# Patient Record
Sex: FEMALE | Race: WHITE | Employment: UNEMPLOYED | URBAN - METROPOLITAN AREA
[De-identification: names, ages, dates, MRNs, and addresses within clinical notes are randomized per-mention and may not be internally consistent; named-entity substitution may affect disease eponyms.]

---

## 2017-03-09 ENCOUNTER — HOSPITAL ENCOUNTER (EMERGENCY)
Age: 22
Discharge: HOME | End: 2017-03-09
Attending: EMERGENCY MEDICINE
Payer: MEDICAID

## 2017-03-09 VITALS
DIASTOLIC BLOOD PRESSURE: 80 MMHG | SYSTOLIC BLOOD PRESSURE: 121 MMHG | RESPIRATION RATE: 18 BRPM | HEART RATE: 76 BPM | HEIGHT: 70 IN | WEIGHT: 293 LBS | BODY MASS INDEX: 41.95 KG/M2 | TEMPERATURE: 98.3 F | OXYGEN SATURATION: 96 %

## 2017-03-09 DIAGNOSIS — R10.2 PELVIC PAIN: Primary | ICD-10-CM

## 2017-03-09 LAB
ALBUMIN SERPL BCP-MCNC: 3.4 G/DL (ref 3.5–5)
ALBUMIN/GLOB SERPL: 1 {RATIO} (ref 1.1–2.2)
ALP SERPL-CCNC: 62 U/L (ref 45–117)
ALT SERPL-CCNC: 63 U/L (ref 12–78)
ANION GAP BLD CALC-SCNC: 8 MMOL/L (ref 5–15)
APPEARANCE UR: CLEAR
AST SERPL W P-5'-P-CCNC: 26 U/L (ref 15–37)
BACTERIA URNS QL MICRO: NEGATIVE /HPF
BASOPHILS # BLD AUTO: 0 K/UL (ref 0–0.1)
BASOPHILS # BLD: 0 % (ref 0–1)
BILIRUB SERPL-MCNC: 0.6 MG/DL (ref 0.2–1)
BILIRUB UR QL: NEGATIVE
BUN SERPL-MCNC: 12 MG/DL (ref 6–20)
BUN/CREAT SERPL: 13 (ref 12–20)
CALCIUM SERPL-MCNC: 8.7 MG/DL (ref 8.5–10.1)
CHLORIDE SERPL-SCNC: 105 MMOL/L (ref 97–108)
CLUE CELLS VAG QL WET PREP: NORMAL
CO2 SERPL-SCNC: 28 MMOL/L (ref 21–32)
COLOR UR: ABNORMAL
CREAT SERPL-MCNC: 0.95 MG/DL (ref 0.55–1.02)
EOSINOPHIL # BLD: 0.2 K/UL (ref 0–0.4)
EOSINOPHIL NFR BLD: 2 % (ref 0–7)
EPITH CASTS URNS QL MICRO: ABNORMAL /LPF
ERYTHROCYTE [DISTWIDTH] IN BLOOD BY AUTOMATED COUNT: 13.1 % (ref 11.5–14.5)
GLOBULIN SER CALC-MCNC: 3.3 G/DL (ref 2–4)
GLUCOSE SERPL-MCNC: 107 MG/DL (ref 65–100)
GLUCOSE UR STRIP.AUTO-MCNC: NEGATIVE MG/DL
HCG UR QL: NEGATIVE
HCT VFR BLD AUTO: 40.6 % (ref 35–47)
HGB BLD-MCNC: 13.3 G/DL (ref 11.5–16)
HGB UR QL STRIP: NEGATIVE
KETONES UR QL STRIP.AUTO: NEGATIVE MG/DL
KOH PREP SPEC: NORMAL
LEUKOCYTE ESTERASE UR QL STRIP.AUTO: ABNORMAL
LYMPHOCYTES # BLD AUTO: 23 % (ref 12–49)
LYMPHOCYTES # BLD: 2 K/UL (ref 0.8–3.5)
MCH RBC QN AUTO: 28.5 PG (ref 26–34)
MCHC RBC AUTO-ENTMCNC: 32.8 G/DL (ref 30–36.5)
MCV RBC AUTO: 86.9 FL (ref 80–99)
MONOCYTES # BLD: 0.5 K/UL (ref 0–1)
MONOCYTES NFR BLD AUTO: 6 % (ref 5–13)
NEUTS SEG # BLD: 6 K/UL (ref 1.8–8)
NEUTS SEG NFR BLD AUTO: 69 % (ref 32–75)
NITRITE UR QL STRIP.AUTO: NEGATIVE
PH UR STRIP: 7 [PH] (ref 5–8)
PLATELET # BLD AUTO: 249 K/UL (ref 150–400)
POTASSIUM SERPL-SCNC: 4 MMOL/L (ref 3.5–5.1)
PROT SERPL-MCNC: 6.7 G/DL (ref 6.4–8.2)
PROT UR STRIP-MCNC: NEGATIVE MG/DL
RBC # BLD AUTO: 4.67 M/UL (ref 3.8–5.2)
RBC #/AREA URNS HPF: ABNORMAL /HPF (ref 0–5)
SERVICE CMNT-IMP: NORMAL
SODIUM SERPL-SCNC: 141 MMOL/L (ref 136–145)
SP GR UR REFRACTOMETRY: 1.02 (ref 1–1.03)
T VAGINALIS VAG QL WET PREP: NORMAL
UA: UC IF INDICATED,UAUC: ABNORMAL
UROBILINOGEN UR QL STRIP.AUTO: 0.2 EU/DL (ref 0.2–1)
WBC # BLD AUTO: 8.7 K/UL (ref 3.6–11)
WBC URNS QL MICRO: ABNORMAL /HPF (ref 0–4)

## 2017-03-09 PROCEDURE — 85025 COMPLETE CBC W/AUTO DIFF WBC: CPT | Performed by: PHYSICIAN ASSISTANT

## 2017-03-09 PROCEDURE — 74011000250 HC RX REV CODE- 250: Performed by: PHYSICIAN ASSISTANT

## 2017-03-09 PROCEDURE — 81025 URINE PREGNANCY TEST: CPT

## 2017-03-09 PROCEDURE — 87210 SMEAR WET MOUNT SALINE/INK: CPT | Performed by: PHYSICIAN ASSISTANT

## 2017-03-09 PROCEDURE — 36415 COLL VENOUS BLD VENIPUNCTURE: CPT | Performed by: PHYSICIAN ASSISTANT

## 2017-03-09 PROCEDURE — 81001 URINALYSIS AUTO W/SCOPE: CPT | Performed by: EMERGENCY MEDICINE

## 2017-03-09 PROCEDURE — 80053 COMPREHEN METABOLIC PANEL: CPT | Performed by: PHYSICIAN ASSISTANT

## 2017-03-09 PROCEDURE — 87491 CHLMYD TRACH DNA AMP PROBE: CPT | Performed by: PHYSICIAN ASSISTANT

## 2017-03-09 PROCEDURE — 74011250636 HC RX REV CODE- 250/636: Performed by: PHYSICIAN ASSISTANT

## 2017-03-09 PROCEDURE — 74011250637 HC RX REV CODE- 250/637: Performed by: PHYSICIAN ASSISTANT

## 2017-03-09 PROCEDURE — 96374 THER/PROPH/DIAG INJ IV PUSH: CPT

## 2017-03-09 PROCEDURE — 96372 THER/PROPH/DIAG INJ SC/IM: CPT

## 2017-03-09 PROCEDURE — 99284 EMERGENCY DEPT VISIT MOD MDM: CPT

## 2017-03-09 RX ORDER — KETOROLAC TROMETHAMINE 30 MG/ML
30 INJECTION, SOLUTION INTRAMUSCULAR; INTRAVENOUS
Status: COMPLETED | OUTPATIENT
Start: 2017-03-09 | End: 2017-03-09

## 2017-03-09 RX ORDER — AZITHROMYCIN 250 MG/1
1000 TABLET, FILM COATED ORAL
Status: COMPLETED | OUTPATIENT
Start: 2017-03-09 | End: 2017-03-09

## 2017-03-09 RX ORDER — ONDANSETRON 8 MG/1
8 TABLET, ORALLY DISINTEGRATING ORAL
Qty: 6 TAB | Refills: 0 | Status: SHIPPED | OUTPATIENT
Start: 2017-03-09 | End: 2017-04-21

## 2017-03-09 RX ORDER — NAPROXEN 500 MG/1
500 TABLET ORAL
Qty: 20 TAB | Refills: 0 | Status: SHIPPED | OUTPATIENT
Start: 2017-03-09 | End: 2017-03-19

## 2017-03-09 RX ORDER — ONDANSETRON 2 MG/ML
4 INJECTION INTRAMUSCULAR; INTRAVENOUS
Status: COMPLETED | OUTPATIENT
Start: 2017-03-09 | End: 2017-03-09

## 2017-03-09 RX ADMIN — AZITHROMYCIN 1000 MG: 250 TABLET, FILM COATED ORAL at 13:25

## 2017-03-09 RX ADMIN — ONDANSETRON HYDROCHLORIDE 4 MG: 2 INJECTION, SOLUTION INTRAMUSCULAR; INTRAVENOUS at 12:51

## 2017-03-09 RX ADMIN — LIDOCAINE HYDROCHLORIDE 250 MG: 10 INJECTION, SOLUTION EPIDURAL; INFILTRATION; INTRACAUDAL; PERINEURAL at 13:25

## 2017-03-09 RX ADMIN — KETOROLAC TROMETHAMINE 30 MG: 30 INJECTION, SOLUTION INTRAMUSCULAR at 13:50

## 2017-03-09 NOTE — ED NOTES
Patient (s)  given copy of dc instructions and 0 paper script(s) and 2 electronic scripts. Patient (s)  verbalized understanding of instructions and script (s). Patient given a current medication reconciliation form and verbalized understanding of their medications. Patient (s) verbalized understanding of the importance of discussing medications with his or her physician or clinic they will be following up with. Patient alert and oriented and in no acute distress. Patient offered wheelchair from treatment area to hospital entrance, patient denied wheelchair.

## 2017-03-09 NOTE — ED NOTES
Pt reported nausea,vomiting,abdominal pain,diarrhea, x 1 day. C/o dizziness about to pass out but didn't passed out,light headache. Emergency Department Nursing Plan of Care       The Nursing Plan of Care is developed from the Nursing assessment and Emergency Department Attending provider initial evaluation. The plan of care may be reviewed in the ED Provider note.     The Plan of Care was developed with the following considerations:   Patient / Family readiness to learn indicated by:verbalized understanding  Persons(s) to be included in education: patient  Barriers to Learning/Limitations:No    Signed     Marietta Root RN    3/9/2017   1:46 PM

## 2017-03-09 NOTE — ED PROVIDER NOTES
Patient is a 24 y.o. female presenting with abdominal pain. The history is provided by the patient. Abdominal Pain    This is a recurrent problem. Episode onset: pt reports intermittent pelvic pain x 1 wk, recently 2 days ago. The problem occurs constantly. The problem has not changed since onset. Pain location: pelvic pain. The quality of the pain is aching. The pain is at a severity of 7/10. The pain is moderate. Associated symptoms include nausea and vomiting. Pertinent negatives include no fever, no diarrhea, no dysuria and no frequency. Nothing worsens the pain. The pain is relieved by nothing.         Past Medical History:   Diagnosis Date    Acid reflux     Acute hepatitis 4/7/2014    Acute pharyngitis 1/22/2010    Acute sinusitis 1/12/2009    Anemia NEC     with pregnancy    Backache, unspecified 9/79/4009    Complication of anesthesia     difficulty waking after surgery (pt reported)    Essential hypertension     Before pregnancy    Flu 11/11/2009    GERD (gastroesophageal reflux disease) 7/31/2012    HX OTHER MEDICAL     GERD with pregnancy    HX OTHER MEDICAL     Migraines worse with pregnancy    Hypertension     IGT (impaired glucose tolerance) 6/27/2012    Migraine 3/22/2011    Mononucleosis 1/12/2010    Mononucleosis 1/12/2010    Morbid obesity ([address]        Past Surgical History:   Procedure Laterality Date    HX CHOLECYSTECTOMY      HX OTHER SURGICAL      gallbladder    HX TONSILLECTOMY      HX WISDOM TEETH EXTRACTION  4/4/2013    Dr. Pat Bachelor         Family History:   Problem Relation Age of Onset    Hypertension Mother     Alcohol abuse Father     Diabetes Maternal Grandmother        Social History     Social History    Marital status: SINGLE     Spouse name: N/A    Number of children: 1    Years of education: 12     Occupational History     Not Employed     grad from [employer] History Main Topics    Smoking status: Current Every Day Smoker     Packs/day: 0.25    Smokeless tobacco: Never Used    Alcohol use No    Drug use: No    Sexual activity: Yes     Partners: Female     Birth control/ protection: Inserts     Other Topics Concern    Not on file     Social History Narrative         ALLERGIES: Latex; Compazine [prochlorperazine edisylate]; Aspirin; and Wellbutrin [bupropion hcl]    Review of Systems   Constitutional: Negative for fever. Gastrointestinal: Positive for abdominal pain, nausea and vomiting. Negative for diarrhea. Genitourinary: Positive for menstrual problem, pelvic pain and vaginal discharge. Negative for dysuria and frequency. Skin: Negative. Neurological: Positive for dizziness and light-headedness. Vitals:    03/09/17 1146   BP: (!) 142/96   Pulse: 78   Resp: 18   Temp: 98.3 °F (36.8 °C)   SpO2: 96%   Weight: 134.7 kg (297 lb)   Height: 5' 10\" (1.778 m)            Physical Exam   Constitutional: She is oriented to person, place, and time. She appears well-developed and well-nourished. No distress. HENT:   Head: Normocephalic and atraumatic. Eyes: Conjunctivae are normal.   Cardiovascular: Normal rate, regular rhythm and normal heart sounds. Pulmonary/Chest: Effort normal and breath sounds normal. No respiratory distress. She has no wheezes. She has no rales. Abdominal: Soft. Bowel sounds are normal. She exhibits distension (obese). There is no tenderness. There is no rebound and no guarding. Musculoskeletal: Normal range of motion. Neurological: She is alert and oriented to person, place, and time. Skin: Skin is warm and dry. Psychiatric: She has a normal mood and affect. Her behavior is normal. Judgment and thought content normal.   Nursing note and vitals reviewed. MDM  Number of Diagnoses or Management Options  Diagnosis management comments: DDX: UTI, PID, dehydration, electrolyte imbalance, gastritis, STI, pregnancy    Discharge Note:  1:50 PM  Discussed lab results with pt and friend.   The patient is ready for discharge. The patient's signs, symptoms, diagnosis, and discharge instruction have been discussed and the patient has conveyed their understanding. The patient is to follow up as recommended or return to the ER should their symptoms worsen. Plan has been discussed and the patient is in agreement.        Amount and/or Complexity of Data Reviewed  Clinical lab tests: ordered and reviewed      ED Course       Procedures

## 2017-03-09 NOTE — LETTER
[facility] EMERGENCY DEPT 
[address] [facility] [address] [id number] [phone] Work/School Note Date: 3/9/2017 To Whom It May concern: 
 
Maria M Em was seen and treated today in the emergency room by the following provider(s): 
Attending Provider: Luiz Porter MD 
Physician Assistant: Aura Ibrahim PA-C. Maria M Em may return to work on 3/11/17. Sincerely, Aura Ibrahim PA-C

## 2017-03-09 NOTE — DISCHARGE INSTRUCTIONS
Pelvic Pain: Care Instructions  Your Care Instructions    Pelvic pain, or pain in the lower belly, can have many causes. Often pelvic pain is not serious and gets better in a few days. If your pain continues or gets worse, you may need tests and treatment. Tell your doctor about any new symptoms. These may be signs of a serious problem. Follow-up care is a key part of your treatment and safety. Be sure to make and go to all appointments, and call your doctor if you are having problems. It's also a good idea to know your test results and keep a list of the medicines you take. How can you care for yourself at home? · Rest until you feel better. Lie down, and raise your legs by placing a pillow under your knees. · Drink plenty of fluids. You may find that small, frequent sips are easier on your stomach than if you drink a lot at once. Avoid drinks with carbonation or caffeine, such as soda pop, tea, or coffee. · Try eating several small meals instead of 2 or 3 large ones. Eat mild foods, such as rice, dry toast or crackers, bananas, and applesauce. Avoid fatty and spicy foods, other fruits, and alcohol until 48 hours after your symptoms have gone away. · Take an over-the-counter pain medicine, such as acetaminophen (Tylenol), ibuprofen (Advil, Motrin), or naproxen (Aleve). Read and follow all instructions on the label. · Do not take two or more pain medicines at the same time unless the doctor told you to. Many pain medicines have acetaminophen, which is Tylenol. Too much acetaminophen (Tylenol) can be harmful. · You can put a heating pad, a warm cloth, or moist heat on your belly to relieve pain. When should you call for help? Call 911 anytime you think you may need emergency care. For example, call if:  · You passed out (lost consciousness). Call your doctor now or seek immediate medical care if:  · Your pain is getting worse. · Your pain becomes focused in one area of your belly.   · You have severe vaginal bleeding. Severe means that you are soaking through your usual pads or tampons every hour for 2 or more hours and passing clots of blood. · You have new symptoms such as fever, urinary problems or unexpected vaginal bleeding. · You are dizzy or lightheaded, or you feel like you may faint. Watch closely for changes in your health, and be sure to contact your doctor if:  · You do not get better as expected. Where can you learn more? Go to http://adelfo-paul.info/. Enter [phone] in the search box to learn more about \"Pelvic Pain: Care Instructions. \"  Current as of: February 25, 2016  Content Version: 11.1  © 2416-5158 [employer], [employer]. Care instructions adapted under license by [employer] (which disclaims liability or warranty for this information). If you have questions about a medical condition or this instruction, always ask your healthcare professional. [address] any warranty or liability for your use of this information.

## 2017-03-10 LAB
C TRACH DNA SPEC QL NAA+PROBE: NEGATIVE
N GONORRHOEA DNA SPEC QL NAA+PROBE: NEGATIVE
SAMPLE TYPE: NORMAL
SERVICE CMNT-IMP: NORMAL
SPECIMEN SOURCE: NORMAL

## 2017-04-21 ENCOUNTER — APPOINTMENT (OUTPATIENT)
Dept: ULTRASOUND IMAGING | Age: 22
End: 2017-04-21
Attending: EMERGENCY MEDICINE
Payer: MEDICAID

## 2017-04-21 ENCOUNTER — HOSPITAL ENCOUNTER (EMERGENCY)
Age: 22
Discharge: HOME | End: 2017-04-21
Attending: EMERGENCY MEDICINE
Payer: MEDICAID

## 2017-04-21 VITALS
BODY MASS INDEX: 41.95 KG/M2 | HEART RATE: 96 BPM | OXYGEN SATURATION: 98 % | DIASTOLIC BLOOD PRESSURE: 99 MMHG | RESPIRATION RATE: 20 BRPM | HEIGHT: 70 IN | WEIGHT: 293 LBS | TEMPERATURE: 98.7 F | SYSTOLIC BLOOD PRESSURE: 141 MMHG

## 2017-04-21 DIAGNOSIS — R11.2 NON-INTRACTABLE VOMITING WITH NAUSEA, UNSPECIFIED VOMITING TYPE: ICD-10-CM

## 2017-04-21 DIAGNOSIS — Z34.91 FIRST TRIMESTER PREGNANCY: ICD-10-CM

## 2017-04-21 DIAGNOSIS — N30.00 ACUTE CYSTITIS WITHOUT HEMATURIA: Primary | ICD-10-CM

## 2017-04-21 LAB
ANION GAP BLD CALC-SCNC: 9 MMOL/L (ref 5–15)
APPEARANCE UR: CLEAR
BACTERIA URNS QL MICRO: ABNORMAL /HPF
BASOPHILS # BLD AUTO: 0 K/UL (ref 0–0.1)
BASOPHILS # BLD: 0 % (ref 0–1)
BILIRUB UR QL: NEGATIVE
BUN SERPL-MCNC: 11 MG/DL (ref 6–20)
BUN/CREAT SERPL: 14 (ref 12–20)
CALCIUM SERPL-MCNC: 8.5 MG/DL (ref 8.5–10.1)
CHLORIDE SERPL-SCNC: 104 MMOL/L (ref 97–108)
CO2 SERPL-SCNC: 27 MMOL/L (ref 21–32)
COLOR UR: ABNORMAL
CREAT SERPL-MCNC: 0.78 MG/DL (ref 0.55–1.02)
EOSINOPHIL # BLD: 0.2 K/UL (ref 0–0.4)
EOSINOPHIL NFR BLD: 2 % (ref 0–7)
EPITH CASTS URNS QL MICRO: ABNORMAL /LPF
ERYTHROCYTE [DISTWIDTH] IN BLOOD BY AUTOMATED COUNT: 13.1 % (ref 11.5–14.5)
GLUCOSE SERPL-MCNC: 79 MG/DL (ref 65–100)
GLUCOSE UR STRIP.AUTO-MCNC: NEGATIVE MG/DL
HCG SERPL-ACNC: ABNORMAL MIU/ML (ref 0–6)
HCG UR QL: POSITIVE
HCT VFR BLD AUTO: 39.1 % (ref 35–47)
HGB BLD-MCNC: 12.9 G/DL (ref 11.5–16)
HGB UR QL STRIP: ABNORMAL
KETONES UR QL STRIP.AUTO: NEGATIVE MG/DL
LEUKOCYTE ESTERASE UR QL STRIP.AUTO: ABNORMAL
LYMPHOCYTES # BLD AUTO: 23 % (ref 12–49)
LYMPHOCYTES # BLD: 2.4 K/UL (ref 0.8–3.5)
MCH RBC QN AUTO: 28.5 PG (ref 26–34)
MCHC RBC AUTO-ENTMCNC: 33 G/DL (ref 30–36.5)
MCV RBC AUTO: 86.3 FL (ref 80–99)
MONOCYTES # BLD: 0.8 K/UL (ref 0–1)
MONOCYTES NFR BLD AUTO: 8 % (ref 5–13)
NEUTS SEG # BLD: 6.8 K/UL (ref 1.8–8)
NEUTS SEG NFR BLD AUTO: 67 % (ref 32–75)
NITRITE UR QL STRIP.AUTO: NEGATIVE
PH UR STRIP: 6.5 [PH] (ref 5–8)
PLATELET # BLD AUTO: 226 K/UL (ref 150–400)
POTASSIUM SERPL-SCNC: 4.2 MMOL/L (ref 3.5–5.1)
PROT UR STRIP-MCNC: NEGATIVE MG/DL
RBC # BLD AUTO: 4.53 M/UL (ref 3.8–5.2)
RBC #/AREA URNS HPF: ABNORMAL /HPF (ref 0–5)
SODIUM SERPL-SCNC: 140 MMOL/L (ref 136–145)
SP GR UR REFRACTOMETRY: 1.02 (ref 1–1.03)
UA: UC IF INDICATED,UAUC: ABNORMAL
UROBILINOGEN UR QL STRIP.AUTO: 1 EU/DL (ref 0.2–1)
WBC # BLD AUTO: 10.3 K/UL (ref 3.6–11)
WBC URNS QL MICRO: ABNORMAL /HPF (ref 0–4)

## 2017-04-21 PROCEDURE — 76801 OB US < 14 WKS SINGLE FETUS: CPT

## 2017-04-21 PROCEDURE — 81025 URINE PREGNANCY TEST: CPT

## 2017-04-21 PROCEDURE — 87147 CULTURE TYPE IMMUNOLOGIC: CPT | Performed by: EMERGENCY MEDICINE

## 2017-04-21 PROCEDURE — 87086 URINE CULTURE/COLONY COUNT: CPT | Performed by: EMERGENCY MEDICINE

## 2017-04-21 PROCEDURE — 74011250636 HC RX REV CODE- 250/636: Performed by: EMERGENCY MEDICINE

## 2017-04-21 PROCEDURE — 85025 COMPLETE CBC W/AUTO DIFF WBC: CPT | Performed by: EMERGENCY MEDICINE

## 2017-04-21 PROCEDURE — 96361 HYDRATE IV INFUSION ADD-ON: CPT

## 2017-04-21 PROCEDURE — 80048 BASIC METABOLIC PNL TOTAL CA: CPT | Performed by: EMERGENCY MEDICINE

## 2017-04-21 PROCEDURE — 99283 EMERGENCY DEPT VISIT LOW MDM: CPT

## 2017-04-21 PROCEDURE — 81001 URINALYSIS AUTO W/SCOPE: CPT | Performed by: EMERGENCY MEDICINE

## 2017-04-21 PROCEDURE — 96374 THER/PROPH/DIAG INJ IV PUSH: CPT

## 2017-04-21 PROCEDURE — 36415 COLL VENOUS BLD VENIPUNCTURE: CPT | Performed by: EMERGENCY MEDICINE

## 2017-04-21 PROCEDURE — 84702 CHORIONIC GONADOTROPIN TEST: CPT | Performed by: EMERGENCY MEDICINE

## 2017-04-21 RX ORDER — SODIUM CHLORIDE 0.9 % (FLUSH) 0.9 %
5-10 SYRINGE (ML) INJECTION EVERY 8 HOURS
Status: DISCONTINUED | OUTPATIENT
Start: 2017-04-21 | End: 2017-04-21 | Stop reason: HOSPADM

## 2017-04-21 RX ORDER — SODIUM CHLORIDE 0.9 % (FLUSH) 0.9 %
5-10 SYRINGE (ML) INJECTION AS NEEDED
Status: DISCONTINUED | OUTPATIENT
Start: 2017-04-21 | End: 2017-04-21 | Stop reason: HOSPADM

## 2017-04-21 RX ORDER — NITROFURANTOIN 25; 75 MG/1; MG/1
100 CAPSULE ORAL 2 TIMES DAILY
Qty: 10 CAP | Refills: 0 | Status: SHIPPED | OUTPATIENT
Start: 2017-04-21 | End: 2017-04-26

## 2017-04-21 RX ORDER — METOCLOPRAMIDE HYDROCHLORIDE 5 MG/ML
10 INJECTION INTRAMUSCULAR; INTRAVENOUS
Status: COMPLETED | OUTPATIENT
Start: 2017-04-21 | End: 2017-04-21

## 2017-04-21 RX ORDER — METOCLOPRAMIDE 10 MG/1
10 TABLET ORAL
Qty: 12 TAB | Refills: 0 | Status: SHIPPED | OUTPATIENT
Start: 2017-04-21 | End: 2017-05-01

## 2017-04-21 RX ADMIN — SODIUM CHLORIDE 1000 ML: 900 INJECTION, SOLUTION INTRAVENOUS at 17:27

## 2017-04-21 RX ADMIN — METOCLOPRAMIDE 10 MG: 5 INJECTION, SOLUTION INTRAMUSCULAR; INTRAVENOUS at 17:27

## 2017-04-21 NOTE — ED NOTES
Patient (s) was given copy of dc instructions and no paper script(s) and 1 electronic scripts. Patient (s)  verbalized understanding of instructions and script (s). Patient given a current medication reconciliation form and verbalized understanding of their medications. Patient (s) verbalized understanding of the importance of discussing medications with  his or her physician or clinic they will be following up with. Patient alert and oriented and in no acute distress. Patient offered wheelchair from treatment area to hospital entrance, patient declined wheelchair.

## 2017-04-21 NOTE — LETTER
[facility] EMERGENCY DEPT 
[address] [facility] [address] [address] 
[phone] Work/School Note Date: 4/21/2017 To Whom It May concern: 
 
Maria M Jaramillo was seen and treated today in the emergency room by the following provider(s): 
Attending Provider: Alok Montero MD. Maria M Jaramillo may return to work on 04/24/2017.  
 
Sincerely, 
 
 
 
 
Dr. Alok Montero MD

## 2017-04-21 NOTE — ED NOTES
.See Nursing Assessment      Emergency Department Nursing Plan of Care       The Nursing Plan of Care is developed from the Nursing assessment and Emergency Department Attending provider initial evaluation. The plan of care may be reviewed in the ED Provider note.     The Plan of Care was developed with the following considerations:   Patient / Family readiness to learn indicated by:verbalized understanding  Persons(s) to be included in education: patient  Barriers to Learning/Limitations:No    Signed     Marc Gentile RN    4/21/2017   5:07 PM

## 2017-04-21 NOTE — ED PROVIDER NOTES
HPI Comments: Maria M Gore is a 9 weeks pregnant 24 y.o. female with PMHx significant for acute pharyngitis, mononucleosis, GERD, migraines and essential HTN presenting ambulatory to [facility] ED with c/o constant nausea, vomiting and diarrhea x 2 days. The pt notes additional sx of 6/10 cramping abdominal pain. She states that she has not been able to keep food or liquids down secondary to her sx. She denies vaginal bleeding, vaginal discharge or taking any prenatal vitamins. PCP: Lizzy Guzmán MD  PSHx: cholecystectomy, tonsillectomy, wisdom teeth extraction   Social History:  (+) Tobacco (0.25 ppd),   (-) EtOH,      (-) Drugs     There are no other complaints, changes, or physical findings at this time. The history is provided by the patient.         Past Medical History:   Diagnosis Date    Acid reflux     Acute hepatitis 4/7/2014    Acute pharyngitis 1/22/2010    Acute sinusitis 1/12/2009    Anemia NEC     with pregnancy    Backache, unspecified 1/38/0307    Complication of anesthesia     difficulty waking after surgery (pt reported)    Essential hypertension     Before pregnancy    Flu 11/11/2009    GERD (gastroesophageal reflux disease) 7/31/2012    HX OTHER MEDICAL     GERD with pregnancy    HX OTHER MEDICAL     Migraines worse with pregnancy    Hypertension     IGT (impaired glucose tolerance) 6/27/2012    Migraine 3/22/2011    Mononucleosis 1/12/2010    Mononucleosis 1/12/2010    Morbid obesity ([address]        Past Surgical History:   Procedure Laterality Date    HX CHOLECYSTECTOMY      HX OTHER SURGICAL      gallbladder    HX TONSILLECTOMY      HX WISDOM TEETH EXTRACTION  4/4/2013    Dr. Karin Murphy         Family History:   Problem Relation Age of Onset    Hypertension Mother     Alcohol abuse Father     Diabetes Maternal Grandmother        Social History     Social History    Marital status: SINGLE     Spouse name: N/A    Number of children: 1    Years of education: 15 Occupational History     Not Employed     grad from [employer] History Main Topics    Smoking status: Current Every Day Smoker     Packs/day: 0.25    Smokeless tobacco: Never Used    Alcohol use No    Drug use: No    Sexual activity: Yes     Partners: Female     Birth control/ protection: Inserts     Other Topics Concern    Not on file     Social History Narrative         ALLERGIES: Latex; Compazine [prochlorperazine edisylate]; Aspirin; and Wellbutrin [bupropion hcl]    Review of Systems   Constitutional: Negative. Negative for appetite change, chills and fever. HENT: Negative. Negative for congestion. Eyes: Negative for visual disturbance. Respiratory: Negative. Negative for cough, shortness of breath and wheezing. Cardiovascular: Negative for chest pain, palpitations and leg swelling. Gastrointestinal: Positive for abdominal pain, diarrhea, nausea and vomiting. Genitourinary: Negative. Negative for dysuria, frequency and urgency. Musculoskeletal: Negative for back pain, joint swelling, myalgias and neck stiffness. Skin: Negative for rash. Neurological: Negative. Negative for dizziness, syncope, weakness and headaches. Hematological: Negative for adenopathy. Psychiatric/Behavioral: Negative for behavioral problems and dysphoric mood. All other systems reviewed and are negative. Vitals:    04/21/17 1651   BP: (!) 141/99   Pulse: 96   Resp: 20   Temp: 98.7 °F (37.1 °C)   SpO2: 98%   Weight: 142 kg (313 lb)   Height: 5' 10\" (1.778 m)            Physical Exam   Constitutional: She is oriented to person, place, and time. She appears well-developed and well-nourished. No distress. Morbidly obese    HENT:   Head: Normocephalic and atraumatic. Mouth/Throat: Oropharynx is clear and moist.   Eyes: Conjunctivae and EOM are normal. Pupils are equal, round, and reactive to light. No scleral icterus. Neck: Normal range of motion. Neck supple.    Cardiovascular: Normal rate and regular rhythm. Exam reveals no gallop. No murmur heard. Pulmonary/Chest: Effort normal. No stridor. No respiratory distress. She has no wheezes. She has no rales. Abdominal: Soft. Bowel sounds are normal. She exhibits no distension and no mass. There is no tenderness. There is no rebound and no guarding. Musculoskeletal: Normal range of motion. She exhibits no edema. Lymphadenopathy:     She has no cervical adenopathy. Neurological: She is alert and oriented to person, place, and time. No cranial nerve deficit. Coordination normal.   Skin: Skin is warm and dry. No rash noted. No erythema. Psychiatric: She has a normal mood and affect. Nursing note and vitals reviewed. MDM  Number of Diagnoses or Management Options  Acute cystitis without hematuria:   First trimester pregnancy:   Non-intractable vomiting with nausea, unspecified vomiting type:   Diagnosis management comments:   DDx: hyperemesis, pregnancy, UTI, dehydration        Amount and/or Complexity of Data Reviewed  Clinical lab tests: reviewed and ordered  Review and summarize past medical records: yes    Patient Progress  Patient progress: stable    ED Course       Procedures    6:34 PM  The patient states that their symptoms have resolved and they feel much better. There are no other new complaints at this time. Her questions have been answered. 6:36 PM  Maria M Britt's final results have been reviewed with her. She has been counseled regarding her diagnosis. She verbally conveys understanding and agreement of the signs, symptoms, diagnosis, treatment and prognosis .      LABORATORY TESTS:  Recent Results (from the past 12 hour(s))   CBC WITH AUTOMATED DIFF    Collection Time: 04/21/17  5:15 PM   Result Value Ref Range    WBC 10.3 3.6 - 11.0 K/uL    RBC 4.53 3.80 - 5.20 M/uL    HGB 12.9 11.5 - 16.0 g/dL    HCT 39.1 35.0 - 47.0 %    MCV 86.3 80.0 - 99.0 FL    MCH 28.5 26.0 - 34.0 PG    MCHC 33.0 30.0 - 36.5 g/dL    RDW 13.1 11.5 - 14.5 %    PLATELET 665 592 - 243 K/uL    NEUTROPHILS 67 32 - 75 %    LYMPHOCYTES 23 12 - 49 %    MONOCYTES 8 5 - 13 %    EOSINOPHILS 2 0 - 7 %    BASOPHILS 0 0 - 1 %    ABS. NEUTROPHILS 6.8 1.8 - 8.0 K/UL    ABS. LYMPHOCYTES 2.4 0.8 - 3.5 K/UL    ABS. MONOCYTES 0.8 0.0 - 1.0 K/UL    ABS. EOSINOPHILS 0.2 0.0 - 0.4 K/UL    ABS. BASOPHILS 0.0 0.0 - 0.1 K/UL   TOTAL HCG, QT.     Collection Time: 04/21/17  5:15 PM   Result Value Ref [place]    [place] [address] (H) 0 - 6 MIU/ML   METABOLIC PANEL, BASIC    Collection Time: 04/21/17  5:15 PM   Result Value Ref Range    Sodium 140 136 - 145 mmol/L    Potassium 4.2 3.5 - 5.1 mmol/L    Chloride 104 97 - 108 mmol/L    CO2 27 21 - 32 mmol/L    Anion gap 9 5 - 15 mmol/L    Glucose 79 65 - 100 mg/dL    BUN 11 6 - 20 MG/DL    Creatinine 0.78 0.55 - 1.02 MG/DL    BUN/Creatinine ratio 14 12 - 20      GFR est AA >60 >60 ml/min/1.73m2    GFR est non-AA >60 >60 ml/min/1.73m2    Calcium 8.5 8.5 - 10.1 MG/DL   URINALYSIS W/ REFLEX CULTURE    Collection Time: 04/21/17  5:15 PM   Result Value Ref Range    Color YELLOW/STRAW      Appearance CLEAR CLEAR      Specific gravity 1.025 1.003 - 1.030      pH (UA) 6.5 5.0 - 8.0      Protein NEGATIVE  NEG mg/dL    Glucose NEGATIVE  NEG mg/dL    Ketone NEGATIVE  NEG mg/dL    Bilirubin NEGATIVE  NEG      Blood TRACE (A) NEG      Urobilinogen 1.0 0.2 - 1.0 EU/dL    Nitrites NEGATIVE  NEG      Leukocyte Esterase LARGE (A) NEG      WBC 5-10 0 - 4 /hpf    RBC 0-5 0 - 5 /hpf    Epithelial cells FEW FEW /lpf    Bacteria 1+ (A) NEG /hpf    UA:UC IF INDICATED URINE CULTURE ORDERED (A) CNI     HCG URINE, QL. - POC    Collection Time: 04/21/17  5:16 PM   Result Value Ref Range    Pregnancy test,urine (POC) POSITIVE (A) NEG           IMAGING RESULTS:  US PREG UTS < 14 WKS SNGL   Final Result      EXAM: US PREG UTS < 14 WKS SNGL     INDICATION: first trimester pregnancy assessment     COMPARISON: None.     TECHNIQUE:  Realtime sonography of the pelvis was performed transabdominally with multiple  static images obtained.      FINDINGS:  The examination demonstrates a single intrauterine pregnancy with a crown-rump  length of 0.84 cm and estimated gestational age of 7 weeks, 5 days. Fetal  cardiac activity is identified with a fetal heart rate of 146 beats per minute. The placenta is not seen at this early stage. There is adequate amniotic fluid. The ovaries are normal in appearance of bilateral blood flow demonstrated. The  right ovary measures 2.4 x 3.0 x 3.0 cm and the left ovary measures 1.3 x 3.3 x  2.5 cm. .     IMPRESSION  IMPRESSION: Single live 6 weeks, 5 days intrauterine pregnancy. MEDICATIONS GIVEN:  Medications   sodium chloride (NS) flush 5-10 mL (not administered)   sodium chloride (NS) flush 5-10 mL (not administered)   metoclopramide HCl (REGLAN) injection 10 mg (10 mg IntraVENous Given 4/21/17 9424)   sodium chloride 0.9 % bolus infusion 1,000 mL (0 mL IntraVENous IV Completed 4/21/17 1804)       IMPRESSION:  1. Acute cystitis without hematuria    2. First trimester pregnancy    3. Non-intractable vomiting with nausea, unspecified vomiting type        PLAN:  1. Current Discharge Medication List      START taking these medications    Details   nitrofurantoin, macrocrystal-monohydrate, (MACROBID) 100 mg capsule Take 1 Cap by mouth two (2) times a day for 5 days. Qty: 10 Cap, Refills: 0           2. Follow-up Information     Follow up With Details Comments Contact Info    Freddie Leach MD In 3 days  [address]  [phone]          Return to ED if worse     DISCHARGE NOTE  6:36 PM  The patient has been re-evaluated and is ready for discharge. Reviewed available results with patient. Counseled pt on diagnosis and care plan. Pt has expressed understanding, and all questions have been answered. Pt agrees with plan and agrees to F/U as recommended, or return to the ED if their sxs worsen.  Discharge instructions have been provided and explained to the pt, along with reasons to return to the ED. This note is prepared by Frank Ricci, acting as Scribe for Dr. Cara Campa MD.    Dr. Cara Campa MD: The scribe's documentation has been prepared under my direction and personally reviewed by me in its entirety. I confirm that the note above accurately reflects all work, treatment, procedures, and medical decision making performed by me.

## 2017-04-22 LAB
BACTERIA SPEC CULT: ABNORMAL
BACTERIA SPEC CULT: ABNORMAL
CC UR VC: ABNORMAL
SERVICE CMNT-IMP: ABNORMAL

## 2017-04-26 LAB
ANTIBODY SCREEN, EXTERNAL: NORMAL
CHLAMYDIA, EXTERNAL: NORMAL
HBSAG, EXTERNAL: NORMAL
HIV, EXTERNAL: NON REACTIVE
N. GONORRHEA, EXTERNAL: NORMAL
RPR, EXTERNAL: NON REACTIVE
RUBELLA, EXTERNAL: NORMAL

## 2017-05-12 ENCOUNTER — HOSPITAL ENCOUNTER (EMERGENCY)
Age: 22
Discharge: HOME | End: 2017-05-12
Attending: EMERGENCY MEDICINE
Payer: MEDICAID

## 2017-05-12 VITALS
HEART RATE: 95 BPM | SYSTOLIC BLOOD PRESSURE: 169 MMHG | BODY MASS INDEX: 41.95 KG/M2 | OXYGEN SATURATION: 98 % | RESPIRATION RATE: 16 BRPM | TEMPERATURE: 98.4 F | WEIGHT: 293 LBS | DIASTOLIC BLOOD PRESSURE: 103 MMHG | HEIGHT: 70 IN

## 2017-05-12 DIAGNOSIS — O26.891 ABDOMINAL PAIN IN PREGNANCY, FIRST TRIMESTER: ICD-10-CM

## 2017-05-12 DIAGNOSIS — L03.311 CELLULITIS, ABDOMINAL WALL: ICD-10-CM
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
## 2017-05-12 DIAGNOSIS — N30.00 ACUTE CYSTITIS WITHOUT HEMATURIA: ICD-10-CM

## 2017-05-12 DIAGNOSIS — R10.9 ABDOMINAL PAIN IN PREGNANCY, FIRST TRIMESTER: ICD-10-CM

## 2017-05-12 LAB
APPEARANCE UR: ABNORMAL
BACTERIA URNS QL MICRO: ABNORMAL /HPF
BILIRUB UR QL: NEGATIVE
CLUE CELLS VAG QL WET PREP: NORMAL
COLOR UR: ABNORMAL
EPITH CASTS URNS QL MICRO: ABNORMAL /LPF
GLUCOSE UR STRIP.AUTO-MCNC: NEGATIVE MG/DL
HGB UR QL STRIP: NEGATIVE
KETONES UR QL STRIP.AUTO: NEGATIVE MG/DL
KOH PREP SPEC: NORMAL
LEUKOCYTE ESTERASE UR QL STRIP.AUTO: ABNORMAL
NITRITE UR QL STRIP.AUTO: NEGATIVE
PH UR STRIP: 6 [PH] (ref 5–8)
PROT UR STRIP-MCNC: NEGATIVE MG/DL
RBC #/AREA URNS HPF: ABNORMAL /HPF (ref 0–5)
SERVICE CMNT-IMP: NORMAL
SP GR UR REFRACTOMETRY: 1.02 (ref 1–1.03)
T VAGINALIS VAG QL WET PREP: NORMAL
UA: UC IF INDICATED,UAUC: ABNORMAL
UROBILINOGEN UR QL STRIP.AUTO: 1 EU/DL (ref 0.2–1)
WBC URNS QL MICRO: ABNORMAL /HPF (ref 0–4)

## 2017-05-12 PROCEDURE — 87210 SMEAR WET MOUNT SALINE/INK: CPT | Performed by: PHYSICIAN ASSISTANT

## 2017-05-12 PROCEDURE — 87086 URINE CULTURE/COLONY COUNT: CPT | Performed by: PHYSICIAN ASSISTANT

## 2017-05-12 PROCEDURE — 99284 EMERGENCY DEPT VISIT MOD MDM: CPT

## 2017-05-12 PROCEDURE — 74011000250 HC RX REV CODE- 250: Performed by: PHYSICIAN ASSISTANT

## 2017-05-12 PROCEDURE — 96372 THER/PROPH/DIAG INJ SC/IM: CPT

## 2017-05-12 PROCEDURE — 87491 CHLMYD TRACH DNA AMP PROBE: CPT | Performed by: PHYSICIAN ASSISTANT

## 2017-05-12 PROCEDURE — 74011250636 HC RX REV CODE- 250/636: Performed by: PHYSICIAN ASSISTANT

## 2017-05-12 PROCEDURE — 87147 CULTURE TYPE IMMUNOLOGIC: CPT | Performed by: PHYSICIAN ASSISTANT

## 2017-05-12 PROCEDURE — 74011250637 HC RX REV CODE- 250/637: Performed by: PHYSICIAN ASSISTANT

## 2017-05-12 PROCEDURE — 81001 URINALYSIS AUTO W/SCOPE: CPT | Performed by: PHYSICIAN ASSISTANT

## 2017-05-12 RX ORDER — ONDANSETRON 4 MG/1
4 TABLET, ORALLY DISINTEGRATING ORAL
Status: COMPLETED | OUTPATIENT
Start: 2017-05-12 | End: 2017-05-12

## 2017-05-12 RX ORDER — ACETAMINOPHEN 500 MG
1000 TABLET ORAL
Status: DISCONTINUED | OUTPATIENT
Start: 2017-05-12 | End: 2017-05-12 | Stop reason: HOSPADM

## 2017-05-12 RX ORDER — AZITHROMYCIN 250 MG/1
1000 TABLET, FILM COATED ORAL
Status: COMPLETED | OUTPATIENT
Start: 2017-05-12 | End: 2017-05-12

## 2017-05-12 RX ORDER — CEPHALEXIN 500 MG/1
500 CAPSULE ORAL 3 TIMES DAILY
Qty: 21 CAP | Refills: 0 | Status: SHIPPED | OUTPATIENT
Start: 2017-05-12 | End: 2017-05-19

## 2017-05-12 RX ADMIN — AZITHROMYCIN 1000 MG: 250 TABLET, FILM COATED ORAL at 19:13

## 2017-05-12 RX ADMIN — ONDANSETRON 4 MG: 4 TABLET, ORALLY DISINTEGRATING ORAL at 19:14

## 2017-05-12 RX ADMIN — LIDOCAINE HYDROCHLORIDE 250 MG: 10 INJECTION, SOLUTION EPIDURAL; INFILTRATION; INTRACAUDAL; PERINEURAL at 19:13

## 2017-05-12 NOTE — ED NOTES
Assumed care of patient from triage. Patient alert and oriented x4. Patient reports she tripped walking up the stairs today and hit lower abd on step. Reports she is 10 wks pregnant. LMP March 3rd. Denies any bleeding. Patient reports she sees Dr. Shawn Barros at [facility]. Patient also reports a circular area of redness to right lower abd x2-3 days and states she thinks she was bit by something. Denies seeing insect. Reports area has increases in redness. Patient denies any other complaints at this time. Emergency Department Nursing Plan of Care       The Nursing Plan of Care is developed from the Nursing assessment and Emergency Department Attending provider initial evaluation. The plan of care may be reviewed in the ED Provider note.     The Plan of Care was developed with the following considerations:   Patient / Family readiness to learn indicated by:verbalized understanding  Persons(s) to be included in education: patient  Barriers to Learning/Limitations:No    Signed     Cristina El RN    5/12/2017   6:34 PM

## 2017-05-12 NOTE — ED NOTES
Bedside and Verbal shift change report given to me (oncoming nurse) by Kamari Gaytan RN  (offgoing nurse). Report included the following information SBAR, Kardex, ED Summary, Intake/Output, MAR and Recent Results.

## 2017-05-12 NOTE — ED TRIAGE NOTES
Pt reports she is 10 weeks pregnant, missed her OBGYN appt yesterday, tripped on slippery steps 30 minutes PTA, denies hitting head or LOC, reports some abdominal cramping since the fall, pt has a red, swollen bite kandice to right abdomen, reports pain worsening since last night.

## 2017-05-12 NOTE — ED NOTES
Patient now mentions that she has been having vaginal discharge for awhile with odor x3 days. Reports concerns for STDs.

## 2017-05-12 NOTE — ED PROVIDER NOTES
Patient is a 24 y.o. female presenting with fall and rash. The history is provided by the patient. Fall   The accident occurred less than 1 hour ago. Fall occurred: while walking up the steps to her home pt states she tripped and fell.  she states she caught herself with the upper part of hand but hit her lower abd. Pt reports she has been having abd cramping prior to fall, states she is high risk pregnancy. She fell from a height of ground level. The pain is at a severity of 10/10. The pain is severe. She was ambulatory at the scene. Associated symptoms include abdominal pain and nausea. Pertinent negatives include no fever, no vomiting, no loss of consciousness and no laceration. Risk factors: pt states she is 10wks pregnant. Associated symptoms comments: Pt c/o vaginal discharge \"all the time\" but states in the past 3 days it now has an odor to it. Pt states her \"baby magali\" has two other girls pregnant and she knows he is sleeping with one of them \"for a fact\"  Pt does have concern for STI. The symptoms are aggravated by pressure on injury. She has tried nothing for the symptoms. Rash    This is a new problem. The current episode started 2 days ago (pt reports small bump to the abdomen ). The problem has been gradually worsening (this morning pt states she woke up and her stomach was \"hot\"). The problem is associated with an unknown factor. There has been no fever. The rash is present on the abdomen. The pain is at a severity of 10/10. The pain has been constant since onset. Associated symptoms include pain and weeping (pt reports greenish drainage from the area). She has tried nothing for the symptoms.         Past Medical History:   Diagnosis Date    Acid reflux     Acute hepatitis 4/7/2014    Acute pharyngitis 1/22/2010    Acute sinusitis 1/12/2009    Anemia NEC     with pregnancy    Backache, unspecified 0/88/3256    Complication of anesthesia     difficulty waking after surgery (pt reported)  Essential hypertension     Before pregnancy    Flu 11/11/2009    GERD (gastroesophageal reflux disease) 7/31/2012    HX OTHER MEDICAL     GERD with pregnancy    HX OTHER MEDICAL     Migraines worse with pregnancy    Hypertension     IGT (impaired glucose tolerance) 6/27/2012    Migraine 3/22/2011    Mononucleosis 1/12/2010    Mononucleosis 1/12/2010    Morbid obesity ([facility] [address]        Past Surgical History:   Procedure Laterality Date    HX CHOLECYSTECTOMY      HX OTHER SURGICAL      gallbladder    HX TONSILLECTOMY      HX WISDOM TEETH EXTRACTION  4/4/2013    Dr. Shyam Gracia         Family History:   Problem Relation Age of Onset    Hypertension Mother     Alcohol abuse Father     Diabetes Maternal Grandmother        Social History     Social History    Marital status: SINGLE     Spouse name: N/A    Number of children: 1    Years of education: 15     Occupational History     Not Employed     grad from [employer] History Main Topics    Smoking status: Current Every Day Smoker     Packs/day: 0.50    Smokeless tobacco: Never Used    Alcohol use No    Drug use: No    Sexual activity: Yes     Partners: Female     Birth control/ protection: Inserts     Other Topics Concern    Not on file     Social History Narrative         ALLERGIES: Latex; Compazine [prochlorperazine edisylate]; Aspirin; and Wellbutrin [bupropion hcl]    Review of Systems   Constitutional: Negative for fever. Gastrointestinal: Positive for abdominal pain and nausea. Negative for vomiting. Genitourinary: Positive for vaginal discharge. Negative for dysuria, frequency and vaginal bleeding. Skin: Positive for rash. Neurological: Negative for loss of consciousness and speech difficulty. All other systems reviewed and are negative.       Vitals:    05/12/17 1810   BP: (!) 169/103   Pulse: 95   Resp: 16   Temp: 98.4 °F (36.9 °C)   SpO2: 98%   Weight: 140.6 kg (310 lb)   Height: 5' 10\" (1.778 m)            Physical Exam Constitutional: She is oriented to person, place, and time. She appears well-developed and well-nourished. No distress. HENT:   Head: Normocephalic and atraumatic. Eyes: Conjunctivae are normal.   Cardiovascular: Normal rate, regular rhythm and normal heart sounds. Pulmonary/Chest: Effort normal and breath sounds normal. No respiratory distress. She has no wheezes. She has no rales. Abdominal: Soft. Bowel sounds are normal. She exhibits distension (morbidly obses). There is tenderness in the suprapubic area. There is no rigidity, no rebound and no guarding. Genitourinary: Cervix exhibits discharge (small amount of yellowish discharge present). Right adnexum displays no tenderness. Left adnexum displays no tenderness. Vaginal discharge found. Neurological: She is alert and oriented to person, place, and time. Skin: Skin is warm and dry. No laceration noted. There is erythema (to the R abdomen, no induration appreciated). Psychiatric: She has a normal mood and affect. Her behavior is normal. Judgment and thought content normal.   Nursing note and vitals reviewed.        MDM  Number of Diagnoses or Management Options  Diagnosis management comments: DDX: UTI, STI, vaginitis, cellulitis, abscess       Amount and/or Complexity of Data Reviewed  Clinical lab tests: ordered and reviewed  Review and summarize past medical records: yes (U/S done about 2-3 wks ago showed normal IUP)      ED Course       Procedures

## 2017-05-12 NOTE — DISCHARGE INSTRUCTIONS
Belly Pain in Pregnancy: Care Instructions  Your Care Instructions  When you're pregnant, any belly pain can be a worry. You may not want to call your doctor about every pain you have. But you don't want to miss something that is dangerous for you or your baby. Even if it feels familiar, belly pain can mean something new when you're pregnant. It's important to know when to call your doctor. It will also help to know how to care for yourself at home when your pain is not caused by anything harmful. · When belly pain is more severe or constant, see a doctor right away. · If you're sure your belly pain is a sign of labor, call your doctor. · When belly pain is brief, it's usually a normal part of pregnancy. It might be related to changes in the growing uterus. Or it could be the stretching of ligaments called round ligaments. These ligaments help support the uterus. Round ligament pain can be on either side of your belly. It can also be felt in your hips or groin. Follow-up care is a key part of your treatment and safety. Be sure to make and go to all appointments, and call your doctor if you are having problems. It's also a good idea to know your test results and keep a list of the medicines you take. How can you tell if belly pain is a sign of labor? When belly pain is caused by labor, it can feel like mild or menstrual-like cramps in your lower belly. These cramps are probably contractions. They can happen in your second or third trimester. You may also have:  · A steady, dull ache in your lower back, pelvis, or thighs. · A feeling of pressure in your pelvis or lower belly. · Changes in your vaginal discharge or a sudden release of fluid from the vagina. If you think you are in labor, call your doctor. How can you care for yourself at home? When belly pain is mild and is not a symptom of labor:  · Rest until you feel better. · Take a warm bath.   · Think about what you drink and eat:  ¨ Drink plenty of fluids. Choose water and other caffeine-free clear liquids until you feel better. ¨ Try eating small, frequent meals. If your stomach is upset, try bland, low-fat foods like plain rice, broiled chicken, toast, and yogurt. · Think about how you move if you are having brief pains from stretching of the round ligaments. ¨ Try gentle stretching. ¨ Move a little more slowly when turning in bed or getting up from a chair, so those ligaments don't stretch quickly. ¨ Lean forward a bit if you think you are going to cough or sneeze. When should you call for help? Call 911 anytime you think you may need emergency care. For example, call if:  · You have sudden, severe pain in your belly. · You have severe vaginal bleeding. Call your doctor now or seek immediate medical care if:  · You have new or worse belly pain or cramping. · You have any vaginal bleeding. · You have a fever. · You have symptoms of preeclampsia, such as:  ¨ Sudden swelling of your face, hands, or feet. ¨ New vision problems (such as dimness or blurring). ¨ A severe headache. · You think that you may be in labor. This means that you've had at least 8 contractions within 1 hour or at least 4 contractions within 20 minutes, even after you change your position and drink fluids. · You have symptoms of a urinary tract infection. These may include:  ¨ Pain or burning when you urinate. ¨ A frequent need to urinate without being able to pass much urine. ¨ Pain in the flank, which is just below the rib cage and above the waist on either side of the back. ¨ Blood in your urine. Watch closely for changes in your health, and be sure to contact your doctor if you are worried about your or your baby's health. Where can you learn more? Go to http://adelfo-paul.info/. Enter 256 127 971 in the search box to learn more about \"Belly Pain in Pregnancy: Care Instructions. \"  Current as of: June 8, 2016  Content Version: 11.2  © 8308-4693 [employer]. Care instructions adapted under license by [employer] (which disclaims liability or warranty for this information). If you have questions about a medical condition or this instruction, always ask your healthcare professional. [place] [address] any warranty or liability for your use of this information. Urinary Tract Infection in Pregnancy: Care Instructions  Your Care Instructions    A urinary tract infection, or UTI, is an infection of the bladder and other urinary structures. Most UTIs occur in the bladder. They often cause pain or burning when you urinate. UTI is the most common bacterial infection in pregnancy. If untreated, a UTI could lead to problems such as a kidney infection or  labor. Most UTIs can be cured with antibiotics. Your doctor will prescribe an antibiotic that is safe during pregnancy. Be sure to finish your medicine so that the infection does not spread to your kidneys. Follow-up care is a key part of your treatment and safety. Be sure to make and go to all appointments, and call your doctor if you are having problems. It's also a good idea to know your test results and keep a list of the medicines you take. How can you care for yourself at home? · Take your antibiotics as directed. Do not stop taking them just because you feel better. You need to take the full course of antibiotics. · Drink extra water and other fluids for the next day or two. This will help wash out the bacteria causing the infection. If you have kidney, heart, or liver disease and have to limit fluids, talk with your doctor before you increase the amount of fluids you drink. · Do not drink alcohol. · Urinate often. Try to empty your bladder each time. Preventing UTIs  · Drink plenty of fluids, enough so that your urine is light yellow or clear like water. This helps you urinate often, which clears bacteria from your system.  If you have kidney, heart, or liver disease and have to limit fluids, talk with your doctor before you increase the amount of fluids you drink. · Urinate when you first have the urge. · Urinate right after you have sex. This is the best way for women to avoid UTIs. · When going to the bathroom, wipe from front to back to keep bacteria from entering the vagina or urethra. When should you call for help? Call your doctor now or seek immediate medical care if:  · Symptoms such as fever, chills, nausea, or vomiting get worse or appear for the first time. · You have new pain in your back just below your rib cage. This is called flank pain. · There is new blood or pus in your urine. · You have any problems with your antibiotic medicine. Watch closely for changes in your health, and be sure to contact your doctor if:  · You are not getting better after 1 day (24 hours). · You have new symptoms, such as blood in your urine. Where can you learn more? Go to http://adelfo-paul.info/. Enter M982 in the search box to learn more about \"Urinary Tract Infection in Pregnancy: Care Instructions. \"  Current as of: November 28, 2016  Content Version: 11.2  © 5213-4415 [employer]. Care instructions adapted under license by [employer] (which disclaims liability or warranty for this information). If you have questions about a medical condition or this instruction, always ask your healthcare professional. [place] [address] any warranty or liability for your use of this information.

## 2017-05-13 NOTE — ED NOTES
Patient  given copy of dc instructions and 1 e- script(s). Patient  verbalized understanding of instructions and script (s). Patient given a current medication reconciliation form and verbalized understanding of their medications. Patient  verbalized understanding of the importance of discussing medications with  his or her physician or clinic they will be following up with. Patient alert and oriented and in no acute distress. Patient discharged home ambulatory.

## 2017-05-14 LAB
BACTERIA SPEC CULT: ABNORMAL
CC UR VC: ABNORMAL
SERVICE CMNT-IMP: ABNORMAL

## 2017-07-17 ENCOUNTER — APPOINTMENT (OUTPATIENT)
Dept: ULTRASOUND IMAGING | Age: 22
End: 2017-07-17
Attending: EMERGENCY MEDICINE
Payer: MEDICAID

## 2017-07-17 ENCOUNTER — HOSPITAL ENCOUNTER (EMERGENCY)
Age: 22
Discharge: HOME | End: 2017-07-18
Attending: EMERGENCY MEDICINE
Payer: MEDICAID

## 2017-07-17 DIAGNOSIS — K21.9 GASTROESOPHAGEAL REFLUX DISEASE, ESOPHAGITIS PRESENCE NOT SPECIFIED: Primary | ICD-10-CM

## 2017-07-17 DIAGNOSIS — R74.8 ELEVATED LIVER ENZYMES: ICD-10-CM

## 2017-07-17 DIAGNOSIS — E86.0 DEHYDRATION: ICD-10-CM

## 2017-07-17 LAB
ALBUMIN SERPL BCP-MCNC: 2.7 G/DL (ref 3.5–5)
ALBUMIN/GLOB SERPL: 0.6 {RATIO} (ref 1.1–2.2)
ALP SERPL-CCNC: 121 U/L (ref 45–117)
ALT SERPL-CCNC: 174 U/L (ref 12–78)
ANION GAP BLD CALC-SCNC: 8 MMOL/L (ref 5–15)
AST SERPL W P-5'-P-CCNC: 91 U/L (ref 15–37)
BASOPHILS # BLD AUTO: 0 K/UL (ref 0–0.1)
BASOPHILS # BLD: 0 % (ref 0–1)
BILIRUB SERPL-MCNC: 0.4 MG/DL (ref 0.2–1)
BUN SERPL-MCNC: 7 MG/DL (ref 6–20)
BUN/CREAT SERPL: 11 (ref 12–20)
CALCIUM SERPL-MCNC: 8.8 MG/DL (ref 8.5–10.1)
CHLORIDE SERPL-SCNC: 105 MMOL/L (ref 97–108)
CO2 SERPL-SCNC: 24 MMOL/L (ref 21–32)
CREAT SERPL-MCNC: 0.64 MG/DL (ref 0.55–1.02)
EOSINOPHIL # BLD: 0.1 K/UL (ref 0–0.4)
EOSINOPHIL NFR BLD: 1 % (ref 0–7)
ERYTHROCYTE [DISTWIDTH] IN BLOOD BY AUTOMATED COUNT: 12.6 % (ref 11.5–14.5)
GLOBULIN SER CALC-MCNC: 4.3 G/DL (ref 2–4)
GLUCOSE SERPL-MCNC: 82 MG/DL (ref 65–100)
HCT VFR BLD AUTO: 38.7 % (ref 35–47)
HGB BLD-MCNC: 13.3 G/DL (ref 11.5–16)
LYMPHOCYTES # BLD AUTO: 17 % (ref 12–49)
LYMPHOCYTES # BLD: 1.7 K/UL (ref 0.8–3.5)
MCH RBC QN AUTO: 29.9 PG (ref 26–34)
MCHC RBC AUTO-ENTMCNC: 34.4 G/DL (ref 30–36.5)
MCV RBC AUTO: 87 FL (ref 80–99)
MONOCYTES # BLD: 0.8 K/UL (ref 0–1)
MONOCYTES NFR BLD AUTO: 9 % (ref 5–13)
NEUTS SEG # BLD: 7.2 K/UL (ref 1.8–8)
NEUTS SEG NFR BLD AUTO: 73 % (ref 32–75)
PLATELET # BLD AUTO: 215 K/UL (ref 150–400)
POTASSIUM SERPL-SCNC: 3.8 MMOL/L (ref 3.5–5.1)
PROT SERPL-MCNC: 7 G/DL (ref 6.4–8.2)
RBC # BLD AUTO: 4.45 M/UL (ref 3.8–5.2)
SODIUM SERPL-SCNC: 137 MMOL/L (ref 136–145)
TROPONIN I SERPL-MCNC: <0.04 NG/ML
WBC # BLD AUTO: 9.8 K/UL (ref 3.6–11)

## 2017-07-17 PROCEDURE — 74011250636 HC RX REV CODE- 250/636: Performed by: EMERGENCY MEDICINE

## 2017-07-17 PROCEDURE — 36415 COLL VENOUS BLD VENIPUNCTURE: CPT | Performed by: EMERGENCY MEDICINE

## 2017-07-17 PROCEDURE — 74011250637 HC RX REV CODE- 250/637: Performed by: EMERGENCY MEDICINE

## 2017-07-17 PROCEDURE — 93005 ELECTROCARDIOGRAM TRACING: CPT

## 2017-07-17 PROCEDURE — 74011000250 HC RX REV CODE- 250: Performed by: EMERGENCY MEDICINE

## 2017-07-17 PROCEDURE — 80053 COMPREHEN METABOLIC PANEL: CPT | Performed by: EMERGENCY MEDICINE

## 2017-07-17 PROCEDURE — 76700 US EXAM ABDOM COMPLETE: CPT

## 2017-07-17 PROCEDURE — 99284 EMERGENCY DEPT VISIT MOD MDM: CPT

## 2017-07-17 PROCEDURE — 84484 ASSAY OF TROPONIN QUANT: CPT | Performed by: EMERGENCY MEDICINE

## 2017-07-17 PROCEDURE — 96375 TX/PRO/DX INJ NEW DRUG ADDON: CPT

## 2017-07-17 PROCEDURE — 83690 ASSAY OF LIPASE: CPT | Performed by: EMERGENCY MEDICINE

## 2017-07-17 PROCEDURE — 96361 HYDRATE IV INFUSION ADD-ON: CPT

## 2017-07-17 PROCEDURE — 85025 COMPLETE CBC W/AUTO DIFF WBC: CPT | Performed by: EMERGENCY MEDICINE

## 2017-07-17 PROCEDURE — 96374 THER/PROPH/DIAG INJ IV PUSH: CPT

## 2017-07-17 RX ORDER — FAMOTIDINE 10 MG/ML
20 INJECTION INTRAVENOUS
Status: COMPLETED | OUTPATIENT
Start: 2017-07-17 | End: 2017-07-17

## 2017-07-17 RX ADMIN — PROMETHAZINE HYDROCHLORIDE 25 MG: 25 INJECTION INTRAMUSCULAR; INTRAVENOUS at 23:20

## 2017-07-17 RX ADMIN — LIDOCAINE HYDROCHLORIDE 40 ML: 20 SOLUTION ORAL; TOPICAL at 23:20

## 2017-07-17 RX ADMIN — SODIUM CHLORIDE 1000 ML: 900 INJECTION, SOLUTION INTRAVENOUS at 23:19

## 2017-07-17 RX ADMIN — FAMOTIDINE 20 MG: 10 INJECTION, SOLUTION INTRAVENOUS at 23:20

## 2017-07-18 VITALS
OXYGEN SATURATION: 97 % | SYSTOLIC BLOOD PRESSURE: 130 MMHG | TEMPERATURE: 98.5 F | DIASTOLIC BLOOD PRESSURE: 73 MMHG | WEIGHT: 293 LBS | RESPIRATION RATE: 16 BRPM | HEIGHT: 70 IN | HEART RATE: 98 BPM | BODY MASS INDEX: 41.95 KG/M2

## 2017-07-18 LAB
APPEARANCE UR: ABNORMAL
ATRIAL RATE: 95 BPM
BACTERIA URNS QL MICRO: NEGATIVE /HPF
BILIRUB UR QL CFM: POSITIVE
CALCULATED P AXIS, ECG09: 47 DEGREES
CALCULATED R AXIS, ECG10: 18 DEGREES
CALCULATED T AXIS, ECG11: 29 DEGREES
COLOR UR: ABNORMAL
DIAGNOSIS, 93000: NORMAL
EPITH CASTS URNS QL MICRO: ABNORMAL /LPF
GLUCOSE UR STRIP.AUTO-MCNC: NEGATIVE MG/DL
HGB UR QL STRIP: NEGATIVE
KETONES UR QL STRIP.AUTO: 15 MG/DL
LEUKOCYTE ESTERASE UR QL STRIP.AUTO: ABNORMAL
LIPASE SERPL-CCNC: 97 U/L (ref 73–393)
NITRITE UR QL STRIP.AUTO: NEGATIVE
P-R INTERVAL, ECG05: 122 MS
PH UR STRIP: 6.5 [PH] (ref 5–8)
PROT UR STRIP-MCNC: NEGATIVE MG/DL
Q-T INTERVAL, ECG07: 348 MS
QRS DURATION, ECG06: 78 MS
QTC CALCULATION (BEZET), ECG08: 437 MS
RBC #/AREA URNS HPF: ABNORMAL /HPF (ref 0–5)
SP GR UR REFRACTOMETRY: 1.02 (ref 1–1.03)
UROBILINOGEN UR QL STRIP.AUTO: 1 EU/DL (ref 0.2–1)
VENTRICULAR RATE, ECG03: 95 BPM
WBC URNS QL MICRO: ABNORMAL /HPF (ref 0–4)

## 2017-07-18 PROCEDURE — 81001 URINALYSIS AUTO W/SCOPE: CPT | Performed by: EMERGENCY MEDICINE

## 2017-07-18 PROCEDURE — 74011250636 HC RX REV CODE- 250/636: Performed by: EMERGENCY MEDICINE

## 2017-07-18 RX ORDER — ONDANSETRON 8 MG/1
8 TABLET, ORALLY DISINTEGRATING ORAL
Qty: 15 TAB | Refills: 0 | Status: SHIPPED | OUTPATIENT
Start: 2017-07-18 | End: 2018-02-14

## 2017-07-18 RX ORDER — OXYCODONE HYDROCHLORIDE 5 MG/1
5 TABLET ORAL
Qty: 10 TAB | Refills: 0 | Status: SHIPPED | OUTPATIENT
Start: 2017-07-18 | End: 2017-09-12

## 2017-07-18 RX ORDER — METOCLOPRAMIDE HYDROCHLORIDE 5 MG/ML
10 INJECTION INTRAMUSCULAR; INTRAVENOUS
Status: COMPLETED | OUTPATIENT
Start: 2017-07-18 | End: 2017-07-18

## 2017-07-18 RX ORDER — HYDROMORPHONE HYDROCHLORIDE 1 MG/ML
0.5 INJECTION, SOLUTION INTRAMUSCULAR; INTRAVENOUS; SUBCUTANEOUS
Status: COMPLETED | OUTPATIENT
Start: 2017-07-18 | End: 2017-07-18

## 2017-07-18 RX ADMIN — HYDROMORPHONE HYDROCHLORIDE 0.5 MG: 1 INJECTION, SOLUTION INTRAMUSCULAR; INTRAVENOUS; SUBCUTANEOUS at 02:54

## 2017-07-18 RX ADMIN — METOCLOPRAMIDE 10 MG: 5 INJECTION, SOLUTION INTRAMUSCULAR; INTRAVENOUS at 02:54

## 2017-07-18 RX ADMIN — SODIUM CHLORIDE 1000 ML: 900 INJECTION, SOLUTION INTRAVENOUS at 02:54

## 2017-07-18 NOTE — ED PROVIDER NOTES
HPI Comments: 25 y.o. A4 21w3d pregnant female with past medical history significant for HTN, s/p cholecystectomy who presents with chief complaint of chest pain. Patient complains of generalized chest pain that initially began as mid-upper back pain 2 days ago. Patient states she awoke with the back pain thought it had migrated to her chest at the end of the day. Patient states the pain is a constant pressure \"like someone stomping\" that is occasionally tight. Patient states the pain has since begun radiating to her abdomen. Patient also complains of nausea, diarrhea, and feeling unable to eat. Patient states she's also had a productive cough with \"brown/green sputum. \" Patient states she went to [facility] ER yesterday and had a negative chest CT to rule out a PE. Patient states tonight she was unable to lay flat due to the pain. Patient denies fever, vaginal bleeding/discharge, or dysuria. There are no other acute medical concerns at this time. Social hx: current smoker, no alcohol  PCP: None  OB: Valerie Bloch Ryce, MD    Note written by Lala Bowden, as dictated by Max Wagner MD 10:59 PM      The history is provided by the patient. No  was used.         Past Medical History:   Diagnosis Date    Acid reflux     Acute hepatitis 2014    Acute pharyngitis 2010    Acute sinusitis 2009    Anemia NEC     with pregnancy    Backache, unspecified     Complication of anesthesia     difficulty waking after surgery (pt reported)    Essential hypertension     Before pregnancy    Flu 2009    GERD (gastroesophageal reflux disease) 2012    HX OTHER MEDICAL     GERD with pregnancy    HX OTHER MEDICAL     Migraines worse with pregnancy    Hypertension     IGT (impaired glucose tolerance) 2012    Migraine 3/22/2011    Mononucleosis 2010    Mononucleosis 2010    Morbid obesity ([facility] [address]        Past Surgical History:   Procedure Laterality Date    HX CHOLECYSTECTOMY      HX OTHER SURGICAL      gallbladder    HX TONSILLECTOMY      HX WISDOM TEETH EXTRACTION  4/4/2013    Dr. Lisa Hardwick         Family History:   Problem Relation Age of Onset    Hypertension Mother     Alcohol abuse Father     Diabetes Maternal Grandmother        Social History     Social History    Marital status: SINGLE     Spouse name: N/A    Number of children: 1    Years of education: 15     Occupational History     Not Employed     grad from [address] [place] [address] [employer] History Main Topics    Smoking status: Current Every Day Smoker     Packs/day: 0.50    Smokeless tobacco: Never Used    Alcohol use No    Drug use: No    Sexual activity: Yes     Partners: Female     Birth control/ protection: Inserts     Other Topics Concern    Not on file     Social History Narrative         ALLERGIES: Latex; Compazine [prochlorperazine edisylate]; Acetaminophen; Aspirin; Tramadol; and Wellbutrin [bupropion hcl]    Review of Systems   Constitutional: Positive for appetite change. Negative for chills, fatigue and fever. HENT: Negative for congestion, rhinorrhea and sore throat. Respiratory: Positive for cough and chest tightness. Negative for shortness of breath. Cardiovascular: Positive for chest pain. Negative for leg swelling. Gastrointestinal: Positive for abdominal pain, diarrhea and nausea. Negative for constipation and vomiting. Genitourinary: Negative for difficulty urinating, dysuria, vaginal bleeding and vaginal discharge. Musculoskeletal: Negative for back pain and neck pain. Skin: Negative for rash and wound. Neurological: Negative for headaches. All other systems reviewed and are negative. Vitals:    07/17/17 2017   BP: (!) 146/96   Pulse: 98   Resp: 20   Temp: 98.5 °F (36.9 °C)   SpO2: 98%   Weight: 138.3 kg (305 lb)   Height: 5' 10\" (1.778 m)            Physical Exam   Nursing note and vitals reviewed.        CONSTITUTIONAL: Well-appearing; well-nourished; in no apparent distress  HEAD: Normocephalic; atraumatic  EYES: PERRL; EOM intact; conjunctiva and sclera are clear bilaterally. ENT: No rhinorrhea; normal pharynx with no tonsillar hypertrophy; mucous membranes pink/moist, no erythema, no exudate. NECK: Supple; non-tender; no cervical lymphadenopathy  CARD: Normal S1, S2; no murmurs, rubs, or gallops. Regular rate and rhythm. RESP: Normal respiratory effort; breath sounds clear and equal bilaterally; no wheezes, rhonchi, or rales. ABD: Normal bowel sounds; non-distended; non-tender; no palpable organomegaly, no masses, no bruits. Back Exam: Normal inspection; no vertebral point tenderness, no CVA tenderness. Normal range of motion. EXT: Normal ROM in all four extremities; non-tender to palpation; no swelling or deformity; distal pulses are normal, no edema. SKIN: Warm; dry; no rash. NEURO:Alert and oriented x 3, coherent, LEONIE-XII grossly intact, sensory and motor are non-focal.      MDM  Number of Diagnoses or Management Options  Dehydration:   Elevated liver enzymes:   Gastroesophageal reflux disease, esophagitis presence not specified:   Diagnosis management comments: Assessment: 20-year-old female, who presents with epigastric pain. She was evaluated at [facility] emergency department with symptoms symptoms when he falls ago and had a negative CT of the chest for PE. Differential diagnosis included ACS, GERD, pancreatitis, obstipation, myofascial strain,      Plan: EKG/ lab/ antiemetic/ Pepcid and GI cocktail/ p.o. Challenge/ ultrasound Abdomen/ Monitor and Reevaluate.          Amount and/or Complexity of Data Reviewed  Clinical lab tests: ordered and reviewed  Tests in the radiology section of CPT®: ordered and reviewed  Tests in the medicine section of CPT®: reviewed and ordered  Discussion of test results with the performing providers: yes  Decide to obtain previous medical records or to obtain history from someone other than the patient: yes  Obtain history from someone other than the patient: yes  Review and summarize past medical records: yes  Discuss the patient with other providers: yes  Independent visualization of images, tracings, or specimens: yes    Risk of Complications, Morbidity, and/or Mortality  Presenting problems: moderate  Diagnostic procedures: moderate  Management options: moderate    Patient Progress  Patient progress: stable    ED Course       Procedures     ED EKG interpretation:  Rhythm: normal sinus rhythm; and regular . Rate (approx.): 95; Axis: normal; P wave: normal; QRS interval: normal ; ST/T wave: non-specific changes; in  Lead: Diffusely; sinus arrhythmia; Other findings: abnormal ekg. This EKG was interpreted by Eduardo Sneed MD,ED Provider. Progress Note:   Pt has been reexamined by Eduardo Sneed MD. Pt is feeling much better. Symptoms have improved. All available results have been reviewed with pt and any available family. The patient was given p.o. Challenge and she tolerated well. She has abnormal LFTs without any evidence of biliary dilatation. Pt understands sx, dx, and tx in ED. Care plan has been outlined and questions have been answered. Pt is ready to go home. Will send home on abdominal pain, and abnormal LFTs instruction. Prescription of oxycodone and Zofran Outpatient referral with PCP as needed. Written by Eduardo Sneed MD,10:22 AM    .   .

## 2017-07-18 NOTE — ED TRIAGE NOTES
Pt arrives from home c/o generalized chest pain that radiates to her upper back and abd. Pt states that she was seen yesterday and had a CT with contrast to rule out PE.  CT scan was negative. Pt reports pain has increased since that time and she is worried because she is 19 weeks pregnant.

## 2017-07-18 NOTE — DISCHARGE INSTRUCTIONS
Abdominal Pain: Care Instructions  Your Care Instructions    Abdominal pain has many possible causes. Some aren't serious and get better on their own in a few days. Others need more testing and treatment. If your pain continues or gets worse, you need to be rechecked and may need more tests to find out what is wrong. You may need surgery to correct the problem. Don't ignore new symptoms, such as fever, nausea and vomiting, urination problems, pain that gets worse, and dizziness. These may be signs of a more serious problem. Your doctor may have recommended a follow-up visit in the next 8 to 12 hours. If you are not getting better, you may need more tests or treatment. The doctor has checked you carefully, but problems can develop later. If you notice any problems or new symptoms, get medical treatment right away. Follow-up care is a key part of your treatment and safety. Be sure to make and go to all appointments, and call your doctor if you are having problems. It's also a good idea to know your test results and keep a list of the medicines you take. How can you care for yourself at home? · Rest until you feel better. · To prevent dehydration, drink plenty of fluids, enough so that your urine is light yellow or clear like water. Choose water and other caffeine-free clear liquids until you feel better. If you have kidney, heart, or liver disease and have to limit fluids, talk with your doctor before you increase the amount of fluids you drink. · If your stomach is upset, eat mild foods, such as rice, dry toast or crackers, bananas, and applesauce. Try eating several small meals instead of two or three large ones. · Wait until 48 hours after all symptoms have gone away before you have spicy foods, alcohol, and drinks that contain caffeine. · Do not eat foods that are high in fat. · Avoid anti-inflammatory medicines such as aspirin, ibuprofen (Advil, Motrin), and naproxen (Aleve).  These can cause stomach upset. Talk to your doctor if you take daily aspirin for another health problem. When should you call for help? Call 911 anytime you think you may need emergency care. For example, call if:  · You passed out (lost consciousness). · You pass maroon or very bloody stools. · You vomit blood or what looks like coffee grounds. · You have new, severe belly pain. Call your doctor now or seek immediate medical care if:  · Your pain gets worse, especially if it becomes focused in one area of your belly. · You have a new or higher fever. · Your stools are black and look like tar, or they have streaks of blood. · You have unexpected vaginal bleeding. · You have symptoms of a urinary tract infection. These may include:  ¨ Pain when you urinate. ¨ Urinating more often than usual.  ¨ Blood in your urine. · You are dizzy or lightheaded, or you feel like you may faint. Watch closely for changes in your health, and be sure to contact your doctor if:  · You are not getting better after 1 day (24 hours). Where can you learn more? Go to http://adelfoEasyRunpaul.info/. Enter E721 in the search box to learn more about \"Abdominal Pain: Care Instructions. \"  Current as of: March 20, 2017  Content Version: 11.3  © 0540-5027 [employer]. Care instructions adapted under license by [employer] (which disclaims liability or warranty for this information). If you have questions about a medical condition or this instruction, always ask your healthcare professional. [place] [address] any warranty or liability for your use of this information. We hope that we have addressed all of your medical concerns. The examination and treatment you received in the Emergency Department were for an emergent problem and were not intended as complete care. It is important that you follow up with your healthcare provider(s) for ongoing care.  If your symptoms worsen or do not improve as expected, and you are unable to reach your usual health care provider(s), you should return to the Emergency Department. Today's healthcare is undergoing tremendous change, and patient satisfaction surveys are one of the many tools to assess the quality of medical care. You may receive a survey from the [employer] regarding your experience in the Emergency Department. I hope that your experience has been completely positive, particularly the medical care that I provided. As such, please participate in the survey; anything less than excellent does not meet my expectations or intentions. [facility] and [facility] participate in nationally recognized quality of care measures. If your blood pressure is greater than 120/80, as reported below, we urge that you seek medical care to address the potential of high blood pressure, commonly known as hypertension. Hypertension can be hereditary or can be caused by certain medical conditions, pain, stress, or \"white coat syndrome. \"       Please make an appointment with your health care provider(s) for follow up of your Emergency Department visit. VITALS:   Patient Vitals for the past 8 hrs:   Temp Pulse Resp BP SpO2   07/17/17 2017 98.5 °F (36.9 °C) 98 20 (!) 146/96 98 %          Thank you for allowing us to provide you with medical care today. We realize that you have many choices for your emergency care needs. Please choose us in the future for any continued health care needs. MD RODOLFO Chan 70: [phone]            Recent Results (from the past 24 hour(s))   EKG, 12 LEAD, INITIAL    Collection Time: 07/17/17  8:45 PM   Result Value Ref Range    Ventricular Rate 95 BPM    Atrial Rate 95 BPM    P-R Interval 122 ms    QRS Duration 78 ms    Q-T Interval 348 ms    QTC Calculation (Bezet) 437 ms    Calculated P Axis 47 degrees    Calculated R Axis 18 degrees    Calculated T Axis 29 degrees    Diagnosis       Normal sinus rhythm with sinus arrhythmia  No previous ECGs available     CBC WITH AUTOMATED DIFF    Collection Time: 07/17/17  8:53 PM   Result Value Ref Range    WBC 9.8 3.6 - 11.0 K/uL    RBC 4.45 3.80 - 5.20 M/uL    HGB 13.3 11.5 - 16.0 g/dL    HCT 38.7 35.0 - 47.0 %    MCV 87.0 80.0 - 99.0 FL    MCH 29.9 26.0 - 34.0 PG    MCHC 34.4 30.0 - 36.5 g/dL    RDW 12.6 11.5 - 14.5 %    PLATELET 424 830 - 043 K/uL    NEUTROPHILS 73 32 - 75 %    LYMPHOCYTES 17 12 - 49 %    MONOCYTES 9 5 - 13 %    EOSINOPHILS 1 0 - 7 %    BASOPHILS 0 0 - 1 %    ABS. NEUTROPHILS 7.2 1.8 - 8.0 K/UL    ABS. LYMPHOCYTES 1.7 0.8 - 3.5 K/UL    ABS. MONOCYTES 0.8 0.0 - 1.0 K/UL    ABS. EOSINOPHILS 0.1 0.0 - 0.4 K/UL    ABS. BASOPHILS 0.0 0.0 - 0.1 K/UL   METABOLIC PANEL, COMPREHENSIVE    Collection Time: 07/17/17  8:53 PM   Result Value Ref Range    Sodium 137 136 - 145 mmol/L    Potassium 3.8 3.5 - 5.1 mmol/L    Chloride 105 97 - 108 mmol/L    CO2 24 21 - 32 mmol/L    Anion gap 8 5 - 15 mmol/L    Glucose 82 65 - 100 mg/dL    BUN 7 6 - 20 MG/DL    Creatinine 0.64 0.55 - 1.02 MG/DL    BUN/Creatinine ratio 11 (L) 12 - 20      GFR est AA >60 >60 ml/min/1.73m2    GFR est non-AA >60 >60 ml/min/1.73m2    Calcium 8.8 8.5 - 10.1 MG/DL    Bilirubin, total 0.4 0.2 - 1.0 MG/DL    ALT (SGPT) 174 (H) 12 - 78 U/L    AST (SGOT) 91 (H) 15 - 37 U/L    Alk.  phosphatase 121 (H) 45 - 117 U/L    Protein, total 7.0 6.4 - 8.2 g/dL    Albumin 2.7 (L) 3.5 - 5.0 g/dL    Globulin 4.3 (H) 2.0 - 4.0 g/dL    A-G Ratio 0.6 (L) 1.1 - 2.2     TROPONIN I    Collection Time: 07/17/17  8:53 PM   Result Value Ref Range    Troponin-I, Qt. <0.04 <0.05 ng/mL   LIPASE    Collection Time: 07/17/17  8:53 PM   Result Value Ref Range    Lipase 97 73 - 393 U/L   URINALYSIS W/ RFLX MICROSCOPIC    Collection Time: 07/18/17  1:18 AM   Result Value Ref Range    Color DARK YELLOW      Appearance CLOUDY (A) CLEAR      Specific gravity 1.023 1.003 - 1.030      pH (UA) 6.5 5.0 - 8.0      Protein NEGATIVE  NEG mg/dL    Glucose NEGATIVE  NEG mg/dL    Ketone 15 (A) NEG mg/dL    Blood NEGATIVE  NEG      Urobilinogen 1.0 0.2 - 1.0 EU/dL    Nitrites NEGATIVE  NEG      Leukocyte Esterase LARGE (A) NEG      WBC 5-10 0 - 4 /hpf    RBC 0-5 0 - 5 /hpf    Epithelial cells MODERATE (A) FEW /lpf    Bacteria NEGATIVE  NEG /hpf   BILIRUBIN, CONFIRM    Collection Time: 07/18/17  1:18 AM   Result Value Ref Range    Bilirubin UA, confirm POSITIVE (A) NEG         Us Abd Comp    Result Date: 7/18/2017  EXAM:  US ABD COMP INDICATION: Abdominal pain. COMPARISON: 12/12/2012. TECHNIQUE: Real-time sonography of the abdomen was performed with multiple static images of the liver, gallbladder, pancreas, spleen, kidneys and retroperitoneum obtained. FINDINGS: LIVER: The liver is normal in echotexture with no mass or other focal abnormality. LIVER VASCULATURE: The portal vein flow is patent with flow towards the liver. The portal vein measures 1.2 cm in diameter. The velocity measures 33.9 cm/s. GALLBLADDER: Surgically absent. COMMON BILE DUCT: There is no biliary duct dilatation and the common duct measures 3.9 mm in diameter. PANCREAS: The visualized pancreas is normal. SPLEEN: The spleen is normal in echotexture. It is mildly enlarged measuring 15.1 x 14.6 x 5.4 cm for a volume of 617 mm. RIGHT KIDNEY: The right kidney demonstrates normal echogenicity with no mass, stone or hydronephrosis. The right kidney measures 12.6 cm in length. LEFT KIDNEY: The left kidney demonstrates normal echogenicity with no mass, stone or hydronephrosis. The left kidney measures 12.8 cm in length. RETROPERITONEUM: The proximal aorta is not well visualized. The mid and distal portions are within normal limits. The IVC is normal. No retroperitoneal mass is identified. There is a mild right pleural effusion. IMPRESSION: 1. Moderate pleural effusion.  2. Mild splenomegaly. 3. Status post cholecystectomy. Gastroesophageal Reflux Disease (GERD): Care Instructions  Your Care Instructions    Gastroesophageal reflux disease (GERD) is the backward flow of stomach acid into the esophagus. The esophagus is the tube that leads from your throat to your stomach. A one-way valve prevents the stomach acid from moving up into this tube. When you have GERD, this valve does not close tightly enough. If you have mild GERD symptoms including heartburn, you may be able to control the problem with antacids or over-the-counter medicine. Changing your diet, losing weight, and making other lifestyle changes can also help reduce symptoms. Follow-up care is a key part of your treatment and safety. Be sure to make and go to all appointments, and call your doctor if you are having problems. Its also a good idea to know your test results and keep a list of the medicines you take. How can you care for yourself at home? · Take your medicines exactly as prescribed. Call your doctor if you think you are having a problem with your medicine. · Your doctor may recommend over-the-counter medicine. For mild or occasional indigestion, antacids, such as Tums, Gaviscon, Mylanta, or Maalox, may help. Your doctor also may recommend over-the-counter acid reducers, such as Pepcid AC, Tagamet HB, Zantac 75, or Prilosec. Read and follow all instructions on the label. If you use these medicines often, talk with your doctor. · Change your eating habits. ¨ Its best to eat several small meals instead of two or three large meals. ¨ After you eat, wait 2 to 3 hours before you lie down. ¨ Chocolate, mint, and alcohol can make GERD worse. ¨ Spicy foods, foods that have a lot of acid (like tomatoes and oranges), and coffee can make GERD symptoms worse in some people. If your symptoms are worse after you eat a certain food, you may want to stop eating that food to see if your symptoms get better.   · Do not smoke or chew tobacco. Smoking can make GERD worse. If you need help quitting, talk to your doctor about stop-smoking programs and medicines. These can increase your chances of quitting for good. · If you have GERD symptoms at night, raise the head of your bed 6 to 8 inches by putting the frame on blocks or placing a foam wedge under the head of your mattress. (Adding extra pillows does not work.)  · Do not wear tight clothing around your middle. · Lose weight if you need to. Losing just 5 to 10 pounds can help. When should you call for help? Call your doctor now or seek immediate medical care if:  · You have new or different belly pain. · Your stools are black and tarlike or have streaks of blood. Watch closely for changes in your health, and be sure to contact your doctor if:  · Your symptoms have not improved after 2 days. · Food seems to catch in your throat or chest.  Where can you learn more? Go to http://adelfo-paul.info/. Enter B533 in the search box to learn more about \"Gastroesophageal Reflux Disease (GERD): Care Instructions. \"  Current as of: August 9, 2016  Content Version: 11.3  © 7544-6888 [employer]. Care instructions adapted under license by [employer] (which disclaims liability or warranty for this information). If you have questions about a medical condition or this instruction, always ask your healthcare professional. [place] [address] any warranty or liability for your use of this information.

## 2017-07-18 NOTE — ED NOTES
Pt has tolerated water PO. Dr. Palomares Cast to bedside to update pt and review discharge information. Pt to be discharged once IV fluids complete.

## 2017-07-18 NOTE — ED NOTES
MD reviewed discharge instructions and options with patient and patient verbalized understanding. RN reviewed discharge instructions using teachback method. Pt ambulated to exit without difficulty and in no signs of acute distress escorted by family. No complaints or needs expressed at this time. Patient was counseled on medications prescribed at discharge.

## 2017-09-12 ENCOUNTER — OFFICE VISIT (OUTPATIENT)
Dept: NEUROLOGY | Age: 22
End: 2017-09-12

## 2017-09-12 VITALS
WEIGHT: 293 LBS | BODY MASS INDEX: 41.95 KG/M2 | SYSTOLIC BLOOD PRESSURE: 124 MMHG | DIASTOLIC BLOOD PRESSURE: 76 MMHG | HEART RATE: 78 BPM | OXYGEN SATURATION: 98 % | HEIGHT: 70 IN | RESPIRATION RATE: 20 BRPM

## 2017-09-12 DIAGNOSIS — Z78.9 EXCESSIVE CAFFEINE INTAKE: ICD-10-CM

## 2017-09-12 DIAGNOSIS — G44.40 REBOUND HEADACHE: Primary | ICD-10-CM

## 2017-09-12 DIAGNOSIS — Z72.0 TOBACCO ABUSE: ICD-10-CM

## 2017-09-12 RX ORDER — NITROFURANTOIN (MACROCRYSTALS) 100 MG/1
100 CAPSULE ORAL 2 TIMES DAILY
COMMUNITY
End: 2017-11-05

## 2017-09-12 RX ORDER — BUTALBITAL, ACETAMINOPHEN AND CAFFEINE 50; 325; 40 MG/1; MG/1; MG/1
1 TABLET ORAL
COMMUNITY
End: 2017-11-05

## 2017-09-12 RX ORDER — DOXYLAMINE SUCCINATE AND PYRIDOXINE HYDROCHLORIDE, DELAYED RELEASE TABLETS 10 MG/10 MG 10; 10 MG/1; MG/1
TABLET, DELAYED RELEASE ORAL
COMMUNITY
End: 2017-11-05

## 2017-09-12 RX ORDER — LABETALOL 100 MG/1
100 TABLET, FILM COATED ORAL 2 TIMES DAILY
COMMUNITY
End: 2018-02-14 | Stop reason: SDUPTHER

## 2017-09-12 RX ORDER — LANOLIN ALCOHOL/MO/W.PET/CERES
65 CREAM (GRAM) TOPICAL
COMMUNITY
End: 2017-11-05

## 2017-09-12 RX ORDER — PANTOPRAZOLE SODIUM 20 MG/1
20 TABLET, DELAYED RELEASE ORAL DAILY
COMMUNITY
End: 2018-09-12

## 2017-09-12 RX ORDER — ASPIRIN 81 MG/1
81 TABLET ORAL DAILY
COMMUNITY
End: 2018-02-14

## 2017-09-12 RX ORDER — METOCLOPRAMIDE 10 MG/1
10 TABLET ORAL
COMMUNITY
End: 2018-02-14 | Stop reason: ALTCHOICE

## 2017-09-12 NOTE — PROGRESS NOTES
Neurology Consult Note      HISTORY PROVIDED BY: patient  This note will not be viewable in [address] Chief Complaint:   Chief Complaint   Patient presents with    Headache      Subjective:    Crista Burk is a 25 y.o. right handed female who presents in consultation for headaches. Pt is currently 27 weeks pregnant. Pt reports onset of migraines in childhood, around 10 or 12yo, [place] [facility] [place] to [place] having MHAs \"all the time\", always has a HA, worse after sleeping. She had a PSG 2-3 years ago without MOLLY seen, has gained wt since then. Pain is in the bifrontal region, throbbing and stabbing pain, may also have in back of head, pain is 8/10, currently 8/10, no N/V, may have P/P. No vision changes. Was taking tylenol everyday 3-4 a day, now taking Fioricet 2 a day. She is also on reglan for MHA from Ob/Gyn. She is on Labetalol for HTN.  +MGM with MHA. Drinking 3-4 water bottles a day. Drinks excessive amounts of caffeine a day, coffee drinks, tea, sodas. Does not sleep well at night, +snoring off and on, witnessed apneas occasionally. +Smoking, 1/2ppd. Her partner mentions that she was a pt of mine in the past seen for non-epileptic spells, currently pregnant as well.      Past Medical History:   Diagnosis Date    Acute hepatitis 4/7/2014    Acute pharyngitis 1/22/2010    Acute sinusitis 1/12/2009    Anemia NEC     with pregnancy    Anxiety     Backache, unspecified 2/86/4485    Complication of anesthesia     difficulty waking after surgery (pt reported)    Depression     Essential hypertension     Before pregnancy    Fatty liver     Flu 11/11/2009    GERD (gastroesophageal reflux disease) 7/31/2012    HX OTHER MEDICAL     GERD with pregnancy    HX OTHER MEDICAL     Migraines worse with pregnancy    Hypertension     IGT (impaired glucose tolerance) 6/27/2012    Migraine 3/22/2011    Mononucleosis 1/12/2010    Morbid obesity ([address]       Past Surgical History:   Procedure Laterality Date    HX CHOLECYSTECTOMY      HX OTHER SURGICAL      gallbladder    HX TONSILLECTOMY      HX WISDOM TEETH EXTRACTION  4/4/2013    Dr. Newton Watson      Social History     Social History    Marital status: SINGLE     Spouse name: N/A    Number of children: 1    Years of education: 12     Occupational History    Does not work, \"pulled out of work due to high risk pregnancy\" Not Employed     grad from [address] [place] [address] History Main Topics    Smoking status: Current Every Day Smoker     Packs/day: 0.50    Smokeless tobacco: Never Used    Alcohol use No    Drug use: No    Sexual activity: Yes     Partners: Female     Birth control/ protection: Inserts     Other Topics Concern    Not on file     Social History Narrative    Lives in [place]      Family History   Problem Relation Age of Onset    Hypertension Mother     Alcohol abuse Father     Diabetes Maternal Grandmother     Headache Maternal Grandmother          Objective:   Review of Systems   Constitutional: Positive for malaise/fatigue. Poor appetite   HENT: Negative. Eyes: Negative. Respiratory: Negative. Cardiovascular: Positive for chest pain and palpitations. Gastrointestinal: Negative. Genitourinary: Negative. Musculoskeletal: Positive for myalgias. Muscle weakness   Skin: Negative. Neurological: Positive for headaches. Endo/Heme/Allergies: Negative. Psychiatric/Behavioral: Positive for depression.        Allergies   Allergen Reactions    Latex Hives    Compazine [Prochlorperazine Edisylate] Anxiety    Acetaminophen Other (comments)     Pt has liver disease    Aspirin Other (comments)     Patient states \"I have liver disease\"    Tramadol Anxiety    Wellbutrin [Bupropion Hcl] Palpitations        Meds:  Outpatient Medications Prior to Visit   Medication Sig Dispense Refill    ondansetron (ZOFRAN ODT) 8 mg disintegrating tablet Take 1 Tab by mouth every eight (8) hours as needed for Nausea. 15 Tab 0    oxyCODONE IR (ROXICODONE) 5 mg immediate release tablet Take 1 Tab by mouth every four (4) hours as needed for Pain. Max Daily Amount: 30 mg. 10 Tab 0    BUTALB/ACETAMINOPHEN/CAFFEINE (FIORICET PO) Take  by mouth. No facility-administered medications prior to visit. Imaging:  MRI Results (most recent):  No results found for this or any previous visit. CT Results (most recent):    Results from Hospital Encounter encounter on 04/06/14   CT MAXILLOFACIAL W CONT   Narrative **Final Report**      ICD Codes / Adm. Diagnosis: [id number]   / Post OP Complication    Examination:  CT Magaly Perry CON  - [id number] - Apr 7 2014  1:02AM  Accession No:  [id number]  Reason:  eval abscess bottom left s/p wisdom extraction. REPORT:  INDICATION: [place] tooth extraction, postop complication    COMPARISON:  None    TECHNIQUE:  Axial CT was performed through the maxillofacial bones. Coronal   reconstruction was obtained. 97 cc Optiray 350 was administered. FINDINGS:    There are postsurgical changes of wisdom tooth extraction. There is   associated subcutaneous emphysema and soft tissue swelling. There is no   focal fluid collection to suggest abscess. There is sinus mucosal thickening   involving the right maxillary sinus; remainder of the paranasal sinuses are   clear. The globes and lenses are intact. IMPRESSION:    No focal fluid collection to suggest abscess. Expected postsurgical changes   of wisdom tooth extraction. Right maxillary sinusitis.           Signing/[place] Doctor: Lindsay Frankel ([id number])    Approved: Lindsay Frankel ([id number])  Apr 7 2014  1:11AM                                       Reviewed records in [place] [facility] [place] tab today    Lab Review   Results for orders placed or performed during the hospital encounter of 07/17/17   CBC WITH AUTOMATED DIFF   Result Value Ref Range    WBC 9.8 3.6 - 11.0 K/uL    RBC 4.45 3.80 - 5.20 M/uL    HGB 13.3 11.5 - 16.0 g/dL    HCT 38.7 35.0 - 47.0 %    MCV 87.0 80.0 - 99.0 FL    MCH 29.9 26.0 - 34.0 PG    MCHC 34.4 30.0 - 36.5 g/dL    RDW 12.6 11.5 - 14.5 %    PLATELET 451 348 - 290 K/uL    NEUTROPHILS 73 32 - 75 %    LYMPHOCYTES 17 12 - 49 %    MONOCYTES 9 5 - 13 %    EOSINOPHILS 1 0 - 7 %    BASOPHILS 0 0 - 1 %    ABS. NEUTROPHILS 7.2 1.8 - 8.0 K/UL    ABS. LYMPHOCYTES 1.7 0.8 - 3.5 K/UL    ABS. MONOCYTES 0.8 0.0 - 1.0 K/UL    ABS. EOSINOPHILS 0.1 0.0 - 0.4 K/UL    ABS. BASOPHILS 0.0 0.0 - 0.1 K/UL   METABOLIC PANEL, COMPREHENSIVE   Result Value Ref Range    Sodium 137 136 - 145 mmol/L    Potassium 3.8 3.5 - 5.1 mmol/L    Chloride 105 97 - 108 mmol/L    CO2 24 21 - 32 mmol/L    Anion gap 8 5 - 15 mmol/L    Glucose 82 65 - 100 mg/dL    BUN 7 6 - 20 MG/DL    Creatinine 0.64 0.55 - 1.02 MG/DL    BUN/Creatinine ratio 11 (L) 12 - 20      GFR est AA >60 >60 ml/min/1.73m2    GFR est non-AA >60 >60 ml/min/1.73m2    Calcium 8.8 8.5 - 10.1 MG/DL    Bilirubin, total 0.4 0.2 - 1.0 MG/DL    ALT (SGPT) 174 (H) 12 - 78 U/L    AST (SGOT) 91 (H) 15 - 37 U/L    Alk.  phosphatase 121 (H) 45 - 117 U/L    Protein, total 7.0 6.4 - 8.2 g/dL    Albumin 2.7 (L) 3.5 - 5.0 g/dL    Globulin 4.3 (H) 2.0 - 4.0 g/dL    A-G Ratio 0.6 (L) 1.1 - 2.2     TROPONIN I   Result Value Ref Range    Troponin-I, Qt. <0.04 <0.05 ng/mL   URINALYSIS W/ RFLX MICROSCOPIC   Result Value Ref Range    Color DARK YELLOW      Appearance CLOUDY (A) CLEAR      Specific gravity 1.023 1.003 - 1.030      pH (UA) 6.5 5.0 - 8.0      Protein NEGATIVE  NEG mg/dL    Glucose NEGATIVE  NEG mg/dL    Ketone 15 (A) NEG mg/dL    Blood NEGATIVE  NEG      Urobilinogen 1.0 0.2 - 1.0 EU/dL    Nitrites NEGATIVE  NEG      Leukocyte Esterase LARGE (A) NEG      WBC 5-10 0 - 4 /hpf    RBC 0-5 0 - 5 /hpf    Epithelial cells MODERATE (A) FEW /lpf    Bacteria NEGATIVE  NEG /hpf   LIPASE   Result Value Ref Range    Lipase 97 73 - 393 U/L   BILIRUBIN, CONFIRM   Result Value Ref Range    Bilirubin UA, confirm POSITIVE (A) NEG     EKG, 12 LEAD, INITIAL   Result Value Ref Range    Ventricular Rate 95 BPM    Atrial Rate 95 BPM    P-R Interval 122 ms    QRS Duration 78 ms    Q-T Interval 348 ms    QTC Calculation (Bezet) 437 ms    Calculated P Axis 47 degrees    Calculated R Axis 18 degrees    Calculated T Axis 29 degrees    Diagnosis       Normal sinus rhythm with sinus arrhythmia  No previous ECGs available  Confirmed by Rodo Mercedes MD, Gregoria Grande ([id number]) on 7/18/2017 8:32:52 AM          Exam:  Visit Vitals    /76    Pulse 78    Resp 20    Ht 5' 10\" (1.778 m)    Wt 137.4 kg (303 lb)    LMP 03/03/2017    SpO2 98%    BMI 43.48 kg/m2     General:  Alert, cooperative, no distress, joking with friend. Head:  Normocephalic, without obvious abnormality, atraumatic. Respiratory:  Heart:   Non labored breathing  Regular rate and rhythm, no murmurs   Neck:   2+ carotids, no bruits   Extremities: Warm, no cyanosis or edema. Pulses: 2+ radial pulses. Neurologic:  MS: Alert and oriented x 4, speech intact. Language intact. Attention and fund of knowledge appropriate. Recent and remote memory intact.   Cranial Nerves:  II: visual fields Full to confrontation   II: pupils Equal, round, reactive to light   II: optic disc No papilledema   III,VII: ptosis none   III,IV,VI: extraocular muscles  EOMI, no nystagmus or diplopia   V: facial light touch sensation  normal   VII: facial muscle function   symmetric   VIII: hearing intact   IX: soft palate elevation  normal   XI: trapezius strength  5/5   XI: sternocleidomastoid strength 5/5   XII: tongue  Midline     Motor: normal bulk and tone, no tremor              Strength: 5/5 throughout, no PD  Sensory: intact to LT, PP  Coordination: FTN and HTS intact, GUS intact  Gait: normal gait, able to tandem walk  Reflexes: 2+ symmetric, toes downgoing           Assessment/Plan   Pt is a 25 y.o. right handed female who is currently 27 weeks pregnant, c/o daily HAs for a long time, taking daily tylenol prior to pregnancy and now daily Fioricet, with h/o migraines beginning in childhood. Exam reveals BMI of >43, pleasant pt in no distress despite current 8/10 headache, o/w non-focal and unremarkable. She may have migraine headaches, but current headaches are consistent with rebound headaches from daily pain medications. In addition patient is drinking excessive amounts of caffeine. Discussed pathophysiology of rebound headaches. Recommend stopping all pain medications understanding that headaches will get worse for up to 2 weeks before they gradually improve. Given the duration that she has had a daily headache this may be months to even a year for her headaches return to baseline migraine frequency. Patient is instructed to wean her caffeine intake down to 8-16 ounces a day. Caffeine likely contributing to HAs and poor sleep which is also contributing to HAs. Increase water intake to 64oz a day. She is instructed to stop smoking immediately for many reasons, but this can also cause HAs. She is on labetalol for her hypertension, this is a reasonable migraine headache prevention medication. Recommend f/u a few months after delivery to reassess progress with the above recommendations and HA frequency. ICD-10-CM ICD-9-CM    1. Rebound headache G44.40 339.3    2. Excessive caffeine intake Z91.89 V15.89    3. Tobacco abuse Z72.0 305.1        Signed:   Alfonso Moss MD  9/12/2017

## 2017-09-12 NOTE — PROGRESS NOTES
Patient here for evaluation of headaches. Patient is currently 27 weeks pregnant. Patient has headache every day. Headaches have been an on going issue.

## 2017-09-12 NOTE — PATIENT INSTRUCTIONS
[address] [facility]   Statement to Patients  April 1, 2014      In an effort to ensure the large volume of patient prescription refills is processed in the most efficient and expeditious manner, we are asking our patients to assist us by calling your Pharmacy for all prescription refills, this will include also your  Mail Order Pharmacy. The pharmacy will contact our office electronically to continue the refill process. Please do not wait until the last minute to call your pharmacy. We need at least 48 hours (2days) to fill prescriptions. We also encourage you to call your pharmacy before going to  your prescription to make sure it is ready. With regard to controlled substance prescription refill requests (narcotic refills) that need to be picked up at our office, we ask your cooperation by providing us with at least 72 hours (3days) notice that you will need a refill. We will not refill narcotic prescription refill requests after 4:00pm on any weekday, Monday through Thursday, or after 2:00pm on Fridays, or on the weekends. We encourage everyone to explore another way of getting your prescription refill request processed using Winkcam, our patient web portal through our electronic medical record system. Winkcam is an efficient and effective way to communicate your medication request directly to the office and  downloadable as an dejan on your smart phone . Winkcam also features a review functionality that allows you to view your medication list as well as leave messages for your physician. Are you ready to get connected? If so please review the attatched instructions or speak to any of our staff to get you set up right away! Thank you so much for your cooperation. Should you have any questions please contact our Practice Administrator.     The Physicians and Staff,  [facility]           Please bring all medication bottles, including vitamins, supplements and any over-the-counter medications, to your next office visit.    -Stop taking all pain medications (May take aspirin if needed for cardiac issues.)  -Wean caffeine to 8-16oz in morning only  -Increase water intake to 64oz a day  -Stop smoking immediately

## 2017-09-12 NOTE — MR AVS SNAPSHOT
Visit Information Date & Time Provider Department Dept. Phone Encounter #  
 9/12/2017  9:40 AM Simba Burnham MD [facility] at [address] [id number] Follow-up Instructions Return in about 6 months (around 3/12/2018). Upcoming Health Maintenance Date Due Pneumococcal 19-64 Medium Risk (1 of 1 - PPSV23) 6/17/2014 DTaP/Tdap/Td series (1 - Tdap) 6/17/2016 PAP AKA CERVICAL CYTOLOGY 6/17/2016 INFLUENZA AGE 9 TO ADULT 8/1/2017 Allergies as of 9/12/2017  Review Complete On: 9/12/2017 By: Jane Jara LPN Severity Noted Reaction Type Reactions Latex    Hives Compazine [Prochlorperazine Edisylate] Medium 03/09/2017    Anxiety Acetaminophen  05/12/2017    Other (comments) Pt has liver disease Aspirin  08/13/2015    Other (comments) Patient states \"I have liver disease\" Tramadol  07/17/2017    Anxiety Wellbutrin [Bupropion Hcl]  10/31/2012    Palpitations Current Immunizations  Reviewed on 10/13/2016 Name Date Human Papillomavirus 6/19/2012, 1/17/2012, 12/1/2011 Influenza Vaccine 9/22/2016 Not reviewed this visit You Were Diagnosed With   
  
 Codes Comments Rebound headache    -  Primary ICD-10-CM: G44.40 ICD-9-CM: 339.3 Excessive caffeine intake     ICD-10-CM: Z91.89 ICD-9-CM: V15.89 Tobacco abuse     ICD-10-CM: Z72.0 ICD-9-CM: 305.1 Vitals BP Pulse Resp Height(growth percentile) Weight(growth percentile) LMP  
 124/76 78 20 5' 10\" (1.778 m) 303 lb (137.4 kg) 03/03/2017 SpO2 BMI OB Status Smoking Status 98% 43.48 kg/m2 Pregnant Current Every Day Smoker Vitals History BMI and BSA Data Body Mass Index Body Surface Area  
 43.48 kg/m 2 2.61 m 2 Preferred Pharmacy Pharmacy Name Phone [address] [facility] [address] [facility] [phone] Your Updated Medication List  
  
   
This list is accurate as of: 9/12/17 10:29 AM.  Always use your most recent med list.  
  
  
  
  
 aspirin delayed-release 81 mg tablet Take 81 mg by mouth daily. butalbital-acetaminophen-caffeine -40 mg per tablet Commonly known as:  Vicente Kimyer Take 1 Tab by mouth every six (6) hours as needed for Pain. DICLEGIS 10-10 mg Tbec Generic drug:  doxylamine-pyridoxine Take  by mouth three (3) times daily as needed. Iron 325 mg (65 mg iron) tablet Generic drug:  ferrous sulfate Take 65 mg by mouth Daily (before breakfast). labetalol 100 mg tablet Commonly known as:  [place] Take 100 mg by mouth two (2) times a day. metoclopramide HCl 10 mg tablet Commonly known as:  REGLAN Take 10 mg by mouth every six (6) hours as needed (headache). nitrofurantoin 100 mg capsule Commonly known as:  MACRODANTIN Take 100 mg by mouth two (2) times a day. ondansetron 8 mg disintegrating tablet Commonly known as:  ZOFRAN ODT Take 1 Tab by mouth every eight (8) hours as needed for Nausea. pantoprazole 20 mg tablet Commonly known as:  PROTONIX Take 20 mg by mouth daily. PRENATAL VITAMIN PO Take  by mouth. Follow-up Instructions Return in about 6 months (around 3/12/2018). Patient Instructions PRESCRIPTION REFILL POLICY [facility] Statement to Patients April 1, 2014 In an effort to ensure the large volume of patient prescription refills is processed in the most efficient and expeditious manner, we are asking our patients to assist us by calling your Pharmacy for all prescription refills, this will include also your  Mail Order Pharmacy. The pharmacy will contact our office electronically to continue the refill process. Please do not wait until the last minute to call your pharmacy. We need at least 48 hours (2days) to fill prescriptions.  We also encourage you to call your pharmacy before going to  your prescription to make sure it is ready. With regard to controlled substance prescription refill requests (narcotic refills) that need to be picked up at our office, we ask your cooperation by providing us with at least 72 hours (3days) notice that you will need a refill. We will not refill narcotic prescription refill requests after 4:00pm on any weekday, Monday through Thursday, or after 2:00pm on Fridays, or on the weekends. We encourage everyone to explore another way of getting your prescription refill request processed using YesWeAd, our patient web portal through our electronic medical record system. YesWeAd is an efficient and effective way to communicate your medication request directly to the office and  downloadable as an dejan on your smart phone . YesWeAd also features a review functionality that allows you to view your medication list as well as leave messages for your physician. Are you ready to get connected? If so please review the attatched instructions or speak to any of our staff to get you set up right away! Thank you so much for your cooperation. Should you have any questions please contact our Practice Administrator. The Physicians and Staff,  [facility] Please bring all medication bottles, including vitamins, supplements and any over-the-counter medications, to your next office visit. 
 
-Stop taking all pain medications (May take aspirin if needed for cardiac issues.) -Wean caffeine to 8-16oz in morning only 
-Increase water intake to 64oz a day 
-Stop smoking immediately Introducing [facility] SERVICES! Dear April: 
Thank you for requesting a YesWeAd account. Our records indicate that you already have an active YesWeAd account. You can access your account anytime at [email or web address] Did you know that you can access your hospital and ER discharge instructions at any time in [address]? You can also review all of your test results from your hospital stay or ER visit. Additional Information If you have questions, please visit the Frequently Asked Questions section of the Smart Balloon website at [email or web address] Remember, Smart Balloon is NOT to be used for urgent needs. For medical emergencies, dial 911. Now available from your iPhone and Android! Please provide this summary of care documentation to your next provider. Your primary care clinician is listed as NONE. If you have any questions after today's visit, please call [phone].

## 2017-11-03 ENCOUNTER — HOME HEALTH ADMISSION (OUTPATIENT)
Dept: HOME HEALTH SERVICES | Facility: HOME HEALTH | Age: 22
End: 2017-11-03
Payer: MEDICAID

## 2017-11-05 ENCOUNTER — HOME CARE VISIT (OUTPATIENT)
Dept: SCHEDULING | Facility: HOME HEALTH | Age: 22
End: 2017-11-05
Payer: MEDICAID

## 2017-11-05 PROCEDURE — G0299 HHS/HOSPICE OF RN EA 15 MIN: HCPCS

## 2017-11-06 VITALS
TEMPERATURE: 97.9 F | HEIGHT: 70 IN | WEIGHT: 293 LBS | BODY MASS INDEX: 41.95 KG/M2 | SYSTOLIC BLOOD PRESSURE: 130 MMHG | OXYGEN SATURATION: 98 % | HEART RATE: 72 BPM | RESPIRATION RATE: 24 BRPM | DIASTOLIC BLOOD PRESSURE: 90 MMHG

## 2017-11-06 LAB — GRBS, EXTERNAL: NORMAL

## 2017-11-08 ENCOUNTER — HOME CARE VISIT (OUTPATIENT)
Dept: SCHEDULING | Facility: HOME HEALTH | Age: 22
End: 2017-11-08
Payer: MEDICAID

## 2017-11-08 VITALS
HEART RATE: 80 BPM | RESPIRATION RATE: 32 BRPM | TEMPERATURE: 97.4 F | SYSTOLIC BLOOD PRESSURE: 110 MMHG | DIASTOLIC BLOOD PRESSURE: 70 MMHG | OXYGEN SATURATION: 98 %

## 2017-11-08 PROCEDURE — G0299 HHS/HOSPICE OF RN EA 15 MIN: HCPCS

## 2017-11-13 ENCOUNTER — HOME CARE VISIT (OUTPATIENT)
Dept: HOME HEALTH SERVICES | Facility: HOME HEALTH | Age: 22
End: 2017-11-13
Payer: MEDICAID

## 2017-11-21 ENCOUNTER — HOSPITAL ENCOUNTER (INPATIENT)
Age: 22
LOS: 3 days | Discharge: HOME | DRG: 540 | End: 2017-11-24
Attending: OBSTETRICS & GYNECOLOGY | Admitting: OBSTETRICS & GYNECOLOGY
Payer: MEDICAID

## 2017-11-21 PROBLEM — Z34.90 PREGNANCY: Status: ACTIVE | Noted: 2017-11-21

## 2017-11-21 LAB
BASOPHILS # BLD: 0 K/UL (ref 0–0.1)
BASOPHILS NFR BLD: 0 % (ref 0–1)
EOSINOPHIL # BLD: 0.2 K/UL (ref 0–0.4)
EOSINOPHIL NFR BLD: 1 % (ref 0–7)
ERYTHROCYTE [DISTWIDTH] IN BLOOD BY AUTOMATED COUNT: 12.5 % (ref 11.5–14.5)
HCT VFR BLD AUTO: 33.6 % (ref 35–47)
HGB BLD-MCNC: 11.2 G/DL (ref 11.5–16)
LYMPHOCYTES # BLD: 2.9 K/UL (ref 0.8–3.5)
LYMPHOCYTES NFR BLD: 21 % (ref 12–49)
MCH RBC QN AUTO: 28.5 PG (ref 26–34)
MCHC RBC AUTO-ENTMCNC: 33.3 G/DL (ref 30–36.5)
MCV RBC AUTO: 85.5 FL (ref 80–99)
MONOCYTES # BLD: 1 K/UL (ref 0–1)
MONOCYTES NFR BLD: 7 % (ref 5–13)
NEUTS SEG # BLD: 10.1 K/UL (ref 1.8–8)
NEUTS SEG NFR BLD: 71 % (ref 32–75)
PLATELET # BLD AUTO: 297 K/UL (ref 150–400)
RBC # BLD AUTO: 3.93 M/UL (ref 3.8–5.2)
WBC # BLD AUTO: 14.2 K/UL (ref 3.6–11)

## 2017-11-21 PROCEDURE — 85025 COMPLETE CBC W/AUTO DIFF WBC: CPT | Performed by: OBSTETRICS & GYNECOLOGY

## 2017-11-21 PROCEDURE — 74011000258 HC RX REV CODE- 258: Performed by: OBSTETRICS & GYNECOLOGY

## 2017-11-21 PROCEDURE — 65410000002 HC RM PRIVATE OB

## 2017-11-21 PROCEDURE — 77030005538 HC CATH URETH FOL44 BARD -B

## 2017-11-21 PROCEDURE — 75410000002 HC LABOR FEE PER 1 HR

## 2017-11-21 PROCEDURE — 74011250636 HC RX REV CODE- 250/636: Performed by: OBSTETRICS & GYNECOLOGY

## 2017-11-21 PROCEDURE — 77030007880 HC KT SPN EPDRL BBMI -B

## 2017-11-21 PROCEDURE — 74011250637 HC RX REV CODE- 250/637: Performed by: OBSTETRICS & GYNECOLOGY

## 2017-11-21 PROCEDURE — 59200 INSERT CERVICAL DILATOR: CPT

## 2017-11-21 PROCEDURE — 36415 COLL VENOUS BLD VENIPUNCTURE: CPT | Performed by: OBSTETRICS & GYNECOLOGY

## 2017-11-21 RX ORDER — SODIUM CHLORIDE 0.9 % (FLUSH) 0.9 %
5-10 SYRINGE (ML) INJECTION AS NEEDED
Status: DISCONTINUED | OUTPATIENT
Start: 2017-11-21 | End: 2017-11-24

## 2017-11-21 RX ORDER — ZOLPIDEM TARTRATE 5 MG/1
5 TABLET ORAL
Status: DISCONTINUED | OUTPATIENT
Start: 2017-11-21 | End: 2017-11-21 | Stop reason: SDUPTHER

## 2017-11-21 RX ORDER — URSODIOL 300 MG/1
300 CAPSULE ORAL 3 TIMES DAILY
COMMUNITY
End: 2018-02-14 | Stop reason: ALTCHOICE

## 2017-11-21 RX ORDER — OXYTOCIN IN 5 % DEXTROSE 30/500 ML
1-25 PLASTIC BAG, INJECTION (ML) INTRAVENOUS
Status: DISCONTINUED | OUTPATIENT
Start: 2017-11-21 | End: 2017-11-22

## 2017-11-21 RX ORDER — SODIUM CHLORIDE 0.9 % (FLUSH) 0.9 %
5-10 SYRINGE (ML) INJECTION EVERY 8 HOURS
Status: DISCONTINUED | OUTPATIENT
Start: 2017-11-21 | End: 2017-11-24

## 2017-11-21 RX ORDER — ZOLPIDEM TARTRATE 5 MG/1
5 TABLET ORAL
Status: DISCONTINUED | OUTPATIENT
Start: 2017-11-21 | End: 2017-11-22

## 2017-11-21 RX ORDER — NALOXONE HYDROCHLORIDE 0.4 MG/ML
0.4 INJECTION, SOLUTION INTRAMUSCULAR; INTRAVENOUS; SUBCUTANEOUS AS NEEDED
Status: DISCONTINUED | OUTPATIENT
Start: 2017-11-21 | End: 2017-11-22 | Stop reason: HOSPADM

## 2017-11-21 RX ADMIN — DINOPROSTONE 10 MG: 10 INSERT VAGINAL at 17:58

## 2017-11-21 RX ADMIN — PROMETHAZINE HYDROCHLORIDE 25 MG: 25 INJECTION, SOLUTION INTRAMUSCULAR; INTRAVENOUS at 21:47

## 2017-11-21 RX ADMIN — Medication 10 ML: at 22:10

## 2017-11-21 RX ADMIN — ZOLPIDEM TARTRATE 5 MG: 5 TABLET ORAL at 21:21

## 2017-11-21 NOTE — IP AVS SNAPSHOT
[address] [facility] 
[phone] Patient: Vanna Domínguez MRN: [id number] ULP:[birth year] About your hospitalization You were admitted on:  November 21, 2017 You last received care in the:  [facility] You were discharged on:  November 24, 2017 Why you were hospitalized Your primary diagnosis was:  Not on File Your diagnoses also included:  Pregnancy Things You Need To Do (next 8 weeks) Follow up with PROVIDER UNKNOWN Where:  Patient not available to ask Discharge Orders None A check kandice indicates which time of day the medication should be taken. My Medications TAKE these medications as instructed Instructions Each Dose to Equal  
 Morning Noon Evening Bedtime HYDROcodone-acetaminophen 5-325 mg per tablet Commonly known as:  Barnet Cuff Your last dose was: Your next dose is: Take 1 Tab by mouth every four (4) hours as needed. Max Daily Amount: 6 Tabs. 1 Tab  
    
   
   
   
  
 ibuprofen 800 mg tablet Commonly known as:  MOTRIN Your last dose was: Your next dose is: Take 1 Tab by mouth every eight (8) hours as needed for Pain. 800 mg  
    
   
   
   
  
 * labetalol 100 mg tablet Commonly known as:  Marlane Angelucci Your last dose was: Your next dose is: Take 100 mg by mouth two (2) times a day. 100 mg  
    
   
   
   
  
 * labetalol 100 mg tablet Commonly known as:  Marlane Angelucci Your last dose was: Your next dose is: Take 1 Tab by mouth two (2) times a day. 100 mg * Notice: This list has 2 medication(s) that are the same as other medications prescribed for you. Read the directions carefully, and ask your doctor or other care provider to review them with you. ASK your physician about these medications Instructions Each Dose to Equal  
 Morning Noon Evening Bedtime ACETAMINOPHEN-CODEINE PO Your last dose was: Your next dose is: Take 1 Tab by mouth every four (4) hours as needed (Pain). 1 Tab  
    
   
   
   
  
 aspirin delayed-release 81 mg tablet Your last dose was: Your next dose is: Take 81 mg by mouth daily. 81 mg  
    
   
   
   
  
 BACTRIM -800 mg per tablet Generic drug:  trimethoprim-sulfamethoxazole Your last dose was: Your next dose is: Take 1 Tab by mouth daily. 1 Tab  
    
   
   
   
  
 cephALEXin 500 mg capsule Commonly known as:  Tram Culver Your last dose was: Your next dose is: Take 500 mg by mouth three (3) times daily. 500 mg  
    
   
   
   
  
 metoclopramide HCl 10 mg tablet Commonly known as:  REGLAN Your last dose was: Your next dose is: Take 10 mg by mouth every six (6) hours as needed (headache). 10 mg  
    
   
   
   
  
 ondansetron 8 mg disintegrating tablet Commonly known as:  ZOFRAN ODT Your last dose was: Your next dose is: Take 1 Tab by mouth every eight (8) hours as needed for Nausea. 8 mg  
    
   
   
   
  
 pantoprazole 20 mg tablet Commonly known as:  PROTONIX Your last dose was: Your next dose is: Take 20 mg by mouth daily. 20 mg PRENATAL VITAMIN PO Your last dose was: Your next dose is: Take 1 Tab by mouth daily. 1 Tab  
    
   
   
   
  
 ursodiol 300 mg capsule Commonly known as:  ACTIGALL Your last dose was: Your next dose is: Take 300 mg by mouth three (3) times daily. Indications: pt states she takes TID but prescription is written for BID  
 300 mg Where to Get Your Medications Information on where to get these meds will be given to you by the nurse or doctor. ! Ask your nurse or doctor about these medications HYDROcodone-acetaminophen 5-325 mg per tablet  
 ibuprofen 800 mg tablet  
 labetalol 100 mg tablet Discharge Instructions  Delivery (Postpartum Care): After Your Visit Your Care Instructions Your baby was delivered through a cut, called an incision, in your belly. This surgery is called a  delivery, or a . After childbirth (postpartum period), your body goes through many changes. In the next few weeks, your body will slowly heal. It can take 4 weeks or more for the incision from your surgery to heal. It is easy to get too tired and overwhelmed during the first weeks after your baby is born. You may feel emotional during this time. Changes in your hormones can shift your mood without warning. It is easy to get too tired and overwhelmed during the first weeks after childbirth. Take it easy on yourself. You may find it hard to meet the extra demands on your energy and time. Get rest whenever you can, accept help from others, and eat well and drink plenty of fluids. After a , you may have gas or need to burp a lot. You may have some light vaginal bleeding or spotting for up to 6 weeks after surgery. It is normal to have pain in the incision area off and on during the next 6 months. No driving for 1 week after delivery. No heavy lifting. No more than 8 lbs or the size of baby for 2-3 weeks. Limit use of stairs. 1-2 times per day for the first week after delivery. Follow-up care is a key part of your treatment and safety. Be sure to make and go to all appointments, and call your doctor if you are having problems. Its also a good idea to know your test results and keep a list of the medicines you take. Around 4 to 6 weeks after your baby's birth, you will have a follow-up visit with your doctor.  This visit is your time to talk to your doctor about anything you are concerned or curious about. Keep a list of questions to bring to your postpartum visit. Your questions might be about: 
Changes in your breasts, such as lumps or soreness. When to expect your menstrual period to start again. What form of birth control is best for you. Weight you have put on during the pregnancy. Exercise options. What foods and drinks are best for you, especially if you are breast-feeding. Problems you might be having with breast-feeding. When you can have sex. Some women may want to talk about lubricants for the vagina. Any feelings of sadness or restlessness that you are having. How can you care for yourself at home? Be sure that you understand any instructions your doctor has given you after surgery. This will guide your activities and tell you what to watch for in the next few weeks. Vaginal bleeding and cramps After delivery, you will have a bloody discharge from the vagina. This will turn pink within a week and then white or yellow after about 10 days. It may last for 2 to 4 weeks or longer, until the uterus has healed. You may have cramps for the first few days after childbirth. These are normal and occur as the uterus shrinks to normal size. Take an over-the-counter pain medicine, such as acetaminophen (Tylenol), ibuprofen (Advil, Motrin), or naproxen (Aleve), for cramps. Read and follow all instructions on the label. Do not take aspirin, because it can cause more bleeding. Take other medicines exactly as prescribed. Call your doctor if you have any problems with your medicine. Incision You may have had staples removed while you were in the hospital. Keep the area clean, and wash it with water and mild soap. If you had stitches, they should dissolve on their own and should not need to be removed. Follow your doctor's instructions for cleaning the stitched area. If you have steri-strips (tape) the will come off on their own in 7-10 days. Breast-feeding and breast fullness Breast-feed your baby in positions that do not put pressure on your incision, such as side-lying or football-hold positions. Ask your nurse, doctor, midwife, or lactation specialist to show you these positions if you do not know what they are. Your breasts may overfill (engorge) in the first few days after delivery. To help milk flow and to relieve pain, warm your breasts in the shower or by using warm, moist towels before nursing. Express a small amount of milk to soften your breast near the nipple and then feed your baby. Put an ice or cold pack on your breast for a few minutes after nursing to reduce swelling and pain. Put a thin cloth between the ice pack and your skin. If you are not nursing, do not put warmth on your breasts or touch your breasts. Wear a tight bra or sports bra, and use an ice or cold pack on your breasts to reduce swelling and pain. Breast fullness usually lasts 3 to 4 days. Activity Eat a balanced diet. Do not try to lose weight by cutting calories. Keep taking your prenatal vitamins, or take a multivitamin. Get as much rest as you can. Try to take naps when your baby sleeps during the day. Get help with household chores from family or friends, if you can. Do not try to do it all yourself. Get some gentle exercise, such as walking, every day. Do not lift more than 10 pounds for the next 4 to 6 weeks. Do not have sex or use tampons until you have stopped bleeding and at least 4 to 6 weeks have passed since you gave birth. Talk to your doctor about birth control. You can get pregnant even before your period returns. Also, you can get pregnant while you are breast-feeding. Mental health It is normal to have some sadness, anxiety, sleeplessness, and mood swings after you go home. If you feel upset or hopeless for more than a few days, talk to your doctor.  If you have any thoughts of hurting yourself or anyone elseincluding your babycall 911 or go to the emergency room. Many women get the \"baby blues\" during the first few days after childbirth. The \"baby blues\" usually peak around the fourth day and then ease up in less than 2 weeks. If you have the \"baby blues\" for more than a few days, or if you have thoughts of hurting yourself or your baby, call your doctor right away. Constipation and hemorrhoids Drink plenty of fluids (8 to 10 glasses a day), especially water. · Eat plenty of fiber each day to avoid constipation. Include foods such as whole-grain breads and cereals, raw vegetables, raw and dried fruits, and beans. · If you have hemorrhoids or swelling or pain around the opening of your vagina, try using cold and heat. You can put ice or a cold pack on the area for 10 to 20 minutes at a time. Put a thin cloth between the ice and your skin. Also try sitting in a few inches of warm water (sitz bath) 3 times a day and after bowel movements. Drink plenty of fluids, enough so that your urine is light yellow or clear like water. If you have kidney, heart, or liver disease and have to limit fluids, talk with your doctor before you increase the amount of fluids you drink. When should you call for help? Call 911 anytime you think you may need emergency care. For example, call if: 
You are thinking of hurting yourself, your baby, or anyone else. You have sudden, severe pain in your belly. You pass out (lose consciousness). Call your doctor now or seek immediate medical care if: 
You have severe vaginal bleeding. You are passing blood clots and soaking through a pad each hour for 2 or more hours. Your vaginal bleeding seems to be getting heavier or is still bright red 4 days after delivery, or you pass blood clots larger than the size of a golf ball. You have increased redness, heat, or drainage in the incision area. You are dizzy or lightheaded, or you feel like you may faint. You are vomiting or cannot keep fluids down. You have a fever. You have new or more belly pain. You pass tissue (not just blood). The incision seems to be pulling open or starts bleeding. Your vaginal discharge smells bad. Your belly feels more tender or full and hard. You have pain or redness in one or both breasts. You feel sad, anxious, or hopeless for more than a few days. Where can you learn more? Go to [email or web address].be Enter W874 in the search box to learn more about \" Delivery: After Your Visit\". or 
  
Go to [email or web address].be Enter T923  in the search box to learn more about \"Postpartum Care: After Your Visit\". This care instruction is for use with your licensed healthcare professional. If you have questions about a medical condition or this instruction, always ask your healthcare professional. Care instructions adapted by [employer] (which disclaims liability or warranty for this information) from Jian Gay, [address] [place] [address]. [employer] disclaims any warranty or liability for your use of this information. Knovel Activation Thank you for requesting access to Knovel. Please follow the instructions below to securely access and download your online medical record. Knovel allows you to send messages to your doctor, view your test results, renew your prescriptions, schedule appointments, and more. How Do I Sign Up? 1. In your internet browser, go to [email or web address]. 2. Click on the First Time User? Click Here link in the Sign In box. You will see the New Member Sign Up page. 3. Enter your Knovel Access Code exactly as it appears below. You will not need to use this code after youve completed the sign-up process. If you do not sign up before the expiration date, you must request a new code. Knovel Access Code: Activation code not generated Current Knovel Status: Active (This is the date your Knovel access code will ) 4.  Enter the last four digits of your Social Security Number (xxxx) and Date of Birth (mm/dd/yyyy) as indicated and click Submit. You will be taken to the next sign-up page. 5. Create a Funium ID. This will be your Funium login ID and cannot be changed, so think of one that is secure and easy to remember. 6. Create a Funium password. You can change your password at any time. 7. Enter your Password Reset Question and Answer. This can be used at a later time if you forget your password. 8. Enter your e-mail address. You will receive e-mail notification when new information is available in [address] 9. Click Sign Up. You can now view and download portions of your medical record. 10. Click the Download Summary menu link to download a portable copy of your medical information. Additional Information If you have questions, please visit the Frequently Asked Questions section of the Funium website at [email or web address] Remember, Funium is NOT to be used for urgent needs. For medical emergencies, dial 911. Introducing [facility]! Dear April: 
Thank you for requesting a Funium account. Our records indicate that you already have an active Funium account. You can access your account anytime at [email or web address] Did you know that you can access your hospital and ER discharge instructions at any time in Funium? You can also review all of your test results from your hospital stay or ER visit. Additional Information If you have questions, please visit the Frequently Asked Questions section of the Funium website at [email or web address] Remember, Funium is NOT to be used for urgent needs. For medical emergencies, dial 911. Now available from your iPhone and Android! Providers Seen During Your Hospitalization Provider Specialty Primary office phone Atif Diaz MD Obstetrics & Gynecology [phone]  Audrey Kwon MD Obstetrics & Gynecology [phone] Immunizations Administered for This Admission Name Date Tdap 11/23/2017 Your Primary Care Physician (PCP) Primary Care Physician Office Phone Office Fax UNKNOWN, PROVIDER ** None ** ** None ** You are allergic to the following Allergen Reactions Latex Hives Compazine (Prochlorperazine Edisylate) Anxiety Acetaminophen Other (comments) Pt has liver disease Aspirin Other (comments) Patient states \"I have liver disease\" Tramadol Anxiety Wellbutrin (Bupropion Hcl) Palpitations Recent Documentation Height Weight Breastfeeding? BMI OB Status Smoking Status 1.778 m 142.9 kg Yes 45.2 kg/m2 Recent pregnancy Current Every Day Smoker Emergency Contacts Name Discharge Info Relation Home Work Mobile Jennifer Reyes DISCHARGE CAREGIVER [3] Other Relative [6] [phone] Estefani Donis  Friend [5] [phone] Patient Belongings The following personal items are in your possession at time of discharge: 
  Dental Appliances: None         Home Medications: Kept at bedside   Jewelry: Body Piercing, Necklace, Ring  Clothing: Footwear, Pants, Shirt    Other Valuables: Cell Phone Please provide this summary of care documentation to your next provider. Signatures-by signing, you are acknowledging that this After Visit Summary has been reviewed with you and you have received a copy. Patient Signature:  ____________________________________________________________ Date:  ____________________________________________________________  
  
Josph Perfect Provider Signature:  ____________________________________________________________ Date:  ____________________________________________________________

## 2017-11-21 NOTE — H&P
History & Physical    Name: Risa Fried MRN: [id number]  SSN: [id number]    YOB: 1995  Age: 25 y.o. Sex: female      Subjective:     Estimated Date of Delivery: None noted. OB History    Para Term  AB Living   2 1 1   1   SAB TAB Ectopic Molar Multiple Live Births        1      # Outcome Date GA Lbr Angel/2nd Weight Sex Delivery Anes PTL Lv   2 Current            1 Term 11 40w5d 10:42 / 01:02 3.07 kg F VAGINAL DELI OTHER (Katarina Bloom          Ms. Nikos Duran is admitted with pregnancy at Unknown for induction of labor due to CHOLESTASIS. Prenatal course was complicated by cholestasis. Please see prenatal records for details.     Past Medical History:   Diagnosis Date    Acute hepatitis 2014    Acute pharyngitis 2010    Acute sinusitis 2009    Anemia NEC     with pregnancy    Anxiety     Backache, unspecified 9258    Complication of anesthesia     difficulty waking after surgery (pt reported)    Depression     Essential hypertension     Before pregnancy    Fatty liver     Flu 2009    GERD (gastroesophageal reflux disease) 2012    HX OTHER MEDICAL     GERD with pregnancy    HX OTHER MEDICAL     Migraines worse with pregnancy    Hypertension     IGT (impaired glucose tolerance) 2012    Migraine 3/22/2011    Mononucleosis 2010    Morbid obesity ([address]      Past Surgical History:   Procedure Laterality Date    HX CHOLECYSTECTOMY      HX OTHER SURGICAL      gallbladder    HX TONSILLECTOMY      HX WISDOM TEETH EXTRACTION  2013    Dr. Gopi Mccracken History    Does not work, \"pulled out of work due to high risk pregnancy\" Not Employed     grad from [address] [place] [address] History Main Topics    Smoking status: Current Every Day Smoker     Packs/day: 0.50    Smokeless tobacco: Never Used    Alcohol use No    Drug use: No    Sexual activity: Yes     Partners: Female     Birth control/ protection: Inserts Family History   Problem Relation Age of Onset    Hypertension Mother     Alcohol abuse Father     Diabetes Maternal Grandmother     Headache Maternal Grandmother        Allergies   Allergen Reactions    Latex Hives    Compazine [Prochlorperazine Edisylate] Anxiety    Acetaminophen Other (comments)     Pt has liver disease    Aspirin Other (comments)     Patient states \"I have liver disease\"    Tramadol Anxiety    Wellbutrin [Bupropion Hcl] Palpitations     Prior to Admission medications    Medication Sig Start Date End Date Taking? Authorizing Provider   ACETAMINOPHEN WITH CODEINE (ACETAMINOPHEN-CODEINE PO) Take 1 Tab by mouth every four (4) hours as needed (Pain). 11/5/17   Daniele Jack MD   trimethoprim-sulfamethoxazole (BACTRIM DS) 160-800 mg per tablet Take 1 Tab by mouth daily. 11/5/17   Daniele Jack MD   cephALEXin (KEFLEX) 500 mg capsule Take 500 mg by mouth three (3) times daily. 11/5/17   Daniele Jack MD   labetalol (NORMODYNE) 100 mg tablet Take 100 mg by mouth two (2) times a day. Historical Provider   aspirin delayed-release 81 mg tablet Take 81 mg by mouth daily. Historical Provider   pantoprazole (PROTONIX) 20 mg tablet Take 20 mg by mouth daily. Historical Provider   metoclopramide HCl (REGLAN) 10 mg tablet Take 10 mg by mouth every six (6) hours as needed (headache). Historical Provider   PRENATAL VIT CALC,IRON,FOLIC (PRENATAL VITAMIN PO) Take 1 Tab by mouth daily. 11/5/17   Historical Provider   ondansetron (ZOFRAN ODT) 8 mg disintegrating tablet Take 1 Tab by mouth every eight (8) hours as needed for Nausea. Patient not taking: Reported on 11/6/2017 7/18/17   Donato Holstein, MD        Review of Systems: A comprehensive review of systems was negative except for that written in the History of Present Illness. Objective:     Vitals: There were no vitals filed for this visit.      Physical Exam:  Cervical Exam: Closed/Thick/High  Membranes:  Intact  Fetal Heart Rate: Reactive          Prenatal Labs:   Lab Results   Component Value Date/Time    Rubella, External immune at 32 09/16/2010    HBsAg, External neg 09/16/2010    HIV, External NR 09/16/2010    RPR, External NR 02/28/2011    Gonorrhea, External neg 09/16/2010    Chlamydia, External neg 09/16/2010    ABO,Rh AB + 09/16/2010    GrBStrep, External neg 04/28/2011       Impression/Plan:     Active Problems:    * No active hospital problems. *       Plan: Admit for induction of labor.      Signed By:  Jonnathan Mills MD     November 21, 2017

## 2017-11-21 NOTE — PROGRESS NOTES
[age] y/o  brianna 17 to l&d for scheduled cervidil d/t cholisistitis. Pt very anxiouse, and reasured. Ppt changed, consents signed.   poc reviewed, questions answered    [id number] Pt back up to br    1645 Pt back up to br    1730 Up to br    1755 Dr Chin Search at bedside to view strip, assess and place cervidil    1900 Pt sitting up eating, trracing mhr.

## 2017-11-21 NOTE — IP AVS SNAPSHOT
Summary of Care Report The Summary of Care report has been created to help improve care coordination. Users with access to [facility] or [address] (Web-based application) may access additional patient information including the Discharge Summary. If you are not currently a [address] user and need more information, please call the number listed below in the [place] [address] section and ask to be connected with Medical Records. Facility Information Name Address Phone [address] [address] [phone] Patient Information Patient Name Sex  Gabriela Organ ([id number]) Female 1995 Discharge Information Admitting Provider Service Area Unit Fede Zamarripa MD / [phone] [employer] [facility] Mother Infant / [phone] Discharge Provider Discharge Date/Time Discharge Disposition Destination (none) 2017 Morning (Pending) AHR (none) Patient Language Language ENGLISH [13] Hospital Problems as of 2017  Reviewed: 2017 11:05 AM by Monserrat Jameson MD  
  
  
  
 Class Noted - Resolved Last Modified POA Active Problems Pregnancy  2017 - Present 2017 by Fede Zamarripa MD Unknown Entered by Fede Zamarripa MD  
  
Non-Hospital Problems as of 2017  Reviewed: 2017 11:05 AM by Monserrat Jameson MD  
  
  
  
 Class Noted - Resolved Last Modified Active Problems Backache, unspecified  2009 - Present 2010 by Bo Hsu Entered by Bo Hsu Acute sinusitis  2009 - Present 2010 by Bo Hsu Entered by Bo Hsu Migraine  3/22/2011 - Present 3/22/2011 by David Adam Entered by David Adam Overview Signed 3/22/2011  7:43 PM by David Adam    Seen by Dr. Iram Leahy 2.14.2011 Obesity  6/19/2012 - Present 6/19/2012 by Olivia Becker MD  
  Entered by Olivia Becker MD  
  IGT (impaired glucose tolerance)  6/27/2012 - Present 6/27/2012 by Olivia Becker MD  
  Entered by Olivia Becker MD  
  GERD (gastroesophageal reflux disease)  7/31/2012 - Present 7/31/2012 by Everardo Brink MD  
  Entered by Everardo Brink MD  
  Elevated liver enzymes  11/23/2012 - Present 11/23/2012 by Gabino Jolly MD  
  Entered by Gabino Jolly MD  
  S/P cholecystectomy  11/23/2012 - Present 11/23/2012 by Gabino Jolly MD  
  Entered by Gabino Jolly MD  
  
You are allergic to the following Allergen Reactions Latex Hives Compazine (Prochlorperazine Edisylate) Anxiety Acetaminophen Other (comments) Pt has liver disease Aspirin Other (comments) Patient states \"I have liver disease\" Tramadol Anxiety Wellbutrin (Bupropion Hcl) Palpitations Current Discharge Medication List  
  
START taking these medications Dose & Instructions Dispensing Information Comments HYDROcodone-acetaminophen 5-325 mg per tablet Commonly known as:  Viji Carlitos Dose:  1 Tab Take 1 Tab by mouth every four (4) hours as needed. Max Daily Amount: 6 Tabs. Quantity:  30 Tab Refills:  0  
   
 ibuprofen 800 mg tablet Commonly known as:  MOTRIN Dose:  800 mg Take 1 Tab by mouth every eight (8) hours as needed for Pain. Quantity:  30 Tab Refills:  0 CONTINUE these medications which have CHANGED Dose & Instructions Dispensing Information Comments * labetalol 100 mg tablet Commonly known as:  Amanda Gala What changed:  Another medication with the same name was added. Make sure you understand how and when to take each. Dose:  100 mg Take 100 mg by mouth two (2) times a day. Refills:  0  
   
 * labetalol 100 mg tablet Commonly known as:  Amanda Gala What changed: You were already taking a medication with the same name, and this prescription was added. Make sure you understand how and when to take each. Dose:  100 mg Take 1 Tab by mouth two (2) times a day. Quantity:  60 Tab Refills:  0  
   
 * Notice: This list has 2 medication(s) that are the same as other medications prescribed for you. Read the directions carefully, and ask your doctor or other care provider to review them with you. ASK your doctor about these medications Dose & Instructions Dispensing Information Comments ACETAMINOPHEN-CODEINE PO Dose:  1 Tab Take 1 Tab by mouth every four (4) hours as needed (Pain). Refills:  0  
   
 aspirin delayed-release 81 mg tablet Dose:  81 mg Take 81 mg by mouth daily. Refills:  0 BACTRIM -800 mg per tablet Generic drug:  trimethoprim-sulfamethoxazole Dose:  1 Tab Take 1 Tab by mouth daily. Refills:  0  
   
 cephALEXin 500 mg capsule Commonly known as:  Nickola Newer Dose:  500 mg Take 500 mg by mouth three (3) times daily. Refills:  0  
   
 metoclopramide HCl 10 mg tablet Commonly known as:  REGLAN Dose:  10 mg Take 10 mg by mouth every six (6) hours as needed (headache). Refills:  0  
   
 ondansetron 8 mg disintegrating tablet Commonly known as:  ZOFRAN ODT Dose:  8 mg Take 1 Tab by mouth every eight (8) hours as needed for Nausea. Quantity:  15 Tab Refills:  0  
   
 pantoprazole 20 mg tablet Commonly known as:  PROTONIX Dose:  20 mg Take 20 mg by mouth daily. Refills:  0 PRENATAL VITAMIN PO Dose:  1 Tab Take 1 Tab by mouth daily. Refills:  0  
   
 ursodiol 300 mg capsule Commonly known as:  ACTIGALL Dose:  300 mg Take 300 mg by mouth three (3) times daily. Indications: pt states she takes TID but prescription is written for BID Refills:  0 Current Immunizations Name Date  Human Papillomavirus 6/19/2012, 1/17/2012, 2011 Influenza Vaccine 2016 Tdap 2017 Surgery Information ID Date/Time Status Primary Surgeon All Procedures Location [id number] 2017 Unposted   [facility] - DO NOT SCHEDULE    
 [id number] 2017 [address] Cecile Jack MD  [facility] L[facility] OR Follow-up Information Follow up With Details Comments Contact Info Provider Unknown   Patient not available to ask Discharge Instructions  Delivery (Postpartum Care): After Your Visit Your Care Instructions Your baby was delivered through a cut, called an incision, in your belly. This surgery is called a  delivery, or a . After childbirth (postpartum period), your body goes through many changes. In the next few weeks, your body will slowly heal. It can take 4 weeks or more for the incision from your surgery to heal. It is easy to get too tired and overwhelmed during the first weeks after your baby is born. You may feel emotional during this time. Changes in your hormones can shift your mood without warning. It is easy to get too tired and overwhelmed during the first weeks after childbirth. Take it easy on yourself. You may find it hard to meet the extra demands on your energy and time. Get rest whenever you can, accept help from others, and eat well and drink plenty of fluids. After a , you may have gas or need to burp a lot. You may have some light vaginal bleeding or spotting for up to 6 weeks after surgery. It is normal to have pain in the incision area off and on during the next 6 months. No driving for 1 week after delivery. No heavy lifting. No more than 8 lbs or the size of baby for 2-3 weeks. Limit use of stairs. 1-2 times per day for the first week after delivery. Follow-up care is a key part of your treatment and safety. Be sure to make and go to all appointments, and call your doctor if you are having problems.  Its also a good idea to know your test results and keep a list of the medicines you take. Around 4 to 6 weeks after your baby's birth, you will have a follow-up visit with your doctor. This visit is your time to talk to your doctor about anything you are concerned or curious about. Keep a list of questions to bring to your postpartum visit. Your questions might be about: 
Changes in your breasts, such as lumps or soreness. When to expect your menstrual period to start again. What form of birth control is best for you. Weight you have put on during the pregnancy. Exercise options. What foods and drinks are best for you, especially if you are breast-feeding. Problems you might be having with breast-feeding. When you can have sex. Some women may want to talk about lubricants for the vagina. Any feelings of sadness or restlessness that you are having. How can you care for yourself at home? Be sure that you understand any instructions your doctor has given you after surgery. This will guide your activities and tell you what to watch for in the next few weeks. Vaginal bleeding and cramps After delivery, you will have a bloody discharge from the vagina. This will turn pink within a week and then white or yellow after about 10 days. It may last for 2 to 4 weeks or longer, until the uterus has healed. You may have cramps for the first few days after childbirth. These are normal and occur as the uterus shrinks to normal size. Take an over-the-counter pain medicine, such as acetaminophen (Tylenol), ibuprofen (Advil, Motrin), or naproxen (Aleve), for cramps. Read and follow all instructions on the label. Do not take aspirin, because it can cause more bleeding. Take other medicines exactly as prescribed. Call your doctor if you have any problems with your medicine. Incision You may have had staples removed while you were in the hospital. Keep the area clean, and wash it with water and mild soap.  
If you had stitches, they should dissolve on their own and should not need to be removed. Follow your doctor's instructions for cleaning the stitched area. If you have steri-strips (tape) the will come off on their own in 7-10 days. Breast-feeding and breast fullness Breast-feed your baby in positions that do not put pressure on your incision, such as side-lying or football-hold positions. Ask your nurse, doctor, midwife, or lactation specialist to show you these positions if you do not know what they are. Your breasts may overfill (engorge) in the first few days after delivery. To help milk flow and to relieve pain, warm your breasts in the shower or by using warm, moist towels before nursing. Express a small amount of milk to soften your breast near the nipple and then feed your baby. Put an ice or cold pack on your breast for a few minutes after nursing to reduce swelling and pain. Put a thin cloth between the ice pack and your skin. If you are not nursing, do not put warmth on your breasts or touch your breasts. Wear a tight bra or sports bra, and use an ice or cold pack on your breasts to reduce swelling and pain. Breast fullness usually lasts 3 to 4 days. Activity Eat a balanced diet. Do not try to lose weight by cutting calories. Keep taking your prenatal vitamins, or take a multivitamin. Get as much rest as you can. Try to take naps when your baby sleeps during the day. Get help with household chores from family or friends, if you can. Do not try to do it all yourself. Get some gentle exercise, such as walking, every day. Do not lift more than 10 pounds for the next 4 to 6 weeks. Do not have sex or use tampons until you have stopped bleeding and at least 4 to 6 weeks have passed since you gave birth. Talk to your doctor about birth control. You can get pregnant even before your period returns. Also, you can get pregnant while you are breast-feeding. Mental health It is normal to have some sadness, anxiety, sleeplessness, and mood swings after you go home. If you feel upset or hopeless for more than a few days, talk to your doctor. If you have any thoughts of hurting yourself or anyone elseincluding your babycall 911 or go to the emergency room. Many women get the \"baby blues\" during the first few days after childbirth. The \"baby blues\" usually peak around the fourth day and then ease up in less than 2 weeks. If you have the \"baby blues\" for more than a few days, or if you have thoughts of hurting yourself or your baby, call your doctor right away. Constipation and hemorrhoids Drink plenty of fluids (8 to 10 glasses a day), especially water. · Eat plenty of fiber each day to avoid constipation. Include foods such as whole-grain breads and cereals, raw vegetables, raw and dried fruits, and beans. · If you have hemorrhoids or swelling or pain around the opening of your vagina, try using cold and heat. You can put ice or a cold pack on the area for 10 to 20 minutes at a time. Put a thin cloth between the ice and your skin. Also try sitting in a few inches of warm water (sitz bath) 3 times a day and after bowel movements. Drink plenty of fluids, enough so that your urine is light yellow or clear like water. If you have kidney, heart, or liver disease and have to limit fluids, talk with your doctor before you increase the amount of fluids you drink. When should you call for help? Call 911 anytime you think you may need emergency care. For example, call if: 
You are thinking of hurting yourself, your baby, or anyone else. You have sudden, severe pain in your belly. You pass out (lose consciousness). Call your doctor now or seek immediate medical care if: 
You have severe vaginal bleeding. You are passing blood clots and soaking through a pad each hour for 2 or more hours.  
Your vaginal bleeding seems to be getting heavier or is still bright red 4 days after delivery, or you pass blood clots larger than the size of a golf ball. You have increased redness, heat, or drainage in the incision area. You are dizzy or lightheaded, or you feel like you may faint. You are vomiting or cannot keep fluids down. You have a fever. You have new or more belly pain. You pass tissue (not just blood). The incision seems to be pulling open or starts bleeding. Your vaginal discharge smells bad. Your belly feels more tender or full and hard. You have pain or redness in one or both breasts. You feel sad, anxious, or hopeless for more than a few days. Where can you learn more? Go to Devunity.be Enter U632 in the search box to learn more about \" Delivery: After Your Visit\". or 
  
Go to Devunity.be Enter F076  in the search box to learn more about \"Postpartum Care: After Your Visit\". This care instruction is for use with your licensed healthcare professional. If you have questions about a medical condition or this instruction, always ask your healthcare professional. Care instructions adapted by [facility] (which disclaims liability or warranty for this information) from [facility], [address] [place] [address]. [facility] disclaims any warranty or liability for your use of this information. [employer] Activation Thank you for requesting access to [employer]. Please follow the instructions below to securely access and download your online medical record. [employer] allows you to send messages to your doctor, view your test results, renew your prescriptions, schedule appointments, and more. How Do I Sign Up? 1. In your internet browser, go to [email or web address]. 2. Click on the First Time User? Click Here link in the Sign In box. You will see the New Member Sign Up page. 3. Enter your [employer] Access Code exactly as it appears below. You will not need to use this code after youve completed the sign-up process.  If you do not sign up before the expiration date, you must request a new code. [employer] Access Code: Activation code not generated Current [facility] Status: Active (This is the date your [employer] access code will ) 4. Enter the last four digits of your Social Security Number (xxxx) and Date of Birth (mm/dd/yyyy) as indicated and click Submit. You will be taken to the next sign-up page. 5. Create a [employer]iat ID. This will be your [facility] login ID and cannot be changed, so think of one that is secure and easy to remember. 6. Create a [employer] password. You can change your password at any time. 7. Enter your Password Reset Question and Answer. This can be used at a later time if you forget your password. 8. Enter your e-mail address. You will receive e-mail notification when new information is available in [address]. 9. Click Sign Up. You can now view and download portions of your medical record. 10. Click the Download Summary menu link to download a portable copy of your medical information. Additional Information If you have questions, please visit the Frequently Asked Questions section of the [facility] website at [email or web address] Remember, [facility] is NOT to be used for urgent needs. For medical emergencies, dial 911. Chart Review Routing History Recipient Method Report Sent By Darvin Anguiano MD  
Phone: [phone] In Basket IP Auto Routed Notes Pippa Hill MD [[id number]] 2014  4:40 AM 2014 Kiko Anguiano MD  
Phone: [phone] In Basket IP Auto Routed Notes Emily Negron MD [[id number]] 2014  9:08 AM 2014 Provider Unknown, MD  
Patient not available to ask [address] Mail IP Auto Routed Notes Leonardo Bravo MD [[id number]] 2017  5:30 PM 2017

## 2017-11-22 ENCOUNTER — ANESTHESIA (OUTPATIENT)
Dept: LABOR AND DELIVERY | Age: 22
DRG: 540 | End: 2017-11-22
Payer: MEDICAID

## 2017-11-22 ENCOUNTER — ANESTHESIA EVENT (OUTPATIENT)
Dept: LABOR AND DELIVERY | Age: 22
DRG: 540 | End: 2017-11-22
Payer: MEDICAID

## 2017-11-22 PROCEDURE — 77030010848 HC CATH INTUTR PRSS KOLB -B

## 2017-11-22 PROCEDURE — 65410000002 HC RM PRIVATE OB

## 2017-11-22 PROCEDURE — 74011250636 HC RX REV CODE- 250/636

## 2017-11-22 PROCEDURE — 00HU33Z INSERTION OF INFUSION DEVICE INTO SPINAL CANAL, PERCUTANEOUS APPROACH: ICD-10-PCS | Performed by: ANESTHESIOLOGY

## 2017-11-22 PROCEDURE — 77010026065 HC OXYGEN MINIMUM MEDICAL AIR

## 2017-11-22 PROCEDURE — 77030018749 HC HK AMNIO DISP DERY -A

## 2017-11-22 PROCEDURE — 76010000391 HC C SECN FIRST 1 HR: Performed by: OBSTETRICS & GYNECOLOGY

## 2017-11-22 PROCEDURE — 76060000078 HC EPIDURAL ANESTHESIA: Performed by: OBSTETRICS & GYNECOLOGY

## 2017-11-22 PROCEDURE — 77030009413 HC ELECTRD SCALP COVD -A

## 2017-11-22 PROCEDURE — 75410000002 HC LABOR FEE PER 1 HR

## 2017-11-22 PROCEDURE — 74011000250 HC RX REV CODE- 250

## 2017-11-22 PROCEDURE — 77030014125 HC TY EPDRL BBMI -B: Performed by: ANESTHESIOLOGY

## 2017-11-22 PROCEDURE — 74011000258 HC RX REV CODE- 258: Performed by: OBSTETRICS & GYNECOLOGY

## 2017-11-22 PROCEDURE — 76060000032 HC ANESTHESIA 0.5 TO 1 HR: Performed by: OBSTETRICS & GYNECOLOGY

## 2017-11-22 PROCEDURE — 74011250636 HC RX REV CODE- 250/636: Performed by: OBSTETRICS & GYNECOLOGY

## 2017-11-22 PROCEDURE — 74011000250 HC RX REV CODE- 250: Performed by: OBSTETRICS & GYNECOLOGY

## 2017-11-22 PROCEDURE — 77010026064 HC OXYGEN INFANT MED AIR MIN

## 2017-11-22 PROCEDURE — 74011250637 HC RX REV CODE- 250/637: Performed by: OBSTETRICS & GYNECOLOGY

## 2017-11-22 PROCEDURE — 76060000078 HC EPIDURAL ANESTHESIA

## 2017-11-22 PROCEDURE — 88307 TISSUE EXAM BY PATHOLOGIST: CPT | Performed by: OBSTETRICS & GYNECOLOGY

## 2017-11-22 PROCEDURE — 75410000003 HC RECOV DEL/VAG/CSECN EA 0.5 HR

## 2017-11-22 RX ORDER — MORPHINE SULFATE 4 MG/ML
INJECTION INTRAVENOUS
Status: COMPLETED
Start: 2017-11-22 | End: 2017-11-22

## 2017-11-22 RX ORDER — MORPHINE SULFATE 4 MG/ML
2 INJECTION INTRAVENOUS
Status: DISCONTINUED | OUTPATIENT
Start: 2017-11-22 | End: 2017-11-24

## 2017-11-22 RX ORDER — SODIUM CHLORIDE, SODIUM LACTATE, POTASSIUM CHLORIDE, CALCIUM CHLORIDE 600; 310; 30; 20 MG/100ML; MG/100ML; MG/100ML; MG/100ML
125 INJECTION, SOLUTION INTRAVENOUS CONTINUOUS
Status: DISCONTINUED | OUTPATIENT
Start: 2017-11-22 | End: 2017-11-24

## 2017-11-22 RX ORDER — SODIUM CHLORIDE 900 MG/100ML
INJECTION INTRAVENOUS
Status: DISPENSED
Start: 2017-11-22 | End: 2017-11-23

## 2017-11-22 RX ORDER — HYDROCODONE BITARTRATE AND ACETAMINOPHEN 5; 325 MG/1; MG/1
1 TABLET ORAL
Status: DISCONTINUED | OUTPATIENT
Start: 2017-11-22 | End: 2017-11-24 | Stop reason: HOSPADM

## 2017-11-22 RX ORDER — SODIUM CHLORIDE 0.9 % (FLUSH) 0.9 %
5-10 SYRINGE (ML) INJECTION EVERY 8 HOURS
Status: DISCONTINUED | OUTPATIENT
Start: 2017-11-22 | End: 2017-11-22 | Stop reason: HOSPADM

## 2017-11-22 RX ORDER — SODIUM CHLORIDE 0.9 % (FLUSH) 0.9 %
5-10 SYRINGE (ML) INJECTION EVERY 8 HOURS
Status: DISCONTINUED | OUTPATIENT
Start: 2017-11-22 | End: 2017-11-22

## 2017-11-22 RX ORDER — NALOXONE HYDROCHLORIDE 0.4 MG/ML
0.4 INJECTION, SOLUTION INTRAMUSCULAR; INTRAVENOUS; SUBCUTANEOUS AS NEEDED
Status: DISCONTINUED | OUTPATIENT
Start: 2017-11-22 | End: 2017-11-24

## 2017-11-22 RX ORDER — ONDANSETRON 2 MG/ML
INJECTION INTRAMUSCULAR; INTRAVENOUS AS NEEDED
Status: DISCONTINUED | OUTPATIENT
Start: 2017-11-22 | End: 2017-11-22 | Stop reason: HOSPADM

## 2017-11-22 RX ORDER — LABETALOL 100 MG/1
100 TABLET, FILM COATED ORAL EVERY 12 HOURS
Status: DISCONTINUED | OUTPATIENT
Start: 2017-11-22 | End: 2017-11-24 | Stop reason: HOSPADM

## 2017-11-22 RX ORDER — DIPHENHYDRAMINE HCL 25 MG
25 CAPSULE ORAL
Status: DISCONTINUED | OUTPATIENT
Start: 2017-11-22 | End: 2017-11-24 | Stop reason: HOSPADM

## 2017-11-22 RX ORDER — SODIUM CHLORIDE 900 MG/100ML
INJECTION INTRAVENOUS
Status: DISCONTINUED
Start: 2017-11-22 | End: 2017-11-22

## 2017-11-22 RX ORDER — CEFAZOLIN SODIUM IN 0.9 % NACL 2 G/100 ML
PLASTIC BAG, INJECTION (ML) INTRAVENOUS
Status: COMPLETED
Start: 2017-11-22 | End: 2017-11-22

## 2017-11-22 RX ORDER — MISOPROSTOL 100 UG/1
TABLET ORAL
Status: DISCONTINUED
Start: 2017-11-22 | End: 2017-11-22

## 2017-11-22 RX ORDER — CHLOROPROCAINE HYDROCHLORIDE 30 MG/ML
INJECTION, SOLUTION EPIDURAL; INFILTRATION; INTRACAUDAL; PERINEURAL AS NEEDED
Status: DISCONTINUED | OUTPATIENT
Start: 2017-11-22 | End: 2017-11-22 | Stop reason: HOSPADM

## 2017-11-22 RX ORDER — SIMETHICONE 80 MG
80 TABLET,CHEWABLE ORAL AS NEEDED
Status: DISCONTINUED | OUTPATIENT
Start: 2017-11-22 | End: 2017-11-24 | Stop reason: HOSPADM

## 2017-11-22 RX ORDER — FENTANYL/BUPIVACAINE/NS/PF 2-1250MCG
1-16 PREFILLED PUMP RESERVOIR EPIDURAL CONTINUOUS
Status: DISCONTINUED | OUTPATIENT
Start: 2017-11-22 | End: 2017-11-22 | Stop reason: HOSPADM

## 2017-11-22 RX ORDER — ACETAMINOPHEN 325 MG/1
650 TABLET ORAL
Status: DISCONTINUED | OUTPATIENT
Start: 2017-11-22 | End: 2017-11-24 | Stop reason: HOSPADM

## 2017-11-22 RX ORDER — SODIUM CHLORIDE 0.9 % (FLUSH) 0.9 %
5-10 SYRINGE (ML) INJECTION AS NEEDED
Status: DISCONTINUED | OUTPATIENT
Start: 2017-11-22 | End: 2017-11-24

## 2017-11-22 RX ORDER — OXYTOCIN/RINGER'S LACTATE 20/1000 ML
PLASTIC BAG, INJECTION (ML) INTRAVENOUS
Status: DISPENSED
Start: 2017-11-22 | End: 2017-11-23

## 2017-11-22 RX ORDER — CEFAZOLIN SODIUM 1 G/3ML
INJECTION, POWDER, FOR SOLUTION INTRAMUSCULAR; INTRAVENOUS
Status: DISCONTINUED
Start: 2017-11-22 | End: 2017-11-22

## 2017-11-22 RX ORDER — SODIUM CHLORIDE, SODIUM LACTATE, POTASSIUM CHLORIDE, CALCIUM CHLORIDE 600; 310; 30; 20 MG/100ML; MG/100ML; MG/100ML; MG/100ML
INJECTION, SOLUTION INTRAVENOUS
Status: DISCONTINUED | OUTPATIENT
Start: 2017-11-22 | End: 2017-11-22 | Stop reason: HOSPADM

## 2017-11-22 RX ORDER — OXYTOCIN/RINGER'S LACTATE 20/1000 ML
125-500 PLASTIC BAG, INJECTION (ML) INTRAVENOUS ONCE
Status: ACTIVE | OUTPATIENT
Start: 2017-11-22 | End: 2017-11-23

## 2017-11-22 RX ORDER — DIPHENHYDRAMINE HYDROCHLORIDE 50 MG/ML
INJECTION, SOLUTION INTRAMUSCULAR; INTRAVENOUS
Status: COMPLETED
Start: 2017-11-22 | End: 2017-11-22

## 2017-11-22 RX ORDER — TERBUTALINE SULFATE 1 MG/ML
INJECTION SUBCUTANEOUS
Status: DISCONTINUED
Start: 2017-11-22 | End: 2017-11-22

## 2017-11-22 RX ORDER — TRANEXAMIC ACID 100 MG/ML
INJECTION, SOLUTION INTRAVENOUS
Status: DISCONTINUED
Start: 2017-11-22 | End: 2017-11-22 | Stop reason: CLARIF

## 2017-11-22 RX ORDER — SODIUM CHLORIDE 0.9 % (FLUSH) 0.9 %
5-10 SYRINGE (ML) INJECTION AS NEEDED
Status: DISCONTINUED | OUTPATIENT
Start: 2017-11-22 | End: 2017-11-22 | Stop reason: HOSPADM

## 2017-11-22 RX ORDER — NALOXONE HYDROCHLORIDE 0.4 MG/ML
0.4 INJECTION, SOLUTION INTRAMUSCULAR; INTRAVENOUS; SUBCUTANEOUS AS NEEDED
Status: DISCONTINUED | OUTPATIENT
Start: 2017-11-22 | End: 2017-11-22 | Stop reason: HOSPADM

## 2017-11-22 RX ORDER — SODIUM CHLORIDE, SODIUM LACTATE, POTASSIUM CHLORIDE, CALCIUM CHLORIDE 600; 310; 30; 20 MG/100ML; MG/100ML; MG/100ML; MG/100ML
125 INJECTION, SOLUTION INTRAVENOUS CONTINUOUS
Status: DISCONTINUED | OUTPATIENT
Start: 2017-11-22 | End: 2017-11-22

## 2017-11-22 RX ORDER — SODIUM CHLORIDE 0.9 % (FLUSH) 0.9 %
5-10 SYRINGE (ML) INJECTION EVERY 8 HOURS
Status: DISCONTINUED | OUTPATIENT
Start: 2017-11-22 | End: 2017-11-24

## 2017-11-22 RX ORDER — MORPHINE SULFATE 10 MG/ML
INJECTION, SOLUTION INTRAMUSCULAR; INTRAVENOUS AS NEEDED
Status: DISCONTINUED | OUTPATIENT
Start: 2017-11-22 | End: 2017-11-22 | Stop reason: HOSPADM

## 2017-11-22 RX ORDER — CEFAZOLIN SODIUM IN 0.9 % NACL 2 G/100 ML
PLASTIC BAG, INJECTION (ML) INTRAVENOUS AS NEEDED
Status: DISCONTINUED | OUTPATIENT
Start: 2017-11-22 | End: 2017-11-22 | Stop reason: HOSPADM

## 2017-11-22 RX ORDER — IBUPROFEN 400 MG/1
800 TABLET ORAL EVERY 8 HOURS
Status: DISCONTINUED | OUTPATIENT
Start: 2017-11-22 | End: 2017-11-24 | Stop reason: HOSPADM

## 2017-11-22 RX ORDER — TERBUTALINE SULFATE 1 MG/ML
0.25 INJECTION SUBCUTANEOUS
Status: COMPLETED | OUTPATIENT
Start: 2017-11-22 | End: 2017-11-22

## 2017-11-22 RX ORDER — MISOPROSTOL 100 UG/1
TABLET ORAL AS NEEDED
Status: DISCONTINUED | OUTPATIENT
Start: 2017-11-22 | End: 2017-11-24 | Stop reason: HOSPADM

## 2017-11-22 RX ORDER — FENTANYL/BUPIVACAINE/NS/PF 2-1250MCG
PREFILLED PUMP RESERVOIR EPIDURAL
Status: COMPLETED
Start: 2017-11-22 | End: 2017-11-22

## 2017-11-22 RX ORDER — ZOLPIDEM TARTRATE 5 MG/1
5 TABLET ORAL
Status: DISCONTINUED | OUTPATIENT
Start: 2017-11-22 | End: 2017-11-24 | Stop reason: HOSPADM

## 2017-11-22 RX ORDER — OXYTOCIN/RINGER'S LACTATE 20/1000 ML
PLASTIC BAG, INJECTION (ML) INTRAVENOUS
Status: DISCONTINUED | OUTPATIENT
Start: 2017-11-22 | End: 2017-11-22 | Stop reason: HOSPADM

## 2017-11-22 RX ADMIN — DIPHENHYDRAMINE HYDROCHLORIDE 50 MG: 50 INJECTION, SOLUTION INTRAMUSCULAR; INTRAVENOUS at 16:20

## 2017-11-22 RX ADMIN — Medication 1 MILLI-UNITS/MIN: at 05:37

## 2017-11-22 RX ADMIN — Medication 12 ML/HR: at 10:30

## 2017-11-22 RX ADMIN — FENTANYL 0.2 MG/100ML-BUPIV 0.125%-NACL 0.9% EPIDURAL INJ 12 ML/HR: 2/0.125 SOLUTION at 10:30

## 2017-11-22 RX ADMIN — CHLOROPROCAINE HYDROCHLORIDE 10 ML: 30 INJECTION, SOLUTION EPIDURAL; INFILTRATION; INTRACAUDAL; PERINEURAL at 13:01

## 2017-11-22 RX ADMIN — MORPHINE SULFATE 2 MG: 4 INJECTION INTRAVENOUS at 17:20

## 2017-11-22 RX ADMIN — LABETALOL HYDROCHLORIDE 100 MG: 100 TABLET, FILM COATED ORAL at 20:54

## 2017-11-22 RX ADMIN — MORPHINE SULFATE 2 MG: 4 INJECTION INTRAVENOUS at 21:20

## 2017-11-22 RX ADMIN — Medication: at 13:14

## 2017-11-22 RX ADMIN — IBUPROFEN 800 MG: 400 TABLET, FILM COATED ORAL at 20:08

## 2017-11-22 RX ADMIN — TRANEXAMIC ACID: 100 INJECTION, SOLUTION INTRAVENOUS at 16:00

## 2017-11-22 RX ADMIN — MORPHINE SULFATE 0.5 MG: 10 INJECTION, SOLUTION INTRAMUSCULAR; INTRAVENOUS at 13:26

## 2017-11-22 RX ADMIN — HYDROCODONE BITARTRATE AND ACETAMINOPHEN 1 TABLET: 5; 325 TABLET ORAL at 16:39

## 2017-11-22 RX ADMIN — SODIUM CHLORIDE, SODIUM LACTATE, POTASSIUM CHLORIDE, AND CALCIUM CHLORIDE 125 ML/HR: 600; 310; 30; 20 INJECTION, SOLUTION INTRAVENOUS at 10:12

## 2017-11-22 RX ADMIN — Medication 1 G: at 13:25

## 2017-11-22 RX ADMIN — Medication 2 G: at 13:22

## 2017-11-22 RX ADMIN — SODIUM CHLORIDE, SODIUM LACTATE, POTASSIUM CHLORIDE, CALCIUM CHLORIDE: 600; 310; 30; 20 INJECTION, SOLUTION INTRAVENOUS at 13:00

## 2017-11-22 RX ADMIN — CHLOROPROCAINE HYDROCHLORIDE 10 ML: 30 INJECTION, SOLUTION EPIDURAL; INFILTRATION; INTRACAUDAL; PERINEURAL at 13:04

## 2017-11-22 RX ADMIN — TERBUTALINE SULFATE 0.25 MG: 1 INJECTION SUBCUTANEOUS at 12:44

## 2017-11-22 RX ADMIN — SODIUM CHLORIDE, SODIUM LACTATE, POTASSIUM CHLORIDE, AND CALCIUM CHLORIDE 125 ML/HR: 600; 310; 30; 20 INJECTION, SOLUTION INTRAVENOUS at 04:57

## 2017-11-22 RX ADMIN — ONDANSETRON 4 MG: 2 INJECTION INTRAMUSCULAR; INTRAVENOUS at 13:19

## 2017-11-22 NOTE — PROGRESS NOTES
Report from Esthela Mckinney RN. Requiring much time at bedside to adjust FM- due to her positioning and size. while this readjusting is being done- pt is sleeping and seems annoyed when she awakes    2327 needing readjustment in FM again    0044 requiring adjustment in FM. Pt is sleeping    0305 fm adjusted to better p/u FHR    0334 approx 20 plus minutes in readjusting FM for FHR    0410 after readjusting FM, cervidil removed- intact. Pt is awake    0525 completed shower. Back to bed, now in BR    0720 bedside report to NIALL Garcia

## 2017-11-22 NOTE — PROGRESS NOTES
Dr. Hobbs Arabia in room to place IUPC, IUPC placed at 1255 FHR down to 30-5 section called pt to OR at 1257.

## 2017-11-22 NOTE — PROGRESS NOTES
Pt taken emergently for c/s for nonreassuring fetal heart tracing. Fetal heart deceleration noted after AROM with adequate recovery. However, with placement of IUPC fetal heart rate proceeded to drop again without recovery. Pitocin was off at this time, oxygen on, pt on lateral side, with recent terbutaline given. No resistance with placement of IUPC or blood noted with placement. Secondary to second fetal heart deceleration in a short period of time without adequate recovery, pt was consented for emergent c/s. Fetal heart tones in the OR unable to be obtained. Please see operative report.

## 2017-11-22 NOTE — PROGRESS NOTES
In pts room for infant report, pt partner c/o pt having pain and heavy bleeding. Assessed bleeding, pad was saturated, blue chux bloody as well. Removed pad, and placed new chux and new pad. Fundus assessed, boggy then firm with massage below uterus - 1 , trickling blood with massage. No clots expelled. Pt c/o severe pain in incision and burning sensation. Pasquale Moss Rn informed.

## 2017-11-22 NOTE — PROGRESS NOTES
TRANSFER - OUT REPORT:    Verbal report given to MERA Skelton RN(name) on April Britt  being transferred to MIU(unit) for routine progression of care       Report consisted of patients Situation, Background, Assessment and   Recommendations(SBAR). Information from the following report(s) SBAR, Procedure Summary, Intake/Output and Recent Results was reviewed with the receiving nurse. LOpportunity for questions and clarification was provided.

## 2017-11-22 NOTE — PROGRESS NOTES
Dr. Jennifer Sanders in room, SVE plan of care discussed, cook balloon placed 80 cc in uterine and 40 cc in vaginal port, patient tolerated with out difficulty.

## 2017-11-22 NOTE — PROCEDURES
Section Delivery Operative Report     Date of Surgery: 2017     Preoperative Diagnosis: IUP 37 5/7 WEEKS PRIMARY C/SCTION FOR NON REAURING ETAL TRACING    Postoperative Diagnosis: * No post-op diagnosis entered *    Procedure: Procedure(s):   SECTION    Surgeon(s) and Role: Tiera Reynolds MD     * Mary Jo Arora MD - Primary     Anesthesia: Spinal    Procedure Detail:    The patient was taken to the operating room, where spinal anesthesia was found to be adequate. The patient was prepped and draped in the normal sterile fashion. Pfannenstiel skin incision was made with the scalpel and carried down to the underlying fascia. The fascial incision was extended laterally with a knife. This fascial incision was grasped with Kocher clamps, tented up, and the underlying rectus muscles were dissected bluntly. The inferior edge of the rectus fascia was grasped with Kocher clamps, tented up, and the underlying rectus muscle was dissected off bluntly. The rectus muscle was divided in the midline bluntly. The peritoneum was entered bluntly. The bladder blade was then inserted. A low transverse uterine incision was made with the scalpel and extended laterally with blunt finger dissection. The placenta appeared partially detached with entry. The babys head was then delivered atraumatically. The nose and mouth were suctioned. The cord was clamped and cut and the baby was handed off to the waiting  care unit staff. Cord gas sample was obtained. The placenta was then delivered spontaneously. The uterus was exteriorized and cleared of all clots and debris. The uterine incision was closed in two layers. The first layer with running locked layer of 0 Vicryl. The second layer was an imbricating layer of 0 Vicryl with good hemostasis assured. The pelvis was washed with warm normal saline. Good hemostasis was again reassured.  The paracolic gutters were washed with warm normal saline and the uterus was returned to the abdomen. Good hemostasis was again reassured throughout. The fascia was closed with 0 Vicryl in a running fashion. Good hemostasis was assured. The subcuticular layers were reapproximated with 3-0 vicryl in interrupted fashion. The skin was closed with absorbable subcuticular staples. The patient tolerated the procedure well. Sponge, lap, and needle counts were correct times two and the patient was taken to recovery in stable condition.       Estimated Blood Loss:  600    Specimens:   ID Type Source Tests Collected by Time Destination   1 : Placenta and cord Fresh Placenta  Fina Hannon MD 2017 1547 Pathology        Cord Blood Results:  Information for the patient's :  [facility] april [[id number]]   No results found for: PCTABR, PCTDIG, BILI, ABORH    No results found for: APH, APCO2, APO2, AHCO3, ABEC, ABDC, O2ST, SITE, RSCOM     Prenatal Labs:  Lab Results   Component Value Date/Time    HBsAg, External neg 2017    HIV, External non reactive 2017    Rubella, External 2.56 Immune 2017    RPR, External non reactive 2017    Gonorrhea, External neg 2017    Chlamydia, External neg 2017    GrBStrep, External neg 2017        Signed By:  Fina Hannon MD     2017

## 2017-11-22 NOTE — ANESTHESIA PROCEDURE NOTES
Epidural Block    Performed by: Joyce Valles  Authorized by: Joyce Valles     Pre-Procedure  Indication: labor epidural    Preanesthetic Checklist: patient identified, risks and benefits discussed, anesthesia consent, site marked, patient being monitored, timeout performed and anesthesia consent      Epidural:   Patient position:  Seated  Prep region:  Lumbar  Prep: DuraPrep    Location:  L3-4    Needle and Epidural Catheter:   Needle Type:  Tuohy  Needle Gauge:  17 G  Injection Technique:  Loss of resistance using air  Attempts:  1  Catheter Size:  19 G  Catheter at Skin Depth (cm):  12  Events: no blood with aspiration, no cerebrospinal fluid with aspiration, no paresthesia and negative aspiration test    Test Dose:  Negative and lidocaine 1.0% w/ epi    Assessment:   Catheter Secured:  Tape and tegaderm  Insertion:  Uncomplicated  Patient tolerance:  Patient tolerated the procedure well with no immediate complications

## 2017-11-22 NOTE — ANESTHESIA PREPROCEDURE EVALUATION
Anesthetic History     History of awareness of surgery under anesthesia          Review of Systems / Medical History  Patient summary reviewed, nursing notes reviewed and pertinent labs reviewed    Pulmonary  Within defined limits                 Neuro/Psych         Headaches and psychiatric history     Cardiovascular    Hypertension              Exercise tolerance: >4 METS     GI/Hepatic/Renal     GERD      Liver disease     Endo/Other        Morbid obesity     Other Findings              Physical Exam    Airway  Mallampati: III  TM Distance: 4 - 6 cm  Neck ROM: normal range of motion   Mouth opening: Normal     Cardiovascular  Regular rate and rhythm,  S1 and S2 normal,  no murmur, click, rub, or gallop             Dental  No notable dental hx       Pulmonary  Breath sounds clear to auscultation               Abdominal  GI exam deferred       Other Findings            Anesthetic Plan    ASA: 3  Anesthesia type: epidural            Anesthetic plan and risks discussed with: Patient and Family

## 2017-11-22 NOTE — PROGRESS NOTES
Dr. Katie Adames in room SVE MD removed balloon cathter, called RN to bedside to AROM. AROM with FSE at 1230 FHR down to 60-90 x 2.5-3 min returning to 125 at 1236, pitocin off, IV bolus,  Terbutaline given and O2 applied.

## 2017-11-22 NOTE — LACTATION NOTE
This note was copied from a baby's chart. Infant  at 0 for 30 minutes with latch score of 7. Encouraged frequent feeding by responding to feeding cues and waking to feed every 2 to 3 hours if sleepy. Infant stooled after  birth per parents. Initiated tracking sheet for parents. Mom has wide angled breast. Mom only nursed for a few days with first child and stated \"I was young and should have nursed longer but I want to this time. \" Encouraged asking for assistance as needed. Mom remembers her milk coming in at home and could easily express her milk today. Mom given lanolin for prevention of nipple tenderness.

## 2017-11-22 NOTE — ANESTHESIA POSTPROCEDURE EVALUATION
Post-Anesthesia Evaluation and Assessment    Patient: Johan Pagan MRN: [id number]  SSN: [id number]    YOB: 1995  Age: 25 y.o. Sex: female       Cardiovascular Function/Vital Signs  Visit Vitals    /61    Pulse 91    Temp 36.6 °C (97.9 °F)    Resp 12    Ht 5' 10\" (1.778 m)    Wt 142.9 kg (315 lb)    SpO2 100%    Breastfeeding Yes    BMI 45.2 kg/m2       Patient is status post No value filed. anesthesia for Procedure(s):   SECTION. Nausea/Vomiting: None    Postoperative hydration reviewed and adequate. Pain:  Pain Scale 1: Numeric (0 - 10) (17 1249)  Pain Intensity 1: 0 (17 1249)   Managed    Neurological Status:   Neuro (WDL): Within Defined Limits (17 1651)   At baseline    Mental Status and Level of Consciousness: Arousable    Pulmonary Status:   O2 Device: Nasal cannula (17 1335)   Adequate oxygenation and airway patent    Complications related to anesthesia: None    Post-anesthesia assessment completed.  No concerns    Signed By: Anitra Macias MD     2017

## 2017-11-23 LAB
BASOPHILS # BLD: 0 K/UL (ref 0–0.1)
BASOPHILS NFR BLD: 0 % (ref 0–1)
EOSINOPHIL # BLD: 0.1 K/UL (ref 0–0.4)
EOSINOPHIL NFR BLD: 1 % (ref 0–7)
ERYTHROCYTE [DISTWIDTH] IN BLOOD BY AUTOMATED COUNT: 12.7 % (ref 11.5–14.5)
HCT VFR BLD AUTO: 27.5 % (ref 35–47)
HGB BLD-MCNC: 9.3 G/DL (ref 11.5–16)
LYMPHOCYTES # BLD: 1.8 K/UL (ref 0.8–3.5)
LYMPHOCYTES NFR BLD: 13 % (ref 12–49)
MCH RBC QN AUTO: 29.8 PG (ref 26–34)
MCHC RBC AUTO-ENTMCNC: 33.8 G/DL (ref 30–36.5)
MCV RBC AUTO: 88.1 FL (ref 80–99)
MONOCYTES # BLD: 0.9 K/UL (ref 0–1)
MONOCYTES NFR BLD: 6 % (ref 5–13)
NEUTS SEG # BLD: 12 K/UL (ref 1.8–8)
NEUTS SEG NFR BLD: 80 % (ref 32–75)
PLATELET # BLD AUTO: 206 K/UL (ref 150–400)
RBC # BLD AUTO: 3.12 M/UL (ref 3.8–5.2)
WBC # BLD AUTO: 14.8 K/UL (ref 3.6–11)

## 2017-11-23 PROCEDURE — 65410000002 HC RM PRIVATE OB

## 2017-11-23 PROCEDURE — 74011250636 HC RX REV CODE- 250/636: Performed by: OBSTETRICS & GYNECOLOGY

## 2017-11-23 PROCEDURE — 85025 COMPLETE CBC W/AUTO DIFF WBC: CPT | Performed by: OBSTETRICS & GYNECOLOGY

## 2017-11-23 PROCEDURE — 74011250637 HC RX REV CODE- 250/637: Performed by: OBSTETRICS & GYNECOLOGY

## 2017-11-23 PROCEDURE — 36415 COLL VENOUS BLD VENIPUNCTURE: CPT | Performed by: OBSTETRICS & GYNECOLOGY

## 2017-11-23 PROCEDURE — 90715 TDAP VACCINE 7 YRS/> IM: CPT | Performed by: OBSTETRICS & GYNECOLOGY

## 2017-11-23 RX ORDER — DOCUSATE SODIUM 100 MG/1
100 CAPSULE, LIQUID FILLED ORAL DAILY
Status: DISCONTINUED | OUTPATIENT
Start: 2017-11-23 | End: 2017-11-24 | Stop reason: HOSPADM

## 2017-11-23 RX ORDER — DOCUSATE SODIUM 100 MG/1
100 CAPSULE, LIQUID FILLED ORAL DAILY
Status: DISCONTINUED | OUTPATIENT
Start: 2017-11-24 | End: 2017-11-23

## 2017-11-23 RX ADMIN — TETANUS TOXOID, REDUCED DIPHTHERIA TOXOID AND ACELLULAR PERTUSSIS VACCINE, ADSORBED 0.5 ML: 5; 2.5; 8; 8; 2.5 SUSPENSION INTRAMUSCULAR at 22:43

## 2017-11-23 RX ADMIN — HYDROCODONE BITARTRATE AND ACETAMINOPHEN 1 TABLET: 5; 325 TABLET ORAL at 21:07

## 2017-11-23 RX ADMIN — IBUPROFEN 800 MG: 400 TABLET, FILM COATED ORAL at 21:01

## 2017-11-23 RX ADMIN — MORPHINE SULFATE 2 MG: 4 INJECTION INTRAVENOUS at 01:28

## 2017-11-23 RX ADMIN — LABETALOL HYDROCHLORIDE 100 MG: 100 TABLET, FILM COATED ORAL at 21:01

## 2017-11-23 RX ADMIN — IBUPROFEN 800 MG: 400 TABLET, FILM COATED ORAL at 05:13

## 2017-11-23 RX ADMIN — IBUPROFEN 800 MG: 400 TABLET, FILM COATED ORAL at 13:12

## 2017-11-23 RX ADMIN — HYDROCODONE BITARTRATE AND ACETAMINOPHEN 1 TABLET: 5; 325 TABLET ORAL at 13:17

## 2017-11-23 RX ADMIN — HYDROCODONE BITARTRATE AND ACETAMINOPHEN 1 TABLET: 5; 325 TABLET ORAL at 06:59

## 2017-11-23 RX ADMIN — DOCUSATE SODIUM 100 MG: 100 CAPSULE, LIQUID FILLED ORAL at 21:01

## 2017-11-23 RX ADMIN — LABETALOL HYDROCHLORIDE 100 MG: 100 TABLET, FILM COATED ORAL at 09:17

## 2017-11-23 RX ADMIN — SODIUM CHLORIDE, SODIUM LACTATE, POTASSIUM CHLORIDE, AND CALCIUM CHLORIDE 125 ML/HR: 600; 310; 30; 20 INJECTION, SOLUTION INTRAVENOUS at 00:31

## 2017-11-23 NOTE — PROGRESS NOTES
Post-Operative  Day 1    April Balwinder     Information for the patient's :  Leodan Jon april [[id number]]   , Low Transverse   Patient doing well without significant complaint. Nausea and vomiting resolved, tolerating liquids, no flatus, dorsey in place. Vitals:    Visit Vitals    BP (!) 148/95 (BP 1 Location: Left arm, BP Patient Position: Sitting)    Pulse 84    Temp 98.3 °F (36.8 °C)    Resp 16    Ht 5' 10\" (1.778 m)    Wt 142.9 kg (315 lb)    SpO2 98%    Breastfeeding Yes    BMI 45.2 kg/m2     Temp (24hrs), Av.8 °F (36.6 °C), Min:97.2 °F (36.2 °C), Max:98.3 °F (36.8 °C)         Intake and Output:   Current shift:  07 - 1900  In: 158.3 [I.V.:158.3]  Out: 550 [Urine:550]  Last 3 completed shifts: 1901 -  07  In: 3308.3 [I.V.:3308.3]  Out: 2450 [Urine:1850]        Exam:        Patient without distress. Lungs clear. Abdomen, bowel sounds present, soft, expected tenderness, fundus firm Wound dressing intact     Perineum normal lochia noted               Lower extremities are negative for swelling, cords or tenderness.     Labs:   Lab Results   Component Value Date/Time    WBC 14.8 2017 05:13 AM    WBC 14.2 2017 06:13 PM    WBC 9.8 2017 08:53 PM    WBC 10.3 2017 05:15 PM    WBC 8.7 2017 12:38 PM    WBC 11.8 05/10/2016 09:10 PM    WBC 9.8 2016 10:02 AM    WBC 10.0 2015 02:15 PM    WBC 11.7 2014 11:59 PM    WBC 9.1 2012 11:36 AM    WBC 11.1 2012 11:04 AM    WBC 14.0 2011 05:36 PM    WBC 9.6 10/27/2011 01:55 PM    WBC 15.0 2011 10:45 AM    WBC 9.7 2010 11:37 AM    WBC 11.3 2010 05:45 PM    WBC 8.4 2010 01:56 PM    WBC 16.3 2009 06:20 PM    WBC 13.0 10/08/2009 11:20 PM    HGB 9.3 2017 05:13 AM    HGB 11.2 2017 06:13 PM    HGB 13.3 2017 08:53 PM    HGB 12.9 2017 05:15 PM    HGB 13.3 2017 12:38 PM    HGB 13.6 05/10/2016 09:10 PM HGB 13.5 02/11/2016 10:02 AM    HGB 13.2 08/13/2015 02:15 PM    HGB 13.5 04/06/2014 11:59 PM    HGB 13.6 12/28/2012 11:36 AM    HGB 12.3 07/31/2012 11:04 AM    HGB 11.7 12/06/2011 05:36 PM    HGB 11.4 10/27/2011 01:55 PM    HGB 11.5 05/24/2011 10:45 AM    HGB 12.0 12/06/2010 11:37 AM    HGB 13.2 05/28/2010 05:45 PM    HGB 12.8 01/12/2010 01:56 PM    HGB 13.7 12/29/2009 06:20 PM    HGB 13.1 10/08/2009 11:20 PM    HCT 27.5 11/23/2017 05:13 AM    HCT 33.6 11/21/2017 06:13 PM    HCT 38.7 07/17/2017 08:53 PM    HCT 39.1 04/21/2017 05:15 PM    HCT 40.6 03/09/2017 12:38 PM    HCT 40.0 05/10/2016 09:10 PM    HCT 41.0 02/11/2016 10:02 AM    HCT 39.9 08/13/2015 02:15 PM    HCT 40.8 04/06/2014 11:59 PM    HCT 41.0 12/28/2012 11:36 AM    HCT 39.4 07/31/2012 11:04 AM    HCT 37.8 12/06/2011 05:36 PM    HCT 34.9 10/27/2011 01:55 PM    HCT 35.0 05/24/2011 10:45 AM    HCT 35.6 12/06/2010 11:37 AM    HCT 41.1 05/28/2010 05:45 PM    HCT 40.7 01/12/2010 01:56 PM    HCT 41.3 12/29/2009 06:20 PM    HCT 38.2 10/08/2009 11:20 PM    PLATELET 846 50/39/9322 05:13 AM    PLATELET 719 55/99/1529 06:13 PM    PLATELET 017 10/43/4599 08:53 PM    PLATELET 473 96/02/9976 05:15 PM    PLATELET 812 54/90/9769 12:38 PM    PLATELET 666 79/55/8492 09:10 PM    PLATELET 745 97/97/5204 10:02 AM    PLATELET 477 96/01/0991 02:15 PM    PLATELET 298 92/23/0531 11:59 PM    PLATELET 962 84/89/4275 11:36 AM    PLATELET 269 54/68/5462 11:04 AM    PLATELET 358 29/07/0815 05:36 PM    PLATELET 687 68/66/4438 01:55 PM    PLATELET 089 10/31/4431 10:45 AM    PLATELET 007 96/24/7646 11:37 AM    PLATELET 679 44/57/3858 05:45 PM    PLATELET 408 60/27/0923 01:56 PM    PLATELET 683 62/45/0329 06:20 PM    PLATELET 708 65/06/8021 11:20 PM       Recent Results (from the past 24 hour(s))   CBC WITH AUTOMATED DIFF    Collection Time: 11/23/17  5:13 AM   Result Value Ref Range    WBC 14.8 (H) 3.6 - 11.0 K/uL    RBC 3.12 (L) 3.80 - 5.20 M/uL    HGB 9.3 (L) 11.5 - 16.0 g/dL    HCT 27.5 (L) 35.0 - 47.0 %    MCV 88.1 80.0 - 99.0 FL    MCH 29.8 26.0 - 34.0 PG    MCHC 33.8 30.0 - 36.5 g/dL    RDW 12.7 11.5 - 14.5 %    PLATELET 427 367 - 707 K/uL    NEUTROPHILS 80 (H) 32 - 75 %    LYMPHOCYTES 13 12 - 49 %    MONOCYTES 6 5 - 13 %    EOSINOPHILS 1 0 - 7 %    BASOPHILS 0 0 - 1 %    ABS. NEUTROPHILS 12.0 (H) 1.8 - 8.0 K/UL    ABS. LYMPHOCYTES 1.8 0.8 - 3.5 K/UL    ABS. MONOCYTES 0.9 0.0 - 1.0 K/UL    ABS. EOSINOPHILS 0.1 0.0 - 0.4 K/UL    ABS. BASOPHILS 0.0 0.0 - 0.1 K/UL       Assessment: Post-Op day 1, stable      Plan:   1.  Routine post-operative care

## 2017-11-23 NOTE — ROUTINE PROCESS
Bedside and Verbal shift change report given to SHAHID Pepe RN (oncoming nurse) by MERA Cortes RN (offgoing nurse). Report included the following information SBAR, Procedure Summary, Intake/Output and MAR.

## 2017-11-23 NOTE — PROGRESS NOTES
Assisted patient up to bathroom to get washed up. Patient was got in the shower and washed washed and shun care taught and performed. New bed linens applied. New gown applied. Patient assisted back to bed. Patient tolerated well.

## 2017-11-23 NOTE — ROUTINE PROCESS
Verbal shift change report given to NICK Delgado (oncoming nurse) by CHANTEL Avila RN (offgoing nurse). Report included the following information SBAR, Procedure Summary, Intake/Output, MAR and Recent Results.

## 2017-11-23 NOTE — ROUTINE PROCESS
Bedside and Verbal shift change report given to CHANTEL Bermudez RN (oncoming nurse) by Velasquez Ricks RN (offgoing nurse). Report included the following information SBAR, Procedure Summary, Intake/Output and MAR.

## 2017-11-23 NOTE — PROGRESS NOTES
Duramorph Follow-Up Note    1 Day Post-Op sp Procedure(s):   SECTION. BP (!) 148/95 (BP 1 Location: Left arm, BP Patient Position: Sitting)  Pulse 84  Temp 36.8 °C (98.3 °F)  Resp 16  Ht 5' 10\" (1.778 m)  Wt 142.9 kg (315 lb)  LMP 2017  SpO2 98%  Breastfeeding? Yes  BMI 45.2 kg/m2. Patient is POD-1 S/P epidural duramorph. Pain is moderately controlled  Patient reports no headache, fever, weakness or numbness. Epidural/spinal tap site is clean, dry and intact. No obvious Anesthesia complications noted. Plan:    Pain management as per primary service.

## 2017-11-24 VITALS
BODY MASS INDEX: 41.95 KG/M2 | HEART RATE: 90 BPM | DIASTOLIC BLOOD PRESSURE: 74 MMHG | RESPIRATION RATE: 18 BRPM | TEMPERATURE: 98.2 F | HEIGHT: 70 IN | WEIGHT: 293 LBS | SYSTOLIC BLOOD PRESSURE: 144 MMHG | OXYGEN SATURATION: 98 %

## 2017-11-24 PROCEDURE — 74011250637 HC RX REV CODE- 250/637: Performed by: OBSTETRICS & GYNECOLOGY

## 2017-11-24 RX ORDER — IBUPROFEN 800 MG/1
800 TABLET ORAL
Qty: 30 TAB | Refills: 0 | Status: SHIPPED | OUTPATIENT
Start: 2017-11-24 | End: 2018-02-14 | Stop reason: ALTCHOICE

## 2017-11-24 RX ORDER — LABETALOL 100 MG/1
100 TABLET, FILM COATED ORAL 2 TIMES DAILY
Qty: 60 TAB | Refills: 0 | Status: SHIPPED | OUTPATIENT
Start: 2017-11-24 | End: 2018-09-12

## 2017-11-24 RX ORDER — HYDROCODONE BITARTRATE AND ACETAMINOPHEN 5; 325 MG/1; MG/1
1 TABLET ORAL
Qty: 30 TAB | Refills: 0 | Status: SHIPPED | OUTPATIENT
Start: 2017-11-24 | End: 2018-02-14 | Stop reason: ALTCHOICE

## 2017-11-24 RX ADMIN — DOCUSATE SODIUM 100 MG: 100 CAPSULE, LIQUID FILLED ORAL at 11:04

## 2017-11-24 RX ADMIN — HYDROCODONE BITARTRATE AND ACETAMINOPHEN 1 TABLET: 5; 325 TABLET ORAL at 04:35

## 2017-11-24 RX ADMIN — HYDROCODONE BITARTRATE AND ACETAMINOPHEN 1 TABLET: 5; 325 TABLET ORAL at 11:26

## 2017-11-24 RX ADMIN — IBUPROFEN 800 MG: 400 TABLET, FILM COATED ORAL at 04:35

## 2017-11-24 RX ADMIN — LABETALOL HYDROCHLORIDE 100 MG: 100 TABLET, FILM COATED ORAL at 08:51

## 2017-11-24 NOTE — ROUTINE PROCESS
Bedside shift change report given to CHANTEL Fabian RN (oncoming nurse) by Jola Oppenheim, RN (offgoing nurse). Report included the following information SBAR, Procedure Summary, Intake/Output, MAR and Recent Results.

## 2017-11-24 NOTE — PROGRESS NOTES
Post-Operative  Day 2    April Balwinder     Information for the patient's :  Key Correa april [[id number]]   , Low Transverse   Patient doing well without significant complaint. Nausea and vomiting resolved, tolerating liquids, passing flatus, voiding and ambulating without difficulty. Vitals:    Visit Vitals    /80 (BP 1 Location: Right arm, BP Patient Position: At rest)    Pulse 91    Temp 97.5 °F (36.4 °C)    Resp 18    Ht 5' 10\" (1.778 m)    Wt 142.9 kg (315 lb)    SpO2 98%    Breastfeeding Yes    BMI 45.2 kg/m2     Temp (24hrs), Av °F (36.7 °C), Min:97.5 °F (36.4 °C), Max:98.3 °F (36.8 °C)        Exam:        Patient without distress. Abdomen, bowel sounds present, soft, expected tenderness, fundus firm                Wound incision clean, dry and intact               Lower extremities are negative for swelling, cords or tenderness.     Labs:   Lab Results   Component Value Date/Time    WBC 14.8 2017 05:13 AM    WBC 14.2 2017 06:13 PM    WBC 9.8 2017 08:53 PM    WBC 10.3 2017 05:15 PM    WBC 8.7 2017 12:38 PM    WBC 11.8 05/10/2016 09:10 PM    WBC 9.8 2016 10:02 AM    WBC 10.0 2015 02:15 PM    WBC 11.7 2014 11:59 PM    WBC 9.1 2012 11:36 AM    WBC 11.1 2012 11:04 AM    WBC 14.0 2011 05:36 PM    WBC 9.6 10/27/2011 01:55 PM    WBC 15.0 2011 10:45 AM    WBC 9.7 2010 11:37 AM    WBC 11.3 2010 05:45 PM    WBC 8.4 2010 01:56 PM    WBC 16.3 2009 06:20 PM    WBC 13.0 10/08/2009 11:20 PM    HGB 9.3 2017 05:13 AM    HGB 11.2 2017 06:13 PM    HGB 13.3 2017 08:53 PM    HGB 12.9 2017 05:15 PM    HGB 13.3 2017 12:38 PM    HGB 13.6 05/10/2016 09:10 PM    HGB 13.5 2016 10:02 AM    HGB 13.2 2015 02:15 PM    HGB 13.5 2014 11:59 PM    HGB 13.6 2012 11:36 AM    HGB 12.3 2012 11:04 AM    HGB 11.7 2011 05:36 PM    HGB 11.4 10/27/2011 01:55 PM    HGB 11.5 05/24/2011 10:45 AM    HGB 12.0 12/06/2010 11:37 AM    HGB 13.2 05/28/2010 05:45 PM    HGB 12.8 01/12/2010 01:56 PM    HGB 13.7 12/29/2009 06:20 PM    HGB 13.1 10/08/2009 11:20 PM    HCT 27.5 11/23/2017 05:13 AM    HCT 33.6 11/21/2017 06:13 PM    HCT 38.7 07/17/2017 08:53 PM    HCT 39.1 04/21/2017 05:15 PM    HCT 40.6 03/09/2017 12:38 PM    HCT 40.0 05/10/2016 09:10 PM    HCT 41.0 02/11/2016 10:02 AM    HCT 39.9 08/13/2015 02:15 PM    HCT 40.8 04/06/2014 11:59 PM    HCT 41.0 12/28/2012 11:36 AM    HCT 39.4 07/31/2012 11:04 AM    HCT 37.8 12/06/2011 05:36 PM    HCT 34.9 10/27/2011 01:55 PM    HCT 35.0 05/24/2011 10:45 AM    HCT 35.6 12/06/2010 11:37 AM    HCT 41.1 05/28/2010 05:45 PM    HCT 40.7 01/12/2010 01:56 PM    HCT 41.3 12/29/2009 06:20 PM    HCT 38.2 10/08/2009 11:20 PM    PLATELET 965 83/73/5252 05:13 AM    PLATELET 029 37/38/4759 06:13 PM    PLATELET 270 49/66/1885 08:53 PM    PLATELET 555 28/12/2824 05:15 PM    PLATELET 511 88/41/0897 12:38 PM    PLATELET 103 36/91/6657 09:10 PM    PLATELET 829 21/89/3928 10:02 AM    PLATELET 891 00/42/7510 02:15 PM    PLATELET 061 45/18/9026 11:59 PM    PLATELET 985 42/78/2506 11:36 AM    PLATELET 052 53/03/4797 11:04 AM    PLATELET 584 02/83/3082 05:36 PM    PLATELET 855 12/79/9282 01:55 PM    PLATELET 378 44/71/2669 10:45 AM    PLATELET 245 11/17/6471 11:37 AM    PLATELET 105 78/96/8293 05:45 PM    PLATELET 160 97/37/0673 01:56 PM    PLATELET 703 96/62/6295 06:20 PM    PLATELET 820 80/94/9809 11:20 PM       No results found for this or any previous visit (from the past 24 hour(s)). Assessment: Post-Op day 2, doing well      Plan:   1.  Routine post-operative care

## 2017-11-24 NOTE — DISCHARGE INSTRUCTIONS
Delivery (Postpartum Care): After Your Visit    Your Care Instructions    Your baby was delivered through a cut, called an incision, in your belly. This surgery is called a  delivery, or a . After childbirth (postpartum period), your body goes through many changes. In the next few weeks, your body will slowly heal. It can take 4 weeks or more for the incision from your surgery to heal. It is easy to get too tired and overwhelmed during the first weeks after your baby is born. You may feel emotional during this time. Changes in your hormones can shift your mood without warning. It is easy to get too tired and overwhelmed during the first weeks after childbirth. Take it easy on yourself. You may find it hard to meet the extra demands on your energy and time. Get rest whenever you can, accept help from others, and eat well and drink plenty of fluids. After a , you may have gas or need to burp a lot. You may have some light vaginal bleeding or spotting for up to 6 weeks after surgery. It is normal to have pain in the incision area off and on during the next 6 months. No driving for 1 week after delivery. No heavy lifting. No more than 8 lbs or the size of baby for 2-3 weeks. Limit use of stairs. 1-2 times per day for the first week after delivery. Follow-up care is a key part of your treatment and safety. Be sure to make and go to all appointments, and call your doctor if you are having problems. Its also a good idea to know your test results and keep a list of the medicines you take. Around 4 to 6 weeks after your baby's birth, you will have a follow-up visit with your doctor. This visit is your time to talk to your doctor about anything you are concerned or curious about. Keep a list of questions to bring to your postpartum visit. Your questions might be about:  Changes in your breasts, such as lumps or soreness.   When to expect your menstrual period to start again.  What form of birth control is best for you. Weight you have put on during the pregnancy. Exercise options. What foods and drinks are best for you, especially if you are breast-feeding. Problems you might be having with breast-feeding. When you can have sex. Some women may want to talk about lubricants for the vagina. Any feelings of sadness or restlessness that you are having. How can you care for yourself at home? Be sure that you understand any instructions your doctor has given you after surgery. This will guide your activities and tell you what to watch for in the next few weeks. Vaginal bleeding and cramps  After delivery, you will have a bloody discharge from the vagina. This will turn pink within a week and then white or yellow after about 10 days. It may last for 2 to 4 weeks or longer, until the uterus has healed. You may have cramps for the first few days after childbirth. These are normal and occur as the uterus shrinks to normal size. Take an over-the-counter pain medicine, such as acetaminophen (Tylenol), ibuprofen (Advil, Motrin), or naproxen (Aleve), for cramps. Read and follow all instructions on the label. Do not take aspirin, because it can cause more bleeding. Take other medicines exactly as prescribed. Call your doctor if you have any problems with your medicine. Incision  You may have had staples removed while you were in the hospital. Keep the area clean, and wash it with water and mild soap. If you had stitches, they should dissolve on their own and should not need to be removed. Follow your doctor's instructions for cleaning the stitched area. If you have steri-strips (tape) the will come off on their own in 7-10 days. Breast-feeding and breast fullness  Breast-feed your baby in positions that do not put pressure on your incision, such as side-lying or football-hold positions.  Ask your nurse, doctor, midwife, or lactation specialist to show you these positions if you do not know what they are. Your breasts may overfill (engorge) in the first few days after delivery. To help milk flow and to relieve pain, warm your breasts in the shower or by using warm, moist towels before nursing. Express a small amount of milk to soften your breast near the nipple and then feed your baby. Put an ice or cold pack on your breast for a few minutes after nursing to reduce swelling and pain. Put a thin cloth between the ice pack and your skin. If you are not nursing, do not put warmth on your breasts or touch your breasts. Wear a tight bra or sports bra, and use an ice or cold pack on your breasts to reduce swelling and pain. Breast fullness usually lasts 3 to 4 days. Activity  Eat a balanced diet. Do not try to lose weight by cutting calories. Keep taking your prenatal vitamins, or take a multivitamin. Get as much rest as you can. Try to take naps when your baby sleeps during the day. Get help with household chores from family or friends, if you can. Do not try to do it all yourself. Get some gentle exercise, such as walking, every day. Do not lift more than 10 pounds for the next 4 to 6 weeks. Do not have sex or use tampons until you have stopped bleeding and at least 4 to 6 weeks have passed since you gave birth. Talk to your doctor about birth control. You can get pregnant even before your period returns. Also, you can get pregnant while you are breast-feeding. Mental health  It is normal to have some sadness, anxiety, sleeplessness, and mood swings after you go home. If you feel upset or hopeless for more than a few days, talk to your doctor. If you have any thoughts of hurting yourself or anyone else--including your baby--call 911 or go to the emergency room. Many women get the \"baby blues\" during the first few days after childbirth. The \"baby blues\" usually peak around the fourth day and then ease up in less than 2 weeks.  If you have the \"baby blues\" for more than a few days, or if you have thoughts of hurting yourself or your baby, call your doctor right away. Constipation and hemorrhoids  Drink plenty of fluids (8 to 10 glasses a day), especially water. · Eat plenty of fiber each day to avoid constipation. Include foods such as whole-grain breads and cereals, raw vegetables, raw and dried fruits, and beans. · If you have hemorrhoids or swelling or pain around the opening of your vagina, try using cold and heat. You can put ice or a cold pack on the area for 10 to 20 minutes at a time. Put a thin cloth between the ice and your skin. Also try sitting in a few inches of warm water (sitz bath) 3 times a day and after bowel movements. Drink plenty of fluids, enough so that your urine is light yellow or clear like water. If you have kidney, heart, or liver disease and have to limit fluids, talk with your doctor before you increase the amount of fluids you drink. When should you call for help? Call 911 anytime you think you may need emergency care. For example, call if:  You are thinking of hurting yourself, your baby, or anyone else. You have sudden, severe pain in your belly. You pass out (lose consciousness). Call your doctor now or seek immediate medical care if:  You have severe vaginal bleeding. You are passing blood clots and soaking through a pad each hour for 2 or more hours. Your vaginal bleeding seems to be getting heavier or is still bright red 4 days after delivery, or you pass blood clots larger than the size of a golf ball. You have increased redness, heat, or drainage in the incision area. You are dizzy or lightheaded, or you feel like you may faint. You are vomiting or cannot keep fluids down. You have a fever. You have new or more belly pain. You pass tissue (not just blood). The incision seems to be pulling open or starts bleeding. Your vaginal discharge smells bad. Your belly feels more tender or full and hard.   You have pain or redness in one or both breasts. You feel sad, anxious, or hopeless for more than a few days. Where can you learn more? Go to [email or web address]    Enter E283 in the search box to learn more about \" Delivery: After Your Visit\". or     Go to [email or web address]    Enter X840  in the search box to learn more about \"Postpartum Care: After Your Visit\". This care instruction is for use with your licensed healthcare professional. If you have questions about a medical condition or this instruction, always ask your healthcare professional. Care instructions adapted by Darlyn Cardona (which disclaims liability or warranty for this information) from Sarah Zhang, [address] [place] [address] . [facility] disclaims any warranty or liability for your use of this information. [employer] Activation    Thank you for requesting access to [employer]. Please follow the instructions below to securely access and download your online medical record. [employer] allows you to send messages to your doctor, view your test results, renew your prescriptions, schedule appointments, and more. How Do I Sign Up? 1. In your internet browser, go to [email or web address]. 2. Click on the First Time User? Click Here link in the Sign In box. You will see the New Member Sign Up page. 3. Enter your [employer] Access Code exactly as it appears below. You will not need to use this code after youve completed the sign-up process. If you do not sign up before the expiration date, you must request a new code. [employer] Access Code: Activation code not generated  Current [employer] Status: Active (This is the date your [employer] access code will )    4. Enter the last four digits of your Social Security Number (xxxx) and Date of Birth (mm/dd/yyyy) as indicated and click Submit. You will be taken to the next sign-up page. 5. Create a [employer] ID.  This will be your [employer] login ID and cannot be changed, so think of one that is secure and easy to remember. 6. Create a [email or web address] password. You can change your password at any time. 7. Enter your Password Reset Question and Answer. This can be used at a later time if you forget your password. 8. Enter your e-mail address. You will receive e-mail notification when new information is available in [address] 9. Click Sign Up. You can now view and download portions of your medical record. 10. Click the Download Summary menu link to download a portable copy of your medical information. Additional Information    If you have questions, please visit the Frequently Asked Questions section of the [email or web address] website at [email or web address] Remember, [email or web address] is NOT to be used for urgent needs. For medical emergencies, dial 911.

## 2017-11-24 NOTE — LACTATION NOTE
This note was copied from a baby's chart.     Couplet Interdisciplinary Rounds     MATERNAL    Daily Goal:     Influenza screening completed: YES   Tdap screening completed: YES   Rhogam Given:N/A  MMR Given:N/A    VTE Prophylaxis: Not indicated, per Provider order    EPDS:            Patient Name: ANNABEL Britt Diagnosis: [place]  Single liveborn, born in hospital, delivered by  delivery   Date of Admission: 2017 LOS: 2  Gestational Age: Gestational Age: 37w6d       Daily Goal:     Birth Weight: 3.165 kg Current Weight: Weight: 2.96 kg (6 lbs 8.4 oz)  % of Weight Change: -6%    Feeding:   Metabolic Screen: YES and Initial    Hepatitis B:  YES and On MAR    Discharge Bili:  YES  Car Seat Trial, if needed:  N/A      Patient/Family Teaching Needs:     Days before discharge: Ready for discharge    In Attendance:  Nursing and Physician

## 2017-11-24 NOTE — ADT AUTH CERT NOTES
Patient Demographics        Patient Name [address] Sex  Address       Lindsay Mendez [id number] Female 1995 [address] [address]           CSN:       [id number]           Admit Date: Admit Time Room Bed       2017  4:08 PM 3307 [[id number]] 01 [[id number]]           Attending Providers        Provider Pager From To       Mary Jo Arora MD  17       Yaya Rossi MD  17       ADM   CSECTION     Delivery Date: 2017   Delivery Time: 1:06 PM   Delivery Type:   Sex:  female    Gestational Age: 37w6d      Measurements:  Birth Weight: 3.165 kg    Birth Length: 20\"          Delivery Clinician:  Mary Jo Arora  Living?:   Delivery Location: OR

## 2017-11-24 NOTE — PROGRESS NOTES
Patient discharge prescriptions & instructions given. Verbalized understanding. All questions answered. No distress noted. Signed copy of discharge instructions on paper chart.

## 2017-12-06 NOTE — DISCHARGE SUMMARY
Obstetrical Discharge Summary     Name: Renetta Caballero MRN: [id number]  SSN: [id number]    YOB: 1995  Age: 25 y.o. Sex: female      Admit Date: 2017    Discharge Date: 2017     Admitting Physician: Edwina Best MD     Attending Physician:  No att. providers found     * Admission Diagnoses: Cervidil  Pregnancy  IUP 37 5/7 WEEKS PRIMARY C/SCTION FOR NON [address] TRACING    * Discharge Diagnoses:   Information for the patient's :  Brittani De [[id number]]   Delivery of a 3.165 kg female infant via , Low Transverse on 2017 at 1:06 PM  by . Apgars were 7 and 9. Additional Diagnoses:   Hospital Problems as of 2017  Date Reviewed: 2017          Codes Class Noted - Resolved POA    Pregnancy ICD-10-CM: Z34.90  ICD-9-CM: V22.2  2017 - Present Unknown             Lab Results   Component Value Date/Time    Rubella, External 2.56 Immune 2017    GrBStrep, External neg 2017    ABO,Rh AB + 2010      Immunization History   Administered Date(s) Administered    Human Papillomavirus 2011, 2012, 2012    Influenza Vaccine 2016, 10/01/2017    Tdap 2017       * Procedures:   Procedure(s):   SECTION           * Discharge Condition: good    * Hospital Course: Normal hospital course following the delivery. * Disposition: Home    Discharge Medications:   Discharge Medication List as of 2017  9:01 AM      START taking these medications    Details   HYDROcodone-acetaminophen (NORCO) 5-325 mg per tablet Take 1 Tab by mouth every four (4) hours as needed. Max Daily Amount: 6 Tabs., Print, Disp-30 Tab, R-0      ibuprofen (MOTRIN) 800 mg tablet Take 1 Tab by mouth every eight (8) hours as needed for Pain., Print, Disp-30 Tab, R-0      !! labetalol (NORMODYNE) 100 mg tablet Take 1 Tab by mouth two (2) times a day., Print, Disp-60 Tab, R-0       !! - Potential duplicate medications found.  Please discuss with provider. CONTINUE these medications which have NOT CHANGED    Details   !! labetalol (NORMODYNE) 100 mg tablet Take 100 mg by mouth two (2) times a day., Historical Med      aspirin delayed-release 81 mg tablet Take 81 mg by mouth daily. , Historical Med      pantoprazole (PROTONIX) 20 mg tablet Take 20 mg by mouth daily. , Historical Med      metoclopramide HCl (REGLAN) 10 mg tablet Take 10 mg by mouth every six (6) hours as needed (headache). , Historical Med      PRENATAL VIT CALC,IRON,FOLIC (PRENATAL VITAMIN PO) Take 1 Tab by mouth daily. , Historical Med      ondansetron (ZOFRAN ODT) 8 mg disintegrating tablet Take 1 Tab by mouth every eight (8) hours as needed for Nausea. , Print, Disp-15 Tab, R-0      ursodiol (ACTIGALL) 300 mg capsule Take 300 mg by mouth three (3) times daily. Indications: pt states she takes TID but prescription is written for BID, Historical Med      ACETAMINOPHEN WITH CODEINE (ACETAMINOPHEN-CODEINE PO) Take 1 Tab by mouth every four (4) hours as needed (Pain). , Historical Med      trimethoprim-sulfamethoxazole (BACTRIM DS) 160-800 mg per tablet Take 1 Tab by mouth daily. , Historical Med      cephALEXin (KEFLEX) 500 mg capsule Take 500 mg by mouth three (3) times daily. , Historical Med       !! - Potential duplicate medications found. Please discuss with provider. * Follow-up Care/Patient Instructions:   Activity: Activity as tolerated  Diet: Regular Diet  Wound Care: None needed    Follow-up Information     Follow up With Details Comments Contact Info    Provider Unknown   Patient not available to ask             Signed By:  Nori Peterson MD     December 6, 2017

## 2018-02-14 ENCOUNTER — OFFICE VISIT (OUTPATIENT)
Dept: BEHAVIORAL/MENTAL HEALTH CLINIC | Age: 23
End: 2018-02-14

## 2018-02-14 VITALS
HEART RATE: 94 BPM | HEIGHT: 70 IN | DIASTOLIC BLOOD PRESSURE: 88 MMHG | WEIGHT: 293 LBS | BODY MASS INDEX: 41.95 KG/M2 | SYSTOLIC BLOOD PRESSURE: 122 MMHG

## 2018-02-14 DIAGNOSIS — F33.1 MODERATE EPISODE OF RECURRENT MAJOR DEPRESSIVE DISORDER (HCC): Primary | ICD-10-CM

## 2018-02-14 PROBLEM — F32.A DEPRESSION: Status: ACTIVE | Noted: 2018-02-14

## 2018-02-14 RX ORDER — SERTRALINE HYDROCHLORIDE 100 MG/1
150 TABLET, FILM COATED ORAL DAILY
Qty: 45 TAB | Refills: 1 | Status: SHIPPED | OUTPATIENT
Start: 2018-02-14 | End: 2018-08-27 | Stop reason: SDUPTHER

## 2018-02-14 RX ORDER — SERTRALINE HYDROCHLORIDE 100 MG/1
TABLET, FILM COATED ORAL DAILY
COMMUNITY
End: 2018-02-14 | Stop reason: SDUPTHER

## 2018-02-14 RX ORDER — BUTALBITAL, ACETAMINOPHEN, CAFFEINE AND CODEINE PHOSPHATE 50; 325; 40; 30 MG/1; MG/1; MG/1; MG/1
CAPSULE ORAL
COMMUNITY
End: 2018-09-12

## 2018-02-14 NOTE — MR AVS SNAPSHOT
[facility] 
 
 
 [address] [facility] [address] [place] [address] 
[phone] Patient: Arline Worley MRN: [id number] ZVR:[id number] Visit Information Date & Time Provider Department Dept. Phone Encounter #  
 2/14/2018  1:00 PM Justin Hearn, Kristin Nuñez [facility] [phone] [id number] [phone] Your Appointments 3/12/2018 11:40 AM  
Follow Up with Armando Li MD  
[facility] [address] [place] [address]) Appt Note: 6 month f/u rebound HA NN 9/12/17 [address] [facility] [address]  
[address]  
  
    
 3/21/2018  1:00 PM  
ESTABLISHED PATIENT with Justin Hearn NP [facility] ([facility]) Appt Note: 5week follw-up [address] [facility] [address] [facility] [address]  
  
   
 [address] [facility] [address] [facility] [address] [address] Upcoming Health Maintenance Date Due Pneumococcal 19-64 Medium Risk (1 of 1 - PPSV23) 6/17/2014 PAP AKA CERVICAL CYTOLOGY 4/28/2020 DTaP/Tdap/Td series (2 - Td) 11/23/2027 Allergies as of 2/14/2018  Review Complete On: 2/14/2018 By: Nazia Wilhelm Severity Noted Reaction Type Reactions Latex    Hives Compazine [Prochlorperazine Edisylate] Medium 03/09/2017    Anxiety Acetaminophen  05/12/2017    Other (comments) Pt has liver disease Aspirin  08/13/2015    Other (comments) Patient states \"I have liver disease\" Celexa [Citalopram]  02/14/2018    Palpitations Tramadol  07/17/2017    Anxiety Wellbutrin [Bupropion Hcl]  10/31/2012    Palpitations Current Immunizations  Reviewed on 11/24/2017 Name Date Human Papillomavirus 6/19/2012, 1/17/2012, 12/1/2011 Influenza Vaccine 10/1/2017, 9/22/2016 Tdap 11/23/2017 10:43 PM  
  
 Not reviewed this visit You Were Diagnosed With   
  
 Codes Comments  Moderate episode of recurrent major depressive disorder ([facility]    -  Primary ICD-10-CM: F33.1 ICD-9-CM: 296.32 Vitals BP Pulse Height(growth percentile) Weight(growth percentile) BMI OB Status 122/88 94 5' 10\" (1.778 m) 312 lb (141.5 kg) 44.77 kg/m2 IUD Smoking Status Current Every Day Smoker Vitals History BMI and BSA Data Body Mass Index Body Surface Area 44.77 kg/m 2 2.64 m 2 Preferred Pharmacy Pharmacy Name Phone Yadi Mcgee [facility], [address] [facility] [phone] Your Updated Medication List  
  
   
This list is accurate as of: 2/14/18  1:45 PM.  Always use your most recent med list.  
  
  
  
  
 codeine-butalbital-acetaminophen-caffeine -51-30 mg capsule Commonly known as:  FIORICET WITH CODEINE Take  by mouth. labetalol 100 mg tablet Commonly known as:  Phillip Warren Take 1 Tab by mouth two (2) times a day. pantoprazole 20 mg tablet Commonly known as:  PROTONIX Take 20 mg by mouth daily. sertraline 100 mg tablet Commonly known as:  ZOLOFT Take 1.5 Tabs by mouth daily. Prescriptions Sent to Pharmacy Refills  
 sertraline (ZOLOFT) 100 mg tablet 1 Sig: Take 1.5 Tabs by mouth daily. Class: Normal  
 Pharmacy: [facility], [address] [place] [address] AT [address] [place] [address] Ph #: [phone] Route: Oral  
  
We Performed the Following METABOLIC PANEL, COMPREHENSIVE [72418 CPT(R)] TSH REFLEX TO T4 [83785 CPT(R)] Introducing [facility]! Dear April: 
Thank you for requesting a [employer] account. Our records indicate that you already have an active [employer] account. You can access your account anytime at [email or web address] Did you know that you can access your hospital and ER discharge instructions at any time in [employer]?   You can also review all of your test results from your hospital stay or ER visit. Additional Information If you have questions, please visit the Frequently Asked Questions section of the [email or web address] website at [email or web address] Remember, [employer] is NOT to be used for urgent needs. For medical emergencies, dial 911. Now available from your iPhone and Android! Please provide this summary of care documentation to your next provider. Your primary care clinician is listed as PROVIDER UNKNOWN. If you have any questions after today's visit, please call [phone].

## 2018-02-15 LAB
ALBUMIN SERPL-MCNC: 4.2 G/DL (ref 3.5–5.5)
ALBUMIN/GLOB SERPL: 1.4 {RATIO} (ref 1.2–2.2)
ALP SERPL-CCNC: 88 IU/L (ref 39–117)
ALT SERPL-CCNC: 35 IU/L (ref 0–32)
AST SERPL-CCNC: 21 IU/L (ref 0–40)
BILIRUB SERPL-MCNC: 0.3 MG/DL (ref 0–1.2)
BUN SERPL-MCNC: 13 MG/DL (ref 6–20)
BUN/CREAT SERPL: 14 (ref 9–23)
CALCIUM SERPL-MCNC: 9.7 MG/DL (ref 8.7–10.2)
CHLORIDE SERPL-SCNC: 98 MMOL/L (ref 96–106)
CO2 SERPL-SCNC: 24 MMOL/L (ref 18–29)
CREAT SERPL-MCNC: 0.91 MG/DL (ref 0.57–1)
GFR SERPLBLD CREATININE-BSD FMLA CKD-EPI: 104 ML/MIN/1.73
GFR SERPLBLD CREATININE-BSD FMLA CKD-EPI: 90 ML/MIN/1.73
GLOBULIN SER CALC-MCNC: 3 G/DL (ref 1.5–4.5)
GLUCOSE SERPL-MCNC: 57 MG/DL (ref 65–99)
POTASSIUM SERPL-SCNC: 4.9 MMOL/L (ref 3.5–5.2)
PROT SERPL-MCNC: 7.2 G/DL (ref 6–8.5)
SODIUM SERPL-SCNC: 139 MMOL/L (ref 134–144)
TSH SERPL DL<=0.005 MIU/L-ACNC: 1.39 UIU/ML (ref 0.45–4.5)

## 2018-08-10 ENCOUNTER — TELEPHONE (OUTPATIENT)
Dept: BEHAVIORAL/MENTAL HEALTH CLINIC | Age: 23
End: 2018-08-10

## 2018-08-10 NOTE — TELEPHONE ENCOUNTER
This is regarding the message that I spoke to you in person about. Ebenezer Vincent is the daughter of your pt Maria M Britt. So I assuming that is who they want to talk to you about. His name is Hector Lora, he's a Guardian at [facility] [employer] and his phone number is [phone]. Update: they faxed over a MARCE.

## 2018-08-23 ENCOUNTER — HOSPITAL ENCOUNTER (EMERGENCY)
Age: 23
Discharge: HOME | End: 2018-08-23
Attending: EMERGENCY MEDICINE
Payer: MEDICAID

## 2018-08-23 VITALS
DIASTOLIC BLOOD PRESSURE: 110 MMHG | TEMPERATURE: 98.3 F | HEIGHT: 70 IN | HEART RATE: 91 BPM | BODY MASS INDEX: 41.95 KG/M2 | SYSTOLIC BLOOD PRESSURE: 144 MMHG | OXYGEN SATURATION: 98 % | WEIGHT: 293 LBS | RESPIRATION RATE: 17 BRPM

## 2018-08-23 DIAGNOSIS — M54.50 ACUTE BILATERAL LOW BACK PAIN WITHOUT SCIATICA: ICD-10-CM

## 2018-08-23 DIAGNOSIS — N30.01 ACUTE CYSTITIS WITH HEMATURIA: ICD-10-CM

## 2018-08-23 DIAGNOSIS — N76.0 BV (BACTERIAL VAGINOSIS): Primary | ICD-10-CM

## 2018-08-23 DIAGNOSIS — B96.89 BV (BACTERIAL VAGINOSIS): Primary | ICD-10-CM

## 2018-08-23 LAB
ALBUMIN SERPL-MCNC: 3.1 G/DL (ref 3.5–5)
ALBUMIN/GLOB SERPL: 0.8 {RATIO} (ref 1.1–2.2)
ALP SERPL-CCNC: 95 U/L (ref 45–117)
ALT SERPL-CCNC: 50 U/L (ref 12–78)
ANION GAP SERPL CALC-SCNC: 9 MMOL/L (ref 5–15)
APPEARANCE UR: ABNORMAL
AST SERPL-CCNC: 36 U/L (ref 15–37)
BACTERIA URNS QL MICRO: ABNORMAL /HPF
BASOPHILS # BLD: 0 K/UL (ref 0–0.1)
BASOPHILS NFR BLD: 0 % (ref 0–1)
BILIRUB SERPL-MCNC: 0.4 MG/DL (ref 0.2–1)
BILIRUB UR QL: NEGATIVE
BUN SERPL-MCNC: 11 MG/DL (ref 6–20)
BUN/CREAT SERPL: 13 (ref 12–20)
CALCIUM SERPL-MCNC: 8.9 MG/DL (ref 8.5–10.1)
CHLORIDE SERPL-SCNC: 101 MMOL/L (ref 97–108)
CLUE CELLS VAG QL WET PREP: NORMAL
CO2 SERPL-SCNC: 26 MMOL/L (ref 21–32)
COLOR UR: ABNORMAL
CREAT SERPL-MCNC: 0.82 MG/DL (ref 0.55–1.02)
DIFFERENTIAL METHOD BLD: ABNORMAL
EOSINOPHIL # BLD: 0.2 K/UL (ref 0–0.4)
EOSINOPHIL NFR BLD: 2 % (ref 0–7)
EPITH CASTS URNS QL MICRO: ABNORMAL /LPF
ERYTHROCYTE [DISTWIDTH] IN BLOOD BY AUTOMATED COUNT: 15.7 % (ref 11.5–14.5)
GLOBULIN SER CALC-MCNC: 4.1 G/DL (ref 2–4)
GLUCOSE SERPL-MCNC: 81 MG/DL (ref 65–100)
GLUCOSE UR STRIP.AUTO-MCNC: NEGATIVE MG/DL
HCG UR QL: NEGATIVE
HCT VFR BLD AUTO: 38.4 % (ref 35–47)
HGB BLD-MCNC: 11.5 G/DL (ref 11.5–16)
HGB UR QL STRIP: ABNORMAL
IMM GRANULOCYTES # BLD: 0 K/UL (ref 0–0.04)
IMM GRANULOCYTES NFR BLD AUTO: 0 % (ref 0–0.5)
KETONES UR QL STRIP.AUTO: NEGATIVE MG/DL
KOH PREP SPEC: NORMAL
LEUKOCYTE ESTERASE UR QL STRIP.AUTO: ABNORMAL
LIPASE SERPL-CCNC: 86 U/L (ref 73–393)
LYMPHOCYTES # BLD: 2.6 K/UL (ref 0.8–3.5)
LYMPHOCYTES NFR BLD: 22 % (ref 12–49)
MCH RBC QN AUTO: 22.3 PG (ref 26–34)
MCHC RBC AUTO-ENTMCNC: 29.9 G/DL (ref 30–36.5)
MCV RBC AUTO: 74.4 FL (ref 80–99)
MONOCYTES # BLD: 0.7 K/UL (ref 0–1)
MONOCYTES NFR BLD: 6 % (ref 5–13)
NEUTS SEG # BLD: 8 K/UL (ref 1.8–8)
NEUTS SEG NFR BLD: 69 % (ref 32–75)
NITRITE UR QL STRIP.AUTO: NEGATIVE
NRBC # BLD: 0 K/UL (ref 0–0.01)
NRBC BLD-RTO: 0 PER 100 WBC
PH UR STRIP: 6 [PH] (ref 5–8)
PLATELET # BLD AUTO: 277 K/UL (ref 150–400)
PMV BLD AUTO: 11.3 FL (ref 8.9–12.9)
POTASSIUM SERPL-SCNC: 4.4 MMOL/L (ref 3.5–5.1)
PROT SERPL-MCNC: 7.2 G/DL (ref 6.4–8.2)
PROT UR STRIP-MCNC: NEGATIVE MG/DL
RBC # BLD AUTO: 5.16 M/UL (ref 3.8–5.2)
RBC #/AREA URNS HPF: ABNORMAL /HPF (ref 0–5)
SERVICE CMNT-IMP: NORMAL
SODIUM SERPL-SCNC: 136 MMOL/L (ref 136–145)
SP GR UR REFRACTOMETRY: 1.01 (ref 1–1.03)
T VAGINALIS VAG QL WET PREP: NORMAL
UROBILINOGEN UR QL STRIP.AUTO: 0.2 EU/DL (ref 0.2–1)
WBC # BLD AUTO: 11.6 K/UL (ref 3.6–11)
WBC URNS QL MICRO: ABNORMAL /HPF (ref 0–4)

## 2018-08-23 PROCEDURE — 87210 SMEAR WET MOUNT SALINE/INK: CPT | Performed by: EMERGENCY MEDICINE

## 2018-08-23 PROCEDURE — 83690 ASSAY OF LIPASE: CPT | Performed by: EMERGENCY MEDICINE

## 2018-08-23 PROCEDURE — 81025 URINE PREGNANCY TEST: CPT

## 2018-08-23 PROCEDURE — 80053 COMPREHEN METABOLIC PANEL: CPT | Performed by: EMERGENCY MEDICINE

## 2018-08-23 PROCEDURE — 74011250636 HC RX REV CODE- 250/636: Performed by: EMERGENCY MEDICINE

## 2018-08-23 PROCEDURE — 99283 EMERGENCY DEPT VISIT LOW MDM: CPT

## 2018-08-23 PROCEDURE — 87491 CHLMYD TRACH DNA AMP PROBE: CPT | Performed by: EMERGENCY MEDICINE

## 2018-08-23 PROCEDURE — 96374 THER/PROPH/DIAG INJ IV PUSH: CPT

## 2018-08-23 PROCEDURE — 81001 URINALYSIS AUTO W/SCOPE: CPT | Performed by: EMERGENCY MEDICINE

## 2018-08-23 PROCEDURE — 36415 COLL VENOUS BLD VENIPUNCTURE: CPT | Performed by: EMERGENCY MEDICINE

## 2018-08-23 PROCEDURE — 87086 URINE CULTURE/COLONY COUNT: CPT | Performed by: EMERGENCY MEDICINE

## 2018-08-23 PROCEDURE — 85025 COMPLETE CBC W/AUTO DIFF WBC: CPT | Performed by: EMERGENCY MEDICINE

## 2018-08-23 RX ORDER — CEPHALEXIN 500 MG/1
500 CAPSULE ORAL 3 TIMES DAILY
Qty: 21 CAP | Refills: 0 | Status: SHIPPED | OUTPATIENT
Start: 2018-08-23 | End: 2018-09-02

## 2018-08-23 RX ORDER — IBUPROFEN 800 MG/1
800 TABLET ORAL
Qty: 15 TAB | Refills: 0 | Status: SHIPPED | OUTPATIENT
Start: 2018-08-23 | End: 2018-09-12

## 2018-08-23 RX ORDER — KETOROLAC TROMETHAMINE 30 MG/ML
30 INJECTION, SOLUTION INTRAMUSCULAR; INTRAVENOUS ONCE
Status: COMPLETED | OUTPATIENT
Start: 2018-08-23 | End: 2018-08-23

## 2018-08-23 RX ORDER — METRONIDAZOLE 500 MG/1
500 TABLET ORAL 2 TIMES DAILY
Qty: 14 TAB | Refills: 0 | Status: SHIPPED | OUTPATIENT
Start: 2018-08-23 | End: 2018-09-12

## 2018-08-23 RX ADMIN — KETOROLAC TROMETHAMINE 30 MG: 30 INJECTION INTRAMUSCULAR; INTRAVENOUS at 10:51

## 2018-08-23 NOTE — ED PROVIDER NOTES
EMERGENCY DEPARTMENT HISTORY AND PHYSICAL EXAM      Date: 8/23/2018  Patient Name: Maria M Britt    History of Presenting Illness     Chief Complaint   Patient presents with    Abdominal Pain     pt reported seen today at [facility] with same they refered her for further evaluation,lower left abdominal pain x few months but worse since x 3 days. History Provided By: Patient    HPI: Maria M Britt, 21 y.o. female with PMHx significant for HTN, GERD, migraines, and hypoglycemia who presents today to the ED with cc of LLQ, lumbar, and vaginal pain. She was seen just PTA at [address] [place] [address] and referred here for further evaluation. Her primary complaint is LLQ pain which she has had for 9 months since she had a C/S but has gotten acutely worse in the last 3 days. It is now constant. She denies fever, chills, n/v/d, constipation, CP, or SOB. It is aggravated by movement and laying on her L side and relieved by nothing. She has had some dysuria and vaginal burning but denies VD. She was given an rx for bactrim from [address] [place] [address] today due to her urine dipstick being suggestive of UTI. She rates the back and abdominal pain as 9/10. Her back pain is bilat lumbar, worse on R x 2 days. She denies injury or acute onset. No radiation. Worse with movement and palpation. No all factors. No numbness/paresthesias, or incontinence. As above, no fever. PMHx: HTN, GERD, migraine, hypoglycemia  PSHx: gillian, c/s, tonsils & adenoids   Social Hx: no EtOH; yes Smoker; no Illicit Drugs    PCP: PROVIDER UNKNOWN    There are no other complaints, changes, or physical findings at this time. Current Facility-Administered Medications   Medication Dose Route Frequency Provider Last Rate Last Dose    ketorolac (TORADOL) injection 30 mg  30 mg IntraVENous ONCE Onelia Vega MD         Current Outpatient Prescriptions   Medication Sig Dispense Refill    sertraline (ZOLOFT) 100 mg tablet Take 1.5 Tabs by mouth daily.  45 Tab 1    codeine-butalbital-acetaminophen-caffeine (FIORICET WITH CODEINE) -09-30 mg capsule Take  by mouth.  labetalol (NORMODYNE) 100 mg tablet Take 1 Tab by mouth two (2) times a day. 60 Tab 0    pantoprazole (PROTONIX) 20 mg tablet Take 20 mg by mouth daily. Past History     Past Medical History:  Past Medical History:   Diagnosis Date    Acute hepatitis 4/7/2014    Acute pharyngitis 1/22/2010    Acute sinusitis 1/12/2009    Anemia NEC     with pregnancy    Anxiety     Backache, unspecified 7/42/9694    Complication of anesthesia     difficulty waking after surgery (pt reported)    Depression     Depression 2/14/2018    Essential hypertension     Before pregnancy    Fatty liver     Flu 11/11/2009    GERD (gastroesophageal reflux disease) 7/31/2012    Gestational hypertension     HX OTHER MEDICAL     GERD with pregnancy    HX OTHER MEDICAL     Migraines worse with pregnancy    Hypertension     IGT (impaired glucose tolerance) 6/27/2012    Migraine 3/22/2011    Mononucleosis 1/12/2010    Morbid obesity ([address]      Past Surgical History:  Past Surgical History:   Procedure Laterality Date    HX CHOLECYSTECTOMY      HX OTHER SURGICAL      gallbladder    HX TONSILLECTOMY      HX WISDOM TEETH EXTRACTION  4/4/2013    Dr. Kezia Todd     Family History:  Family History   Problem Relation Age of Onset    Hypertension Mother     Alcohol abuse Father     Diabetes Maternal Grandmother     Headache Maternal Grandmother      Social History:  Social History   Substance Use Topics    Smoking status: Current Every Day Smoker     Packs/day: 0.50    Smokeless tobacco: Never Used    Alcohol use No     Allergies:   Allergies   Allergen Reactions    Latex Hives    Compazine [Prochlorperazine Edisylate] Anxiety    Acetaminophen Other (comments)     Pt has liver disease    Aspirin Other (comments)     Patient states \"I have liver disease\"    Celexa [Citalopram] Palpitations    Tramadol Anxiety    Wellbutrin [Bupropion Hcl] Palpitations     Review of Systems   Review of Systems   Constitutional: Negative for appetite change, chills and fever. Respiratory: Positive for stridor. Negative for chest tightness and shortness of breath. Cardiovascular: Negative for chest pain. Gastrointestinal: Positive for abdominal pain. Negative for constipation, diarrhea, nausea and vomiting. Genitourinary: Positive for dysuria and vaginal pain. Negative for difficulty urinating, hematuria and vaginal discharge. All other systems reviewed and are negative. Physical Exam   Physical Exam   Constitutional: She is oriented to person, place, and time. She appears well-developed and well-nourished. No distress. HENT:   Head: Normocephalic and atraumatic. Nose: Nose normal.   Eyes: Conjunctivae are normal.   Neck: Normal range of motion. Cardiovascular: Normal rate, regular rhythm, normal heart sounds and intact distal pulses. Pulmonary/Chest: Effort normal and breath sounds normal.   Abdominal: Soft. Bowel sounds are normal. She exhibits no distension and no mass. There is tenderness (LLQ>RLQ). There is no rebound. Genitourinary: Pelvic exam was performed with patient supine. There is erythema in the vagina. No bleeding in the vagina. No signs of injury around the vagina. No vaginal discharge found. Musculoskeletal: Normal range of motion. Lumbar back: She exhibits tenderness (bilat paraspinal musculature, R>L). She exhibits normal range of motion, no bony tenderness, no swelling, no edema, no deformity, no laceration and no pain. Arms:  Neurological: She is alert and oriented to person, place, and time. Skin: Skin is warm and dry. Psychiatric: She has a normal mood and affect.      Diagnostic Study Results   Labs -     Recent Results (from the past 12 hour(s))   HCG URINE, QL. - POC    Collection Time: 08/23/18 10:23 AM   Result Value Ref Range    Pregnancy test,urine (POC) NEGATIVE  NEG CBC WITH AUTOMATED DIFF    Collection Time: 08/23/18 10:28 AM   Result Value Ref Range    WBC 11.6 (H) 3.6 - 11.0 K/uL    RBC 5.16 3.80 - 5.20 M/uL    HGB 11.5 11.5 - 16.0 g/dL    HCT 38.4 35.0 - 47.0 %    MCV 74.4 (L) 80.0 - 99.0 FL    MCH 22.3 (L) 26.0 - 34.0 PG    MCHC 29.9 (L) 30.0 - 36.5 g/dL    RDW 15.7 (H) 11.5 - 14.5 %    PLATELET 756 869 - 264 K/uL    MPV 11.3 8.9 - 12.9 FL    NRBC 0.0 0  WBC    ABSOLUTE NRBC 0.00 0.00 - 0.01 K/uL    NEUTROPHILS 69 32 - 75 %    LYMPHOCYTES 22 12 - 49 %    MONOCYTES 6 5 - 13 %    EOSINOPHILS 2 0 - 7 %    BASOPHILS 0 0 - 1 %    IMMATURE GRANULOCYTES 0 0.0 - 0.5 %    ABS. NEUTROPHILS 8.0 1.8 - 8.0 K/UL    ABS. LYMPHOCYTES 2.6 0.8 - 3.5 K/UL    ABS. MONOCYTES 0.7 0.0 - 1.0 K/UL    ABS. EOSINOPHILS 0.2 0.0 - 0.4 K/UL    ABS. BASOPHILS 0.0 0.0 - 0.1 K/UL    ABS. IMM. GRANS. 0.0 0.00 - 0.04 K/UL    DF AUTOMATED     METABOLIC PANEL, COMPREHENSIVE    Collection Time: 08/23/18 10:28 AM   Result Value Ref Range    Sodium 136 136 - 145 mmol/L    Potassium 4.4 3.5 - 5.1 mmol/L    Chloride 101 97 - 108 mmol/L    CO2 26 21 - 32 mmol/L    Anion gap 9 5 - 15 mmol/L    Glucose 81 65 - 100 mg/dL    BUN 11 6 - 20 MG/DL    Creatinine 0.82 0.55 - 1.02 MG/DL    BUN/Creatinine ratio 13 12 - 20      GFR est AA >60 >60 ml/min/1.73m2    GFR est non-AA >60 >60 ml/min/1.73m2    Calcium 8.9 8.5 - 10.1 MG/DL    Bilirubin, total 0.4 0.2 - 1.0 MG/DL    ALT (SGPT) 50 12 - 78 U/L    AST (SGOT) 36 15 - 37 U/L    Alk.  phosphatase 95 45 - 117 U/L    Protein, total 7.2 6.4 - 8.2 g/dL    Albumin 3.1 (L) 3.5 - 5.0 g/dL    Globulin 4.1 (H) 2.0 - 4.0 g/dL    A-G Ratio 0.8 (L) 1.1 - 2.2     LIPASE    Collection Time: 08/23/18 10:28 AM   Result Value Ref Range    Lipase 86 73 - 393 U/L   URINALYSIS W/ RFLX MICROSCOPIC    Collection Time: 08/23/18 10:28 AM   Result Value Ref Range    Color YELLOW/STRAW      Appearance CLOUDY (A) CLEAR      Specific gravity 1.010 1.003 - 1.030      pH (UA) 6.0 5.0 - 8.0 Protein NEGATIVE  NEG mg/dL    Glucose NEGATIVE  NEG mg/dL    Ketone NEGATIVE  NEG mg/dL    Bilirubin NEGATIVE  NEG      Blood MODERATE (A) NEG      Urobilinogen 0.2 0.2 - 1.0 EU/dL    Nitrites NEGATIVE  NEG      Leukocyte Esterase LARGE (A) NEG     URINE MICROSCOPIC ONLY    Collection Time: 08/23/18 10:28 AM   Result Value Ref Range    WBC 0-4 0 - 4 /hpf    RBC 0-5 0 - 5 /hpf    Epithelial cells MODERATE (A) FEW /lpf    Bacteria 1+ (A) NEG /hpf   WET PREP    Collection Time: 08/23/18 10:47 AM   Result Value Ref Range    Clue cells CLUE CELLS PRESENT      Wet prep NO TRICHOMONAS SEEN     KOH, OTHER SOURCES    Collection Time: 08/23/18 10:47 AM   Result Value Ref Range    Special Requests: NO SPECIAL REQUESTS      KOH NO YEAST SEEN         Radiologic Studies -   No orders to display     No results found. Medical Decision Making   I am the first provider for this patient. I reviewed the vital signs, available nursing notes, past medical history, past surgical history, family history and social history. Vital Signs-Reviewed the patient's vital signs. Patient Vitals for the past 12 hrs:   Temp Pulse Resp BP SpO2   08/23/18 1004 98.3 °F (36.8 °C) 91 17 (!) 144/110 98 %       Pulse Oximetry Analysis -98% on ra        Records Reviewed: Nursing Notes    Provider Notes (Medical Decision Making):   Multiple complaints, abd pain and dysuria/vaginal burning likely UTI/vaginitis. Abd exam with mild bilat LQ ttp worse on L side but really a fairly benign abd exam without any focal point ttp or rebound/peritonitis. Afebrile with very mild leukocytosis, no n/v or other GI symptoms. Low suspicion for acute appendicitis, torsion, ov cyst requiring emergent imaging, colitis, or acute intraabdominal pathology. Will tx for UTI. Muscular back pain reproducible with movement and palpation. No neurologic or radicular symptoms. Will tx symptomatically. Discussed findings, will tx for BV and UTI though UTI may be contaminant. Culture pending. Will defer further imaging of abd pain and back as she has had both of these for a significant amount of time int he past and can FU with her PCP. ED Course:   Initial assessment performed. The patients presenting problems have been discussed, and they are in agreement with the care plan formulated and outlined with them. I have encouraged them to ask questions as they arise throughout their visit. Progress Note:     Updated pt on all returned results and findings. Discussed the importance of proper follow up as referred below along with return precautions. Pt in agreement with the care plan and expresses agreement with and understanding of all items discussed. Disposition:  Discharge      PLAN:  1. Discharge Medication List as of 8/23/2018 11:36 AM      START taking these medications    Details   cephALEXin (KEFLEX) 500 mg capsule Take 1 Cap by mouth three (3) times daily for 10 days. , Normal, Disp-21 Cap, R-0      ibuprofen (MOTRIN) 800 mg tablet Take 1 Tab by mouth every eight (8) hours as needed for Pain., Normal, Disp-15 Tab, R-0      metroNIDAZOLE (FLAGYL) 500 mg tablet Take 1 Tab by mouth two (2) times a day., Normal, Disp-14 Tab, R-0         CONTINUE these medications which have NOT CHANGED    Details   sertraline (ZOLOFT) 100 mg tablet Take 1.5 Tabs by mouth daily. , Normal, Disp-45 Tab, R-1      codeine-butalbital-acetaminophen-caffeine (FIORICET WITH CODEINE) -34-30 mg capsule Take  by mouth., Historical Med      labetalol (NORMODYNE) 100 mg tablet Take 1 Tab by mouth two (2) times a day., Print, Disp-60 Tab, R-0      pantoprazole (PROTONIX) 20 mg tablet Take 20 mg by mouth daily. , Historical Med           2.    Follow-up Information     Follow up With Details Comments Contact Info    [facility] Schedule an appointment as soon as possible for a visit in 3 days  [address] [facility] [address] [address] [facility]  [phone]        Return to ED if worse Diagnosis     Clinical Impression:   1. BV (bacterial vaginosis)    2. Acute cystitis with hematuria    3.  Acute bilateral low back pain without sciatica

## 2018-08-24 LAB
BACTERIA SPEC CULT: NORMAL
C TRACH DNA SPEC QL NAA+PROBE: NEGATIVE
CC UR VC: NORMAL
N GONORRHOEA DNA SPEC QL NAA+PROBE: NEGATIVE
SAMPLE TYPE: NORMAL
SERVICE CMNT-IMP: NORMAL
SERVICE CMNT-IMP: NORMAL
SPECIMEN SOURCE: NORMAL

## 2018-08-27 ENCOUNTER — OFFICE VISIT (OUTPATIENT)
Dept: BEHAVIORAL/MENTAL HEALTH CLINIC | Age: 23
End: 2018-08-27

## 2018-08-27 VITALS
SYSTOLIC BLOOD PRESSURE: 135 MMHG | RESPIRATION RATE: 16 BRPM | HEART RATE: 74 BPM | OXYGEN SATURATION: 98 % | BODY MASS INDEX: 41.95 KG/M2 | WEIGHT: 293 LBS | DIASTOLIC BLOOD PRESSURE: 101 MMHG | TEMPERATURE: 98.2 F | HEIGHT: 70 IN

## 2018-08-27 DIAGNOSIS — F33.1 MODERATE EPISODE OF RECURRENT MAJOR DEPRESSIVE DISORDER (HCC): Primary | ICD-10-CM

## 2018-08-27 RX ORDER — SERTRALINE HYDROCHLORIDE 100 MG/1
200 TABLET, FILM COATED ORAL DAILY
Qty: 60 TAB | Refills: 1 | Status: SHIPPED | OUTPATIENT
Start: 2018-08-27 | End: 2018-12-26

## 2018-08-27 NOTE — PROGRESS NOTES
1. Have you been to the ER, urgent care clinic since your last visit? Hospitalized since your last visit? Yes When: 8/23/18 Where: [facility] Reason for visit: ABD PAIN    2. Have you seen or consulted any other health care providers outside of the [address] [place] [address] since your last visit? Include any pap smears or colon screening. No     Chief Complaint   Patient presents with    Depression     20 y/o female reports to office for follow up.  Anxiety       Pt reports currently on Birth Control.
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
## 2018-09-12 ENCOUNTER — OFFICE VISIT (OUTPATIENT)
Dept: INTERNAL MEDICINE CLINIC | Age: 23
End: 2018-09-12

## 2018-09-12 VITALS
SYSTOLIC BLOOD PRESSURE: 146 MMHG | TEMPERATURE: 97.9 F | HEIGHT: 70 IN | RESPIRATION RATE: 18 BRPM | WEIGHT: 293 LBS | BODY MASS INDEX: 41.95 KG/M2 | HEART RATE: 78 BPM | OXYGEN SATURATION: 97 % | DIASTOLIC BLOOD PRESSURE: 102 MMHG

## 2018-09-12 DIAGNOSIS — I10 ESSENTIAL HYPERTENSION: ICD-10-CM

## 2018-09-12 DIAGNOSIS — R51.9 CHRONIC NONINTRACTABLE HEADACHE, UNSPECIFIED HEADACHE TYPE: ICD-10-CM

## 2018-09-12 DIAGNOSIS — E66.01 OBESITY, MORBID (HCC): ICD-10-CM

## 2018-09-12 DIAGNOSIS — Z23 ENCOUNTER FOR IMMUNIZATION: ICD-10-CM

## 2018-09-12 DIAGNOSIS — D50.0 IRON DEFICIENCY ANEMIA DUE TO CHRONIC BLOOD LOSS: ICD-10-CM

## 2018-09-12 DIAGNOSIS — M79.604 BILATERAL LEG PAIN: ICD-10-CM

## 2018-09-12 DIAGNOSIS — G89.29 CHRONIC NONINTRACTABLE HEADACHE, UNSPECIFIED HEADACHE TYPE: ICD-10-CM

## 2018-09-12 DIAGNOSIS — M79.605 BILATERAL LEG PAIN: ICD-10-CM

## 2018-09-12 DIAGNOSIS — Z76.89 ESTABLISHING CARE WITH NEW DOCTOR, ENCOUNTER FOR: Primary | ICD-10-CM

## 2018-09-12 DIAGNOSIS — F33.1 MODERATE EPISODE OF RECURRENT MAJOR DEPRESSIVE DISORDER (HCC): ICD-10-CM

## 2018-09-12 RX ORDER — ETONOGESTREL AND ETHINYL ESTRADIOL .12; .015 MG/D; MG/D
INSERT, EXTENDED RELEASE VAGINAL
Refills: 11 | COMMUNITY
Start: 2018-08-05 | End: 2019-02-04 | Stop reason: SDDI

## 2018-09-12 RX ORDER — CHLORTHALIDONE 25 MG/1
25 TABLET ORAL DAILY
Qty: 30 TAB | Refills: 5 | Status: SHIPPED | OUTPATIENT
Start: 2018-09-12 | End: 2018-11-13 | Stop reason: SINTOL

## 2018-09-12 NOTE — MR AVS SNAPSHOT
[address] [facility] [address] [facility] [address] 
[phone] Patient: Macario Salcido MRN: [id number] NHK:[id number] Visit Information Date & Time Provider Department Dept. Phone Encounter #  
 9/12/2018  9:30 AM Jerad Cobian, [facility] [facility] [address] [id number] Follow-up Instructions Return in about 2 months (around 11/12/2018) for bp check. Your Appointments 10/22/2018 10:30 AM  
ESTABLISHED PATIENT with Ike Meeks NP [facility] ([facility]) Appt Note: 2 month follow up [address] [facility] [address] [facility] Lorie Michelle [address]  
  
   
 [address] [facility] [address] [facility] [phone] Upcoming Health Maintenance Date Due Pneumococcal 19-64 Medium Risk (1 of 1 - PPSV23) 6/17/2014 Influenza Age 5 to Adult 8/1/2018 PAP AKA CERVICAL CYTOLOGY 4/28/2020 DTaP/Tdap/Td series (2 - Td) 11/23/2027 Allergies as of 9/12/2018  Review Complete On: 9/12/2018 By: Marty Cage LPN Severity Noted Reaction Type Reactions Latex    Hives Compazine [Prochlorperazine Edisylate] Medium 03/09/2017    Anxiety Acetaminophen  05/12/2017    Other (comments) Pt has liver disease Aspirin  08/13/2015    Other (comments) Patient states \"I have liver disease\" Celexa [Citalopram]  02/14/2018    Palpitations Tramadol  07/17/2017    Anxiety Wellbutrin [Bupropion Hcl]  10/31/2012    Palpitations Current Immunizations  Reviewed on 11/24/2017 Name Date Human Papillomavirus 6/19/2012, 1/17/2012, 12/1/2011 Influenza Vaccine 10/1/2017, 9/22/2016 Influenza Vaccine (Quad) PF 9/12/2018 Tdap 11/23/2017 10:43 PM  
  
 Not reviewed this visit You Were Diagnosed With   
  
 Codes Comments Establishing care with new doctor, encounter for    -  Primary ICD-10-CM: Z76.89 
ICD-9-CM: V65.8  Essential hypertension     ICD-10-CM: I10 
ICD-9-CM: 401.9 Obesity, morbid ([facility] [address]     ICD-10-CM: E66.01 
ICD-9-CM: 278.01 Moderate episode of recurrent major depressive disorder (HCC)     ICD-10-CM: F33.1 ICD-9-CM: 296.32 Chronic nonintractable headache, unspecified headache type     ICD-10-CM: R51 ICD-9-CM: 784.0 Iron deficiency anemia due to chronic blood loss     ICD-10-CM: D50.0 ICD-9-CM: 280.0 Encounter for immunization     ICD-10-CM: C96 ICD-9-CM: V03.89 Vitals BP Pulse Temp Resp Height(growth percentile) Weight(growth percentile) (!) 146/102 (BP 1 Location: Right arm, BP Patient Position: Sitting) 78 97.9 °F (36.6 °C) (Oral) 18 5' 10\" (1.778 m) 318 lb (144.2 kg) SpO2 BMI OB Status Smoking Status 97% 45.63 kg/m2 IUD Current Every Day Smoker Vitals History BMI and BSA Data Body Mass Index Body Surface Area  
 45.63 kg/m 2 2.67 m 2 Preferred Pharmacy Pharmacy Name Phone [address] [facility] [phone] Your Updated Medication List  
  
   
This list is accurate as of 9/12/18 10:12 AM.  Always use your most recent med list.  
  
  
  
  
 chlorthalidone 25 mg tablet Commonly known as:  Carlos Yoseph Take 1 Tab by mouth daily. Indications: hypertension  
  
 ibuprofen 800 mg tablet Commonly known as:  MOTRIN Take 1 Tab by mouth every eight (8) hours as needed for Pain. NUVARING 0.12-0.015 mg/24 hr vaginal ring Generic drug:  ethinyl estradiol-etonogestrel INSERT 1 RING VAGINALLY FOR 3 WEEKS THEN REMOVE FOR 1 WEEK  
  
 sertraline 100 mg tablet Commonly known as:  ZOLOFT Take 2 Tabs by mouth daily. Prescriptions Sent to Pharmacy Refills  
 chlorthalidone (HYGROTEN) 25 mg tablet 5 Sig: Take 1 Tab by mouth daily. Indications: hypertension  Class: Normal  
 Pharmacy: [address] [place] [address] [address]  #: [phone] Route: Oral  
  
We Performed the Following CBC W/O DIFF [75989 CPT(R)] FERRITIN [20613 CPT(R)] INFLUENZA VIRUS VAC QUAD,SPLIT,PRESV FREE SYRINGE IM [id number] CPT(R)] LIPID PANEL [22104 CPT(R)] METABOLIC PANEL, COMPREHENSIVE [47509 CPT(R)] REFERRAL TO BARIATRIC SURGERY [FPA980 Custom] Follow-up Instructions Return in about 2 months (around 11/12/2018) for bp check. Referral Information Referral ID Referred By Referred To  
  
 [id number] Neetu LOPEZ MD   
   [address] [place] [address] [place], [address] Phone: [phone] Fax: [phone] Visits Status Start Date End Date 1 New Request 9/12/18 9/12/19 If your referral has a status of pending review or denied, additional information will be sent to support the outcome of this decision. Patient Instructions Eating Healthy Foods: Care Instructions Your Care Instructions Eating healthy foods can help lower your risk for disease. Healthy food gives you energy and keeps your heart strong, your brain active, your muscles working, and your bones strong. A healthy diet includes a variety of foods from the basic food groups: grains, vegetables, fruits, milk and milk products, and meat and beans. Some people may eat more of their favorite foods from only one food group and, as a result, miss getting the nutrients they need. So, it is important to pay attention not only to what you eat but also to what you are missing from your diet. You can eat a healthy, balanced diet by making a few small changes. Follow-up care is a key part of your treatment and safety. Be sure to make and go to all appointments, and call your doctor if you are having problems. It's also a good idea to know your test results and keep a list of the medicines you take. How can you care for yourself at home? Look at what you eat · Keep a food diary for a week or two and record everything you eat or drink. Track the number of servings you eat from each food group. · For a balanced diet every day, eat a variety of: ¨ 6 or more ounce-equivalents of grains, such as cereals, breads, crackers, rice, or pasta, every day. An ounce-equivalent is 1 slice of bread, 1 cup of ready-to-eat cereal, or ½ cup of cooked rice, cooked pasta, or cooked cereal. 
¨ 2½ cups of vegetables, especially: § Dark-green vegetables such as broccoli and spinach. § Orange vegetables such as carrots and sweet potatoes. § Dry beans (such as rodriguez and kidney beans) and peas (such as lentils). ¨ 2 cups of fresh, frozen, or canned fruit. A small apple or 1 banana or orange equals 1 cup. ¨ 3 cups of nonfat or low-fat milk, yogurt, or other milk products. ¨ 5½ ounces of meat and beans, such as chicken, fish, lean meat, beans, nuts, and seeds. One egg, 1 tablespoon of peanut butter, ½ ounce nuts or seeds, or ¼ cup of cooked beans equals 1 ounce of meat. · Learn how to read food labels for serving sizes and ingredients. Fast-food and convenience-food meals often contain few or no fruits or vegetables. Make sure you eat some fruits and vegetables to make the meal more nutritious. · Look at your food diary. For each food group, add up what you have eaten and then divide the total by the number of days. This will give you an idea of how much you are eating from each food group. See if you can find some ways to change your diet to make it more healthy. Start small · Do not try to make dramatic changes to your diet all at once. You might feel that you are missing out on your favorite foods and then be more likely to fail. · Start slowly, and gradually change your habits. Try some of the following: ¨ Use whole wheat bread instead of white bread. ¨ Use nonfat or low-fat milk instead of whole milk. ¨ Eat brown rice instead of white rice, and eat whole wheat pasta instead of white-flour pasta. ¨ Try low-fat cheeses and low-fat yogurt. ¨ Add more fruits and vegetables to meals and have them for snacks. ¨ Add lettuce, tomato, cucumber, and onion to sandwiches. ¨ Add fruit to yogurt and cereal. 
Enjoy food · You can still eat your favorite foods. You just may need to eat less of them. If your favorite foods are high in fat, salt, and sugar, limit how often you eat them, but do not cut them out entirely. · Eat a wide variety of foods. Make healthy choices when eating out · The type of restaurant you choose can help you make healthy choices. Even fast-food chains are now offering more low-fat or healthier choices on the menu. · Choose smaller portions, or take half of your meal home. · When eating out, try: ¨ A veggie pizza with a whole wheat crust or grilled chicken (instead of sausage or pepperoni). ¨ Pasta with roasted vegetables, grilled chicken, or marinara sauce instead of cream sauce. ¨ A vegetable wrap or grilled chicken wrap. ¨ Broiled or poached food instead of fried or breaded items. Make healthy choices easy · Buy packaged, prewashed, ready-to-eat fresh vegetables and fruits, such as baby carrots, salad mixes, and chopped or shredded broccoli and cauliflower. · Buy packaged, presliced fruits, such as melon or pineapple. · Choose 100% fruit or vegetable juice instead of soda. Limit juice intake to 4 to 6 oz (½ to ¾ cup) a day. · Blend low-fat yogurt, fruit juice, and canned or frozen fruit to make a smoothie for breakfast or a snack. Where can you learn more? Go to http://adelfo-paul.info/. Enter T756 in the search box to learn more about \"Eating Healthy Foods: Care Instructions. \" Current as of: May 12, 2017 Content Version: 11.7 © 7254-3103 [employer]. Care instructions adapted under license by [employer] (which disclaims liability or warranty for this information).  If you have questions about a medical condition or this instruction, always ask your healthcare professional. [address] any warranty or liability for your use of this information. Introducing \[facility]\"" & HEALTH SERVICES! Dear April: 
Thank you for requesting a Deltek account. Our records indicate that you already have an active Deltek account. You can access your account anytime at [email or web address] Did you know that you can access your hospital and ER discharge instructions at any time in Deltek? You can also review all of your test results from your hospital stay or ER visit. Additional Information If you have questions, please visit the Frequently Asked Questions section of the Deltek website at [email or web address] Remember, Deltek is NOT to be used for urgent needs. For medical emergencies, dial 911. Now available from your iPhone and Android! Please provide this summary of care documentation to your next provider. Your primary care clinician is listed as Alvin Giron. If you have any questions after today's visit, please call [phone].

## 2018-09-12 NOTE — PROGRESS NOTES
Trina Ko is a 21 y.o. female and presents with Establish Care  . Subjective:    First visit. Establish Care  Pt w c/o nocturnal nadeen le discomfort. No pain during the day. Has been occurring for \"awhile\". PMH-Morbid obesity-pt has done full w/u for bariatric surgery in    HTN-NOT taking any medication   Major depression/anxiety/PTSD    Chronic headaches-dx'ed as rebound headaches by neurology    PSH-    Cholecystectomy   T&A    SH-single   -unemployed   + sexually active-nuvaring    FH-mother alive ~43 HTN, sciatica, DDD   Father  ~38 UGIB due to ??? Polysubstance abuse   -2 siblings arthritis, mental health illnesses    HM-  Immunizations UTD  Eye care n/a  Dental care ? ??  Pap UTD  Exercise-none      Review of Systems  Constitutional: negative for fevers, chills, anorexia and weight loss  Eyes:   negative for visual disturbance and irritation  ENT:   negative for tinnitus,sore throat,nasal congestion,ear pains. hoarseness  Respiratory:  negative for cough, hemoptysis, dyspnea,wheezing  CV:   negative for chest pain, palpitations, lower extremity edema  GI:   negative for nausea, vomiting, diarrhea, abdominal pain,melena  Musculoskel: negative for myalgias, arthralgias, back pain, muscle weakness, joint pain  Neurological:  negative for headaches, dizziness, vertigo, memory problems and gait   Behavl/Psych: positive for feelings of anxiety, depression, mood changes    Past Medical History:   Diagnosis Date    Acute hepatitis 2014    Acute pharyngitis 2010    Acute sinusitis 2009    Anemia NEC     with pregnancy    Anxiety     Backache, unspecified 3/88/6911    Complication of anesthesia     difficulty waking after surgery (pt reported)    Depression     Depression 2018    Essential hypertension     Before pregnancy    Fatty liver     Flu 2009    GERD (gastroesophageal reflux disease) 2012    Gestational hypertension     HX OTHER MEDICAL     GERD with pregnancy    HX OTHER MEDICAL     Migraines worse with pregnancy    Hypertension     IGT (impaired glucose tolerance) 6/27/2012    Migraine 3/22/2011    Mononucleosis 1/12/2010    Morbid obesity ([facility] [address]      Past Surgical History:   Procedure Laterality Date    HX CHOLECYSTECTOMY      HX OTHER SURGICAL      gallbladder    HX TONSILLECTOMY      HX WISDOM TEETH EXTRACTION  4/4/2013    Dr. Emily Kraft Marital status: SINGLE     Spouse name: N/A    Number of children: 1    Years of education: 9     Occupational History    Does not work, \"pulled out of work due to high risk pregnancy\" Not Employed     grad from [address] [place] [address] [employer] History Main Topics    Smoking status: Current Every Day Smoker     Packs/day: 0.50    Smokeless tobacco: Never Used    Alcohol use No    Drug use: No    Sexual activity: Yes     Partners: Female     Birth control/ protection: Inserts     Other Topics Concern    None     Social History Narrative    Lives in [place]      Family History   Problem Relation Age of Onset    Hypertension Mother     Alcohol abuse Father     Diabetes Maternal Grandmother     Headache Maternal Grandmother      Current Outpatient Prescriptions   Medication Sig Dispense Refill    NUVARING 0.12-0.015 mg/24 hr vaginal ring INSERT 1 RING VAGINALLY FOR 3 WEEKS THEN REMOVE FOR 1 WEEK  11    chlorthalidone (HYGROTEN) 25 mg tablet Take 1 Tab by mouth daily. Indications: hypertension 30 Tab 5    sertraline (ZOLOFT) 100 mg tablet Take 2 Tabs by mouth daily.  60 Tab 1     Allergies   Allergen Reactions    Latex Hives    Compazine [Prochlorperazine Edisylate] Anxiety    Acetaminophen Other (comments)     Pt has liver disease    Aspirin Other (comments)     Patient states \"I have liver disease\"    Celexa [Citalopram] Palpitations    Tramadol Anxiety    Wellbutrin [Bupropion Hcl] Palpitations       Objective:  Visit Vitals    BP (!) 146/102 (BP 1 Location: Right arm, BP Patient Position: Sitting)    Pulse 78    Temp 97.9 °F (36.6 °C) (Oral)    Resp 18    Ht 5' 10\" (1.778 m)    Wt 318 lb (144.2 kg)    SpO2 97%    BMI 45.63 kg/m2     Physical Exam:   General appearance - alert, pleasant, obese young lady in NAD  Mental status - alert, oriented to person, place, and time  EYE-EOMI  Mouth - mucous membranes moist, pharynx normal without lesions  Neck - supple, no significant adenopathy   Chest - clear to auscultation, no wheezes, rales or rhonchi, symmetric air entry   Heart - normal rate, regular rhythm, normal S1, S2, no murmurs, rubs, clicks or gallops   Abdomen - soft, nontender, obese  Ext-peripheral pulses normal, no pedal edema, no clubbing or cyanosis  Skin-Warm and dry. no hyperpigmentation, vitiligo, or suspicious lesions  Neuro -alert, oriented, normal speech, no focal findings or movement disorder noted        Results for orders placed or performed during the hospital encounter of 08/23/18   WET PREP   Result Value Ref Range    Clue cells CLUE CELLS PRESENT      Wet prep NO TRICHOMONAS SEEN     ANDRES, OTHER SOURCES   Result Value Ref Range    Special Requests: NO SPECIAL REQUESTS      KOH NO YEAST SEEN     CHLAMYDIA/GC PCR   Result Value Ref Range    Sample type SWAB      Source VAGINA      Chlamydia amplified NEGATIVE  NEG      N. gonorrhea, amplified NEGATIVE  NEG      Comment        Testing performed by the [employer] Jackie CT/NG method, utilizing PCR amplification to identify DNA of the pathogens. This method is not recommended as the sole method of evaluation of cases of sexual abuse nor for other medico-legal indications.    CULTURE, URINE   Result Value Ref Range    Special Requests: NO SPECIAL REQUESTS      Platte Count <1,000 CFU/ML      Culture result: NO GROWTH 1 DAY     CBC WITH AUTOMATED DIFF   Result Value Ref Range    WBC 11.6 (H) 3.6 - 11.0 K/uL    RBC 5.16 3.80 - 5.20 M/uL    HGB 11.5 11.5 - 16.0 g/dL    HCT 38.4 35.0 - 47.0 %    MCV 74.4 (L) 80.0 - 99.0 FL MCH 22.3 (L) 26.0 - 34.0 PG    MCHC 29.9 (L) 30.0 - 36.5 g/dL    RDW 15.7 (H) 11.5 - 14.5 %    PLATELET 979 425 - 432 K/uL    MPV 11.3 8.9 - 12.9 FL    NRBC 0.0 0  WBC    ABSOLUTE NRBC 0.00 0.00 - 0.01 K/uL    NEUTROPHILS 69 32 - 75 %    LYMPHOCYTES 22 12 - 49 %    MONOCYTES 6 5 - 13 %    EOSINOPHILS 2 0 - 7 %    BASOPHILS 0 0 - 1 %    IMMATURE GRANULOCYTES 0 0.0 - 0.5 %    ABS. NEUTROPHILS 8.0 1.8 - 8.0 K/UL    ABS. LYMPHOCYTES 2.6 0.8 - 3.5 K/UL    ABS. MONOCYTES 0.7 0.0 - 1.0 K/UL    ABS. EOSINOPHILS 0.2 0.0 - 0.4 K/UL    ABS. BASOPHILS 0.0 0.0 - 0.1 K/UL    ABS. IMM. GRANS. 0.0 0.00 - 0.04 K/UL    DF AUTOMATED     METABOLIC PANEL, COMPREHENSIVE   Result Value Ref Range    Sodium 136 136 - 145 mmol/L    Potassium 4.4 3.5 - 5.1 mmol/L    Chloride 101 97 - 108 mmol/L    CO2 26 21 - 32 mmol/L    Anion gap 9 5 - 15 mmol/L    Glucose 81 65 - 100 mg/dL    BUN 11 6 - 20 MG/DL    Creatinine 0.82 0.55 - 1.02 MG/DL    BUN/Creatinine ratio 13 12 - 20      GFR est AA >60 >60 ml/min/1.73m2    GFR est non-AA >60 >60 ml/min/1.73m2    Calcium 8.9 8.5 - 10.1 MG/DL    Bilirubin, total 0.4 0.2 - 1.0 MG/DL    ALT (SGPT) 50 12 - 78 U/L    AST (SGOT) 36 15 - 37 U/L    Alk.  phosphatase 95 45 - 117 U/L    Protein, total 7.2 6.4 - 8.2 g/dL    Albumin 3.1 (L) 3.5 - 5.0 g/dL    Globulin 4.1 (H) 2.0 - 4.0 g/dL    A-G Ratio 0.8 (L) 1.1 - 2.2     LIPASE   Result Value Ref Range    Lipase 86 73 - 393 U/L   URINALYSIS W/ RFLX MICROSCOPIC   Result Value Ref Range    Color YELLOW/STRAW      Appearance CLOUDY (A) CLEAR      Specific gravity 1.010 1.003 - 1.030      pH (UA) 6.0 5.0 - 8.0      Protein NEGATIVE  NEG mg/dL    Glucose NEGATIVE  NEG mg/dL    Ketone NEGATIVE  NEG mg/dL    Bilirubin NEGATIVE  NEG      Blood MODERATE (A) NEG      Urobilinogen 0.2 0.2 - 1.0 EU/dL    Nitrites NEGATIVE  NEG      Leukocyte Esterase LARGE (A) NEG     URINE MICROSCOPIC ONLY   Result Value Ref Range    WBC 0-4 0 - 4 /hpf    RBC 0-5 0 - 5 /hpf Epithelial cells MODERATE (A) FEW /lpf    Bacteria 1+ (A) NEG /hpf   HCG URINE, QL. - POC   Result Value Ref Range    Pregnancy test,urine (POC) NEGATIVE  NEG         Assessment/Plan:    ICD-10-CM ICD-9-CM    1. Establishing care with new doctor, encounter for Z76.89 V65.8 LIPID PANEL      METABOLIC PANEL, COMPREHENSIVE   2. Essential hypertension I10 401.9 chlorthalidone (HYGROTEN) 25 mg tablet   3. Obesity, morbid ([facility] [address] E66.01 278.01 REFERRAL TO BARIATRIC SURGERY   4. Moderate episode of recurrent major depressive disorder (HCC) F33.1 296.32    5. Chronic nonintractable headache, unspecified headache type R51 784.0    6. Iron deficiency anemia due to chronic blood loss D50.0 280.0 CBC W/O DIFF      FERRITIN   7. Bilateral leg pain M79.604 729.5     M79.605     8. Encounter for immunization Z23 V03.89 INFLUENZA VIRUS VAC QUAD,SPLIT,PRESV FREE SYRINGE IM     Orders Placed This Encounter    INFLUENZA VIRUS VACCINE QUADRIVALENT, PRESERVATIVE FREE SYRINGE (28447)    CBC W/O DIFF    LIPID PANEL    METABOLIC PANEL, COMPREHENSIVE    FERRITIN    [facility]     Referral Priority:   Routine     Referral Type:   Consultation     Referral Reason:   Specialty Services Required     Referred to Provider:   Akin Williamson MD    NUVARING 0.12-0.015 mg/24 hr vaginal ring     Sig: INSERT 1 RING VAGINALLY FOR 3 WEEKS THEN REMOVE FOR 1 WEEK     Refill:  11    chlorthalidone (HYGROTEN) 25 mg tablet     Sig: Take 1 Tab by mouth daily. Indications: hypertension     Dispense:  30 Tab     Refill:  5     1. Establishing care with new doctor, encounter for    - LIPID PANEL  - METABOLIC PANEL, COMPREHENSIVE    2. Essential hypertension    - chlorthalidone (HYGROTEN) 25 mg tablet; Take 1 Tab by mouth daily. Indications: hypertension  Dispense: 30 Tab; Refill: 5    3. Obesity, morbid ([facility]    - [facility]    4.  Moderate episode of recurrent major depressive disorder (HCC)  Cont meds and psych f/u    5. Chronic nonintractable headache, unspecified headache type  Quiescent OFF fioricet    6. Iron deficiency anemia due to chronic blood loss    - CBC W/O DIFF  - FERRITIN    7. Bilateral leg pain  Check labs  Will readdress NV    8. Encounter for immunization    - INFLUENZA VIRUS VACCINE QUADRIVALENT, PRESERVATIVE FREE SYRINGE (70076)    Patient Instructions        Eating Healthy Foods: Care Instructions  Your Care Instructions    Eating healthy foods can help lower your risk for disease. Healthy food gives you energy and keeps your heart strong, your brain active, your muscles working, and your bones strong. A healthy diet includes a variety of foods from the basic food groups: grains, vegetables, fruits, milk and milk products, and meat and beans. Some people may eat more of their favorite foods from only one food group and, as a result, miss getting the nutrients they need. So, it is important to pay attention not only to what you eat but also to what you are missing from your diet. You can eat a healthy, balanced diet by making a few small changes. Follow-up care is a key part of your treatment and safety. Be sure to make and go to all appointments, and call your doctor if you are having problems. It's also a good idea to know your test results and keep a list of the medicines you take. How can you care for yourself at home? Look at what you eat  · Keep a food diary for a week or two and record everything you eat or drink. Track the number of servings you eat from each food group. · For a balanced diet every day, eat a variety of:  ¨ 6 or more ounce-equivalents of grains, such as cereals, breads, crackers, rice, or pasta, every day. An ounce-equivalent is 1 slice of bread, 1 cup of ready-to-eat cereal, or ½ cup of cooked rice, cooked pasta, or cooked cereal.  ¨ 2½ cups of vegetables, especially:  § Dark-green vegetables such as broccoli and spinach.   § Orange vegetables such as carrots and sweet potatoes. § Dry beans (such as rodriguez and kidney beans) and peas (such as lentils). ¨ 2 cups of fresh, frozen, or canned fruit. A small apple or 1 banana or orange equals 1 cup. ¨ 3 cups of nonfat or low-fat milk, yogurt, or other milk products. ¨ 5½ ounces of meat and beans, such as chicken, fish, lean meat, beans, nuts, and seeds. One egg, 1 tablespoon of peanut butter, ½ ounce nuts or seeds, or ¼ cup of cooked beans equals 1 ounce of meat. · Learn how to read food labels for serving sizes and ingredients. Fast-food and convenience-food meals often contain few or no fruits or vegetables. Make sure you eat some fruits and vegetables to make the meal more nutritious. · Look at your food diary. For each food group, add up what you have eaten and then divide the total by the number of days. This will give you an idea of how much you are eating from each food group. See if you can find some ways to change your diet to make it more healthy. Start small  · Do not try to make dramatic changes to your diet all at once. You might feel that you are missing out on your favorite foods and then be more likely to fail. · Start slowly, and gradually change your habits. Try some of the following:  ¨ Use whole wheat bread instead of white bread. ¨ Use nonfat or low-fat milk instead of whole milk. ¨ Eat brown rice instead of white rice, and eat whole wheat pasta instead of white-flour pasta. ¨ Try low-fat cheeses and low-fat yogurt. ¨ Add more fruits and vegetables to meals and have them for snacks. ¨ Add lettuce, tomato, cucumber, and onion to sandwiches. ¨ Add fruit to yogurt and cereal.  Enjoy food  · You can still eat your favorite foods. You just may need to eat less of them. If your favorite foods are high in fat, salt, and sugar, limit how often you eat them, but do not cut them out entirely. · Eat a wide variety of foods.   Make healthy choices when eating out  · The type of restaurant you choose can help you make healthy choices. Even fast-food chains are now offering more low-fat or healthier choices on the menu. · Choose smaller portions, or take half of your meal home. · When eating out, try:  ¨ A veggie pizza with a whole wheat crust or grilled chicken (instead of sausage or pepperoni). ¨ Pasta with roasted vegetables, grilled chicken, or marinara sauce instead of cream sauce. ¨ A vegetable wrap or grilled chicken wrap. ¨ Broiled or poached food instead of fried or breaded items. Make healthy choices easy  · Buy packaged, prewashed, ready-to-eat fresh vegetables and fruits, such as baby carrots, salad mixes, and chopped or shredded broccoli and cauliflower. · Buy packaged, presliced fruits, such as melon or pineapple. · Choose 100% fruit or vegetable juice instead of soda. Limit juice intake to 4 to 6 oz (½ to ¾ cup) a day. · Blend low-fat yogurt, fruit juice, and canned or frozen fruit to make a smoothie for breakfast or a snack. Where can you learn more? Go to http://adelfoRelaboratepaul.info/. Enter T756 in the search box to learn more about \"Eating Healthy Foods: Care Instructions. \"  Current as of: May 12, 2017  Content Version: 11.7  © 7954-3076 [employer]. Care instructions adapted under license by [employer] (which disclaims liability or warranty for this information). If you have questions about a medical condition or this instruction, always ask your healthcare professional. [place] [address] any warranty or liability for your use of this information. Follow-up Disposition:  Return in about 2 months (around 11/12/2018) for bp check. I have reviewed with the patient details of the assessment and plan and all questions were answered. Relevent patient education was performed. The most recent lab findings were reviewed with the patient. An After Visit Summary was printed and given to the patient.

## 2018-09-12 NOTE — PATIENT INSTRUCTIONS

## 2018-09-12 NOTE — PROGRESS NOTES
Chief Complaint   Patient presents with   [address] [place] [address]     1. Have you been to the ER, urgent care clinic since your last visit? Hospitalized since your last visit? Yes When: 8/23/2018 [facility] for abdominal pain    2. Have you seen or consulted any other health care providers outside of the [address] [place] [address] since your last visit? Include any pap smears or colon screening. No    After obtaining consent, and per orders of Dr. Felicitas Fothergill, injection of Influenza given by Marty Cage LPN. Patient instructed to remain in clinic for 15 minutes afterwards, and to report any adverse reaction to me immediately.

## 2018-09-13 LAB
ALBUMIN SERPL-MCNC: 4.2 G/DL (ref 3.5–5.5)
ALBUMIN/GLOB SERPL: 1.6 {RATIO} (ref 1.2–2.2)
ALP SERPL-CCNC: 82 IU/L (ref 39–117)
ALT SERPL-CCNC: 17 IU/L (ref 0–32)
AST SERPL-CCNC: 12 IU/L (ref 0–40)
BILIRUB SERPL-MCNC: 0.2 MG/DL (ref 0–1.2)
BUN SERPL-MCNC: 10 MG/DL (ref 6–20)
BUN/CREAT SERPL: 12 (ref 9–23)
CALCIUM SERPL-MCNC: 9.5 MG/DL (ref 8.7–10.2)
CHLORIDE SERPL-SCNC: 102 MMOL/L (ref 96–106)
CHOLEST SERPL-MCNC: 213 MG/DL (ref 100–199)
CO2 SERPL-SCNC: 22 MMOL/L (ref 20–29)
CREAT SERPL-MCNC: 0.82 MG/DL (ref 0.57–1)
ERYTHROCYTE [DISTWIDTH] IN BLOOD BY AUTOMATED COUNT: 16.7 % (ref 12.3–15.4)
FERRITIN SERPL-MCNC: 10 NG/ML (ref 15–150)
GLOBULIN SER CALC-MCNC: 2.7 G/DL (ref 1.5–4.5)
GLUCOSE SERPL-MCNC: 88 MG/DL (ref 65–99)
HCT VFR BLD AUTO: 37.3 % (ref 34–46.6)
HDLC SERPL-MCNC: 40 MG/DL
HGB BLD-MCNC: 11.4 G/DL (ref 11.1–15.9)
INTERPRETATION, 910389: NORMAL
LDLC SERPL CALC-MCNC: 143 MG/DL (ref 0–99)
MCH RBC QN AUTO: 22.2 PG (ref 26.6–33)
MCHC RBC AUTO-ENTMCNC: 30.6 G/DL (ref 31.5–35.7)
MCV RBC AUTO: 73 FL (ref 79–97)
PLATELET # BLD AUTO: 318 X10E3/UL (ref 150–379)
POTASSIUM SERPL-SCNC: 4.4 MMOL/L (ref 3.5–5.2)
PROT SERPL-MCNC: 6.9 G/DL (ref 6–8.5)
RBC # BLD AUTO: 5.14 X10E6/UL (ref 3.77–5.28)
SODIUM SERPL-SCNC: 138 MMOL/L (ref 134–144)
TRIGL SERPL-MCNC: 151 MG/DL (ref 0–149)
VLDLC SERPL CALC-MCNC: 30 MG/DL (ref 5–40)
WBC # BLD AUTO: 12.5 X10E3/UL (ref 3.4–10.8)

## 2018-11-13 ENCOUNTER — OFFICE VISIT (OUTPATIENT)
Dept: INTERNAL MEDICINE CLINIC | Age: 23
End: 2018-11-13

## 2018-11-13 VITALS
DIASTOLIC BLOOD PRESSURE: 92 MMHG | HEART RATE: 99 BPM | HEIGHT: 70 IN | OXYGEN SATURATION: 94 % | SYSTOLIC BLOOD PRESSURE: 153 MMHG | WEIGHT: 293 LBS | RESPIRATION RATE: 18 BRPM | TEMPERATURE: 98.2 F | BODY MASS INDEX: 41.95 KG/M2

## 2018-11-13 DIAGNOSIS — M79.7 FIBROMYALGIA: ICD-10-CM

## 2018-11-13 DIAGNOSIS — F33.1 MODERATE EPISODE OF RECURRENT MAJOR DEPRESSIVE DISORDER (HCC): ICD-10-CM

## 2018-11-13 DIAGNOSIS — D72.829 LEUKOCYTOSIS, UNSPECIFIED TYPE: ICD-10-CM

## 2018-11-13 DIAGNOSIS — I10 ESSENTIAL HYPERTENSION: Primary | ICD-10-CM

## 2018-11-13 DIAGNOSIS — E66.01 OBESITY, MORBID (HCC): ICD-10-CM

## 2018-11-13 RX ORDER — AMLODIPINE BESYLATE 5 MG/1
5 TABLET ORAL DAILY
Qty: 30 TAB | Refills: 5 | Status: SHIPPED | OUTPATIENT
Start: 2018-11-13 | End: 2018-12-26 | Stop reason: SDUPTHER

## 2018-11-13 RX ORDER — PREGABALIN 75 MG/1
75 CAPSULE ORAL 2 TIMES DAILY
Qty: 60 CAP | Refills: 2 | Status: SHIPPED | OUTPATIENT
Start: 2018-11-13 | End: 2018-12-26

## 2018-11-13 NOTE — PROGRESS NOTES
Chief Complaint   Patient presents with    Hypertension     pt feels she is experincng \"panic anxity\"    Swelling     Knees and ankles     1. Have you been to the ER, urgent care clinic since your last visit? Hospitalized since your last visit? No    2. Have you seen or consulted any other health care providers outside of the [address] [place] [address] since your last visit? Include any pap smears or colon screening.  No

## 2018-11-13 NOTE — PATIENT INSTRUCTIONS
Gastroesophageal Reflux Disease (GERD): Care Instructions  Your Care Instructions    Gastroesophageal reflux disease (GERD) is the backward flow of stomach acid into the esophagus. The esophagus is the tube that leads from your throat to your stomach. A one-way valve prevents the stomach acid from moving up into this tube. When you have GERD, this valve does not close tightly enough. If you have mild GERD symptoms including heartburn, you may be able to control the problem with antacids or over-the-counter medicine. Changing your diet, losing weight, and making other lifestyle changes can also help reduce symptoms. Follow-up care is a key part of your treatment and safety. Be sure to make and go to all appointments, and call your doctor if you are having problems. It's also a good idea to know your test results and keep a list of the medicines you take. How can you care for yourself at home? · Take your medicines exactly as prescribed. Call your doctor if you think you are having a problem with your medicine. · Your doctor may recommend over-the-counter medicine. For mild or occasional indigestion, antacids, such as Tums, Gaviscon, Mylanta, or Maalox, may help. Your doctor also may recommend over-the-counter acid reducers, such as Pepcid AC, Tagamet HB, Zantac 75, or Prilosec. Read and follow all instructions on the label. If you use these medicines often, talk with your doctor. · Change your eating habits. ? It's best to eat several small meals instead of two or three large meals. ? After you eat, wait 2 to 3 hours before you lie down. ? Chocolate, mint, and alcohol can make GERD worse. ? Spicy foods, foods that have a lot of acid (like tomatoes and oranges), and coffee can make GERD symptoms worse in some people. If your symptoms are worse after you eat a certain food, you may want to stop eating that food to see if your symptoms get better.   · Do not smoke or chew tobacco. Smoking can make GERD worse. If you need help quitting, talk to your doctor about stop-smoking programs and medicines. These can increase your chances of quitting for good. · If you have GERD symptoms at night, raise the head of your bed 6 to 8 inches by putting the frame on blocks or placing a foam wedge under the head of your mattress. (Adding extra pillows does not work.)  · Do not wear tight clothing around your middle. · Lose weight if you need to. Losing just 5 to 10 pounds can help. When should you call for help? Call your doctor now or seek immediate medical care if:    · You have new or different belly pain.     · Your stools are black and tarlike or have streaks of blood.    Watch closely for changes in your health, and be sure to contact your doctor if:    · Your symptoms have not improved after 2 days.     · Food seems to catch in your throat or chest.   Where can you learn more? Go to http://adelfo-paul.info/. Enter F097 in the search box to learn more about \"Gastroesophageal Reflux Disease (GERD): Care Instructions. \"  Current as of: March 28, 2018  Content Version: 11.8  © 9798-9110 [employer]. Care instructions adapted under license by [employer] (which disclaims liability or warranty for this information). If you have questions about a medical condition or this instruction, always ask your healthcare professional. [address] any warranty or liability for your use of this information. Low Sodium Diet (2,000 Milligram): Care Instructions  Your Care Instructions    Too much sodium causes your body to hold on to extra water. This can raise your blood pressure and force your heart and kidneys to work harder. In very serious cases, this could cause you to be put in the hospital. It might even be life-threatening. By limiting sodium, you will feel better and lower your risk of serious problems. The most common source of sodium is salt.  People get most of the salt in their diet from canned, prepared, and packaged foods. Fast food and restaurant meals also are very high in sodium. Your doctor will probably limit your sodium to less than 2,000 milligrams (mg) a day. This limit counts all the sodium in prepared and packaged foods and any salt you add to your food. Follow-up care is a key part of your treatment and safety. Be sure to make and go to all appointments, and call your doctor if you are having problems. It's also a good idea to know your test results and keep a list of the medicines you take. How can you care for yourself at home? Read food labels  · Read labels on cans and food packages. The labels tell you how much sodium is in each serving. Make sure that you look at the serving size. If you eat more than the serving size, you have eaten more sodium. · Food labels also tell you the Percent Daily Value for sodium. Choose products with low Percent Daily Values for sodium. · Be aware that sodium can come in forms other than salt, including monosodium glutamate (MSG), sodium citrate, and sodium bicarbonate (baking soda). MSG is often added to Asian food. When you eat out, you can sometimes ask for food without MSG or added salt. Buy low-sodium foods  · Buy foods that are labeled \"unsalted\" (no salt added), \"sodium-free\" (less than 5 mg of sodium per serving), or \"low-sodium\" (less than 140 mg of sodium per serving). Foods labeled \"reduced-sodium\" and \"light sodium\" may still have too much sodium. Be sure to read the label to see how much sodium you are getting. · Buy fresh vegetables, or frozen vegetables without added sauces. Buy low-sodium versions of canned vegetables, soups, and other canned goods. Prepare low-sodium meals  · Cut back on the amount of salt you use in cooking. This will help you adjust to the taste. Do not add salt after cooking. One teaspoon of salt has about 2,300 mg of sodium.   · Take the salt shaker off the table.  · Flavor your food with garlic, lemon juice, onion, vinegar, herbs, and spices. Do not use soy sauce, lite soy sauce, steak sauce, onion salt, garlic salt, celery salt, mustard, or ketchup on your food. · Use low-sodium salad dressings, sauces, and ketchup. Or make your own salad dressings and sauces without adding salt. · Use less salt (or none) when recipes call for it. You can often use half the salt a recipe calls for without losing flavor. Other foods such as rice, pasta, and grains do not need added salt. · Rinse canned vegetables, and cook them in fresh water. This removes some--but not all--of the salt. · Avoid water that is naturally high in sodium or that has been treated with water softeners, which add sodium. Call your local water company to find out the sodium content of your water supply. If you buy bottled water, read the label and choose a sodium-free brand. Avoid high-sodium foods  · Avoid eating:  ? Smoked, cured, salted, and canned meat, fish, and poultry. ? Ham, wolfe, hot dogs, and luncheon meats. ? Regular, hard, and processed cheese and regular peanut butter. ? Crackers with salted tops, and other salted snack foods such as pretzels, chips, and salted popcorn. ? Frozen prepared meals, unless labeled low-sodium. ? Canned and dried soups, broths, and bouillon, unless labeled sodium-free or low-sodium. ? Canned vegetables, unless labeled sodium-free or low-sodium. ? Western [employer] fries, pizza, tacos, and other fast foods. ? Pickles, olives, ketchup, and other condiments, especially soy sauce, unless labeled sodium-free or low-sodium. Where can you learn more? Go to http://adelfo-paul.info/. Enter J363 in the search box to learn more about \"Low Sodium Diet (2,000 Milligram): Care Instructions. \"  Current as of: March 29, 2018  Content Version: 11.8  © 3427-6270 [employer].  Care instructions adapted under license by [employer] disclaims liability or warranty for this information). If you have questions about a medical condition or this instruction, always ask your healthcare professional. [address] any warranty or liability for your use of this information.

## 2018-11-14 NOTE — PROGRESS NOTES
Best Fay is a 21 y.o. female and presents with Hypertension (pt feels she is experincng \"panic anxity\") and Swelling (Knees and ankles)  . Subjective:        PMH-Morbid obesity-pt has appt w bariatric clinic in January     Wt Readings from Last 3 Encounters:   11/13/18 327 lb (148.3 kg)   09/12/18 318 lb (144.2 kg)   08/27/18 320 lb (145.2 kg)        HTN-pt relays she has been taking her bp meds, but it causes abd discomfort     BP Readings from Last 3 Encounters:   11/13/18 (!) 153/92   09/12/18 (!) 146/102   08/27/18 (!) 135/101        Major depression/anxiety/PTSD-pt missed her appt w psych and had to reschedule, but she may be able to get an appt through her in home counselor to somewhere else sooner      Chronic headaches-dx'ed as rebound headaches by neurology     [place] LE Pain-chronic, debilitating as per pt   ?fibromyalgia     Leukocytosis-chronic as per chart review   -  Lab Results   Component Value Date/Time    WBC 12.5 (H) 09/12/2018 10:32 AM         Review of Systems  Constitutional: negative for fevers, chills, anorexia and weight loss  Eyes:   negative for visual disturbance and irritation  ENT:   negative for tinnitus,sore throat,nasal congestion,ear pains. hoarseness  Respiratory:  negative for cough, hemoptysis, dyspnea,wheezing  CV:   negative for chest pain, palpitations, lower extremity edema  GI:   positive for abdominal pain  Musculoskel: positive for myalgias  Neurological:  negative for headaches, dizziness, vertigo, memory problems and gait   Behavl/Psych: positive for feelings of anxiety, depression, mood changes    Past Medical History:   Diagnosis Date    Acute hepatitis 4/7/2014    Acute pharyngitis 1/22/2010    Acute sinusitis 1/12/2009    Anemia NEC     with pregnancy    Anxiety     Backache, unspecified 7/41/4472    Complication of anesthesia     difficulty waking after surgery (pt reported)    Depression     Depression 2/14/2018    Essential hypertension     Before pregnancy    Fatty liver     Flu 11/11/2009    GERD (gastroesophageal reflux disease) 7/31/2012    Gestational hypertension     HX OTHER MEDICAL     GERD with pregnancy    HX OTHER MEDICAL     Migraines worse with pregnancy    Hypertension     IGT (impaired glucose tolerance) 6/27/2012    Migraine 3/22/2011    Mononucleosis 1/12/2010    Morbid obesity ([address]     PTSD (post-traumatic stress disorder)      Past Surgical History:   Procedure Laterality Date    HX CHOLECYSTECTOMY      HX OTHER SURGICAL      gallbladder    HX TONSILLECTOMY      HX WISDOM TEETH EXTRACTION  4/4/2013    Dr. Ariana Clement     Social History     Socioeconomic History    Marital status: SINGLE     Spouse name: Not on file    Number of children: 1    Years of education: 9    Highest education level: Not on file   Social Needs    Financial resource strain: Not on file    Food insecurity - worry: Not on file    Food insecurity - inability: Not on file   [facility] needs - medical: Not on file   [employer] [facility] [employer] [facility] needs - non-medical: Not on file   Occupational History    Occupation: Does not work, \"pulled out of work due to high risk pregnancy\"     Employer: NOT EMPLOYED     Comment: grad from [employer]   Tobacco Use    Smoking status: Current Every Day Smoker     Packs/day: 0.50    Smokeless tobacco: Never Used   Substance and Sexual Activity    Alcohol use: No    Drug use: No    Sexual activity: Yes     Partners: Female     Birth control/protection: Inserts   Other Topics Concern    Not on file   Social History Narrative    Lives in [address] [place] [address] [address]   Problem Relation Age of Onset    Hypertension Mother     Alcohol abuse Father     Diabetes Maternal Grandmother     Headache Maternal Grandmother      Current Outpatient Medications   Medication Sig Dispense Refill    amLODIPine (NORVASC) 5 mg tablet Take 1 Tab by mouth daily. 30 Tab 5    pregabalin (LYRICA) 75 mg capsule Take 1 Cap by mouth two (2) times a day. Max Daily Amount: 150 mg. 60 Cap 2    NUVARING 0.12-0.015 mg/24 hr vaginal ring INSERT 1 RING VAGINALLY FOR 3 WEEKS THEN REMOVE FOR 1 WEEK  11    sertraline (ZOLOFT) 100 mg tablet Take 2 Tabs by mouth daily. 60 Tab 1     Allergies   Allergen Reactions    Latex Hives    Compazine [Prochlorperazine Edisylate] Anxiety    Acetaminophen Other (comments)     Pt has liver disease    Aspirin Other (comments)     Patient states \"I have liver disease\"    Celexa [Citalopram] Palpitations    Tramadol Anxiety    Wellbutrin [Bupropion Hcl] Palpitations       Objective:  Visit Vitals  BP (!) 153/92 (BP 1 Location: Left arm, BP Patient Position: Sitting)   Pulse 99   Temp 98.2 °F (36.8 °C) (Oral)   Resp 18   Ht 5' 10\" (1.778 m)   Wt 327 lb (148.3 kg)   LMP 11/02/2018 (Exact Date)   SpO2 94%   BMI 46.92 kg/m²     Physical Exam:   General appearance - alert, pleasant, obese young lady in NAD  Mental status - alert, oriented to person, place, and time  EYE-EOMI  Mouth - mucous membranes moist, pharynx normal without lesions  Neck - supple, no significant adenopathy   Chest - clear to auscultation, no wheezes, rales or rhonchi, symmetric air entry   Heart - normal rate, regular rhythm, normal S1, S2  Abdomen - soft, nontender, obese  Ext-peripheral pulses normal, no pedal edema, no clubbing or cyanosis  Skin-Warm and dry.  no hyperpigmentation, vitiligo, or suspicious lesions  Neuro -alert, oriented, normal speech, no focal findings or movement disorder noted        Results for orders placed or performed in visit on 09/12/18   CBC W/O DIFF   Result Value Ref Range    WBC 12.5 (H) 3.4 - 10.8 x10E3/uL    RBC 5.14 3.77 - 5.28 x10E6/uL    HGB 11.4 11.1 - 15.9 g/dL    HCT 37.3 34.0 - 46.6 %    MCV 73 (L) 79 - 97 fL    MCH 22.2 (L) 26.6 - 33.0 pg    MCHC 30.6 (L) 31.5 - 35.7 g/dL    RDW 16.7 (H) 12.3 - 15.4 %    PLATELET 456 379 - 392 x10E3/uL   LIPID PANEL   Result Value Ref Range    Cholesterol, total 213 (H) 100 - 199 mg/dL Triglyceride 151 (H) 0 - 149 mg/dL    HDL Cholesterol 40 >39 mg/dL    VLDL, calculated 30 5 - 40 mg/dL    LDL, calculated 143 (H) 0 - 99 mg/dL   METABOLIC PANEL, COMPREHENSIVE   Result Value Ref Range    Glucose 88 65 - 99 mg/dL    BUN 10 6 - 20 mg/dL    Creatinine 0.82 0.57 - 1.00 mg/dL    GFR est non- >59 mL/min/1.73    GFR est  >59 mL/min/1.73    BUN/Creatinine ratio 12 9 - 23    Sodium 138 134 - 144 mmol/L    Potassium 4.4 3.5 - 5.2 mmol/L    Chloride 102 96 - 106 mmol/L    CO2 22 20 - 29 mmol/L    Calcium 9.5 8.7 - 10.2 mg/dL    Protein, total 6.9 6.0 - 8.5 g/dL    Albumin 4.2 3.5 - 5.5 g/dL    GLOBULIN, TOTAL 2.7 1.5 - 4.5 g/dL    A-G Ratio 1.6 1.2 - 2.2    Bilirubin, total 0.2 0.0 - 1.2 mg/dL    Alk. phosphatase 82 39 - 117 IU/L    AST (SGOT) 12 0 - 40 IU/L    ALT (SGPT) 17 0 - 32 IU/L   FERRITIN   Result Value Ref Range    Ferritin 10 (L) 15 - 150 ng/mL   CVD REPORT   Result Value Ref Range    INTERPRETATION Note        Assessment/Plan:    ICD-10-CM ICD-9-CM    1. Essential hypertension I10 401.9 amLODIPine (NORVASC) 5 mg tablet   2. Fibromyalgia M79.7 729.1 pregabalin (LYRICA) 75 mg capsule   3. Obesity, morbid ([address] E66.01 278.01    4. Moderate episode of recurrent major depressive disorder (HCC) F33.1 296.32    5. Leukocytosis, unspecified type D72.829 288.60      Orders Placed This Encounter    amLODIPine (NORVASC) 5 mg tablet     Sig: Take 1 Tab by mouth daily. Dispense:  30 Tab     Refill:  5     TAKES THE PLACE OF CHLORTHALIDONE    pregabalin (LYRICA) 75 mg capsule     Sig: Take 1 Cap by mouth two (2) times a day. Max Daily Amount: 150 mg. Dispense:  60 Cap     Refill:  2   1. Essential hypertension  D/c chlorthalidone due to SE  - amLODIPine (NORVASC) 5 mg tablet; Take 1 Tab by mouth daily. Dispense: 30 Tab; Refill: 5    2. Fibromyalgia  Trial Lyrica  Check vit d level NV  - pregabalin (LYRICA) 75 mg capsule; Take 1 Cap by mouth two (2) times a day. Max Daily Amount: 150 mg. Dispense: 60 Cap; Refill: 2    3. Obesity, morbid ([facility] [address]  F/u bariatrics    4. Moderate episode of recurrent major depressive disorder ([facility] [address] [facility]  F/u psychiatry    5. Leukocytosis, unspecified type  Chronic, unchanged      Patient Instructions          Gastroesophageal Reflux Disease (GERD): Care Instructions  Your Care Instructions    Gastroesophageal reflux disease (GERD) is the backward flow of stomach acid into the esophagus. The esophagus is the tube that leads from your throat to your stomach. A one-way valve prevents the stomach acid from moving up into this tube. When you have GERD, this valve does not close tightly enough. If you have mild GERD symptoms including heartburn, you may be able to control the problem with antacids or over-the-counter medicine. Changing your diet, losing weight, and making other lifestyle changes can also help reduce symptoms. Follow-up care is a key part of your treatment and safety. Be sure to make and go to all appointments, and call your doctor if you are having problems. It's also a good idea to know your test results and keep a list of the medicines you take. How can you care for yourself at home? · Take your medicines exactly as prescribed. Call your doctor if you think you are having a problem with your medicine. · Your doctor may recommend over-the-counter medicine. For mild or occasional indigestion, antacids, such as Tums, Gaviscon, Mylanta, or Maalox, may help. Your doctor also may recommend over-the-counter acid reducers, such as Pepcid AC, Tagamet HB, Zantac 75, or Prilosec. Read and follow all instructions on the label. If you use these medicines often, talk with your doctor. · Change your eating habits. ? It's best to eat several small meals instead of two or three large meals. ? After you eat, wait 2 to 3 hours before you lie down. ? Chocolate, mint, and alcohol can make GERD worse. ?  Spicy foods, foods that have a lot of acid (like tomatoes and oranges), and coffee can make GERD symptoms worse in some people. If your symptoms are worse after you eat a certain food, you may want to stop eating that food to see if your symptoms get better. · Do not smoke or chew tobacco. Smoking can make GERD worse. If you need help quitting, talk to your doctor about stop-smoking programs and medicines. These can increase your chances of quitting for good. · If you have GERD symptoms at night, raise the head of your bed 6 to 8 inches by putting the frame on blocks or placing a foam wedge under the head of your mattress. (Adding extra pillows does not work.)  · Do not wear tight clothing around your middle. · Lose weight if you need to. Losing just 5 to 10 pounds can help. When should you call for help? Call your doctor now or seek immediate medical care if:    · You have new or different belly pain.     · Your stools are black and tarlike or have streaks of blood.    Watch closely for changes in your health, and be sure to contact your doctor if:    · Your symptoms have not improved after 2 days.     · Food seems to catch in your throat or chest.   Where can you learn more? Go to http://adelfo-paul.info/. Enter X053 in the search box to learn more about \"Gastroesophageal Reflux Disease (GERD): Care Instructions. \"  Current as of: March 28, 2018  Content Version: 11.8  © 8508-2530 [employer]. Care instructions adapted under license by [employer] (which disclaims liability or warranty for this information). If you have questions about a medical condition or this instruction, always ask your healthcare professional. [place] [address] any warranty or liability for your use of this information. Low Sodium Diet (2,000 Milligram): Care Instructions  Your Care Instructions    Too much sodium causes your body to hold on to extra water.  This can raise your blood pressure and force your heart and kidneys to work harder. In very serious cases, this could cause you to be put in the hospital. It might even be life-threatening. By limiting sodium, you will feel better and lower your risk of serious problems. The most common source of sodium is salt. People get most of the salt in their diet from canned, prepared, and packaged foods. Fast food and restaurant meals also are very high in sodium. Your doctor will probably limit your sodium to less than 2,000 milligrams (mg) a day. This limit counts all the sodium in prepared and packaged foods and any salt you add to your food. Follow-up care is a key part of your treatment and safety. Be sure to make and go to all appointments, and call your doctor if you are having problems. It's also a good idea to know your test results and keep a list of the medicines you take. How can you care for yourself at home? Read food labels  · Read labels on cans and food packages. The labels tell you how much sodium is in each serving. Make sure that you look at the serving size. If you eat more than the serving size, you have eaten more sodium. · Food labels also tell you the Percent Daily Value for sodium. Choose products with low Percent Daily Values for sodium. · Be aware that sodium can come in forms other than salt, including monosodium glutamate (MSG), sodium citrate, and sodium bicarbonate (baking soda). MSG is often added to Asian food. When you eat out, you can sometimes ask for food without MSG or added salt. Buy low-sodium foods  · Buy foods that are labeled \"unsalted\" (no salt added), \"sodium-free\" (less than 5 mg of sodium per serving), or \"low-sodium\" (less than 140 mg of sodium per serving). Foods labeled \"reduced-sodium\" and \"light sodium\" may still have too much sodium. Be sure to read the label to see how much sodium you are getting. · Buy fresh vegetables, or frozen vegetables without added sauces.  Buy low-sodium versions of canned vegetables, soups, and other canned goods.  Prepare low-sodium meals  · Cut back on the amount of salt you use in cooking. This will help you adjust to the taste. Do not add salt after cooking. One teaspoon of salt has about 2,300 mg of sodium. · Take the salt shaker off the table. · Flavor your food with garlic, lemon juice, onion, vinegar, herbs, and spices. Do not use soy sauce, lite soy sauce, steak sauce, onion salt, garlic salt, celery salt, mustard, or ketchup on your food. · Use low-sodium salad dressings, sauces, and ketchup. Or make your own salad dressings and sauces without adding salt. · Use less salt (or none) when recipes call for it. You can often use half the salt a recipe calls for without losing flavor. Other foods such as rice, pasta, and grains do not need added salt. · Rinse canned vegetables, and cook them in fresh water. This removes some--but not all--of the salt. · Avoid water that is naturally high in sodium or that has been treated with water softeners, which add sodium. Call your local water company to find out the sodium content of your water supply. If you buy bottled water, read the label and choose a sodium-free brand. Avoid high-sodium foods  · Avoid eating:  ? Smoked, cured, salted, and canned meat, fish, and poultry. ? Ham, wolfe, hot dogs, and luncheon meats. ? Regular, hard, and processed cheese and regular peanut butter. ? Crackers with salted tops, and other salted snack foods such as pretzels, chips, and salted popcorn. ? Frozen prepared meals, unless labeled low-sodium. ? Canned and dried soups, broths, and bouillon, unless labeled sodium-free or low-sodium. ? Canned vegetables, unless labeled sodium-free or low-sodium. ? Western Shahnaz fries, pizza, tacos, and other fast foods. ? Pickles, olives, ketchup, and other condiments, especially soy sauce, unless labeled sodium-free or low-sodium. Where can you learn more? Go to http://adelfo-paul.info/.   Enter D066 in the search box to learn more about \"Low Sodium Diet (2,000 Milligram): Care Instructions. \"  Current as of: March 29, 2018  Content Version: 11.8  © 8452-9259 [employer]. Care instructions adapted under license by [employer] (which disclaims liability or warranty for this information). If you have questions about a medical condition or this instruction, always ask your healthcare professional. [place] [address] any warranty or liability for your use of this information. Follow-up Disposition:  Return in about 6 weeks (around 12/25/2018) for bp check on new med. I have reviewed with the patient details of the assessment and plan and all questions were answered. Relevent patient education was performed. The most recent lab findings were reviewed with the patient. An After Visit Summary was printed and given to the patient.

## 2018-11-21 RX ORDER — DULOXETIN HYDROCHLORIDE 30 MG/1
30 CAPSULE, DELAYED RELEASE ORAL DAILY
Qty: 30 CAP | Refills: 0 | Status: SHIPPED | OUTPATIENT
Start: 2018-11-21 | End: 2019-07-19

## 2018-11-26 ENCOUNTER — DOCUMENTATION ONLY (OUTPATIENT)
Dept: INTERNAL MEDICINE CLINIC | Age: 23
End: 2018-11-26

## 2018-11-26 NOTE — PROGRESS NOTES
Called and informed  pharmacy of [facility] [place] denial for Lyrica 75 mg. Provider informed. Advised pharmacy that medication has been changed to duloxetine by provider.

## 2018-12-26 ENCOUNTER — OFFICE VISIT (OUTPATIENT)
Dept: INTERNAL MEDICINE CLINIC | Age: 23
End: 2018-12-26

## 2018-12-26 VITALS
RESPIRATION RATE: 16 BRPM | HEART RATE: 83 BPM | HEIGHT: 70 IN | SYSTOLIC BLOOD PRESSURE: 131 MMHG | DIASTOLIC BLOOD PRESSURE: 100 MMHG | OXYGEN SATURATION: 97 % | TEMPERATURE: 98 F | WEIGHT: 293 LBS | BODY MASS INDEX: 41.95 KG/M2

## 2018-12-26 DIAGNOSIS — R30.0 DYSURIA: ICD-10-CM

## 2018-12-26 DIAGNOSIS — I10 ESSENTIAL HYPERTENSION: Primary | ICD-10-CM

## 2018-12-26 DIAGNOSIS — G62.9 NEUROPATHY: ICD-10-CM

## 2018-12-26 DIAGNOSIS — E66.01 OBESITY, MORBID (HCC): ICD-10-CM

## 2018-12-26 PROBLEM — Z34.90 PREGNANCY: Status: RESOLVED | Noted: 2017-11-21 | Resolved: 2018-12-26

## 2018-12-26 RX ORDER — TRAZODONE HYDROCHLORIDE 50 MG/1
TABLET ORAL
Refills: 3 | COMMUNITY
Start: 2018-12-08 | End: 2019-12-19

## 2018-12-26 RX ORDER — BUTALBITAL, ACETAMINOPHEN AND CAFFEINE 300; 40; 50 MG/1; MG/1; MG/1
CAPSULE ORAL
Refills: 3 | COMMUNITY
Start: 2018-12-02 | End: 2019-12-19

## 2018-12-26 RX ORDER — AMLODIPINE BESYLATE 10 MG/1
10 TABLET ORAL DAILY
Qty: 60 TAB | Refills: 3 | Status: SHIPPED | OUTPATIENT
Start: 2018-12-26 | End: 2019-10-24

## 2018-12-26 RX ORDER — ESCITALOPRAM OXALATE 10 MG/1
100 TABLET ORAL DAILY
Refills: 3 | COMMUNITY
Start: 2018-12-08 | End: 2019-12-19

## 2018-12-26 NOTE — PROGRESS NOTES
Best Fay is a 21 y.o. female and presents with Medication Evaluation (Cymbalta) and Blood Pressure Check  . Subjective:    Pt w c/o few days rt sided flank pain after urinating. No dysuria/hematuria/fever/n/v    PMH-Morbid obesity-pt has appt w bariatric clinic in January     Wt Readings from Last 3 Encounters:   12/26/18 330 lb (149.7 kg)   11/13/18 327 lb (148.3 kg)   09/12/18 318 lb (144.2 kg)        HTN-pt relays she has been taking her bp med (amlodipine), w/o SE     BP Readings from Last 3 Encounters:   12/26/18 (!) 131/100   11/13/18 (!) 153/92   09/12/18 (!) 146/102        Major depression/anxiety/PTSD-pt missed her appt w psych and had to reschedule, but she may be able to get an appt through her in home counselor to somewhere else sooner      Chronic headaches-dx'ed as rebound headaches by neurology     [place] LE Pain-chronic, debilitating as per pt   -?neuropathy   -insurance will NOT cover lyrica/cymbalta did NOT help, will d/c     Leukocytosis-chronic as per chart review   -  Lab Results   Component Value Date/Time    WBC 12.5 (H) 09/12/2018 10:32 AM         Review of Systems  Constitutional: negative for fevers, chills, anorexia and weight loss  Eyes:   negative for visual disturbance and irritation  ENT:   negative for tinnitus,sore throat,nasal congestion,ear pains. hoarseness  Respiratory:  negative for cough, hemoptysis, dyspnea,wheezing  CV:   negative for chest pain, palpitations, lower extremity edema  GI:   positive for abdominal pain  Musculoskel: positive for myalgias  Neurological:  negative for headaches, dizziness, vertigo, memory problems and gait   Behavl/Psych: positive for feelings of anxiety, depression, mood changes    Past Medical History:   Diagnosis Date    Acute hepatitis 4/7/2014    Acute pharyngitis 1/22/2010    Acute sinusitis 1/12/2009    Anemia NEC     with pregnancy    Anxiety     Backache, unspecified 9/48/7028    Complication of anesthesia     difficulty waking after surgery (pt reported)    Depression     Depression 2/14/2018    Essential hypertension     Before pregnancy    Fatty liver     Flu 11/11/2009    GERD (gastroesophageal reflux disease) 7/31/2012    Gestational hypertension     HX OTHER MEDICAL     GERD with pregnancy    HX OTHER MEDICAL     Migraines worse with pregnancy    Hypertension     IGT (impaired glucose tolerance) 6/27/2012    Migraine 3/22/2011    Mononucleosis 1/12/2010    Morbid obesity ([address]     PTSD (post-traumatic stress disorder)      Past Surgical History:   Procedure Laterality Date    HX CHOLECYSTECTOMY      HX OTHER SURGICAL      gallbladder    HX TONSILLECTOMY      HX WISDOM TEETH EXTRACTION  4/4/2013    Dr. Quentin Elmore     Social History     Socioeconomic History    Marital status: SINGLE     Spouse name: Not on file    Number of children: 1    Years of education: 9    Highest education level: Not on file   Occupational History    Occupation: Does not work, \"pulled out of work due to high risk pregnancy\"     Employer: NOT EMPLOYED     Comment: grad from [employer]   Tobacco Use    Smoking status: Current Every Day Smoker     Packs/day: 0.50    Smokeless tobacco: Never Used   Substance and Sexual Activity    Alcohol use: No    Drug use: No    Sexual activity: Yes     Partners: Female     Birth control/protection: Inserts   Social History Narrative    Lives in [place]      Family History   Problem Relation Age of Onset    Hypertension Mother     Alcohol abuse Father     Diabetes Maternal Grandmother     Headache Maternal Grandmother      Current Outpatient Medications   Medication Sig Dispense Refill    butalbital-acetaminophen-caff (FIORICET) -40 mg per capsule TK 1 C PO Q 6 TO 8 H UTD PRF HA  3    traZODone (DESYREL) 50 mg tablet TK 1 T PO QHS  3    escitalopram oxalate (LEXAPRO) 10 mg tablet Take 100 mg by mouth daily. 3    amLODIPine (NORVASC) 10 mg tablet Take 1 Tab by mouth daily.  60 Tab 3    DULoxetine (CYMBALTA) 30 mg capsule Take 1 Cap by mouth daily. 30 Cap 0    NUVARING 0.12-0.015 mg/24 hr vaginal ring INSERT 1 RING VAGINALLY FOR 3 WEEKS THEN REMOVE FOR 1 WEEK  11     Allergies   Allergen Reactions    Latex Hives    Compazine [Prochlorperazine Edisylate] Anxiety    Acetaminophen Other (comments)     Pt has liver disease    Aspirin Other (comments)     Patient states \"I have liver disease\"    Celexa [Citalopram] Palpitations    Tramadol Anxiety    Wellbutrin [Bupropion Hcl] Palpitations       Objective:  Visit Vitals  BP (!) 131/100 (BP 1 Location: Left arm, BP Patient Position: Sitting)   Pulse 83   Temp 98 °F (36.7 °C) (Oral)   Resp 16   Ht 5' 10\" (1.778 m)   Wt 330 lb (149.7 kg)   LMP 12/25/2018 (Exact Date)   SpO2 97%   Breastfeeding? No   BMI 47.35 kg/m²     Physical Exam:   General appearance - alert, pleasant, obese young lady in NAD  Mental status - alert, oriented to person, place, and time  EYE-EOMI  Mouth - mucous membranes moist, pharynx normal without lesions  Neck - supple, no significant adenopathy   Chest - clear to auscultation, no wheezes, rales or rhonchi, symmetric air entry   Heart - normal rate, regular rhythm, normal S1, S2  Abdomen - soft, nontender, obese  Ext-peripheral pulses normal, no pedal edema, no clubbing or cyanosis  Skin-Warm and dry.  no hyperpigmentation, vitiligo, or suspicious lesions  Neuro -alert, oriented, normal speech, no focal findings or movement disorder noted        Results for orders placed or performed in visit on 09/12/18   CBC W/O DIFF   Result Value Ref Range    WBC 12.5 (H) 3.4 - 10.8 x10E3/uL    RBC 5.14 3.77 - 5.28 x10E6/uL    HGB 11.4 11.1 - 15.9 g/dL    HCT 37.3 34.0 - 46.6 %    MCV 73 (L) 79 - 97 fL    MCH 22.2 (L) 26.6 - 33.0 pg    MCHC 30.6 (L) 31.5 - 35.7 g/dL    RDW 16.7 (H) 12.3 - 15.4 %    PLATELET 762 508 - 265 x10E3/uL   LIPID PANEL   Result Value Ref Range    Cholesterol, total 213 (H) 100 - 199 mg/dL    Triglyceride 151 (H) 0 - 149 mg/dL    HDL Cholesterol 40 >39 mg/dL    VLDL, calculated 30 5 - 40 mg/dL    LDL, calculated 143 (H) 0 - 99 mg/dL   METABOLIC PANEL, COMPREHENSIVE   Result Value Ref Range    Glucose 88 65 - 99 mg/dL    BUN 10 6 - 20 mg/dL    Creatinine 0.82 0.57 - 1.00 mg/dL    GFR est non- >59 mL/min/1.73    GFR est  >59 mL/min/1.73    BUN/Creatinine ratio 12 9 - 23    Sodium 138 134 - 144 mmol/L    Potassium 4.4 3.5 - 5.2 mmol/L    Chloride 102 96 - 106 mmol/L    CO2 22 20 - 29 mmol/L    Calcium 9.5 8.7 - 10.2 mg/dL    Protein, total 6.9 6.0 - 8.5 g/dL    Albumin 4.2 3.5 - 5.5 g/dL    GLOBULIN, TOTAL 2.7 1.5 - 4.5 g/dL    A-G Ratio 1.6 1.2 - 2.2    Bilirubin, total 0.2 0.0 - 1.2 mg/dL    Alk. phosphatase 82 39 - 117 IU/L    AST (SGOT) 12 0 - 40 IU/L    ALT (SGPT) 17 0 - 32 IU/L   FERRITIN   Result Value Ref Range    Ferritin 10 (L) 15 - 150 ng/mL   CVD REPORT   Result Value Ref Range    INTERPRETATION Note        Assessment/Plan:    ICD-10-CM ICD-9-CM    1. Essential hypertension I10 401.9 amLODIPine (NORVASC) 10 mg tablet   2. Neuropathy G62.9 355.9 EMG TWO EXTREMITIES LOWER   3. Dysuria R30.0 788. 1 URINALYSIS W/ RFLX MICROSCOPIC      CULTURE, URINE   4. Obesity, morbid ([facility] [address] E66.01 278.01      Orders Placed This Encounter    CULTURE, URINE    URINALYSIS W/ RFLX MICROSCOPIC    EMG TWO EXTREMITIES LOWER     Standing Status:   Future     Standing Expiration Date:   6/26/2019     Order Specific Question:   Reason for Exam:     Answer:   neuropathy    butalbital-acetaminophen-caff (FIORICET) -40 mg per capsule     Sig: TK 1 C PO Q 6 TO 8 H UTD PRF HA     Refill:  3    traZODone (DESYREL) 50 mg tablet     Sig: TK 1 T PO QHS     Refill:  3    escitalopram oxalate (LEXAPRO) 10 mg tablet     Sig: Take 100 mg by mouth daily. Refill:  3    amLODIPine (NORVASC) 10 mg tablet     Sig: Take 1 Tab by mouth daily. Dispense:  60 Tab     Refill:  3     HIGHER DOSE   1.  Essential hypertension  Increase amlodipine  D/w pt low salt diet  - amLODIPine (NORVASC) 10 mg tablet; Take 1 Tab by mouth daily. Dispense: 60 Tab; Refill: 3    2. Neuropathy    - EMG TWO EXTREMITIES LOWER; Future    3. Dysuria    - URINALYSIS W/ RFLX MICROSCOPIC  - CULTURE, URINE    4. Obesity, morbid ([facility] [address]          Patient Instructions        Low Sodium Diet (2,000 Milligram): Care Instructions  Your Care Instructions    Too much sodium causes your body to hold on to extra water. This can raise your blood pressure and force your heart and kidneys to work harder. In very serious cases, this could cause you to be put in the hospital. It might even be life-threatening. By limiting sodium, you will feel better and lower your risk of serious problems. The most common source of sodium is salt. People get most of the salt in their diet from canned, prepared, and packaged foods. Fast food and restaurant meals also are very high in sodium. Your doctor will probably limit your sodium to less than 2,000 milligrams (mg) a day. This limit counts all the sodium in prepared and packaged foods and any salt you add to your food. Follow-up care is a key part of your treatment and safety. Be sure to make and go to all appointments, and call your doctor if you are having problems. It's also a good idea to know your test results and keep a list of the medicines you take. How can you care for yourself at home? Read food labels  · Read labels on cans and food packages. The labels tell you how much sodium is in each serving. Make sure that you look at the serving size. If you eat more than the serving size, you have eaten more sodium. · Food labels also tell you the Percent Daily Value for sodium. Choose products with low Percent Daily Values for sodium. · Be aware that sodium can come in forms other than salt, including monosodium glutamate (MSG), sodium citrate, and sodium bicarbonate (baking soda). MSG is often added to Asian food.  When you eat out, you can sometimes ask for food without MSG or added salt. Buy low-sodium foods  · Buy foods that are labeled \"unsalted\" (no salt added), \"sodium-free\" (less than 5 mg of sodium per serving), or \"low-sodium\" (less than 140 mg of sodium per serving). Foods labeled \"reduced-sodium\" and \"light sodium\" may still have too much sodium. Be sure to read the label to see how much sodium you are getting. · Buy fresh vegetables, or frozen vegetables without added sauces. Buy low-sodium versions of canned vegetables, soups, and other canned goods. Prepare low-sodium meals  · Cut back on the amount of salt you use in cooking. This will help you adjust to the taste. Do not add salt after cooking. One teaspoon of salt has about 2,300 mg of sodium. · Take the salt shaker off the table. · Flavor your food with garlic, lemon juice, onion, vinegar, herbs, and spices. Do not use soy sauce, lite soy sauce, steak sauce, onion salt, garlic salt, celery salt, mustard, or ketchup on your food. · Use low-sodium salad dressings, sauces, and ketchup. Or make your own salad dressings and sauces without adding salt. · Use less salt (or none) when recipes call for it. You can often use half the salt a recipe calls for without losing flavor. Other foods such as rice, pasta, and grains do not need added salt. · Rinse canned vegetables, and cook them in fresh water. This removes some--but not all--of the salt. · Avoid water that is naturally high in sodium or that has been treated with water softeners, which add sodium. Call your local water company to find out the sodium content of your water supply. If you buy bottled water, read the label and choose a sodium-free brand. Avoid high-sodium foods  · Avoid eating:  ? Smoked, cured, salted, and canned meat, fish, and poultry. ? Ham, wolfe, hot dogs, and luncheon meats. ? Regular, hard, and processed cheese and regular peanut butter.   ? Crackers with salted tops, and other salted snack foods such as pretzels, chips, and salted popcorn. ? Frozen prepared meals, unless labeled low-sodium. ? Canned and dried soups, broths, and bouillon, unless labeled sodium-free or low-sodium. ? Canned vegetables, unless labeled sodium-free or low-sodium. ? Western Shahnaz fries, pizza, tacos, and other fast foods. ? Pickles, olives, ketchup, and other condiments, especially soy sauce, unless labeled sodium-free or low-sodium. Where can you learn more? Go to http://adelfoWiseBanyanpaul.info/. Enter W124 in the search box to learn more about \"Low Sodium Diet (2,000 Milligram): Care Instructions. \"  Current as of: March 29, 2018  Content Version: 11.8  © 4582-4542 [employer]. Care instructions adapted under license by [employer] (which disclaims liability or warranty for this information). If you have questions about a medical condition or this instruction, always ask your healthcare professional. [place] [address] any warranty or liability for your use of this information. Follow-up Disposition:  Return in about 6 weeks (around 2/6/2019) for bp check. I have reviewed with the patient details of the assessment and plan and all questions were answered. Relevent patient education was performed. The most recent lab findings were reviewed with the patient. An After Visit Summary was printed and given to the patient.

## 2018-12-26 NOTE — PROGRESS NOTES
Identified pt with two pt identifiers(name and ). Reviewed record in preparation for visit and have obtained necessary documentation. Chief Complaint   Patient presents with    Medication Evaluation     Cymbalta    Blood Pressure Check        Health Maintenance Due   Topic    Pneumococcal 19-64 Medium Risk (1 of 1 - PPSV23)       Coordination of Care Questionnaire:  :   1) Have you been to an emergency room, urgent care, or hospitalized since your last visit?   no   If yes, where when, and reason for visit? 2. Have seen or consulted any other health care provider since your last visit? NO  If yes, where when, and reason for visit? 3) Do you have an Advanced Directive/ Living Will in place? NO  If yes, do we have a copy on file NO  If no, would you like information NO    Patient is accompanied by daughter I have received verbal consent from Maria M Britt to discuss any/all medical information while they are present in the room. Visit Vitals  BP (!) 131/100 (BP 1 Location: Left arm, BP Patient Position: Sitting)   Pulse 83   Temp 98 °F (36.7 °C) (Oral)   Resp 16   Ht 5' 10\" (1.778 m)   Wt 330 lb (149.7 kg)   SpO2 97%   Breastfeeding?  No   BMI 47.35 kg/m²     Cardwell Grandchild, LPN

## 2018-12-26 NOTE — PATIENT INSTRUCTIONS
Low Sodium Diet (2,000 Milligram): Care Instructions  Your Care Instructions    Too much sodium causes your body to hold on to extra water. This can raise your blood pressure and force your heart and kidneys to work harder. In very serious cases, this could cause you to be put in the hospital. It might even be life-threatening. By limiting sodium, you will feel better and lower your risk of serious problems. The most common source of sodium is salt. People get most of the salt in their diet from canned, prepared, and packaged foods. Fast food and restaurant meals also are very high in sodium. Your doctor will probably limit your sodium to less than 2,000 milligrams (mg) a day. This limit counts all the sodium in prepared and packaged foods and any salt you add to your food. Follow-up care is a key part of your treatment and safety. Be sure to make and go to all appointments, and call your doctor if you are having problems. It's also a good idea to know your test results and keep a list of the medicines you take. How can you care for yourself at home? Read food labels  · Read labels on cans and food packages. The labels tell you how much sodium is in each serving. Make sure that you look at the serving size. If you eat more than the serving size, you have eaten more sodium. · Food labels also tell you the Percent Daily Value for sodium. Choose products with low Percent Daily Values for sodium. · Be aware that sodium can come in forms other than salt, including monosodium glutamate (MSG), sodium citrate, and sodium bicarbonate (baking soda). MSG is often added to Asian food. When you eat out, you can sometimes ask for food without MSG or added salt. Buy low-sodium foods  · Buy foods that are labeled \"unsalted\" (no salt added), \"sodium-free\" (less than 5 mg of sodium per serving), or \"low-sodium\" (less than 140 mg of sodium per serving).  Foods labeled \"reduced-sodium\" and \"light sodium\" may still have too much sodium. Be sure to read the label to see how much sodium you are getting. · Buy fresh vegetables, or frozen vegetables without added sauces. Buy low-sodium versions of canned vegetables, soups, and other canned goods. Prepare low-sodium meals  · Cut back on the amount of salt you use in cooking. This will help you adjust to the taste. Do not add salt after cooking. One teaspoon of salt has about 2,300 mg of sodium. · Take the salt shaker off the table. · Flavor your food with garlic, lemon juice, onion, vinegar, herbs, and spices. Do not use soy sauce, lite soy sauce, steak sauce, onion salt, garlic salt, celery salt, mustard, or ketchup on your food. · Use low-sodium salad dressings, sauces, and ketchup. Or make your own salad dressings and sauces without adding salt. · Use less salt (or none) when recipes call for it. You can often use half the salt a recipe calls for without losing flavor. Other foods such as rice, pasta, and grains do not need added salt. · Rinse canned vegetables, and cook them in fresh water. This removes some--but not all--of the salt. · Avoid water that is naturally high in sodium or that has been treated with water softeners, which add sodium. Call your local water company to find out the sodium content of your water supply. If you buy bottled water, read the label and choose a sodium-free brand. Avoid high-sodium foods  · Avoid eating:  ? Smoked, cured, salted, and canned meat, fish, and poultry. ? Ham, wolfe, hot dogs, and luncheon meats. ? Regular, hard, and processed cheese and regular peanut butter. ? Crackers with salted tops, and other salted snack foods such as pretzels, chips, and salted popcorn. ? Frozen prepared meals, unless labeled low-sodium. ? Canned and dried soups, broths, and bouillon, unless labeled sodium-free or low-sodium. ? Canned vegetables, unless labeled sodium-free or low-sodium. ? Western Shahnaz fries, pizza, tacos, and other fast foods.   ? [place], olives, ketchup, and other condiments, especially soy sauce, unless labeled sodium-free or low-sodium. Where can you learn more? Go to http://adelfo-paul.info/. Enter I290 in the search box to learn more about \"Low Sodium Diet (2,000 Milligram): Care Instructions. \"  Current as of: March 29, 2018  Content Version: 11.8  © 7104-6756 [employer]. Care instructions adapted under license by [employer] (which disclaims liability or warranty for this information). If you have questions about a medical condition or this instruction, always ask your healthcare professional. [address] any warranty or liability for your use of this information.

## 2018-12-27 LAB
APPEARANCE UR: CLEAR
BACTERIA #/AREA URNS HPF: ABNORMAL /[HPF]
BILIRUB UR QL STRIP: NEGATIVE
CASTS URNS QL MICRO: ABNORMAL /LPF
COLOR UR: YELLOW
EPI CELLS #/AREA URNS HPF: ABNORMAL /HPF
GLUCOSE UR QL: NEGATIVE
HGB UR QL STRIP: ABNORMAL
KETONES UR QL STRIP: NEGATIVE
LEUKOCYTE ESTERASE UR QL STRIP: NEGATIVE
MICRO URNS: ABNORMAL
MUCOUS THREADS URNS QL MICRO: PRESENT
NITRITE UR QL STRIP: POSITIVE
PH UR STRIP: 5.5 [PH] (ref 5–7.5)
PROT UR QL STRIP: NEGATIVE
RBC #/AREA URNS HPF: ABNORMAL /HPF
SP GR UR: 1.03 (ref 1–1.03)
UROBILINOGEN UR STRIP-MCNC: 1 MG/DL (ref 0.2–1)
WBC #/AREA URNS HPF: ABNORMAL /HPF

## 2018-12-28 LAB — BACTERIA UR CULT: NORMAL

## 2019-01-23 ENCOUNTER — HOSPITAL ENCOUNTER (EMERGENCY)
Age: 24
Discharge: HOME | End: 2019-01-23
Attending: EMERGENCY MEDICINE
Payer: MEDICAID

## 2019-01-23 VITALS
RESPIRATION RATE: 16 BRPM | SYSTOLIC BLOOD PRESSURE: 135 MMHG | DIASTOLIC BLOOD PRESSURE: 87 MMHG | BODY MASS INDEX: 43.4 KG/M2 | HEART RATE: 91 BPM | OXYGEN SATURATION: 98 % | WEIGHT: 293 LBS | TEMPERATURE: 98.6 F | HEIGHT: 69 IN

## 2019-01-23 DIAGNOSIS — B96.89 BV (BACTERIAL VAGINOSIS): ICD-10-CM

## 2019-01-23 DIAGNOSIS — K52.9 GASTROENTERITIS: Primary | ICD-10-CM

## 2019-01-23 DIAGNOSIS — L73.9 FOLLICULITIS: ICD-10-CM

## 2019-01-23 DIAGNOSIS — N30.00 ACUTE CYSTITIS WITHOUT HEMATURIA: ICD-10-CM

## 2019-01-23 DIAGNOSIS — N76.0 BV (BACTERIAL VAGINOSIS): ICD-10-CM

## 2019-01-23 LAB
ALBUMIN SERPL-MCNC: 3.3 G/DL (ref 3.5–5)
ALBUMIN/GLOB SERPL: 0.8 {RATIO} (ref 1.1–2.2)
ALP SERPL-CCNC: 93 U/L (ref 45–117)
ALT SERPL-CCNC: 57 U/L (ref 12–78)
ANION GAP SERPL CALC-SCNC: 9 MMOL/L (ref 5–15)
APPEARANCE UR: ABNORMAL
AST SERPL-CCNC: 32 U/L (ref 15–37)
BACTERIA URNS QL MICRO: ABNORMAL /HPF
BASOPHILS # BLD: 0 K/UL (ref 0–0.1)
BASOPHILS NFR BLD: 0 % (ref 0–1)
BILIRUB SERPL-MCNC: 0.7 MG/DL (ref 0.2–1)
BILIRUB UR QL: NEGATIVE
BUN SERPL-MCNC: 12 MG/DL (ref 6–20)
BUN/CREAT SERPL: 14 (ref 12–20)
CALCIUM SERPL-MCNC: 9 MG/DL (ref 8.5–10.1)
CHLORIDE SERPL-SCNC: 103 MMOL/L (ref 97–108)
CLUE CELLS VAG QL WET PREP: NORMAL
CO2 SERPL-SCNC: 27 MMOL/L (ref 21–32)
COLOR UR: ABNORMAL
CREAT SERPL-MCNC: 0.88 MG/DL (ref 0.55–1.02)
DIFFERENTIAL METHOD BLD: ABNORMAL
EOSINOPHIL # BLD: 0.2 K/UL (ref 0–0.4)
EOSINOPHIL NFR BLD: 2 % (ref 0–7)
EPITH CASTS URNS QL MICRO: ABNORMAL /LPF
ERYTHROCYTE [DISTWIDTH] IN BLOOD BY AUTOMATED COUNT: 14.8 % (ref 11.5–14.5)
GLOBULIN SER CALC-MCNC: 4 G/DL (ref 2–4)
GLUCOSE SERPL-MCNC: 86 MG/DL (ref 65–100)
GLUCOSE UR STRIP.AUTO-MCNC: NEGATIVE MG/DL
HCG UR QL: NEGATIVE
HCT VFR BLD AUTO: 37.1 % (ref 35–47)
HGB BLD-MCNC: 11.6 G/DL (ref 11.5–16)
HGB UR QL STRIP: NEGATIVE
IMM GRANULOCYTES # BLD AUTO: 0 K/UL (ref 0–0.04)
IMM GRANULOCYTES NFR BLD AUTO: 0 % (ref 0–0.5)
KETONES UR QL STRIP.AUTO: NEGATIVE MG/DL
KOH PREP SPEC: NORMAL
LEUKOCYTE ESTERASE UR QL STRIP.AUTO: ABNORMAL
LIPASE SERPL-CCNC: 84 U/L (ref 73–393)
LYMPHOCYTES # BLD: 2.5 K/UL (ref 0.8–3.5)
LYMPHOCYTES NFR BLD: 24 % (ref 12–49)
MCH RBC QN AUTO: 23.7 PG (ref 26–34)
MCHC RBC AUTO-ENTMCNC: 31.3 G/DL (ref 30–36.5)
MCV RBC AUTO: 75.9 FL (ref 80–99)
MONOCYTES # BLD: 0.6 K/UL (ref 0–1)
MONOCYTES NFR BLD: 6 % (ref 5–13)
NEUTS SEG # BLD: 7.1 K/UL (ref 1.8–8)
NEUTS SEG NFR BLD: 67 % (ref 32–75)
NITRITE UR QL STRIP.AUTO: NEGATIVE
NRBC # BLD: 0 K/UL (ref 0–0.01)
NRBC BLD-RTO: 0 PER 100 WBC
PH UR STRIP: 6 [PH] (ref 5–8)
PLATELET # BLD AUTO: 293 K/UL (ref 150–400)
PMV BLD AUTO: 10.8 FL (ref 8.9–12.9)
POTASSIUM SERPL-SCNC: 3.9 MMOL/L (ref 3.5–5.1)
PROT SERPL-MCNC: 7.3 G/DL (ref 6.4–8.2)
PROT UR STRIP-MCNC: ABNORMAL MG/DL
RBC # BLD AUTO: 4.89 M/UL (ref 3.8–5.2)
RBC #/AREA URNS HPF: ABNORMAL /HPF (ref 0–5)
SERVICE CMNT-IMP: NORMAL
SODIUM SERPL-SCNC: 139 MMOL/L (ref 136–145)
SP GR UR REFRACTOMETRY: 1.02 (ref 1–1.03)
T VAGINALIS VAG QL WET PREP: NORMAL
UA: UC IF INDICATED,UAUC: ABNORMAL
UROBILINOGEN UR QL STRIP.AUTO: 0.2 EU/DL (ref 0.2–1)
WBC # BLD AUTO: 10.5 K/UL (ref 3.6–11)
WBC URNS QL MICRO: ABNORMAL /HPF (ref 0–4)

## 2019-01-23 PROCEDURE — 85025 COMPLETE CBC W/AUTO DIFF WBC: CPT

## 2019-01-23 PROCEDURE — 87210 SMEAR WET MOUNT SALINE/INK: CPT

## 2019-01-23 PROCEDURE — 87491 CHLMYD TRACH DNA AMP PROBE: CPT

## 2019-01-23 PROCEDURE — 99284 EMERGENCY DEPT VISIT MOD MDM: CPT

## 2019-01-23 PROCEDURE — 96374 THER/PROPH/DIAG INJ IV PUSH: CPT

## 2019-01-23 PROCEDURE — 87086 URINE CULTURE/COLONY COUNT: CPT

## 2019-01-23 PROCEDURE — 74011250637 HC RX REV CODE- 250/637: Performed by: PHYSICIAN ASSISTANT

## 2019-01-23 PROCEDURE — 81001 URINALYSIS AUTO W/SCOPE: CPT

## 2019-01-23 PROCEDURE — 74011250636 HC RX REV CODE- 250/636: Performed by: PHYSICIAN ASSISTANT

## 2019-01-23 PROCEDURE — 96372 THER/PROPH/DIAG INJ SC/IM: CPT

## 2019-01-23 PROCEDURE — 96361 HYDRATE IV INFUSION ADD-ON: CPT

## 2019-01-23 PROCEDURE — 81025 URINE PREGNANCY TEST: CPT

## 2019-01-23 PROCEDURE — 36415 COLL VENOUS BLD VENIPUNCTURE: CPT

## 2019-01-23 PROCEDURE — 83690 ASSAY OF LIPASE: CPT

## 2019-01-23 PROCEDURE — 80053 COMPREHEN METABOLIC PANEL: CPT

## 2019-01-23 PROCEDURE — 96375 TX/PRO/DX INJ NEW DRUG ADDON: CPT

## 2019-01-23 RX ORDER — AZITHROMYCIN 500 MG/1
1000 TABLET, FILM COATED ORAL
Status: COMPLETED | OUTPATIENT
Start: 2019-01-23 | End: 2019-01-23

## 2019-01-23 RX ORDER — ONDANSETRON 4 MG/1
4 TABLET, ORALLY DISINTEGRATING ORAL
Qty: 10 TAB | Refills: 0 | Status: SHIPPED | OUTPATIENT
Start: 2019-01-23 | End: 2019-02-04

## 2019-01-23 RX ORDER — METRONIDAZOLE 500 MG/1
500 TABLET ORAL 2 TIMES DAILY
Qty: 14 TAB | Refills: 0 | Status: SHIPPED | OUTPATIENT
Start: 2019-01-23 | End: 2019-01-30

## 2019-01-23 RX ORDER — SULFAMETHOXAZOLE AND TRIMETHOPRIM 800; 160 MG/1; MG/1
1 TABLET ORAL 2 TIMES DAILY
Qty: 14 TAB | Refills: 0 | Status: SHIPPED | OUTPATIENT
Start: 2019-01-23 | End: 2019-01-30

## 2019-01-23 RX ORDER — ONDANSETRON 2 MG/ML
4 INJECTION INTRAMUSCULAR; INTRAVENOUS
Status: COMPLETED | OUTPATIENT
Start: 2019-01-23 | End: 2019-01-23

## 2019-01-23 RX ORDER — KETOROLAC TROMETHAMINE 30 MG/ML
30 INJECTION, SOLUTION INTRAMUSCULAR; INTRAVENOUS
Status: COMPLETED | OUTPATIENT
Start: 2019-01-23 | End: 2019-01-23

## 2019-01-23 RX ADMIN — LIDOCAINE HYDROCHLORIDE 250 MG: 10 INJECTION, SOLUTION EPIDURAL; INFILTRATION; INTRACAUDAL; PERINEURAL at 17:09

## 2019-01-23 RX ADMIN — AZITHROMYCIN 1000 MG: 500 TABLET, FILM COATED ORAL at 17:09

## 2019-01-23 RX ADMIN — SODIUM CHLORIDE 1000 ML: 900 INJECTION, SOLUTION INTRAVENOUS at 16:49

## 2019-01-23 RX ADMIN — ONDANSETRON 4 MG: 2 SOLUTION INTRAMUSCULAR; INTRAVENOUS at 16:49

## 2019-01-23 RX ADMIN — KETOROLAC TROMETHAMINE 30 MG: 30 INJECTION, SOLUTION INTRAMUSCULAR; INTRAVENOUS at 16:49

## 2019-01-23 NOTE — ED PROVIDER NOTES
EMERGENCY DEPARTMENT HISTORY AND PHYSICAL EXAM 
 
 
Date: 1/23/2019 Patient Name: Margot Joseph History of Presenting Illness Chief Complaint Patient presents with  Vaginal Discharge  Abdominal Pain History Provided By: Patient HPI: April Britt, 21 y.o. female with PMHx significant for migraines, htn, anemia, GERD, depression, anxiety, PTSD presents ambulatory to the ED with cc of intermittent aching suprapubic abd pain x 3 days with assoc multiple bouts NBNB emesis and NB diarrhea. Pt also reports decreased appetite. Pt also reports increase in yellow vaginal discharge with irritation. Denies odor. Denies dysuria, hematuria. Pt also reports \"lesions\" to genital area that when popped have \"white stuff come out of them\". Rates pain 10/10. There are no other complaints, changes, or physical findings at this time. PCP: Samantha Ivy MD 
 
No current facility-administered medications on file prior to encounter. Current Outpatient Medications on File Prior to Encounter Medication Sig Dispense Refill  traZODone (DESYREL) 50 mg tablet TK 1 T PO QHS  3  
 escitalopram oxalate (LEXAPRO) 10 mg tablet Take 100 mg by mouth daily. 3  
 amLODIPine (NORVASC) 10 mg tablet Take 1 Tab by mouth daily. 60 Tab 3  
 DULoxetine (CYMBALTA) 30 mg capsule Take 1 Cap by mouth daily. 30 Cap 0  
 butalbital-acetaminophen-caff (FIORICET) -40 mg per capsule TK 1 C PO Q 6 TO 8 H UTD PRF HA  3  
 NUVARING 0.12-0.015 mg/24 hr vaginal ring INSERT 1 RING VAGINALLY FOR 3 WEEKS THEN REMOVE FOR 1 WEEK  11 Past History Past Medical History: 
Past Medical History:  
Diagnosis Date  Acute hepatitis 4/7/2014  Acute pharyngitis 1/22/2010  Acute sinusitis 1/12/2009  Anemia NEC   
 with pregnancy  Anxiety  Backache, unspecified 1/19/2009  Complication of anesthesia   
 difficulty waking after surgery (pt reported)  Depression  Depression 2/14/2018  Essential hypertension Before pregnancy  Fatty liver  Flu 11/11/2009  GERD (gastroesophageal reflux disease) 7/31/2012  Gestational hypertension  HX OTHER MEDICAL   
 GERD with pregnancy [address] Migraines worse with pregnancy  Hypertension  IGT (impaired glucose tolerance) 6/27/2012  Migraine 3/22/2011  Mononucleosis 1/12/2010  Morbid obesity ([address]  PTSD (post-traumatic stress disorder) Past Surgical History: 
Past Surgical History:  
Procedure Laterality Date  HX CHOLECYSTECTOMY  HX OTHER SURGICAL    
 gallbladder  HX TONSILLECTOMY  HX WISDOM TEETH EXTRACTION  4/4/2013 Dr. Lisa Hardwick Family History: 
Family History Problem Relation Age of Onset  Hypertension Mother  Alcohol abuse Father  Diabetes Maternal Grandmother  Headache Maternal Grandmother Social History: 
Social History Tobacco Use  Smoking status: Current Every Day Smoker Packs/day: 0.50  Smokeless tobacco: Never Used Substance Use Topics  Alcohol use: No  
 Drug use: No  
 
 
Allergies: Allergies Allergen Reactions  Latex Hives  Compazine [Prochlorperazine Edisylate] Anxiety  Acetaminophen Other (comments) Pt has liver disease  Aspirin Other (comments) Patient states \"I have liver disease\"  Celexa [Citalopram] Palpitations  Tramadol Anxiety  Wellbutrin [Bupropion Hcl] Palpitations Review of Systems Review of Systems Constitutional: Negative for chills and fever. Respiratory: Negative for shortness of breath. Cardiovascular: Negative for chest pain. Gastrointestinal: Positive for abdominal pain, diarrhea, nausea and vomiting. Negative for constipation. Genitourinary: Positive for vaginal discharge. Negative for dysuria, flank pain, hematuria, pelvic pain, vaginal bleeding and vaginal pain. Musculoskeletal: Negative for back pain and myalgias.   
Skin: Negative for color change, pallor, rash and wound. Neurological: Negative for dizziness, weakness and light-headedness. All other systems reviewed and are negative. Physical Exam  
Physical Exam  
Constitutional: She is oriented to person, place, and time. She appears well-developed and well-nourished. No distress. HENT:  
Head: Normocephalic and atraumatic. Eyes: Conjunctivae are normal.  
Cardiovascular: Normal rate, regular rhythm and normal heart sounds. No murmur heard. Pulmonary/Chest: Effort normal and breath sounds normal. No respiratory distress. She has no wheezes. She has no rales. Abdominal: Soft. Bowel sounds are normal. She exhibits no distension. There is no tenderness. There is no rebound. Genitourinary: There is lesion on the right labia. There is lesion on the left labia. Uterus is not tender. Cervix exhibits discharge (small amount of yellow thin discharge, no odor, cervical os closed). Cervix exhibits no motion tenderness and no friability. Right adnexum displays no tenderness. Left adnexum displays no tenderness. No erythema, tenderness or bleeding in the vagina. No foreign body in the vagina. No signs of injury around the vagina. No vaginal discharge found. Genitourinary Comments: Multiple small indurated erythematous lesions. TTP. No drainage. Scabbed. Musculoskeletal: Normal range of motion. Neurological: She is alert and oriented to person, place, and time. Skin: Skin is warm. No rash noted. Psychiatric: She has a normal mood and affect. Her behavior is normal.  
Nursing note and vitals reviewed. Diagnostic Study Results Labs - No results found for this or any previous visit (from the past 12 hour(s)). Radiologic Studies - No orders to display CT Results  (Last 48 hours) None CXR Results  (Last 48 hours) None Medical Decision Making I am the first provider for this patient.  
 
I reviewed the vital signs, available nursing notes, past medical history, past surgical history, family history and social history. Vital Signs-Reviewed the patient's vital signs. No data found. Records Reviewed: Nursing Notes and Old Medical Records Provider Notes (Medical Decision Making): DDx: Gonorrhea, Chlamydia, Trich, UTI, BV, Yeast, PID, Gastroenteritis, Viral Illness, Food Borne Illness ED Course:  
Initial assessment performed. The patients presenting problems have been discussed, and they are in agreement with the care plan formulated and outlined with them. I have encouraged them to ask questions as they arise throughout their visit. Disposition: 
Discussed lab results with pt along with dx and treatment plan. Discussed importance of PCP follow up. All questions answered. Pt voiced they understood. Return if sx worsen. PLAN: 
1. Discharge Medication List as of 1/23/2019  5:33 PM  
  
START taking these medications Details  
ondansetron (ZOFRAN ODT) 4 mg disintegrating tablet Take 1 Tab by mouth every eight (8) hours as needed for Nausea., Normal, Disp-10 Tab, R-0  
  
trimethoprim-sulfamethoxazole (BACTRIM DS) 160-800 mg per tablet Take 1 Tab by mouth two (2) times a day for 7 days. , Normal, Disp-14 Tab, R-0  
  
metroNIDAZOLE (FLAGYL) 500 mg tablet Take 1 Tab by mouth two (2) times a day for 7 days. , Normal, Disp-14 Tab, R-0  
  
  
CONTINUE these medications which have NOT CHANGED Details  
traZODone (DESYREL) 50 mg tablet TK 1 T PO QHS, Historical Med, R-3  
  
escitalopram oxalate (LEXAPRO) 10 mg tablet Take 100 mg by mouth daily. , Historical Med, R-3  
  
amLODIPine (NORVASC) 10 mg tablet Take 1 Tab by mouth daily. , NormalHIGHER DOSEDisp-60 Tab, R-3  
  
DULoxetine (CYMBALTA) 30 mg capsule Take 1 Cap by mouth daily. , Normal, Disp-30 Cap, R-0  
  
butalbital-acetaminophen-caff (FIORICET) -40 mg per capsule TK 1 C PO Q 6 TO 8 H UTD PRF HA, Historical Med, R-3  
  
NUVARING 0.12-0.015 mg/24 hr vaginal ring INSERT 1 RING VAGINALLY FOR 3 WEEKS THEN REMOVE FOR 1 WEEK, Historical Med, R-11, SHANTE  
  
  
 
2. Follow-up Information Follow up With Specialties Details Why Contact Info Tyesha Das MD Internal Medicine Schedule an appointment as soon as possible for a visit As needed [address] [facility] [phone] Return to ED if worse Diagnosis Clinical Impression:  
1. Gastroenteritis 2. Acute cystitis without hematuria 3. Folliculitis 4. BV (bacterial vaginosis)

## 2019-01-23 NOTE — ED NOTES
Emergency Department Nursing Plan of Care The Nursing Plan of Care is developed from the Nursing assessment and Emergency Department Attending provider initial evaluation. The plan of care may be reviewed in the ED Provider note. The Plan of Care was developed with the following considerations:  
Patient / Family readiness to learn indicated by:verbalized understanding Persons(s) to be included in education: patient Barriers to Learning/Limitations:No 
 
Signed Craig Taylor 1/23/2019   3:53 PM

## 2019-01-23 NOTE — ED TRIAGE NOTES
C/o abd pain with n/v/diarrhea x 2-3 days, also c/o \"purple spots\" on vagina x 2-3 weeks that \"will pop with white stuff\" on occ

## 2019-01-23 NOTE — DISCHARGE INSTRUCTIONS
Patient Education        Bacterial Vaginosis: Care Instructions  Your Care Instructions    Bacterial vaginosis is a type of vaginal infection. It is caused by excess growth of certain bacteria that are normally found in the vagina. Symptoms can include itching, swelling, pain when you urinate or have sex, and a gray or yellow discharge with a \"fishy\" odor. It is not considered an infection that is spread through sexual contact. Although symptoms can be annoying and uncomfortable, bacterial vaginosis does not usually cause other health problems. However, if you have it while you are pregnant, it can cause complications. While the infection may go away on its own, most doctors use antibiotics to treat it. You may have been prescribed pills or vaginal cream. With treatment, bacterial vaginosis usually clears up in 5 to 7 days. Follow-up care is a key part of your treatment and safety. Be sure to make and go to all appointments, and call your doctor if you are having problems. It's also a good idea to know your test results and keep a list of the medicines you take. How can you care for yourself at home? · Take your antibiotics as directed. Do not stop taking them just because you feel better. You need to take the full course of antibiotics. · Do not eat or drink anything that contains alcohol if you are taking metronidazole (Flagyl). · Keep using your medicine if you start your period. Use pads instead of tampons while using a vaginal cream or suppository. Tampons can absorb the medicine. · Wear loose cotton clothing. Do not wear nylon and other materials that hold body heat and moisture close to the skin. · Do not scratch. Relieve itching with a cold pack or a cool bath. · Do not wash your vaginal area more than once a day. Use plain water or a mild, unscented soap. Do not douche. When should you call for help?   Watch closely for changes in your health, and be sure to contact your doctor if:    · You have unexpected vaginal bleeding.     · You have a fever.     · You have new or increased pain in your vagina or pelvis.     · You are not getting better after 1 week.     · Your symptoms return after you finish the course of your medicine. Where can you learn more? Go to http://adelfo-paul.info/. Negrito Wiggins in the search box to learn more about \"Bacterial Vaginosis: Care Instructions. \"  Current as of: May 14, 2018  Content Version: 11.9  © 5651-0099 [employer]. Care instructions adapted under license by [employer] (which disclaims liability or warranty for this information). If you have questions about a medical condition or this instruction, always ask your healthcare professional. [place] [address] any warranty or liability for your use of this information. Patient Education        Gastroenteritis: Care Instructions  Your Care Instructions    Gastroenteritis is an illness that may cause nausea, vomiting, and diarrhea. It is sometimes called \"stomach flu. \" It can be caused by bacteria or a virus. You will probably begin to feel better in 1 to 2 days. In the meantime, get plenty of rest and make sure you do not become dehydrated. Dehydration occurs when your body loses too much fluid. Follow-up care is a key part of your treatment and safety. Be sure to make and go to all appointments, and call your doctor if you are having problems. It's also a good idea to know your test results and keep a list of the medicines you take. How can you care for yourself at home? · If your doctor prescribed antibiotics, take them as directed. Do not stop taking them just because you feel better. You need to take the full course of antibiotics. · Drink plenty of fluids to prevent dehydration, enough so that your urine is light yellow or clear like water. Choose water and other caffeine-free clear liquids until you feel better.  If you have kidney, heart, or liver disease and have to limit fluids, talk with your doctor before you increase your fluid intake. · Drink fluids slowly, in frequent, small amounts, because drinking too much too fast can cause vomiting. · Begin eating mild foods, such as dry toast, yogurt, applesauce, bananas, and rice. Avoid spicy, hot, or high-fat foods, and do not drink alcohol or caffeine for a day or two. Do not drink milk or eat ice cream until you are feeling better. How to prevent gastroenteritis  · Keep hot foods hot and cold foods cold. · Do not eat meats, dressings, salads, or other foods that have been kept at room temperature for more than 2 hours. · Use a thermometer to check your refrigerator. It should be between 34°F and 40°F.  · Defrost meats in the refrigerator or microwave, not on the kitchen counter. · Keep your hands and your kitchen clean. Wash your hands, cutting boards, and countertops with hot soapy water frequently. · Cook meat until it is well done. · Do not eat raw eggs or uncooked sauces made with raw eggs. · Do not take chances. If food looks or tastes spoiled, throw it out. When should you call for help? Call 911 anytime you think you may need emergency care. For example, call if:    · You vomit blood or what looks like coffee grounds.     · You passed out (lost consciousness).     · You pass maroon or very bloody stools.    Call your doctor now or seek immediate medical care if:    · You have severe belly pain.     · You have signs of needing more fluids.  You have sunken eyes, a dry mouth, and pass only a little dark urine.     · You feel like you are going to faint.     · You have increased belly pain that does not go away in 1 to 2 days.     · You have new or increased nausea, or you are vomiting.     · You have a new or higher fever.     · Your stools are black and tarlike or have streaks of blood.    Watch closely for changes in your health, and be sure to contact your doctor if:    · You are dizzy or lightheaded.     · You urinate less than usual, or your urine is dark yellow or brown.     · You do not feel better with each day that goes by. Where can you learn more? Go to http://adelfo-paul.info/. Enter N142 in the search box to learn more about \"Gastroenteritis: Care Instructions. \"  Current as of: July 30, 2018  Content Version: 11.9  © 2006-2018 [employer]. Care instructions adapted under license by [employer] (which disclaims liability or warranty for this information). If you have questions about a medical condition or this instruction, always ask your healthcare professional. [place] [address] any warranty or liability for your use of this information. Patient Education        Urinary Tract Infection in Women: Care Instructions  Your Care Instructions    A urinary tract infection, or UTI, is a general term for an infection anywhere between the kidneys and the urethra (where urine comes out). Most UTIs are bladder infections. They often cause pain or burning when you urinate. UTIs are caused by bacteria and can be cured with antibiotics. Be sure to complete your treatment so that the infection goes away. Follow-up care is a key part of your treatment and safety. Be sure to make and go to all appointments, and call your doctor if you are having problems. It's also a good idea to know your test results and keep a list of the medicines you take. How can you care for yourself at home? · Take your antibiotics as directed. Do not stop taking them just because you feel better. You need to take the full course of antibiotics. · Drink extra water and other fluids for the next day or two. This may help wash out the bacteria that are causing the infection.  (If you have kidney, heart, or liver disease and have to limit fluids, talk with your doctor before you increase your fluid intake.)  · Avoid drinks that are carbonated or have caffeine. They can irritate the bladder. · Urinate often. Try to empty your bladder each time. · To relieve pain, take a hot bath or lay a heating pad set on low over your lower belly or genital area. Never go to sleep with a heating pad in place. To prevent UTIs  · Drink plenty of water each day. This helps you urinate often, which clears bacteria from your system. (If you have kidney, heart, or liver disease and have to limit fluids, talk with your doctor before you increase your fluid intake.)  · Urinate when you need to. · Urinate right after you have sex. · Change sanitary pads often. · Avoid douches, bubble baths, feminine hygiene sprays, and other feminine hygiene products that have deodorants. · After going to the bathroom, wipe from front to back. When should you call for help? Call your doctor now or seek immediate medical care if:    · Symptoms such as fever, chills, nausea, or vomiting get worse or appear for the first time.     · You have new pain in your back just below your rib cage. This is called flank pain.     · There is new blood or pus in your urine.     · You have any problems with your antibiotic medicine.    Watch closely for changes in your health, and be sure to contact your doctor if:    · You are not getting better after taking an antibiotic for 2 days.     · Your symptoms go away but then come back. Where can you learn more? Go to http://adelfo-paul.info/. Enter I516 in the search box to learn more about \"Urinary Tract Infection in Women: Care Instructions. \"  Current as of: March 20, 2018  Content Version: 11.9  © 3739-4905 [employer]. Care instructions adapted under license by [employer] (which disclaims liability or warranty for this information).  If you have questions about a medical condition or this instruction, always ask your healthcare professional. [address] any warranty or liability for your use of this information. Patient Education        Folliculitis: Care Instructions  Your Care Instructions    Folliculitis (say \"qll-QZX-ydb-LY-tus\") is an infection of the pouches (follicles) in the skin where hair grows. It can occur on any part of the body, but it is most common on the scalp, face, armpits, and groin. Bacteria, such as those found in a hot tub, can cause folliculitis. Folliculitis begins as a red, tender area near a strand of hair. The skin can itch or burn and may drain pus or blood. Sometimes folliculitis can lead to more serious skin infections. Your doctor usually can treat mild folliculitis with an antibiotic cream or ointment. If you have folliculitis on your scalp, you may use a shampoo that kills bacteria. Antibiotics you take as pills can treat infections deeper in the skin. For stubborn cases of folliculitis, laser treatment may be an option. Laser treatment uses strong beams of light to destroy the hair follicle. But hair will no longer grow in the treated area. Follow-up care is a key part of your treatment and safety. Be sure to make and go to all appointments, and call your doctor if you are having problems. It's also a good idea to know your test results and keep a list of the medicines you take. How can you care for yourself at home? · Take your medicine exactly as prescribed. If your doctor prescribed antibiotics, take them as directed. Do not stop taking them just because you feel better. You need to take the full course of antibiotics. · Use a soap that kills bacteria to wash the infected area. If your scalp or beard is infected, use a shampoo with selenium or propylene glycol. Be careful. Do not scrub too long or too hard. · Mix 1 1/3 cup warm water and 1 tablespoon vinegar. Soak a cloth in the mixture, and place it over the infected skin until it cools off (usually 5 to 10 minutes). You can do this 3 to 6 times a day.   · Do not share your razor, towel, or washcloth. That can spread folliculitis. · Use a new blade in your razor each time you shave to keep from re-infecting your skin. · If you tend to get folliculitis, avoid using hot tubs. They can contain bacteria that cause folliculitis. When should you call for help? Call your doctor now or seek immediate medical care if:    · You have symptoms of infection, such as:  ? Increased pain, swelling, warmth, or redness. ? Red streaks leading from the area. ? Pus draining from the area. ? A fever.    Watch closely for changes in your health, and be sure to contact your doctor if:    · You do not get better as expected. Where can you learn more? Go to http://adelfo-paul.info/. Enter M257 in the search box to learn more about \"Folliculitis: Care Instructions. \"  Current as of: April 17, 2018  Content Version: 11.9  © 9854-8718 [employer]. Care instructions adapted under license by [employer] (which disclaims liability or warranty for this information). If you have questions about a medical condition or this instruction, always ask your healthcare professional. [place] [address] any warranty or liability for your use of this information.

## 2019-02-04 ENCOUNTER — OFFICE VISIT (OUTPATIENT)
Dept: SURGERY | Age: 24
End: 2019-02-04

## 2019-02-04 VITALS
HEART RATE: 88 BPM | SYSTOLIC BLOOD PRESSURE: 150 MMHG | TEMPERATURE: 98.4 F | DIASTOLIC BLOOD PRESSURE: 100 MMHG | OXYGEN SATURATION: 97 % | WEIGHT: 293 LBS | RESPIRATION RATE: 20 BRPM | BODY MASS INDEX: 43.4 KG/M2 | HEIGHT: 69 IN

## 2019-02-04 DIAGNOSIS — R73.02 IMPAIRED GLUCOSE TOLERANCE: ICD-10-CM

## 2019-02-04 DIAGNOSIS — F43.10 PTSD (POST-TRAUMATIC STRESS DISORDER): ICD-10-CM

## 2019-02-04 DIAGNOSIS — K21.9 GASTROESOPHAGEAL REFLUX DISEASE, ESOPHAGITIS PRESENCE NOT SPECIFIED: ICD-10-CM

## 2019-02-04 DIAGNOSIS — D50.8 OTHER IRON DEFICIENCY ANEMIA: ICD-10-CM

## 2019-02-04 DIAGNOSIS — F17.200 SMOKER: ICD-10-CM

## 2019-02-04 DIAGNOSIS — E66.01 MORBID OBESITY WITH BMI OF 45.0-49.9, ADULT (HCC): Primary | ICD-10-CM

## 2019-02-04 DIAGNOSIS — F50.89 PICA: ICD-10-CM

## 2019-02-04 DIAGNOSIS — I10 ESSENTIAL HYPERTENSION: ICD-10-CM

## 2019-02-04 NOTE — PROGRESS NOTES
1. Have you been to the ER, urgent care clinic since your last visit? Hospitalized since your last visit? new patient    2. Have you seen or consulted any other health care providers outside of the [address] [place] [address] since your last visit? Include any pap smears or colon screening. New patient      April Balwinder  Body composition    female  21 y.o. Vitals:    02/04/19 1105   BP: (!) 150/100   Pulse: 88   Resp: 20   Temp: 98.4 °F (36.9 °C)   SpO2: 97%   Weight: 332 lb (150.6 kg)   Height: 5' 9\" (1.753 m)     Body mass index is 49.03 kg/m². Jony Saunas Neck- 17.5 inches  Waist-62 inches  Hips-58.5 inches

## 2019-02-04 NOTE — Clinical Note
Cayden Schwartz RD, smoking cessation and psych (need info from her psych provider, meet with [facility] before submission after psych info

## 2019-02-04 NOTE — PATIENT INSTRUCTIONS
You must be smoke free a minimum of 30 days prior to surgery     You can make the psych eval appt at any time     The surgical coordinator will contact you about an appt with the dietician          Learning About Bariatric Surgery  What is bariatric surgery? Bariatric surgery is surgery to help you lose weight. This type of surgery is only used for people who are very overweight and have not been able to lose weight with diet and exercise. This surgery makes the stomach smaller. Some types of surgery also change the connection between your stomach and intestines. How is bariatric surgery done? Bariatric surgery may be either \"open\" or \"laparoscopic. \" Open surgery is done through a large cut (incision) in the belly. Laparoscopic surgery is done through several small cuts. The doctor puts a lighted tube, or scope, and other surgical tools through small cuts in your belly. The doctor is able to see your organs with the scope. There are different types of bariatric surgery. Gastric sleeve surgery  The surgery is usually done through several small incisions in the belly. The doctor removes more than half of your stomach. This leaves a thin sleeve, or tube, that is about the size of a banana. Because part of your stomach has been removed, this can't be reversed. Shanelle-en-Y gastric bypass surgery  Shanelle-en-Y (say \"tiffanie-en-why\") surgery changes the connection between the stomach and the intestines. The doctor separates a section of your stomach from the rest of your stomach. This makes a small pouch. The new pouch will hold the food you eat. The doctor connects the stomach pouch to the middle part of the small intestine. Gastric banding surgery  The surgery is usually done through several small incisions in the belly. The doctor wraps a band around the upper part of the stomach. This creates a small pouch. The small size of the pouch means that you will get full after you eat just a small amount of food.  The doctor can inflate or deflate the band to adjust the size. This lets the doctor adjust how quickly food passes from the new pouch into the stomach. It does not change the connection between the stomach and the intestines. What can you expect after the surgery? You may stay in the hospital for one or more days after the surgery. How long you stay depends on the type of surgery you had. Most people need 2 to 4 weeks before they are ready to get back to their usual routine. For the first 2 to 6 weeks after surgery, you probably will need to follow a liquid or soft diet. Bit by bit, you will be able to eat more solid foods. Your doctor may advise you to work with a dietitian. This way you'll be sure to get enough protein, vitamins, and minerals while you are losing weight. Even with a healthy diet, you may need to take vitamin and mineral supplements. After surgery, you will not be able to eat very much at one time. You will get full quickly. Try not to eat too much at one time or eat foods that are high in fat or sugar. If you do, you may vomit, get stomach pain, or have diarrhea. You probably will lose weight very quickly in the first few months after surgery. As time goes on, your weight loss will slow down. You will have regular doctor visits to check how you are doing. Think of bariatric surgery as a tool to help you lose weight. It isn't an instant fix. You will still need to eat a healthy diet and get regular exercise. This will help you reach your weight goal and avoid regaining the weight you lose. Follow-up care is a key part of your treatment and safety. Be sure to make and go to all appointments, and call your doctor if you are having problems. It's also a good idea to know your test results and keep a list of the medicines you take. Where can you learn more? Go to http://adelfo-paul.info/. Enter G469 in the search box to learn more about \"Learning About Bariatric Surgery. \"  Current as of: June 25, 2018  Content Version: 11.9  © 9971-7519 [employer]. Care instructions adapted under license by [employer] (which disclaims liability or warranty for this information). If you have questions about a medical condition or this instruction, always ask your healthcare professional. [address] any warranty or liability for your use of this information.

## 2019-02-04 NOTE — PROGRESS NOTES
HISTORY OF PRESENT ILLNESS  Maria M Elise is a 21 y.o. female. Patient presents with: Morbid Obesity: new bariatric patient interested in lap gastric bypass  New Patient        Maria M Elise presents today for evaluation for weight loss surgery to treat morbid obesity. She went through process about 5 years ago and then lost her insurance. Body mass index is 49.03 kg/m².    Attended Dr. Elizabeth Schultz seminar   Interested in gastric bypass     Diet:  \"hate food and I try\"; not hungry, soda few cans per/day, sweet tea, juices, some water; skips breakfast and sometimes lunch, eats out several times per week     High weight today   Low weight 296 lbs   Patient Active Problem List   Diagnosis Code    Migraine G43.909    IGT (impaired glucose tolerance) R73.02    GERD (gastroesophageal reflux disease) K21.9    Elevated liver enzymes R74.8    S/P cholecystectomy Z90.49    Depression F32.9    Obesity, morbid ([address] E66.01    Essential hypertension I10     Past Medical History:   Diagnosis Date    Acute hepatitis 2014    Acute pharyngitis 2010    Acute sinusitis 2009    Anemia NEC     with pregnancy    Anxiety     Backache, unspecified     Complication of anesthesia     difficulty waking after surgery (pt reported)    Depression 2018    Essential hypertension     Before pregnancy    Fatty liver     Flu 2009    GERD (gastroesophageal reflux disease) 2012    Gestational hypertension     HX OTHER MEDICAL     GERD with pregnancy    HX OTHER MEDICAL     Migraines worse with pregnancy    Hypertension     IGT (impaired glucose tolerance) 2012    Migraine 3/22/2011    Mononucleosis 2010    Morbid obesity [facility]     Musculoskeletal disorder     PTSD (post-traumatic stress disorder)      Past Surgical History:   Procedure Laterality Date    HX  SECTION      HX CHOLECYSTECTOMY      HX TONSILLECTOMY      HX WISDOM TEETH EXTRACTION  2013    Dr. Tylor Kelley Social History     Socioeconomic History    Marital status: SINGLE     Spouse name: Not on file    Number of children: 1    Years of education: 9    Highest education level: Not on file   Social Needs    Financial resource strain: Not on file    Food insecurity - worry: Not on file    Food insecurity - inability: Not on file   [employer] needs - medical: Not on file   [employer] needs - non-medical: Not on file   Occupational History    Occupation: Does not work, \"pulled out of work due to high risk pregnancy\"     Employer: NOT EMPLOYED     Comment: grad from [employer]   Tobacco Use    Smoking status: Current Every Day Smoker     Packs/day: 0.50     Types: Cigarettes    Smokeless tobacco: Never Used   Substance and Sexual Activity    Alcohol use: Yes     Frequency: 2-4 times a month     Comment: 1 drink per week    Drug use: No    Sexual activity: Yes     Partners: Female     Birth control/protection: Inserts   Other Topics Concern    Not on file   Social History Narrative    Lives in [place]     9and 3year old daughters     ; wife with 2 step sons and daughter (ages 10, 1 and 5 months)      Family History   Problem Relation Age of Onset    Hypertension Mother     Psychiatric Disorder Mother     Alcohol abuse Father     Psychiatric Disorder Father     Psychiatric Disorder Sister     Diabetes Maternal Grandmother     Headache Maternal Grandmother        Current Outpatient Medications:     butalbital-acetaminophen-caff (FIORICET) -40 mg per capsule, TK 1 C PO Q 6 TO 8 H UTD PRF HA, Disp: , Rfl: 3    traZODone (DESYREL) 50 mg tablet, TK 1 T PO QHS, Disp: , Rfl: 3    escitalopram oxalate (LEXAPRO) 10 mg tablet, Take 100 mg by mouth daily. , Disp: , Rfl: 3    amLODIPine (NORVASC) 10 mg tablet, Take 1 Tab by mouth daily. , Disp: 60 Tab, Rfl: 3    DULoxetine (CYMBALTA) 30 mg capsule, Take 1 Cap by mouth daily. , Disp: 30 Cap, Rfl: 0  Allergies   Allergen Reactions    Latex Hives    Compazine [Prochlorperazine Edisylate] Anxiety    Acetaminophen Other (comments)     Pt has liver disease    Aspirin Other (comments)     Patient states \"I have liver disease\"    Celexa [Citalopram] Palpitations    Tramadol Hives and Anxiety    Wellbutrin [Bupropion Hcl] Palpitations     PCP Ksenia Carrera MD      Review of Systems   Constitutional: Positive for malaise/fatigue (dragging most of the time ). HENT: Negative. Eyes: Negative. Respiratory: Positive for cough and shortness of breath. Negative for hemoptysis, sputum production and wheezing. Snores   Has had a negative sleep test    Cardiovascular: Positive for palpitations and leg swelling (prn). Negative for chest pain. Gastrointestinal: Positive for abdominal pain (occasioanl crampy pain ), heartburn (terrible no meds ) and nausea (\"stay nauseated alot\"). Negative for blood in stool, constipation, diarrhea and melena. Genitourinary: Negative for dysuria, flank pain and urgency. Frequent UTI    Musculoskeletal: Positive for back pain, joint pain (left leg and has had EMG \"nerves ok\") and myalgias. Negative for falls and neck pain. Skin: Positive for rash (skin folds ). Boils groin and abdomen    Neurological: Positive for seizures (long time ago after smoking marijuana ) and headaches (morning headaches ). Endo/Heme/Allergies:        Heavy menses and painful   Hx anemia   Ice PICA few trays per day     Gestational DM with 2nd pregnancy    Psychiatric/Behavioral: Positive for depression. The patient is nervous/anxious (panic attacks in public, avoids shopping / stores  past 2-3 years; makes it difficult to work ) and has insomnia (prn poor sleep and occasionally uses trazodone ).          In home counselor \"Mr Guo\" 3x/ week   [facility]   No psych hospitalizations     \"wife came today, but is in the car, says she is supportive but then she stays in the car\"       Physical Exam Constitutional: She is oriented to person, place, and time. No distress. BP (!) 150/100   Pulse 88   Temp 98.4 °F (36.9 °C)   Resp 20   Ht 5' 9\" (1.753 m)   Wt 332 lb (150.6 kg)   LMP 01/26/2019   SpO2 97%   BMI 49.03 kg/m²   White female unaccompanied    Cardiovascular: Normal rate and regular rhythm. Pulmonary/Chest: Effort normal and breath sounds normal. No respiratory distress. Abdominal: Soft. Obese      Musculoskeletal:   Ambulating independently    Neurological: She is alert and oriented to person, place, and time. Skin: She is not diaphoretic. ASSESSMENT and PLAN    ICD-10-CM ICD-9-CM    1. Morbid obesity with BMI of 45.0-49.9, adult ([facility] E66.01 278.01     Z68.42 V85.42    2. Essential hypertension I10 401.9    3. Impaired glucose tolerance R73.02 790.22    4. Gastroesophageal reflux disease, esophagitis presence not specified K21.9 530.81    5. PTSD (post-traumatic stress disorder) F43.10 309.81    6. Smoker F17.200 305.1    7. Pica F50.89 307.52    8. Other iron deficiency anemia D50.8 280.8      April Britt meets criteria established by the NIH. Without weight reduction, co-morbidities will escalate as well as risk of early mortality. Recommendation is patient could be served with surgical weight reduction, the procedure of Malabsorptive gastric bypass for treatment of morbid obesity . I explained to the patient differences between laparoscopic and open gastric bypass, laparoscopic adjustable gastric banding, and sleeve gastrectomy procedures. Patient has attended one our informational meeting and has seen our educational materials. Patient desires to have surgery with Gab Montalvo MD.   The procedure of divided gastric bypass with Shanelle-en-Y gastrojejunostomy was explained to the patient including the four parts of the operation- 1) loss of appetite, 2) the restrictive phases, 3) the dumping phase, 4) mild malabsorption from the diversion of pancreatic or biliary enzymes. Informed consent was obtained concerning the potential morbidities and mortality of the operation including anastomotic leak, pulmonary embolism, deep vein thrombosis, bleeding, staple- line disruption, stomal stenosis or dilatation, inadequate weight loss, and requirement for life-long vitamin intake. Further information was given concerning a three-day hospitalization with at least one or more nights in a special care unit, protein- enriched liquified diet for two-thee weeks, convalescence for three to six weeks. Information was provided concerning the team approach anesthesia, nursing, and dietary. Pneumatic stockings, Heparin or Lovenox, and wound care management techniques were explained. Abdominal pain, nausea and/or vomiting may occur if eating too fast, too much, or not chewing well enough. With sweets or high fat ingestion, dumping may occur. It was further stressed the approximately three to five percent of patients require endoscopic balloon dilatation of the staple line. Furthermore, a clear discussion of the imperfections of the operation was discussed including the fact that it not effective in every patient because of patient non-compliance and/or mechanical failure. It was hoped, however, that at approaching a ninety percent level, the patients can achieve a mean of seventy percent loss of excess body weight. This is determined partially by patient compliance and exercise as well as making wise choices for the diet.     Recommendations:    _x__ Nutrition Evaluation    _x__ Psychological Evaluation (will need clearance from her mental health team)    ___ Cardiac Evaluation    ___ Pulmonary Evaluation    ___ Sleep Medicine     ___ [facility]     ___ Upper GI    ___ Upper endoscopy     _x__ Smoking cessation x 30 days pre surgery     ___ Committee    _x__ Meet with surgeon prior to submission to insurance to review mental health status and smoking     I have reinforced without lifestyle change and behavior modification, Maria M Britt would not achieve her weight loss goals. I reviewed risks and complications associated with each procedure. I discussed with patient a diet high in protein, low-fat, low- sugar, limited carbohydrates, and discontinuing use of carbonated beverages. Also discussed physical activity, smoking cessation, sleep hygiene and stress management. Maria M Britt verbalized understanding and questions were answered to the best of my knowledge and ability. Bariatric surgery  educational materials were provided. 51 minutes spent in face to face with Maria M Britt > 50% counseling.   CC Elena James MD

## 2019-02-06 ENCOUNTER — OFFICE VISIT (OUTPATIENT)
Dept: INTERNAL MEDICINE CLINIC | Age: 24
End: 2019-02-06

## 2019-02-06 VITALS
HEIGHT: 69 IN | RESPIRATION RATE: 19 BRPM | WEIGHT: 293 LBS | HEART RATE: 72 BPM | DIASTOLIC BLOOD PRESSURE: 88 MMHG | BODY MASS INDEX: 43.4 KG/M2 | TEMPERATURE: 97.4 F | OXYGEN SATURATION: 96 % | SYSTOLIC BLOOD PRESSURE: 135 MMHG

## 2019-02-06 DIAGNOSIS — E66.01 OBESITY, MORBID (HCC): ICD-10-CM

## 2019-02-06 DIAGNOSIS — F33.1 MODERATE EPISODE OF RECURRENT MAJOR DEPRESSIVE DISORDER (HCC): ICD-10-CM

## 2019-02-06 DIAGNOSIS — I10 ESSENTIAL HYPERTENSION: Primary | ICD-10-CM

## 2019-02-06 NOTE — PROGRESS NOTES
Chief Complaint   Patient presents with    Hypertension    Weight Management     1. Have you been to the ER, urgent care clinic since your last visit? Hospitalized since your last visit? No    2. Have you seen or consulted any other health care providers outside of the [address] [place] [address] since your last visit? Include any pap smears or colon screening.  No

## 2019-02-06 NOTE — PROGRESS NOTES
Odessa Hubbard is a 21 y.o. female and presents with Hypertension and Weight Management  . Subjective:    Pt is s/p EMG-NML. No neuropathy. XR ls spine NML    PMH-Morbid obesity-pt has established w bariatric surgery and is participating      Wt Readings from Last 3 Encounters:   02/06/19 335 lb (152 kg)   02/04/19 332 lb (150.6 kg)   01/23/19 325 lb (147.4 kg)        HTN-pt relays she has been taking her bp med (amlodipine), w/o SE     BP Readings from Last 3 Encounters:   02/06/19 135/88   02/04/19 (!) 150/100   01/23/19 135/87        Major depression/anxiety/PTSD-pt has established w psychiatry here      Chronic headaches-dx'ed as rebound headaches by neurology     [place] LE Pain-unclear etiology   -prob fibromyalgia   Recommend wt loss and daily exercise     Leukocytosis-chronic as per chart review   -  Lab Results   Component Value Date/Time    WBC 10.5 01/23/2019 04:38 PM         Review of Systems  Constitutional: negative for fevers, chills, anorexia and weight loss  Eyes:   negative for visual disturbance and irritation  ENT:   negative for tinnitus,sore throat,nasal congestion,ear pains. hoarseness  Respiratory:  negative for cough, hemoptysis, dyspnea,wheezing  CV:   negative for chest pain, palpitations, lower extremity edema  GI:   positive for abdominal pain  Musculoskel: positive for myalgias  Neurological:  negative for headaches, dizziness, vertigo, memory problems and gait   Behavl/Psych: positive for feelings of anxiety, depression, mood changes    Past Medical History:   Diagnosis Date    Acute hepatitis 4/7/2014    Acute pharyngitis 1/22/2010    Acute sinusitis 1/12/2009    Anemia NEC     with pregnancy    Anxiety     Backache, unspecified 3/87/2543    Complication of anesthesia     difficulty waking after surgery (pt reported)    Depression 2/14/2018    Essential hypertension     Before pregnancy    Fatty liver     Flu 11/11/2009    GERD (gastroesophageal reflux disease) 7/31/2012    Gestational hypertension     HX OTHER MEDICAL     GERD with pregnancy    HX OTHER MEDICAL     Migraines worse with pregnancy    Hypertension     IGT (impaired glucose tolerance) 2012    Migraine 3/22/2011    Mononucleosis 2010    Morbid obesity ([facility] [address]     Musculoskeletal disorder     PTSD (post-traumatic stress disorder)      Past Surgical History:   Procedure Laterality Date    HX  SECTION      HX CHOLECYSTECTOMY      HX TONSILLECTOMY      HX WISDOM TEETH EXTRACTION  2013    Dr. Campa Client     Social History     Socioeconomic History    Marital status: SINGLE     Spouse name: Not on file    Number of children: 1    Years of education: 9    Highest education level: Not on file   Occupational History    Occupation: Does not work, \"pulled out of work due to high risk pregnancy\"     Employer: NOT EMPLOYED     Comment: grad from [employer]   Tobacco Use    Smoking status: Current Every Day Smoker     Packs/day: 0.50     Types: Cigarettes    Smokeless tobacco: Never Used   Substance and Sexual Activity    Alcohol use: Yes     Frequency: 2-4 times a month     Comment: 1 drink per week    Drug use: No    Sexual activity: Yes     Partners: Female     Birth control/protection: Inserts   Social History Narrative    Lives in [place]     9and 3year old daughters     ; wife with 2 step sons and daughter (ages 10, 1 and 5 months)      Family History   Problem Relation Age of Onset    Hypertension Mother     Psychiatric Disorder Mother     Alcohol abuse Father     Psychiatric Disorder Father     Psychiatric Disorder Sister     Diabetes Maternal Grandmother     Headache Maternal Grandmother      Current Outpatient Medications   Medication Sig Dispense Refill    butalbital-acetaminophen-caff (FIORICET) -40 mg per capsule TK 1 C PO Q 6 TO 8 H UTD PRF HA  3    traZODone (DESYREL) 50 mg tablet TK 1 T PO QHS  3    escitalopram oxalate (LEXAPRO) 10 mg tablet Take 100 mg by mouth daily.  3    amLODIPine (NORVASC) 10 mg tablet Take 1 Tab by mouth daily. 60 Tab 3    DULoxetine (CYMBALTA) 30 mg capsule Take 1 Cap by mouth daily. 30 Cap 0     Allergies   Allergen Reactions    Latex Hives    Compazine [Prochlorperazine Edisylate] Anxiety    Acetaminophen Other (comments)     Pt has liver disease    Aspirin Other (comments)     Patient states \"I have liver disease\"    Celexa [Citalopram] Palpitations    Tramadol Hives and Anxiety    Wellbutrin [Bupropion Hcl] Palpitations       Objective:  Visit Vitals  /88 (BP 1 Location: Left arm, BP Patient Position: Sitting)   Pulse 72   Temp 97.4 °F (36.3 °C) (Oral)   Resp 19   Ht 5' 9\" (1.753 m)   Wt 335 lb (152 kg)   LMP 01/26/2019   SpO2 96%   BMI 49.47 kg/m²     Physical Exam:   General appearance - alert, pleasant, obese young lady in NAD  Mental status - alert, oriented to person, place, and time  EYE-EOMI  Mouth - mucous membranes moist, pharynx normal without lesions  Neck - supple, no significant adenopathy   Chest - clear to auscultation, no wheezes, rales or rhonchi, symmetric air entry   Heart - normal rate, regular rhythm, normal S1, S2  Abdomen - soft, nontender, obese  Ext-peripheral pulses normal, no pedal edema, no clubbing or cyanosis  Skin-Warm and dry.  no hyperpigmentation, vitiligo, or suspicious lesions  Neuro -alert, oriented, normal speech, no focal findings or movement disorder noted        Results for orders placed or performed during the hospital encounter of 01/23/19   ANDRES, OTHER SOURCES   Result Value Ref Range    Special Requests: NO SPECIAL REQUESTS      KOH NO YEAST SEEN     WET PREP   Result Value Ref Range    Clue cells CLUE CELLS PRESENT      Wet prep NO TRICHOMONAS SEEN     CHLAMYDIA/GC PCR   Result Value Ref Range    Sample type SWAB      Source VAGINA      Chlamydia amplified NEGATIVE  NEG      N. gonorrhea, amplified NEGATIVE  NEG      Comment        Testing performed by the [employer] Jackie CT/NG method, utilizing PCR amplification to identify DNA of the pathogens. This method is not recommended as the sole method of evaluation of cases of sexual abuse nor for other medico-legal indications. CULTURE, URINE   Result Value Ref Range    Special Requests: NO SPECIAL REQUESTS  Reflexed from [id number]        [place] Count [id number]  COLONIES/mL        Culture result: MIXED UROGENITAL GEORGE ISOLATED     URINALYSIS W/ REFLEX CULTURE   Result Value Ref Range    Color DARK YELLOW      Appearance CLOUDY (A) CLEAR      Specific gravity 1.025 1.003 - 1.030      pH (UA) 6.0 5.0 - 8.0      Protein TRACE (A) NEG mg/dL    Glucose NEGATIVE  NEG mg/dL    Ketone NEGATIVE  NEG mg/dL    Bilirubin NEGATIVE  NEG      Blood NEGATIVE  NEG      Urobilinogen 0.2 0.2 - 1.0 EU/dL    Nitrites NEGATIVE  NEG      Leukocyte Esterase LARGE (A) NEG      WBC 5-10 0 - 4 /hpf    RBC 5-10 0 - 5 /hpf    Epithelial cells MODERATE (A) FEW /lpf    Bacteria 2+ (A) NEG /hpf    UA:UC IF INDICATED URINE CULTURE ORDERED (A) CNI     CBC WITH AUTOMATED DIFF   Result Value Ref Range    WBC 10.5 3.6 - 11.0 K/uL    RBC 4.89 3.80 - 5.20 M/uL    HGB 11.6 11.5 - 16.0 g/dL    HCT 37.1 35.0 - 47.0 %    MCV 75.9 (L) 80.0 - 99.0 FL    MCH 23.7 (L) 26.0 - 34.0 PG    MCHC 31.3 30.0 - 36.5 g/dL    RDW 14.8 (H) 11.5 - 14.5 %    PLATELET 110 548 - 896 K/uL    MPV 10.8 8.9 - 12.9 FL    NRBC 0.0 0  WBC    ABSOLUTE NRBC 0.00 0.00 - 0.01 K/uL    NEUTROPHILS 67 32 - 75 %    LYMPHOCYTES 24 12 - 49 %    MONOCYTES 6 5 - 13 %    EOSINOPHILS 2 0 - 7 %    BASOPHILS 0 0 - 1 %    IMMATURE GRANULOCYTES 0 0.0 - 0.5 %    ABS. NEUTROPHILS 7.1 1.8 - 8.0 K/UL    ABS. LYMPHOCYTES 2.5 0.8 - 3.5 K/UL    ABS. MONOCYTES 0.6 0.0 - 1.0 K/UL    ABS. EOSINOPHILS 0.2 0.0 - 0.4 K/UL    ABS. BASOPHILS 0.0 0.0 - 0.1 K/UL    ABS. IMM.  GRANS. 0.0 0.00 - 0.04 K/UL    DF AUTOMATED     METABOLIC PANEL, COMPREHENSIVE   Result Value Ref Range    Sodium 139 136 - 145 mmol/L    Potassium 3.9 3.5 - 5.1 mmol/L Chloride 103 97 - 108 mmol/L    CO2 27 21 - 32 mmol/L    Anion gap 9 5 - 15 mmol/L    Glucose 86 65 - 100 mg/dL    BUN 12 6 - 20 MG/DL    Creatinine 0.88 0.55 - 1.02 MG/DL    BUN/Creatinine ratio 14 12 - 20      GFR est AA >60 >60 ml/min/1.73m2    GFR est non-AA >60 >60 ml/min/1.73m2    Calcium 9.0 8.5 - 10.1 MG/DL    Bilirubin, total 0.7 0.2 - 1.0 MG/DL    ALT (SGPT) 57 12 - 78 U/L    AST (SGOT) 32 15 - 37 U/L    Alk. phosphatase 93 45 - 117 U/L    Protein, total 7.3 6.4 - 8.2 g/dL    Albumin 3.3 (L) 3.5 - 5.0 g/dL    Globulin 4.0 2.0 - 4.0 g/dL    A-G Ratio 0.8 (L) 1.1 - 2.2     LIPASE   Result Value Ref Range    Lipase 84 73 - 393 U/L   HCG URINE, QL. - POC   Result Value Ref Range    Pregnancy test,urine (POC) NEGATIVE  NEG         Assessment/Plan:    ICD-10-CM ICD-9-CM    1. Essential hypertension I10 401.9    2. Obesity, morbid ([address] E66.01 278.01    3. Moderate episode of recurrent major depressive disorder (HCC) F33.1 296.32      No orders of the defined types were placed in this encounter. 1. Essential hypertension  Controlled on current regimen    2. Obesity, morbid ([address]  F/u 1 mth weight check    3. Moderate episode of recurrent major depressive disorder ([address]  Cont meds and psych f/u            There are no Patient Instructions on file for this visit. Follow-up Disposition:  Return in about 4 weeks (around 3/6/2019) for weight check. I have reviewed with the patient details of the assessment and plan and all questions were answered. Relevent patient education was performed. The most recent lab findings were reviewed with the patient. An After Visit Summary was printed and given to the patient.

## 2019-02-06 NOTE — PATIENT INSTRUCTIONS
Eating Healthy Foods: Care Instructions  Your Care Instructions    Eating healthy foods can help lower your risk for disease. Healthy food gives you energy and keeps your heart strong, your brain active, your muscles working, and your bones strong. A healthy diet includes a variety of foods from the basic food groups: grains, vegetables, fruits, milk and milk products, and meat and beans. Some people may eat more of their favorite foods from only one food group and, as a result, miss getting the nutrients they need. So, it is important to pay attention not only to what you eat but also to what you are missing from your diet. You can eat a healthy, balanced diet by making a few small changes. Follow-up care is a key part of your treatment and safety. Be sure to make and go to all appointments, and call your doctor if you are having problems. It's also a good idea to know your test results and keep a list of the medicines you take. How can you care for yourself at home? Look at what you eat  · Keep a food diary for a week or two and record everything you eat or drink. Track the number of servings you eat from each food group. · For a balanced diet every day, eat a variety of:  ? 6 or more ounce-equivalents of grains, such as cereals, breads, crackers, rice, or pasta, every day. An ounce-equivalent is 1 slice of bread, 1 cup of ready-to-eat cereal, or ½ cup of cooked rice, cooked pasta, or cooked cereal.  ? 2½ cups of vegetables, especially:  § Dark-green vegetables such as broccoli and spinach. § Orange vegetables such as carrots and sweet potatoes. § Dry beans (such as rodriguez and kidney beans) and peas (such as lentils). ? 2 cups of fresh, frozen, or canned fruit. A small apple or 1 banana or orange equals 1 cup. ? 3 cups of nonfat or low-fat milk, yogurt, or other milk products. ? 5½ ounces of meat and beans, such as chicken, fish, lean meat, beans, nuts, and seeds.  One egg, 1 tablespoon of peanut butter, ½ ounce nuts or seeds, or ¼ cup of cooked beans equals 1 ounce of meat. · Learn how to read food labels for serving sizes and ingredients. Fast-food and convenience-food meals often contain few or no fruits or vegetables. Make sure you eat some fruits and vegetables to make the meal more nutritious. · Look at your food diary. For each food group, add up what you have eaten and then divide the total by the number of days. This will give you an idea of how much you are eating from each food group. See if you can find some ways to change your diet to make it more healthy. Start small  · Do not try to make dramatic changes to your diet all at once. You might feel that you are missing out on your favorite foods and then be more likely to fail. · Start slowly, and gradually change your habits. Try some of the following:  ? Use whole wheat bread instead of white bread. ? Use nonfat or low-fat milk instead of whole milk. ? Eat brown rice instead of white rice, and eat whole wheat pasta instead of white-flour pasta. ? Try low-fat cheeses and low-fat yogurt. ? Add more fruits and vegetables to meals and have them for snacks. ? Add lettuce, tomato, cucumber, and onion to sandwiches. ? Add fruit to yogurt and cereal.  Enjoy food  · You can still eat your favorite foods. You just may need to eat less of them. If your favorite foods are high in fat, salt, and sugar, limit how often you eat them, but do not cut them out entirely. · Eat a wide variety of foods. Make healthy choices when eating out  · The type of restaurant you choose can help you make healthy choices. Even fast-food chains are now offering more low-fat or healthier choices on the menu. · Choose smaller portions, or take half of your meal home. · When eating out, try:  ? A veggie pizza with a whole wheat crust or grilled chicken (instead of sausage or pepperoni).   ? Pasta with roasted vegetables, grilled chicken, or marinara sauce instead of cream sauce. ? A vegetable wrap or grilled chicken wrap. ? Broiled or poached food instead of fried or breaded items. Make healthy choices easy  · Buy packaged, prewashed, ready-to-eat fresh vegetables and fruits, such as baby carrots, salad mixes, and chopped or shredded broccoli and cauliflower. · Buy packaged, presliced fruits, such as melon or pineapple. · Choose 100% fruit or vegetable juice instead of soda. Limit juice intake to 4 to 6 oz (½ to ¾ cup) a day. · Blend low-fat yogurt, fruit juice, and canned or frozen fruit to make a smoothie for breakfast or a snack. Where can you learn more? Go to http://adelfo-paul.info/. Enter T756 in the search box to learn more about \"Eating Healthy Foods: Care Instructions. \"  Current as of: March 28, 2018  Content Version: 11.9  © 4047-5107 [employer], [address]. Care instructions adapted under license by [employer] (which disclaims liability or warranty for this information). If you have questions about a medical condition or this instruction, always ask your healthcare professional. [place] [address] any warranty or liability for your use of this information.

## 2019-03-06 ENCOUNTER — OFFICE VISIT (OUTPATIENT)
Dept: INTERNAL MEDICINE CLINIC | Age: 24
End: 2019-03-06

## 2019-03-06 VITALS
SYSTOLIC BLOOD PRESSURE: 129 MMHG | RESPIRATION RATE: 19 BRPM | HEART RATE: 86 BPM | WEIGHT: 293 LBS | OXYGEN SATURATION: 96 % | HEIGHT: 69 IN | TEMPERATURE: 98.2 F | BODY MASS INDEX: 43.4 KG/M2 | DIASTOLIC BLOOD PRESSURE: 84 MMHG

## 2019-03-06 DIAGNOSIS — F33.1 MODERATE EPISODE OF RECURRENT MAJOR DEPRESSIVE DISORDER (HCC): ICD-10-CM

## 2019-03-06 DIAGNOSIS — E66.01 OBESITY, MORBID (HCC): ICD-10-CM

## 2019-03-06 DIAGNOSIS — I10 ESSENTIAL HYPERTENSION: Primary | ICD-10-CM

## 2019-03-06 DIAGNOSIS — G43.909 MIGRAINE WITHOUT STATUS MIGRAINOSUS, NOT INTRACTABLE, UNSPECIFIED MIGRAINE TYPE: ICD-10-CM

## 2019-03-06 NOTE — PROGRESS NOTES
Chief Complaint   Patient presents with    Hypertension     Pt missed some BP doses       1. Have you been to the ER, urgent care clinic since your last visit? Hospitalized since your last visit? No    2. Have you seen or consulted any other health care providers outside of the [address] [place] [address] since your last visit? Include any pap smears or colon screening.  No

## 2019-03-06 NOTE — PROGRESS NOTES
Katelyn Caballero is a 21 y.o. female and presents with Hypertension (Pt missed some BP doses)  . Subjective:  Pt is Not following a heathy diet (she is currently drinking a high caffeine coffee drink \"monster\")  Pt has a new job and has worked ~ 60 hrs this week  Pt w c/o nadeen le edema at the end of the day, which resolved w elevation    Pt is s/p EMG-NML. No neuropathy. XR ls spine NML    PMH-Morbid obesity-pt has established w bariatric surgery and is participating      Wt Readings from Last 3 Encounters:   03/06/19 335 lb (152 kg)   02/06/19 335 lb (152 kg)   02/04/19 332 lb (150.6 kg)        HTN-pt relays she has been taking her bp med (amlodipine), w/o SE     BP Readings from Last 3 Encounters:   03/06/19 129/84   02/06/19 135/88   02/04/19 (!) 150/100        Major depression/anxiety/PTSD-pt has established w psychiatry here      Chronic headaches-dx'ed as rebound headaches by neurology   Pt has NOT f/u since 9/17 w Dr. Ashok Goltz etiology   -prob fibromyalgia   Recommend wt loss and daily exercise         Review of Systems  Constitutional: negative for fevers, chills, anorexia and weight loss  Eyes:   negative for visual disturbance and irritation  ENT:   negative for tinnitus,sore throat,nasal congestion,ear pains. hoarseness  Respiratory:  negative for cough, hemoptysis, dyspnea,wheezing  CV:   negative for chest pain, palpitations, lower extremity edema  GI:   positive for abdominal pain  Musculoskel: positive for myalgias  Neurological:  negative for headaches, dizziness, vertigo, memory problems and gait   Behavl/Psych: positive for feelings of anxiety, depression, mood changes    Past Medical History:   Diagnosis Date    Acute hepatitis 4/7/2014    Acute pharyngitis 1/22/2010    Acute sinusitis 1/12/2009    Anemia NEC     with pregnancy    Anxiety     Backache, unspecified 1/04/4809    Complication of anesthesia     difficulty waking after surgery (pt reported)    Depression 2018    Essential hypertension     Before pregnancy    Fatty liver     Flu 2009    GERD (gastroesophageal reflux disease) 2012    Gestational hypertension     HX OTHER MEDICAL     GERD with pregnancy    HX OTHER MEDICAL     Migraines worse with pregnancy    Hypertension     IGT (impaired glucose tolerance) 2012    Migraine 3/22/2011    Mononucleosis 2010    Morbid obesity ([facility] [address]     Musculoskeletal disorder     PTSD (post-traumatic stress disorder)      Past Surgical History:   Procedure Laterality Date    HX  SECTION      HX CHOLECYSTECTOMY      HX TONSILLECTOMY      HX WISDOM TEETH EXTRACTION  2013    Dr. Newton Watson     Social History     Socioeconomic History    Marital status: SINGLE     Spouse name: Not on file    Number of children: 1    Years of education: 9    Highest education level: Not on file   Occupational History    Occupation: Does not work, \"pulled out of work due to high risk pregnancy\"     Employer: NOT EMPLOYED     Comment: grad from [employer]   Tobacco Use    Smoking status: Current Every Day Smoker     Packs/day: 0.50     Types: Cigarettes    Smokeless tobacco: Never Used   Substance and Sexual Activity    Alcohol use: Yes     Frequency: 2-4 times a month     Comment: 1 drink per week    Drug use: No    Sexual activity: Yes     Partners: Female     Birth control/protection: Inserts   Social History Narrative    Lives in [facility]     9and 3year old daughters     ; wife with 2 step sons and daughter (ages 10, 1 and 5 months)      Family History   Problem Relation Age of Onset    Hypertension Mother     Psychiatric Disorder Mother     Alcohol abuse Father     Psychiatric Disorder Father     Psychiatric Disorder Sister     Diabetes Maternal Grandmother     Headache Maternal Grandmother      Current Outpatient Medications   Medication Sig Dispense Refill    butalbital-acetaminophen-caff (FIORICET) -40 mg per capsule TK 1 C PO Q 6 TO 8 H UTD PRF HA  3    traZODone (DESYREL) 50 mg tablet TK 1 T PO QHS  3    escitalopram oxalate (LEXAPRO) 10 mg tablet Take 100 mg by mouth daily. 3    amLODIPine (NORVASC) 10 mg tablet Take 1 Tab by mouth daily. 60 Tab 3    DULoxetine (CYMBALTA) 30 mg capsule Take 1 Cap by mouth daily. 30 Cap 0     Allergies   Allergen Reactions    Latex Hives    Compazine [Prochlorperazine Edisylate] Anxiety    Acetaminophen Other (comments)     Pt has liver disease    Aspirin Other (comments)     Patient states \"I have liver disease\"    Celexa [Citalopram] Palpitations    Tramadol Hives and Anxiety    Wellbutrin [Bupropion Hcl] Palpitations       Objective:  Visit Vitals  /84 (BP 1 Location: Left arm, BP Patient Position: Sitting)   Pulse 86   Temp 98.2 °F (36.8 °C) (Oral)   Resp 19   Ht 5' 9\" (1.753 m)   Wt 335 lb (152 kg)   LMP 03/01/2019 (Approximate)   SpO2 96%   BMI 49.47 kg/m²     Physical Exam:   General appearance - alert, pleasant, obese young lady in NAD  Mental status - alert, oriented to person, place, and time  EYE-EOMI  Mouth - mucous membranes moist, pharynx normal without lesions  Neck - supple, no significant adenopathy   Chest - clear to auscultation, no wheezes, rales or rhonchi, symmetric air entry   Heart - normal rate, regular rhythm, normal S1, S2  Abdomen - soft, nontender, obese  Ext-peripheral pulses normal, no pedal edema, no clubbing or cyanosis  Skin-Warm and dry.  no hyperpigmentation, vitiligo, or suspicious lesions  Neuro -alert, oriented, normal speech, no focal findings or movement disorder noted        Results for orders placed or performed during the hospital encounter of 01/23/19   ANDRES, OTHER SOURCES   Result Value Ref Range    Special Requests: NO SPECIAL REQUESTS      KOH NO YEAST SEEN     WET PREP   Result Value Ref Range    Clue cells CLUE CELLS PRESENT      Wet prep NO TRICHOMONAS SEEN     CHLAMYDIA/GC PCR   Result Value Ref Range    Sample type SWAB      Source VAGINA      Chlamydia amplified NEGATIVE  NEG      N. gonorrhea, amplified NEGATIVE  NEG      Comment        Testing performed by the [employer] Jackie CT/NG method, utilizing PCR amplification to identify DNA of the pathogens. This method is not recommended as the sole method of evaluation of cases of sexual abuse nor for other medico-legal indications. CULTURE, URINE   Result Value Ref Range    Special Requests: NO SPECIAL REQUESTS  Reflexed from [id number]        [place] Count [id number]  COLONIES/mL        Culture result: MIXED UROGENITAL GEORGE ISOLATED     URINALYSIS W/ REFLEX CULTURE   Result Value Ref Range    Color DARK YELLOW      Appearance CLOUDY (A) CLEAR      Specific gravity 1.025 1.003 - 1.030      pH (UA) 6.0 5.0 - 8.0      Protein TRACE (A) NEG mg/dL    Glucose NEGATIVE  NEG mg/dL    Ketone NEGATIVE  NEG mg/dL    Bilirubin NEGATIVE  NEG      Blood NEGATIVE  NEG      Urobilinogen 0.2 0.2 - 1.0 EU/dL    Nitrites NEGATIVE  NEG      Leukocyte Esterase LARGE (A) NEG      WBC 5-10 0 - 4 /hpf    RBC 5-10 0 - 5 /hpf    Epithelial cells MODERATE (A) FEW /lpf    Bacteria 2+ (A) NEG /hpf    UA:UC IF INDICATED URINE CULTURE ORDERED (A) CNI     CBC WITH AUTOMATED DIFF   Result Value Ref Range    WBC 10.5 3.6 - 11.0 K/uL    RBC 4.89 3.80 - 5.20 M/uL    HGB 11.6 11.5 - 16.0 g/dL    HCT 37.1 35.0 - 47.0 %    MCV 75.9 (L) 80.0 - 99.0 FL    MCH 23.7 (L) 26.0 - 34.0 PG    MCHC 31.3 30.0 - 36.5 g/dL    RDW 14.8 (H) 11.5 - 14.5 %    PLATELET 444 566 - 189 K/uL    MPV 10.8 8.9 - 12.9 FL    NRBC 0.0 0  WBC    ABSOLUTE NRBC 0.00 0.00 - 0.01 K/uL    NEUTROPHILS 67 32 - 75 %    LYMPHOCYTES 24 12 - 49 %    MONOCYTES 6 5 - 13 %    EOSINOPHILS 2 0 - 7 %    BASOPHILS 0 0 - 1 %    IMMATURE GRANULOCYTES 0 0.0 - 0.5 %    ABS. NEUTROPHILS 7.1 1.8 - 8.0 K/UL    ABS. LYMPHOCYTES 2.5 0.8 - 3.5 K/UL    ABS. MONOCYTES 0.6 0.0 - 1.0 K/UL    ABS. EOSINOPHILS 0.2 0.0 - 0.4 K/UL    ABS. BASOPHILS 0.0 0.0 - 0.1 K/UL    ABS. IMM.  GRANS. 0.0 0.00 - 0.04 K/UL    DF AUTOMATED     METABOLIC PANEL, COMPREHENSIVE   Result Value Ref Range    Sodium 139 136 - 145 mmol/L    Potassium 3.9 3.5 - 5.1 mmol/L    Chloride 103 97 - 108 mmol/L    CO2 27 21 - 32 mmol/L    Anion gap 9 5 - 15 mmol/L    Glucose 86 65 - 100 mg/dL    BUN 12 6 - 20 MG/DL    Creatinine 0.88 0.55 - 1.02 MG/DL    BUN/Creatinine ratio 14 12 - 20      GFR est AA >60 >60 ml/min/1.73m2    GFR est non-AA >60 >60 ml/min/1.73m2    Calcium 9.0 8.5 - 10.1 MG/DL    Bilirubin, total 0.7 0.2 - 1.0 MG/DL    ALT (SGPT) 57 12 - 78 U/L    AST (SGOT) 32 15 - 37 U/L    Alk. phosphatase 93 45 - 117 U/L    Protein, total 7.3 6.4 - 8.2 g/dL    Albumin 3.3 (L) 3.5 - 5.0 g/dL    Globulin 4.0 2.0 - 4.0 g/dL    A-G Ratio 0.8 (L) 1.1 - 2.2     LIPASE   Result Value Ref Range    Lipase 84 73 - 393 U/L   HCG URINE, QL. - POC   Result Value Ref Range    Pregnancy test,urine (POC) NEGATIVE  NEG         Assessment/Plan:    ICD-10-CM ICD-9-CM    1. Essential hypertension I10 401.9    2. Obesity, morbid ([facility] [address] E66.01 278.01    3. Migraine without status migrainosus, not intractable, unspecified migraine type G43.909 346.90    4. Moderate episode of recurrent major depressive disorder (HCC) F33.1 296.32      No orders of the defined types were placed in this encounter. 1. Essential hypertension  Controlled on current regimen    2. Obesity, morbid ([address]  Pt will contact bariatric clinic as she has not heard back from them    3. Migraine without status migrainosus, not intractable, unspecified migraine type  F/u neuro    4. Moderate episode of recurrent major depressive disorder (HCC)  F/u psych  Cont current regimen              Patient Instructions        Eating Healthy Foods: Care Instructions  Your Care Instructions    Eating healthy foods can help lower your risk for disease. Healthy food gives you energy and keeps your heart strong, your brain active, your muscles working, and your bones strong.   A healthy diet includes a variety of foods from the basic food groups: grains, vegetables, fruits, milk and milk products, and meat and beans. Some people may eat more of their favorite foods from only one food group and, as a result, miss getting the nutrients they need. So, it is important to pay attention not only to what you eat but also to what you are missing from your diet. You can eat a healthy, balanced diet by making a few small changes. Follow-up care is a key part of your treatment and safety. Be sure to make and go to all appointments, and call your doctor if you are having problems. It's also a good idea to know your test results and keep a list of the medicines you take. How can you care for yourself at home? Look at what you eat  · Keep a food diary for a week or two and record everything you eat or drink. Track the number of servings you eat from each food group. · For a balanced diet every day, eat a variety of:  ? 6 or more ounce-equivalents of grains, such as cereals, breads, crackers, rice, or pasta, every day. An ounce-equivalent is 1 slice of bread, 1 cup of ready-to-eat cereal, or ½ cup of cooked rice, cooked pasta, or cooked cereal.  ? 2½ cups of vegetables, especially:  § Dark-green vegetables such as broccoli and spinach. § Orange vegetables such as carrots and sweet potatoes. § Dry beans (such as rodriguez and kidney beans) and peas (such as lentils). ? 2 cups of fresh, frozen, or canned fruit. A small apple or 1 banana or orange equals 1 cup. ? 3 cups of nonfat or low-fat milk, yogurt, or other milk products. ? 5½ ounces of meat and beans, such as chicken, fish, lean meat, beans, nuts, and seeds. One egg, 1 tablespoon of peanut butter, ½ ounce nuts or seeds, or ¼ cup of cooked beans equals 1 ounce of meat. · Learn how to read food labels for serving sizes and ingredients. Fast-food and convenience-food meals often contain few or no fruits or vegetables.  Make sure you eat some fruits and vegetables to make the meal more nutritious. · Look at your food diary. For each food group, add up what you have eaten and then divide the total by the number of days. This will give you an idea of how much you are eating from each food group. See if you can find some ways to change your diet to make it more healthy. Start small  · Do not try to make dramatic changes to your diet all at once. You might feel that you are missing out on your favorite foods and then be more likely to fail. · Start slowly, and gradually change your habits. Try some of the following:  ? Use whole wheat bread instead of white bread. ? Use nonfat or low-fat milk instead of whole milk. ? Eat brown rice instead of white rice, and eat whole wheat pasta instead of white-flour pasta. ? Try low-fat cheeses and low-fat yogurt. ? Add more fruits and vegetables to meals and have them for snacks. ? Add lettuce, tomato, cucumber, and onion to sandwiches. ? Add fruit to yogurt and cereal.  Enjoy food  · You can still eat your favorite foods. You just may need to eat less of them. If your favorite foods are high in fat, salt, and sugar, limit how often you eat them, but do not cut them out entirely. · Eat a wide variety of foods. Make healthy choices when eating out  · The type of restaurant you choose can help you make healthy choices. Even fast-food chains are now offering more low-fat or healthier choices on the menu. · Choose smaller portions, or take half of your meal home. · When eating out, try:  ? A veggie pizza with a whole wheat crust or grilled chicken (instead of sausage or pepperoni). ? Pasta with roasted vegetables, grilled chicken, or marinara sauce instead of cream sauce. ? A vegetable wrap or grilled chicken wrap. ? Broiled or poached food instead of fried or breaded items.   Make healthy choices easy  · Buy packaged, prewashed, ready-to-eat fresh vegetables and fruits, such as baby carrots, salad mixes, and chopped or shredded broccoli and cauliflower. · Buy packaged, presliced fruits, such as melon or pineapple. · Choose 100% fruit or vegetable juice instead of soda. Limit juice intake to 4 to 6 oz (½ to ¾ cup) a day. · Blend low-fat yogurt, fruit juice, and canned or frozen fruit to make a smoothie for breakfast or a snack. Where can you learn more? Go to http://adelfo-paul.info/. Enter T756 in the search box to learn more about \"Eating Healthy Foods: Care Instructions. \"  Current as of: March 28, 2018  Content Version: 11.9  © 0161-6847 [employer]. Care instructions adapted under license by [employer] (which disclaims liability or warranty for this information). If you have questions about a medical condition or this instruction, always ask your healthcare professional. [facility] [address] any warranty or liability for your use of this information. Follow-up Disposition:  Return in about 4 weeks (around 4/3/2019) for bp and weight check. I have reviewed with the patient details of the assessment and plan and all questions were answered. Relevent patient education was performed. The most recent lab findings were reviewed with the patient. An After Visit Summary was printed and given to the patient.

## 2019-03-06 NOTE — PATIENT INSTRUCTIONS
Eating Healthy Foods: Care Instructions  Your Care Instructions    Eating healthy foods can help lower your risk for disease. Healthy food gives you energy and keeps your heart strong, your brain active, your muscles working, and your bones strong. A healthy diet includes a variety of foods from the basic food groups: grains, vegetables, fruits, milk and milk products, and meat and beans. Some people may eat more of their favorite foods from only one food group and, as a result, miss getting the nutrients they need. So, it is important to pay attention not only to what you eat but also to what you are missing from your diet. You can eat a healthy, balanced diet by making a few small changes. Follow-up care is a key part of your treatment and safety. Be sure to make and go to all appointments, and call your doctor if you are having problems. It's also a good idea to know your test results and keep a list of the medicines you take. How can you care for yourself at home? Look at what you eat  · Keep a food diary for a week or two and record everything you eat or drink. Track the number of servings you eat from each food group. · For a balanced diet every day, eat a variety of:  ? 6 or more ounce-equivalents of grains, such as cereals, breads, crackers, rice, or pasta, every day. An ounce-equivalent is 1 slice of bread, 1 cup of ready-to-eat cereal, or ½ cup of cooked rice, cooked pasta, or cooked cereal.  ? 2½ cups of vegetables, especially:  § Dark-green vegetables such as broccoli and spinach. § Orange vegetables such as carrots and sweet potatoes. § Dry beans (such as rodriguez and kidney beans) and peas (such as lentils). ? 2 cups of fresh, frozen, or canned fruit. A small apple or 1 banana or orange equals 1 cup. ? 3 cups of nonfat or low-fat milk, yogurt, or other milk products. ? 5½ ounces of meat and beans, such as chicken, fish, lean meat, beans, nuts, and seeds.  One egg, 1 tablespoon of peanut butter, ½ ounce nuts or seeds, or ¼ cup of cooked beans equals 1 ounce of meat. · Learn how to read food labels for serving sizes and ingredients. Fast-food and convenience-food meals often contain few or no fruits or vegetables. Make sure you eat some fruits and vegetables to make the meal more nutritious. · Look at your food diary. For each food group, add up what you have eaten and then divide the total by the number of days. This will give you an idea of how much you are eating from each food group. See if you can find some ways to change your diet to make it more healthy. Start small  · Do not try to make dramatic changes to your diet all at once. You might feel that you are missing out on your favorite foods and then be more likely to fail. · Start slowly, and gradually change your habits. Try some of the following:  ? Use whole wheat bread instead of white bread. ? Use nonfat or low-fat milk instead of whole milk. ? Eat brown rice instead of white rice, and eat whole wheat pasta instead of white-flour pasta. ? Try low-fat cheeses and low-fat yogurt. ? Add more fruits and vegetables to meals and have them for snacks. ? Add lettuce, tomato, cucumber, and onion to sandwiches. ? Add fruit to yogurt and cereal.  Enjoy food  · You can still eat your favorite foods. You just may need to eat less of them. If your favorite foods are high in fat, salt, and sugar, limit how often you eat them, but do not cut them out entirely. · Eat a wide variety of foods. Make healthy choices when eating out  · The type of restaurant you choose can help you make healthy choices. Even fast-food chains are now offering more low-fat or healthier choices on the menu. · Choose smaller portions, or take half of your meal home. · When eating out, try:  ? A veggie pizza with a whole wheat crust or grilled chicken (instead of sausage or pepperoni).   ? Pasta with roasted vegetables, grilled chicken, or marinara sauce instead of cream sauce. ? A vegetable wrap or grilled chicken wrap. ? Broiled or poached food instead of fried or breaded items. Make healthy choices easy  · Buy packaged, prewashed, ready-to-eat fresh vegetables and fruits, such as baby carrots, salad mixes, and chopped or shredded broccoli and cauliflower. · Buy packaged, presliced fruits, such as melon or pineapple. · Choose 100% fruit or vegetable juice instead of soda. Limit juice intake to 4 to 6 oz (½ to ¾ cup) a day. · Blend low-fat yogurt, fruit juice, and canned or frozen fruit to make a smoothie for breakfast or a snack. Where can you learn more? Go to http://adelfo-paul.info/. Enter T756 in the search box to learn more about \"Eating Healthy Foods: Care Instructions. \"  Current as of: March 28, 2018  Content Version: 11.9  © 2585-6528 [employer]. Care instructions adapted under license by [employer] (which disclaims liability or warranty for this information). If you have questions about a medical condition or this instruction, always ask your healthcare professional. [place] [address] any warranty or liability for your use of this information.

## 2019-07-19 ENCOUNTER — OFFICE VISIT (OUTPATIENT)
Dept: INTERNAL MEDICINE CLINIC | Age: 24
End: 2019-07-19

## 2019-07-19 VITALS
TEMPERATURE: 98 F | DIASTOLIC BLOOD PRESSURE: 92 MMHG | SYSTOLIC BLOOD PRESSURE: 132 MMHG | BODY MASS INDEX: 43.4 KG/M2 | OXYGEN SATURATION: 95 % | HEART RATE: 61 BPM | RESPIRATION RATE: 19 BRPM | HEIGHT: 69 IN | WEIGHT: 293 LBS

## 2019-07-19 DIAGNOSIS — M79.604 PAIN OF RIGHT LOWER EXTREMITY: ICD-10-CM

## 2019-07-19 DIAGNOSIS — K21.9 GASTROESOPHAGEAL REFLUX DISEASE WITHOUT ESOPHAGITIS: ICD-10-CM

## 2019-07-19 DIAGNOSIS — I10 ESSENTIAL HYPERTENSION: Primary | ICD-10-CM

## 2019-07-19 RX ORDER — RANITIDINE 300 MG/1
300 TABLET ORAL
Qty: 30 TAB | Refills: 3 | Status: SHIPPED | OUTPATIENT
Start: 2019-07-19 | End: 2019-11-04 | Stop reason: SDUPTHER

## 2019-07-19 RX ORDER — CHLORTHALIDONE 25 MG/1
25 TABLET ORAL DAILY
Qty: 30 TAB | Refills: 5 | Status: SHIPPED | OUTPATIENT
Start: 2019-07-19 | End: 2020-01-02 | Stop reason: SDUPTHER

## 2019-07-19 RX ORDER — CYCLOBENZAPRINE HCL 10 MG
10 TABLET ORAL
Qty: 30 TAB | Refills: 2 | Status: SHIPPED | OUTPATIENT
Start: 2019-07-19

## 2019-07-19 NOTE — PROGRESS NOTES
Chief Complaint   Patient presents with    Hypertension    Weight Management     1. Have you been to the ER, urgent care clinic since your last visit? Hospitalized since your last visit? No    2. Have you seen or consulted any other health care providers outside of the [address] [place] [address] since your last visit? Include any pap smears or colon screening.  No

## 2019-07-19 NOTE — PROGRESS NOTES
Halima Guerra is a 25 y.o. female and presents with Hypertension and Weight Management  . Subjective:  Pt w c/o rt leg pain @ bedtime. Feels like a cramp    Pt is s/p EMG-NML. No neuropathy. XR ls spine NML    PMH-Morbid obesity-pt has not f/u w bariatric clinic     Wt Readings from Last 3 Encounters:   07/19/19 327 lb (148.3 kg)   03/06/19 335 lb (152 kg)   02/06/19 335 lb (152 kg)        HTN-pt relays she has been taking her bp med      BP Readings from Last 3 Encounters:   07/19/19 (!) 132/92   03/06/19 129/84   02/06/19 135/88        Major depression/anxiety/PTSD-pt will be establishing w psych @ [facility]      Chronic headaches-dx'ed as rebound headaches by neurology   Pt has NOT f/u since 9/17 w Dr. Wil Miller etiology    -w/u neg    Review of Systems  Constitutional: negative for fevers, chills, anorexia and weight loss  Eyes:   negative for visual disturbance and irritation  ENT:   negative for tinnitus,sore throat,nasal congestion,ear pains. hoarseness  Respiratory:  negative for cough, hemoptysis, dyspnea,wheezing  CV:   negative for chest pain, palpitations, lower extremity edema  GI:   positive for abdominal pain  Musculoskel: positive for myalgias  Neurological:  negative for headaches, dizziness, vertigo, memory problems and gait   Behavl/Psych: positive for feelings of anxiety, depression, mood changes    Past Medical History:   Diagnosis Date    Acute hepatitis 4/7/2014    Acute pharyngitis 1/22/2010    Acute sinusitis 1/12/2009    Anemia NEC     with pregnancy    Anxiety     Backache, unspecified 5/75/8261    Complication of anesthesia     difficulty waking after surgery (pt reported)    Depression 2/14/2018    Essential hypertension     Before pregnancy    Fatty liver     Flu 11/11/2009    GERD (gastroesophageal reflux disease) 7/31/2012    Gestational hypertension     HX OTHER MEDICAL     GERD with pregnancy    HX OTHER MEDICAL     Migraines worse with pregnancy    Hypertension     IGT (impaired glucose tolerance) 2012    Migraine 3/22/2011    Mononucleosis 2010    Morbid obesity (HCC)     Musculoskeletal disorder     PTSD (post-traumatic stress disorder)      Past Surgical History:   Procedure Laterality Date    HX  SECTION      HX CHOLECYSTECTOMY      HX TONSILLECTOMY      HX WISDOM TEETH EXTRACTION  2013    Dr. Rosalie Nuñez History     Socioeconomic History    Marital status: SINGLE     Spouse name: Not on file    Number of children: 1    Years of education: 9    Highest education level: Not on file   Occupational History    Occupation: Does not work, \"pulled out of work due to high risk pregnancy\"     Employer: NOT EMPLOYED     [place] [id number]   Tobacco Use    Smoking status: Current Every Day Smoker     Packs/day: 0.50     Types: Cigarettes    Smokeless tobacco: Never Used   Substance and Sexual Activity    Alcohol use: Yes     Frequency: 2-4 times a month     Comment: 1 drink per week    Drug use: No    Sexual activity: Yes     Partners: Female     Birth control/protection: Inserts   Social History Narrative    Lives in [place]     9and 3year old daughters     ; wife with 2 step sons and daughter (ages 10, 1 and 5 months)      Family History   Problem Relation Age of Onset    Hypertension Mother     Psychiatric Disorder Mother     Alcohol abuse Father     Psychiatric Disorder Father     Psychiatric Disorder Sister     Diabetes Maternal Grandmother     Headache Maternal Grandmother      Current Outpatient Medications   Medication Sig Dispense Refill    cyclobenzaprine (FLEXERIL) 10 mg tablet Take 1 Tab by mouth nightly. 30 Tab 2    chlorthalidone (HYGROTEN) 25 mg tablet Take 1 Tab by mouth daily. 30 Tab 5    raNITIdine (ZANTAC) 300 mg tab Take 1 Tab by mouth nightly.  30 Tab 3    butalbital-acetaminophen-caff (FIORICET) -40 mg per capsule TK 1 C PO Q 6 TO 8 H UTD PRF HA  3    traZODone (DESYREL) 50 mg tablet TK 1 T PO QHS  3    escitalopram oxalate (LEXAPRO) 10 mg tablet Take 100 mg by mouth daily. 3    amLODIPine (NORVASC) 10 mg tablet Take 1 Tab by mouth daily. 60 Tab 3    DULoxetine (CYMBALTA) 30 mg capsule Take 1 Cap by mouth daily. 30 Cap 0     Allergies   Allergen Reactions    Latex Hives    Compazine [Prochlorperazine Edisylate] Anxiety    Acetaminophen Other (comments)     Pt has liver disease    Aspirin Other (comments)     Patient states \"I have liver disease\"    Celexa [Citalopram] Palpitations    Tramadol Hives and Anxiety    Wellbutrin [Bupropion Hcl] Palpitations       Objective:  Visit Vitals  BP (!) 132/92 (BP 1 Location: Left arm, BP Patient Position: Sitting)   Pulse 61   Temp 98 °F (36.7 °C) (Oral)   Resp 19   Ht 5' 9\" (1.753 m)   Wt 327 lb (148.3 kg)   LMP 07/09/2019 (Exact Date)   SpO2 95%   BMI 48.29 kg/m²     Physical Exam:   General appearance - alert, pleasant, obese young lady in NAD  Mental status - alert, oriented to person, place, and time  EYE-EOMI  Mouth - mucous membranes moist, pharynx normal without lesions  Neck - supple, no significant adenopathy   Chest - clear to auscultation, no wheezes, rales or rhonchi, symmetric air entry   Heart - normal rate, regular rhythm, normal S1, S2  Abdomen - soft, nontender, obese  Ext-peripheral pulses normal, no pedal edema, no clubbing or cyanosis  Skin-Warm and dry.  no hyperpigmentation, vitiligo, or suspicious lesions  Neuro -alert, oriented, normal speech, no focal findings or movement disorder noted        Results for orders placed or performed during the hospital encounter of 01/23/19   ANDRES, OTHER SOURCES   Result Value Ref Range    Special Requests: NO SPECIAL REQUESTS      KOH NO YEAST SEEN     WET PREP   Result Value Ref Range    Clue cells CLUE CELLS PRESENT      Wet prep NO TRICHOMONAS SEEN     CHLAMYDIA/GC PCR   Result Value Ref Range    Sample type SWAB      Source VAGINA      Chlamydia amplified NEGATIVE  NEG      N. gonorrhea, amplified NEGATIVE  NEG      Comment        Testing performed by the [employer] Jackie CT/NG method, utilizing PCR amplification to identify DNA of the pathogens. This method is not recommended as the sole method of evaluation of cases of sexual abuse nor for other medico-legal indications. CULTURE, URINE   Result Value Ref Range    Special Requests: NO SPECIAL REQUESTS  Reflexed from [id number]        [place] Count [id number]  COLONIES/mL        Culture result: MIXED UROGENITAL GEORGE ISOLATED     URINALYSIS W/ REFLEX CULTURE   Result Value Ref Range    Color DARK YELLOW      Appearance CLOUDY (A) CLEAR      Specific gravity 1.025 1.003 - 1.030      pH (UA) 6.0 5.0 - 8.0      Protein TRACE (A) NEG mg/dL    Glucose NEGATIVE  NEG mg/dL    Ketone NEGATIVE  NEG mg/dL    Bilirubin NEGATIVE  NEG      Blood NEGATIVE  NEG      Urobilinogen 0.2 0.2 - 1.0 EU/dL    Nitrites NEGATIVE  NEG      Leukocyte Esterase LARGE (A) NEG      WBC 5-10 0 - 4 /hpf    RBC 5-10 0 - 5 /hpf    Epithelial cells MODERATE (A) FEW /lpf    Bacteria 2+ (A) NEG /hpf    UA:UC IF INDICATED URINE CULTURE ORDERED (A) CNI     CBC WITH AUTOMATED DIFF   Result Value Ref Range    WBC 10.5 3.6 - 11.0 K/uL    RBC 4.89 3.80 - 5.20 M/uL    HGB 11.6 11.5 - 16.0 g/dL    HCT 37.1 35.0 - 47.0 %    MCV 75.9 (L) 80.0 - 99.0 FL    MCH 23.7 (L) 26.0 - 34.0 PG    MCHC 31.3 30.0 - 36.5 g/dL    RDW 14.8 (H) 11.5 - 14.5 %    PLATELET 866 943 - 069 K/uL    MPV 10.8 8.9 - 12.9 FL    NRBC 0.0 0  WBC    ABSOLUTE NRBC 0.00 0.00 - 0.01 K/uL    NEUTROPHILS 67 32 - 75 %    LYMPHOCYTES 24 12 - 49 %    MONOCYTES 6 5 - 13 %    EOSINOPHILS 2 0 - 7 %    BASOPHILS 0 0 - 1 %    IMMATURE GRANULOCYTES 0 0.0 - 0.5 %    ABS. NEUTROPHILS 7.1 1.8 - 8.0 K/UL    ABS. LYMPHOCYTES 2.5 0.8 - 3.5 K/UL    ABS. MONOCYTES 0.6 0.0 - 1.0 K/UL    ABS. EOSINOPHILS 0.2 0.0 - 0.4 K/UL    ABS. BASOPHILS 0.0 0.0 - 0.1 K/UL    ABS. IMM.  GRANS. 0.0 0.00 - 0.04 K/UL    DF AUTOMATED METABOLIC PANEL, COMPREHENSIVE   Result Value Ref Range    Sodium 139 136 - 145 mmol/L    Potassium 3.9 3.5 - 5.1 mmol/L    Chloride 103 97 - 108 mmol/L    CO2 27 21 - 32 mmol/L    Anion gap 9 5 - 15 mmol/L    Glucose 86 65 - 100 mg/dL    BUN 12 6 - 20 MG/DL    Creatinine 0.88 0.55 - 1.02 MG/DL    BUN/Creatinine ratio 14 12 - 20      GFR est AA >60 >60 ml/min/1.73m2    GFR est non-AA >60 >60 ml/min/1.73m2    Calcium 9.0 8.5 - 10.1 MG/DL    Bilirubin, total 0.7 0.2 - 1.0 MG/DL    ALT (SGPT) 57 12 - 78 U/L    AST (SGOT) 32 15 - 37 U/L    Alk. phosphatase 93 45 - 117 U/L    Protein, total 7.3 6.4 - 8.2 g/dL    Albumin 3.3 (L) 3.5 - 5.0 g/dL    Globulin 4.0 2.0 - 4.0 g/dL    A-G Ratio 0.8 (L) 1.1 - 2.2     LIPASE   Result Value Ref Range    Lipase 84 73 - 393 U/L   HCG URINE, QL. - POC   Result Value Ref Range    Pregnancy test,urine (POC) NEGATIVE  NEG         Assessment/Plan:    ICD-10-CM ICD-9-CM    1. Pain of right lower extremity M79.604 729.5 cyclobenzaprine (FLEXERIL) 10 mg tablet   2. Essential hypertension I10 401.9 chlorthalidone (HYGROTEN) 25 mg tablet   3. Gastroesophageal reflux disease without esophagitis K21.9 530.81 raNITIdine (ZANTAC) 300 mg tab     Orders Placed This Encounter    cyclobenzaprine (FLEXERIL) 10 mg tablet     Sig: Take 1 Tab by mouth nightly. Dispense:  30 Tab     Refill:  2    chlorthalidone (HYGROTEN) 25 mg tablet     Sig: Take 1 Tab by mouth daily. Dispense:  30 Tab     Refill:  5     TAKES THE PLACE OF AMLODIPINE    raNITIdine (ZANTAC) 300 mg tab     Sig: Take 1 Tab by mouth nightly. Dispense:  30 Tab     Refill:  3   1. Essential hypertension  D/c amlodipine  - chlorthalidone (HYGROTEN) 25 mg tablet; Take 1 Tab by mouth daily. Dispense: 30 Tab; Refill: 5    2. Pain of right lower extremity  Trial muscle relax qhs  - cyclobenzaprine (FLEXERIL) 10 mg tablet; Take 1 Tab by mouth nightly. Dispense: 30 Tab; Refill: 2    3.  Gastroesophageal reflux disease without esophagitis  D/w pt dietary changes  - raNITIdine (ZANTAC) 300 mg tab; Take 1 Tab by mouth nightly. Dispense: 30 Tab; Refill: 3                There are no Patient Instructions on file for this visit. Follow-up and Dispositions    · Return in about 6 weeks (around 8/30/2019) for bp check on new med. I have reviewed with the patient details of the assessment and plan and all questions were answered. Relevent patient education was performed. The most recent lab findings were reviewed with the patient. An After Visit Summary was printed and given to the patient.

## 2019-08-05 ENCOUNTER — HOSPITAL ENCOUNTER (EMERGENCY)
Age: 24
Discharge: HOME | End: 2019-08-05
Attending: EMERGENCY MEDICINE
Payer: MEDICAID

## 2019-08-05 VITALS
HEART RATE: 96 BPM | WEIGHT: 293 LBS | BODY MASS INDEX: 41.95 KG/M2 | OXYGEN SATURATION: 97 % | DIASTOLIC BLOOD PRESSURE: 79 MMHG | TEMPERATURE: 98.4 F | RESPIRATION RATE: 16 BRPM | HEIGHT: 70 IN | SYSTOLIC BLOOD PRESSURE: 132 MMHG

## 2019-08-05 DIAGNOSIS — S39.012A STRAIN OF LUMBAR REGION, INITIAL ENCOUNTER: Primary | ICD-10-CM

## 2019-08-05 DIAGNOSIS — G89.29 CHRONIC PAIN OF RIGHT LOWER EXTREMITY: ICD-10-CM

## 2019-08-05 DIAGNOSIS — M79.604 CHRONIC PAIN OF RIGHT LOWER EXTREMITY: ICD-10-CM

## 2019-08-05 LAB
APPEARANCE UR: CLEAR
BACTERIA URNS QL MICRO: NEGATIVE /HPF
BILIRUB UR QL: NEGATIVE
COLOR UR: ABNORMAL
EPITH CASTS URNS QL MICRO: ABNORMAL /LPF
GLUCOSE UR STRIP.AUTO-MCNC: NEGATIVE MG/DL
HCG UR QL: NEGATIVE
HGB UR QL STRIP: NEGATIVE
KETONES UR QL STRIP.AUTO: NEGATIVE MG/DL
LEUKOCYTE ESTERASE UR QL STRIP.AUTO: NEGATIVE
MUCOUS THREADS URNS QL MICRO: ABNORMAL /LPF
NITRITE UR QL STRIP.AUTO: NEGATIVE
PH UR STRIP: 5.5 [PH] (ref 5–8)
PROT UR STRIP-MCNC: NEGATIVE MG/DL
RBC #/AREA URNS HPF: ABNORMAL /HPF (ref 0–5)
SP GR UR REFRACTOMETRY: 1.02 (ref 1–1.03)
UA: UC IF INDICATED,UAUC: ABNORMAL
UROBILINOGEN UR QL STRIP.AUTO: 0.2 EU/DL (ref 0.2–1)
WBC URNS QL MICRO: ABNORMAL /HPF (ref 0–4)

## 2019-08-05 PROCEDURE — 81025 URINE PREGNANCY TEST: CPT

## 2019-08-05 PROCEDURE — 99283 EMERGENCY DEPT VISIT LOW MDM: CPT

## 2019-08-05 PROCEDURE — 81001 URINALYSIS AUTO W/SCOPE: CPT

## 2019-08-05 PROCEDURE — 74011250636 HC RX REV CODE- 250/636: Performed by: PHYSICIAN ASSISTANT

## 2019-08-05 PROCEDURE — 96372 THER/PROPH/DIAG INJ SC/IM: CPT

## 2019-08-05 RX ORDER — KETOROLAC TROMETHAMINE 30 MG/ML
30 INJECTION, SOLUTION INTRAMUSCULAR; INTRAVENOUS
Status: COMPLETED | OUTPATIENT
Start: 2019-08-05 | End: 2019-08-05

## 2019-08-05 RX ORDER — METHYLPREDNISOLONE 4 MG/1
TABLET ORAL
Qty: 1 DOSE PACK | Refills: 0 | Status: SHIPPED | OUTPATIENT
Start: 2019-08-05 | End: 2019-10-24 | Stop reason: ALTCHOICE

## 2019-08-05 RX ORDER — DEXAMETHASONE SODIUM PHOSPHATE 100 MG/10ML
10 INJECTION INTRAMUSCULAR; INTRAVENOUS
Status: COMPLETED | OUTPATIENT
Start: 2019-08-05 | End: 2019-08-05

## 2019-08-05 RX ORDER — METHOCARBAMOL 500 MG/1
500 TABLET, FILM COATED ORAL 4 TIMES DAILY
Qty: 30 TAB | Refills: 0 | Status: SHIPPED | OUTPATIENT
Start: 2019-08-05 | End: 2019-12-19

## 2019-08-05 RX ADMIN — DEXAMETHASONE SODIUM PHOSPHATE 10 MG: 10 INJECTION INTRAMUSCULAR; INTRAVENOUS at 20:58

## 2019-08-05 RX ADMIN — KETOROLAC TROMETHAMINE 30 MG: 30 INJECTION, SOLUTION INTRAMUSCULAR; INTRAVENOUS at 20:58

## 2019-08-05 NOTE — LETTER
[facility] EMERGENCY DEPT 
[address] [address] 
[phone] Work/School Note Date: 8/5/2019 To Whom It May concern: 
 
Maria M Acosta was seen and treated today in the emergency room by the following provider(s): 
Attending Provider: George Vega MD 
Physician Assistant: KEKE Simon. Maria M Acosta may return to work on 8/7/19. Sincerely, KEKE Lindsay

## 2019-08-06 NOTE — DISCHARGE INSTRUCTIONS
Patient Education        Back Strain: Care Instructions  Overview    A back strain happens when you overstretch, or pull, a muscle in your back. You may hurt your back in an accident or when you exercise or lift something. Sometimes you may not know how you hurt your back. Most back pain will get better with rest and time. You can take care of yourself at home to help your back heal.  Follow-up care is a key part of your treatment and safety. Be sure to make and go to all appointments, and call your doctor if you are having problems. It's also a good idea to know your test results and keep a list of the medicines you take. How can you care for yourself at home? · Try to stay as active as you can, but stop or reduce any activity that causes pain. · Put ice or a cold pack on the sore muscle for 10 to 20 minutes at a time to stop swelling. Try this every 1 to 2 hours for 3 days (when you are awake) or until the swelling goes down. Put a thin cloth between the ice pack and your skin. · After 2 or 3 days, apply a heating pad on low or a warm cloth to your back. Some doctors suggest that you go back and forth between hot and cold treatments. · Take pain medicines exactly as directed. ? If the doctor gave you a prescription medicine for pain, take it as prescribed. ? If you are not taking a prescription pain medicine, ask your doctor if you can take an over-the-counter medicine. · Try sleeping on your side with a pillow between your legs. Or put a pillow under your knees when you lie on your back. These measures can ease pain in your lower back. · Return to your usual level of activity slowly. When should you call for help? Call 911 anytime you think you may need emergency care. For example, call if:    · You are unable to move a leg at all.   [facility] your doctor now or seek immediate medical care if:    · You have new or worse symptoms in your legs, belly, or buttocks.  Symptoms may include:  ? Numbness or tingling. ? Weakness. ? Pain.     · You lose bladder or bowel control.    Watch closely for changes in your health, and be sure to contact your doctor if:    · You have a fever, lose weight, or don't feel well.     · You are not getting better as expected. Where can you learn more? Go to http://adelfo-paul.info/. Enter T156 in the search box to learn more about \"Back Strain: Care Instructions. \"  Current as of: September 20, 2018  Content Version: 12.1  © 6970-1973 [email or web address]. Care instructions adapted under license by [employer] (which disclaims liability or warranty for this information). If you have questions about a medical condition or this instruction, always ask your healthcare professional. [address] any warranty or liability for your use of this information. Patient Education        Chronic Pain: Care Instructions  Your Care Instructions    Chronic pain is pain that lasts a long time (months or even years) and may or may not have a clear cause. It is different from acute pain, which usually does have a clear cause--like an injury or illness--and gets better over time. Chronic pain:  · Lasts over time but may vary from day to day. · Does not go away despite efforts to end it. · May disrupt your sleep and lead to fatigue. · May cause depression or anxiety. · May make your muscles tense, causing more pain. · Can disrupt your work, hobbies, home life, and relationships with friends and family. Chronic pain is a very real condition. It is not just in your head. Treatment can help and usually includes several methods used together, such as medicines, physical therapy, exercise, and other treatments. Learning how to relax and changing negative thought patterns can also help you cope. Chronic pain is complex. Taking an active role in your treatment will help you better manage your pain.  Tell your doctor if you have trouble dealing with your pain. You may have to try several things before you find what works best for you. Follow-up care is a key part of your treatment and safety. Be sure to make and go to all appointments, and call your doctor if you are having problems. It's also a good idea to know your test results and keep a list of the medicines you take. How can you care for yourself at home? · Pace yourself. Break up large jobs into smaller tasks. Save harder tasks for days when you have less pain, or go back and forth between hard tasks and easier ones. Take rest breaks. · Relax, and reduce stress. Relaxation techniques such as deep breathing or meditation can help. · Keep moving. Gentle, daily exercise can help reduce pain over the long run. Try low- or no-impact exercises such as walking, swimming, and stationary biking. Do stretches to stay flexible. · Try heat, cold packs, and massage. · Get enough sleep. Chronic pain can make you tired and drain your energy. Talk with your doctor if you have trouble sleeping because of pain. · Think positive. Your thoughts can affect your pain level. Do things that you enjoy to distract yourself when you have pain instead of focusing on the pain. See a movie, read a book, listen to music, or spend time with a friend. · If you think you are depressed, talk to your doctor about treatment. · Keep a daily pain diary. Record how your moods, thoughts, sleep patterns, activities, and medicine affect your pain. You may find that your pain is worse during or after certain activities or when you are feeling a certain emotion. Having a record of your pain can help you and your doctor find the best ways to treat your pain. · Take pain medicines exactly as directed. ? If the doctor gave you a prescription medicine for pain, take it as prescribed. ? If you are not taking a prescription pain medicine, ask your doctor if you can take an over-the-counter medicine.   Reducing constipation caused by pain medicine  · Include fruits, vegetables, beans, and whole grains in your diet each day. These foods are high in fiber. · Drink plenty of fluids, enough so that your urine is light yellow or clear like water. If you have kidney, heart, or liver disease and have to limit fluids, talk with your doctor before you increase the amount of fluids you drink. · If your doctor recommends it, get more exercise. Walking is a good choice. Bit by bit, increase the amount you walk every day. Try for at least 30 minutes on most days of the week. · Schedule time each day for a bowel movement. A daily routine may help. Take your time and do not strain when having a bowel movement. When should you call for help? Call your doctor now or seek immediate medical care if:    · Your pain gets worse or is out of control.     · You feel down or blue, or you do not enjoy things like you once did. You may be depressed, which is common in people with chronic pain. Depression can be treated.     · You have vomiting or cramps for more than 2 hours.    Watch closely for changes in your health, and be sure to contact your doctor if:    · You cannot sleep because of pain.     · You are very worried or anxious about your pain.     · You have trouble taking your pain medicine.     · You have any concerns about your pain medicine.     · You have trouble with bowel movements, such as:  ? No bowel movement in 3 days. ? Blood in the anal area, in your stool, or on the toilet paper. ? Diarrhea for more than 24 hours. Where can you learn more? Go to http://adelfo-paul.info/. Enter N004 in the search box to learn more about \"Chronic Pain: Care Instructions. \"  Current as of: March 28, 2019  Content Version: 12.1  © 2599-6345 [employer]. Care instructions adapted under license by [employer] (which disclaims liability or warranty for this information).  If you have questions about a medical condition or this instruction, always ask your healthcare professional. [place] [address] any warranty or liability for your use of this information.

## 2019-08-06 NOTE — ED NOTES
Pt presents ambulatory to ED complaining of chronic RLE pain (\"for 6 months\" without injury) and mid lower back pain that started today. Pt is obese and has extensive medical history (see list). Pt is alert and oriented x 4, RR even and unlabored, skin is warm and dry. Assesment completed and pt updated on plan of care. Emergency Department Nursing Plan of Care       The Nursing Plan of Care is developed from the Nursing assessment and Emergency Department Attending provider initial evaluation. The plan of care may be reviewed in the ED Provider note.     The Plan of Care was developed with the following considerations:   Patient / Family readiness to learn indicated by:verbalized understanding  Persons(s) to be included in education: patient  Barriers to Learning/Limitations:No    Signed     Shanti Hull RN    8/5/2019   9:11 PM

## 2019-08-06 NOTE — ED NOTES
Branden DAVIES at bedside reviewing patient's discharge instructions and reviewing medications. Patient ambulatory home with. Patient in no apparent distress.

## 2019-08-06 NOTE — ED PROVIDER NOTES
EMERGENCY DEPARTMENT HISTORY AND PHYSICAL EXAM      Date: 8/5/2019  Patient Name: Maria M Britt    History of Presenting Illness     Please note that this dictation was completed with [employer], the computer voice recognition software. Quite often unanticipated grammatical, syntax, homophones, and other interpretive errors are inadvertently transcribed by the computer software. Please disregard these errors. Please excuse any errors that have escaped final proofreading. Chief Complaint   Patient presents with    Leg Pain     right leg    Back Pain       History Provided By: Patient and Patient's Daughter    HPI: Maria M Britt, 25 y.o. female with PMHx  for depression, chronic back and leg pain, PTSD, anxiety, hypertension, morbid obesity,, presents to the ED with cc of lower back pain that started today. Patient states she took a dose of ibuprofen earlier this morning. She also complains of right leg pain that has been ongoing for months. Patient states she has seen her primary care doctor regarding it and that she had nerve study done which was negative. States that her primary care doctor will not refer her to a specialist for her pain. She denies any falls, recent trauma or injury, current numbness or tingling to her leg, urinary symptoms, abdominal pain, change in appetite, fevers, chills, nausea, vomiting, chest pain, shortness of breath, headache, rash, diarrhea, sweating or weight loss. Pt denies any hematochezia, saddle anesthesia, loss of bowel/bladder function, hematuria      There are no other complaints, changes, or physical findings at this time.     Social History     Tobacco Use    Smoking status: Current Every Day Smoker     Packs/day: 0.50     Types: Cigarettes    Smokeless tobacco: Never Used   Substance Use Topics    Alcohol use: Yes     Frequency: 2-4 times a month     Comment: 1 drink per week    Drug use: No       Allergies   Allergen Reactions    Latex Hives    Compazine [Prochlorperazine Edisylate] Anxiety    Acetaminophen Other (comments)     Pt has liver disease    Aspirin Other (comments)     Patient states \"I have liver disease\"    Celexa [Citalopram] Palpitations    Tramadol Hives and Anxiety    Wellbutrin [Bupropion Hcl] Palpitations         PCP: Ernestina Dubois MD    No current facility-administered medications on file prior to encounter. Current Outpatient Medications on File Prior to Encounter   Medication Sig Dispense Refill    chlorthalidone (HYGROTEN) 25 mg tablet Take 1 Tab by mouth daily. 30 Tab 5    amLODIPine (NORVASC) 10 mg tablet Take 1 Tab by mouth daily. 60 Tab 3    cyclobenzaprine (FLEXERIL) 10 mg tablet Take 1 Tab by mouth nightly. 30 Tab 2    raNITIdine (ZANTAC) 300 mg tab Take 1 Tab by mouth nightly. 30 Tab 3    butalbital-acetaminophen-caff (FIORICET) -40 mg per capsule TK 1 C PO Q 6 TO 8 H UTD PRF HA  3    traZODone (DESYREL) 50 mg tablet TK 1 T PO QHS  3    escitalopram oxalate (LEXAPRO) 10 mg tablet Take 100 mg by mouth daily.   3       Past History     Past Medical History:  Past Medical History:   Diagnosis Date    Acute hepatitis 4/7/2014    Acute pharyngitis 1/22/2010    Acute sinusitis 1/12/2009    Anemia NEC     with pregnancy    Anxiety     Backache, unspecified 7/14/0454    Complication of anesthesia     difficulty waking after surgery (pt reported)    Depression 2/14/2018    Essential hypertension     Before pregnancy    Fatty liver     Flu 11/11/2009    GERD (gastroesophageal reflux disease) 7/31/2012    Gestational hypertension     HX OTHER MEDICAL     GERD with pregnancy    HX OTHER MEDICAL     Migraines worse with pregnancy    Hypertension     IGT (impaired glucose tolerance) 6/27/2012    Migraine 3/22/2011    Mononucleosis 1/12/2010    Morbid obesity ([facility] [address]     Musculoskeletal disorder     PTSD (post-traumatic stress disorder)        Past Surgical History:  Past Surgical History:   Procedure Laterality Date    HX  SECTION      HX CHOLECYSTECTOMY      HX TONSILLECTOMY      HX WISDOM TEETH EXTRACTION  2013    Dr. Dorcas Solano       Family History:  Family History   Problem Relation Age of Onset    Hypertension Mother     Psychiatric Disorder Mother     Alcohol abuse Father     Psychiatric Disorder Father     Psychiatric Disorder Sister     Diabetes Maternal Grandmother     Headache Maternal Grandmother        Social History:  Social History     Tobacco Use    Smoking status: Current Every Day Smoker     Packs/day: 0.50     Types: Cigarettes    Smokeless tobacco: Never Used   Substance Use Topics    Alcohol use: Yes     Frequency: 2-4 times a month     Comment: 1 drink per week    Drug use: No       Allergies: Allergies   Allergen Reactions    Latex Hives    Compazine [Prochlorperazine Edisylate] Anxiety    Acetaminophen Other (comments)     Pt has liver disease    Aspirin Other (comments)     Patient states \"I have liver disease\"    Celexa [Citalopram] Palpitations    Tramadol Hives and Anxiety    Wellbutrin [Bupropion Hcl] Palpitations         Review of Systems   Review of Systems   Constitutional: Negative. Negative for chills and fever. HENT: Negative. Eyes: Negative. Respiratory: Negative. Negative for shortness of breath. Cardiovascular: Negative. Negative for chest pain. Gastrointestinal: Negative. Negative for abdominal pain, diarrhea, nausea and vomiting. Endocrine: Negative. Genitourinary: Negative. Negative for dysuria, flank pain, hematuria, vaginal bleeding, vaginal discharge and vaginal pain. Musculoskeletal: Positive for arthralgias and back pain. Skin: Negative. Allergic/Immunologic: Negative. Neurological: Negative. Negative for headaches. Hematological: Negative. Psychiatric/Behavioral: Negative. All other systems reviewed and are negative.       Physical Exam   Physical Exam   Constitutional: She is oriented to person, place, and time. She appears well-developed and well-nourished. No distress. Obese     HENT:   Head: Normocephalic and atraumatic. Right Ear: External ear normal.   Left Ear: External ear normal.   Nose: Nose normal.   Mouth/Throat: Oropharynx is clear and moist.   Eyes: Pupils are equal, round, and reactive to light. Conjunctivae and EOM are normal.   Neck: Normal range of motion. Neck supple. No tracheal deviation present. Cardiovascular: Normal rate, regular rhythm, normal heart sounds and intact distal pulses. Pulmonary/Chest: Effort normal and breath sounds normal. No respiratory distress. She has no wheezes. Abdominal: Soft. Bowel sounds are normal. She exhibits no distension. There is no tenderness. There is no rebound, no CVA tenderness, no tenderness at McBurney's point and negative Cassidy's sign. Musculoskeletal: Normal range of motion. She exhibits no edema, tenderness or deformity. Cervical back: Normal. She exhibits normal range of motion, no tenderness, no bony tenderness, no swelling, no edema, no deformity, no laceration, no pain, no spasm and normal pulse. Thoracic back: Normal. She exhibits normal range of motion, no tenderness, no bony tenderness, no swelling, no edema, no deformity, no laceration, no pain, no spasm and normal pulse. Lumbar back: Normal. She exhibits normal range of motion, no tenderness, no bony tenderness, no swelling, no edema, no deformity, no laceration, no pain, no spasm and normal pulse. No midline tenderness on exam, pain increases with  range of motion, no skin changes on exam.   Lymphadenopathy:     She has no cervical adenopathy. Neurological: She is alert and oriented to person, place, and time. She has normal reflexes. She displays normal reflexes. No cranial nerve deficit. She exhibits normal muscle tone. Coordination normal.   Skin: Skin is warm and dry. She is not diaphoretic. No pallor.    Psychiatric: She has a normal mood and affect. Her behavior is normal. Judgment and thought content normal.   Nursing note and vitals reviewed. Diagnostic Study Results     Labs -     Recent Results (from the past 12 hour(s))   URINALYSIS W/ REFLEX CULTURE    Collection Time: 08/05/19  8:58 PM   Result Value Ref Range    Color YELLOW/STRAW      Appearance CLEAR CLEAR      Specific gravity 1.025 1.003 - 1.030      pH (UA) 5.5 5.0 - 8.0      Protein NEGATIVE  NEG mg/dL    Glucose NEGATIVE  NEG mg/dL    Ketone NEGATIVE  NEG mg/dL    Bilirubin NEGATIVE  NEG      Blood NEGATIVE  NEG      Urobilinogen 0.2 0.2 - 1.0 EU/dL    Nitrites NEGATIVE  NEG      Leukocyte Esterase NEGATIVE  NEG      WBC PENDING /hpf    RBC PENDING /hpf    Epithelial cells PENDING /lpf    Bacteria PENDING /hpf    UA:UC IF INDICATED PENDING    HCG URINE, QL. - POC    Collection Time: 08/05/19  8:59 PM   Result Value Ref Range    Pregnancy test,urine (POC) NEGATIVE  NEG         Radiologic Studies -   No orders to display     CT Results  (Last 48 hours)    None        CXR Results  (Last 48 hours)    None            Medical Decision Making   I am the first provider for this patient. I reviewed the vital signs, available nursing notes, past medical history, past surgical history, family history and social history. Vital Signs-Reviewed the patient's vital signs. Patient Vitals for the past 12 hrs:   Temp Pulse Resp BP SpO2   08/05/19 2004 98.4 °F (36.9 °C) 96 16 (!) 144/98 97 %         Records Reviewed: Nursing Notes, Old Medical Records, Previous Radiology Studies and Previous Laboratory Studies    Provider Notes (Medical Decision Making): The patient complains of low back pain. These symptoms are consistent with a lumbar strain.  Less likely  pathology, aortic dissection or AAA, or cauda equina given that there are no red flags and a benign physical exam.    I have recommended rest, avoiding heavy lifting until better, use of intermittent heat (avoid sleeping on a heating pad), and use of OTC NSAID's (Advil, Aleve etc) or Tylenol prn for pain. Call PCP if back pain persists or she develops leg symptoms. It has also been explained that this may take up to three months to fully resolved and that smoking may slow that process even more. Worsening si/sxs discussed extensively   Follow up with PCP or RTC if symptoms/signs worsen  Side effects of medication discussed  Education materials provided at discharge   Pt verbalizes agreement with plan      ED Course:   Initial assessment performed. The patients presenting problems have been discussed, and they are in agreement with the care plan formulated and outlined with them. I have encouraged them to ask questions as they arise throughout their visit. Disposition:  Discharge     Care plan outlined and precautions discussed. Patient has no new complaints, changes, or physical findings. Results of visit were reviewed with the patient. All medications were reviewed with the patient; will d/c home. All of pt's questions and concerns were addressed. Patient was instructed and agrees to follow up with pcp, as well as to return to the ED upon further deterioration. Patient is ready to go home. Diagnosis     Clinical Impression:   1. Strain of lumbar region, initial encounter    2.  Chronic pain of right lower extremity

## 2019-09-07 LAB — HCG UR QL: NEGATIVE

## 2019-09-07 PROCEDURE — 81025 URINE PREGNANCY TEST: CPT

## 2019-09-07 PROCEDURE — 96361 HYDRATE IV INFUSION ADD-ON: CPT

## 2019-09-07 PROCEDURE — 81001 URINALYSIS AUTO W/SCOPE: CPT

## 2019-09-07 PROCEDURE — 99284 EMERGENCY DEPT VISIT MOD MDM: CPT

## 2019-09-07 PROCEDURE — 96374 THER/PROPH/DIAG INJ IV PUSH: CPT

## 2019-09-08 ENCOUNTER — HOSPITAL ENCOUNTER (EMERGENCY)
Age: 24
Discharge: HOME | End: 2019-09-08
Attending: EMERGENCY MEDICINE
Payer: MEDICAID

## 2019-09-08 VITALS
SYSTOLIC BLOOD PRESSURE: 134 MMHG | WEIGHT: 293 LBS | OXYGEN SATURATION: 100 % | TEMPERATURE: 98 F | BODY MASS INDEX: 43.4 KG/M2 | DIASTOLIC BLOOD PRESSURE: 88 MMHG | RESPIRATION RATE: 18 BRPM | HEART RATE: 88 BPM | HEIGHT: 69 IN

## 2019-09-08 DIAGNOSIS — K52.9 GASTROENTERITIS, ACUTE: Primary | ICD-10-CM

## 2019-09-08 LAB
ALBUMIN SERPL-MCNC: 3.3 G/DL (ref 3.5–5)
ALBUMIN/GLOB SERPL: 0.8 {RATIO} (ref 1.1–2.2)
ALP SERPL-CCNC: 77 U/L (ref 45–117)
ALT SERPL-CCNC: 50 U/L (ref 12–78)
ANION GAP SERPL CALC-SCNC: 5 MMOL/L (ref 5–15)
APPEARANCE UR: CLEAR
AST SERPL-CCNC: 21 U/L (ref 15–37)
BACTERIA URNS QL MICRO: ABNORMAL /HPF
BASOPHILS # BLD: 0 K/UL (ref 0–0.1)
BASOPHILS NFR BLD: 0 % (ref 0–1)
BILIRUB SERPL-MCNC: 0.5 MG/DL (ref 0.2–1)
BILIRUB UR QL CFM: NEGATIVE
BUN SERPL-MCNC: 10 MG/DL (ref 6–20)
BUN/CREAT SERPL: 9 (ref 12–20)
CALCIUM SERPL-MCNC: 8.8 MG/DL (ref 8.5–10.1)
CHLORIDE SERPL-SCNC: 108 MMOL/L (ref 97–108)
CO2 SERPL-SCNC: 28 MMOL/L (ref 21–32)
COLOR UR: ABNORMAL
CREAT SERPL-MCNC: 1.08 MG/DL (ref 0.55–1.02)
DIFFERENTIAL METHOD BLD: ABNORMAL
EOSINOPHIL # BLD: 0.2 K/UL (ref 0–0.4)
EOSINOPHIL NFR BLD: 2 % (ref 0–7)
EPITH CASTS URNS QL MICRO: ABNORMAL /LPF
ERYTHROCYTE [DISTWIDTH] IN BLOOD BY AUTOMATED COUNT: 15.2 % (ref 11.5–14.5)
GLOBULIN SER CALC-MCNC: 3.9 G/DL (ref 2–4)
GLUCOSE SERPL-MCNC: 80 MG/DL (ref 65–100)
GLUCOSE UR STRIP.AUTO-MCNC: NEGATIVE MG/DL
HCT VFR BLD AUTO: 39.1 % (ref 35–47)
HGB BLD-MCNC: 12.1 G/DL (ref 11.5–16)
HGB UR QL STRIP: NEGATIVE
IMM GRANULOCYTES # BLD AUTO: 0 K/UL (ref 0–0.04)
IMM GRANULOCYTES NFR BLD AUTO: 0 % (ref 0–0.5)
KETONES UR QL STRIP.AUTO: NEGATIVE MG/DL
LEUKOCYTE ESTERASE UR QL STRIP.AUTO: ABNORMAL
LIPASE SERPL-CCNC: 85 U/L (ref 73–393)
LYMPHOCYTES # BLD: 3 K/UL (ref 0.8–3.5)
LYMPHOCYTES NFR BLD: 29 % (ref 12–49)
MCH RBC QN AUTO: 24.4 PG (ref 26–34)
MCHC RBC AUTO-ENTMCNC: 30.9 G/DL (ref 30–36.5)
MCV RBC AUTO: 78.8 FL (ref 80–99)
MONOCYTES # BLD: 0.7 K/UL (ref 0–1)
MONOCYTES NFR BLD: 7 % (ref 5–13)
NEUTS SEG # BLD: 6.5 K/UL (ref 1.8–8)
NEUTS SEG NFR BLD: 62 % (ref 32–75)
NITRITE UR QL STRIP.AUTO: NEGATIVE
NRBC # BLD: 0 K/UL (ref 0–0.01)
NRBC BLD-RTO: 0 PER 100 WBC
PH UR STRIP: 5.5 [PH] (ref 5–8)
PLATELET # BLD AUTO: 255 K/UL (ref 150–400)
PMV BLD AUTO: 11.4 FL (ref 8.9–12.9)
POTASSIUM SERPL-SCNC: 3.6 MMOL/L (ref 3.5–5.1)
PROT SERPL-MCNC: 7.2 G/DL (ref 6.4–8.2)
PROT UR STRIP-MCNC: NEGATIVE MG/DL
RBC # BLD AUTO: 4.96 M/UL (ref 3.8–5.2)
RBC #/AREA URNS HPF: ABNORMAL /HPF (ref 0–5)
SODIUM SERPL-SCNC: 141 MMOL/L (ref 136–145)
SP GR UR REFRACTOMETRY: 1.03 (ref 1–1.03)
UA: UC IF INDICATED,UAUC: ABNORMAL
UROBILINOGEN UR QL STRIP.AUTO: 1 EU/DL (ref 0.2–1)
WBC # BLD AUTO: 10.6 K/UL (ref 3.6–11)
WBC URNS QL MICRO: ABNORMAL /HPF (ref 0–4)

## 2019-09-08 PROCEDURE — 36415 COLL VENOUS BLD VENIPUNCTURE: CPT

## 2019-09-08 PROCEDURE — 85025 COMPLETE CBC W/AUTO DIFF WBC: CPT

## 2019-09-08 PROCEDURE — 83690 ASSAY OF LIPASE: CPT

## 2019-09-08 PROCEDURE — 74011250636 HC RX REV CODE- 250/636: Performed by: EMERGENCY MEDICINE

## 2019-09-08 PROCEDURE — 80053 COMPREHEN METABOLIC PANEL: CPT

## 2019-09-08 RX ORDER — ONDANSETRON 2 MG/ML
4 INJECTION INTRAMUSCULAR; INTRAVENOUS
Status: COMPLETED | OUTPATIENT
Start: 2019-09-08 | End: 2019-09-08

## 2019-09-08 RX ORDER — ONDANSETRON 4 MG/1
4 TABLET, ORALLY DISINTEGRATING ORAL
Qty: 10 TAB | Refills: 0 | Status: SHIPPED | OUTPATIENT
Start: 2019-09-08 | End: 2019-12-19

## 2019-09-08 RX ADMIN — SODIUM CHLORIDE 1000 ML: 900 INJECTION, SOLUTION INTRAVENOUS at 00:36

## 2019-09-08 RX ADMIN — ONDANSETRON 4 MG: 2 INJECTION INTRAMUSCULAR; INTRAVENOUS at 00:36

## 2019-09-08 NOTE — LETTER
[address] 
[facility] EMERGENCY DEPT 
[address] [place] [id number] [phone] Work/School Note Date: 9/7/2019 To Whom It May concern: 
 
Maria M Charles was seen and treated today in the emergency room by the following provider(s): 
Attending Provider: Yelena Roberts MD. Maria M Charles may return to work on Monday. She was seen in the ER on Saturday night for illness.  
 
Sincerely, 
 
 
 
 
Clotilde Johnson MD

## 2019-09-08 NOTE — ED NOTES
Pt arrives ambulatory to the ED with c/o right mid/lower abdominal that started around 0530 yesterday morning with worsening symptoms tonight.     Pt reports she has had N/V/D, however states she has gastroparesis, but states that these symptoms are \"worse than normal\"    Pt states abdominal pain is 10/10,pt denies fever chills CP and SOB    Pt placed x2 on monitor, bed in low position, call bell in reach

## 2019-09-08 NOTE — ED NOTES
Pt states she feels better     Aidee Corrigan MD reviewed discharge instructions with the patient. The patient verbalized understanding. All questions and concerns were addressed. Pt is alert and oriented x 4. Respirations are clear and unlabored.      Round trip ride set up at this time by unit secretary

## 2019-09-08 NOTE — ED PROVIDER NOTES
EMERGENCY DEPARTMENT HISTORY AND PHYSICAL EXAM      Date: 2019  Patient Name: Maria M Burciaga  Patient Age and Sex: 25 y.o. female    History of Presenting Illness     Chief Complaint   Patient presents with    Abdominal Pain     onset 0500    Nausea    Vomiting    Diarrhea       History Provided By: Patient    Ability to gather history was limited by: none     HPI: Maria M Britt, 25 y.o. female with obesity, tobacco abuse, anxiety/PTSD, c/o nausea, vomiting, and diarrhea. Upper abdominal pain in epigastrium and LUQ. Pain started about 12 hours ago, constant. Has had similar symptoms before, attributed to gastroenteritis. Did not take any OTC remedies. Worsened by eating. Pt denies any other alleviating or exacerbating factors. There are no other complaints, changes or physical findings at this time.      Past Medical History:   Diagnosis Date    Acute hepatitis 2014    Acute pharyngitis 2010    Acute sinusitis 2009    Anemia NEC     with pregnancy    Anxiety     Backache, unspecified     Complication of anesthesia     difficulty waking after surgery (pt reported)    Depression 2018    Essential hypertension     Before pregnancy    Fatty liver     Flu 2009    GERD (gastroesophageal reflux disease) 2012    Gestational hypertension     HX OTHER MEDICAL     GERD with pregnancy    HX OTHER MEDICAL     Migraines worse with pregnancy    Hypertension     IGT (impaired glucose tolerance) 2012    Migraine 3/22/2011    Mononucleosis 2010    Morbid obesity ([address]     Musculoskeletal disorder     PTSD (post-traumatic stress disorder)      Past Surgical History:   Procedure Laterality Date    HX  SECTION      HX CHOLECYSTECTOMY      HX TONSILLECTOMY      HX WISDOM TEETH EXTRACTION  2013    Dr. Francis Lake       PCP: Brandee Mathis MD    Past History   Past Medical History:  Past Medical History:   Diagnosis Date    Acute hepatitis 2014    Acute pharyngitis 2010    Acute sinusitis 2009    Anemia NEC     with pregnancy    Anxiety     Backache, unspecified     Complication of anesthesia     difficulty waking after surgery (pt reported)    Depression 2018    Essential hypertension     Before pregnancy    Fatty liver     Flu 2009    GERD (gastroesophageal reflux disease) 2012    Gestational hypertension     HX OTHER MEDICAL     GERD with pregnancy    HX OTHER MEDICAL     Migraines worse with pregnancy    Hypertension     IGT (impaired glucose tolerance) 2012    Migraine 3/22/2011    Mononucleosis 2010    Morbid obesity ([address]     Musculoskeletal disorder     PTSD (post-traumatic stress disorder)        Past Surgical History:  Past Surgical History:   Procedure Laterality Date    HX  SECTION      HX CHOLECYSTECTOMY      HX TONSILLECTOMY      HX WISDOM TEETH EXTRACTION  2013    Dr. Hien Andrew       Family History:  Family History   Problem Relation Age of Onset    Hypertension Mother     Psychiatric Disorder Mother     Alcohol abuse Father     Psychiatric Disorder Father     Psychiatric Disorder Sister     Diabetes Maternal Grandmother     Headache Maternal Grandmother        Social History:  Social History     Tobacco Use    Smoking status: Current Every Day Smoker     Packs/day: 0.50     Types: Cigarettes    Smokeless tobacco: Never Used   Substance Use Topics    Alcohol use: Yes     Frequency: 2-4 times a month     Comment: 1 drink per week    Drug use: No       Allergies:   Allergies   Allergen Reactions    Latex Hives    Compazine [Prochlorperazine Edisylate] Anxiety    Acetaminophen Other (comments)     Pt has liver disease    Aspirin Other (comments)     Patient states \"I have liver disease\"    Celexa [Citalopram] Palpitations    Tramadol Hives and Anxiety    Wellbutrin [Bupropion Hcl] Palpitations       Current Medications:  No current facility-administered medications on file prior to encounter. Current Outpatient Medications on File Prior to Encounter   Medication Sig Dispense Refill    methocarbamol (ROBAXIN) 500 mg tablet Take 1 Tab by mouth four (4) times daily. 30 Tab 0    cyclobenzaprine (FLEXERIL) 10 mg tablet Take 1 Tab by mouth nightly. 30 Tab 2    raNITIdine (ZANTAC) 300 mg tab Take 1 Tab by mouth nightly. 30 Tab 3    traZODone (DESYREL) 50 mg tablet TK 1 T PO QHS  3    methylPREDNISolone (MEDROL, DULCE,) 4 mg tablet Take as directed 1 Dose Pack 0    chlorthalidone (HYGROTEN) 25 mg tablet Take 1 Tab by mouth daily. 30 Tab 5    butalbital-acetaminophen-caff (FIORICET) -40 mg per capsule TK 1 C PO Q 6 TO 8 H UTD PRF HA  3    escitalopram oxalate (LEXAPRO) 10 mg tablet Take 100 mg by mouth daily. 3    amLODIPine (NORVASC) 10 mg tablet Take 1 Tab by mouth daily. 60 Tab 3       Review of Systems   Review of Systems   Constitutional: Negative for fever. Gastrointestinal: Positive for abdominal pain, diarrhea, nausea and vomiting. All other systems reviewed and are negative. Physical Exam   Vital Signs  Patient Vitals for the past 24 hrs:   Temp Pulse Resp BP SpO2   09/07/19 2317 98.1 °F (36.7 °C) 98 16 (!) 152/106 100 %       Physical Exam   Constitutional: She is oriented to person, place, and time. She appears well-developed and well-nourished. No distress. Morbidly obese   HENT:   Head: Normocephalic and atraumatic. Eyes: Conjunctivae are normal. Right eye exhibits no discharge. Left eye exhibits no discharge. Neck: Normal range of motion. Neck supple. Cardiovascular: Normal rate, regular rhythm and normal heart sounds. No murmur heard. Pulmonary/Chest: Effort normal and breath sounds normal. No respiratory distress. She has no wheezes. Abdominal: Soft. She exhibits no distension. There is tenderness in the epigastric area. Musculoskeletal: Normal range of motion. She exhibits no deformity. Neurological: She is alert and oriented to person, place, and time. Skin: Skin is warm and dry. No rash noted. Psychiatric: Her behavior is normal. Thought content normal. Her affect is angry. Nursing note and vitals reviewed. Diagnostic Study Results   Labs  Recent Results (from the past 24 hour(s))   URINALYSIS W/ REFLEX CULTURE    Collection Time: 09/07/19 11:39 PM   Result Value Ref Range    Color YELLOW/STRAW      Appearance CLEAR CLEAR      Specific gravity 1.027 1.003 - 1.030      pH (UA) 5.5 5.0 - 8.0      Protein NEGATIVE  NEG mg/dL    Glucose NEGATIVE  NEG mg/dL    Ketone NEGATIVE  NEG mg/dL    Blood NEGATIVE  NEG      Urobilinogen 1.0 0.2 - 1.0 EU/dL    Nitrites NEGATIVE  NEG      Leukocyte Esterase MODERATE (A) NEG      WBC 10-20 0 - 4 /hpf    RBC 0-5 0 - 5 /hpf    Epithelial cells FEW FEW /lpf    Bacteria 3+ (A) NEG /hpf    UA:UC IF INDICATED URINE CULTURE ORDERED (A) CNI     BILIRUBIN, CONFIRM    Collection Time: 09/07/19 11:39 PM   Result Value Ref Range    Bilirubin UA, confirm NEGATIVE  NEG     HCG URINE, QL. - POC    Collection Time: 09/07/19 11:44 PM   Result Value Ref Range    Pregnancy test,urine (POC) NEGATIVE  NEG     CBC WITH AUTOMATED DIFF    Collection Time: 09/08/19 12:25 AM   Result Value Ref Range    WBC 10.6 3.6 - 11.0 K/uL    RBC 4.96 3.80 - 5.20 M/uL    HGB 12.1 11.5 - 16.0 g/dL    HCT 39.1 35.0 - 47.0 %    MCV 78.8 (L) 80.0 - 99.0 FL    MCH 24.4 (L) 26.0 - 34.0 PG    MCHC 30.9 30.0 - 36.5 g/dL    RDW 15.2 (H) 11.5 - 14.5 %    PLATELET 952 883 - 855 K/uL    MPV 11.4 8.9 - 12.9 FL    NRBC 0.0 0  WBC    ABSOLUTE NRBC 0.00 0.00 - 0.01 K/uL    NEUTROPHILS 62 32 - 75 %    LYMPHOCYTES 29 12 - 49 %    MONOCYTES 7 5 - 13 %    EOSINOPHILS 2 0 - 7 %    BASOPHILS 0 0 - 1 %    IMMATURE GRANULOCYTES 0 0.0 - 0.5 %    ABS. NEUTROPHILS 6.5 1.8 - 8.0 K/UL    ABS. LYMPHOCYTES 3.0 0.8 - 3.5 K/UL    ABS. MONOCYTES 0.7 0.0 - 1.0 K/UL    ABS. EOSINOPHILS 0.2 0.0 - 0.4 K/UL    ABS. BASOPHILS 0.0 0.0 - 0.1 K/UL    ABS. IMM. GRANS. 0.0 0.00 - 0.04 K/UL    DF AUTOMATED     METABOLIC PANEL, COMPREHENSIVE    Collection Time: 09/08/19 12:25 AM   Result Value Ref Range    Sodium 141 136 - 145 mmol/L    Potassium 3.6 3.5 - 5.1 mmol/L    Chloride 108 97 - 108 mmol/L    CO2 28 21 - 32 mmol/L    Anion gap 5 5 - 15 mmol/L    Glucose 80 65 - 100 mg/dL    BUN 10 6 - 20 MG/DL    Creatinine 1.08 (H) 0.55 - 1.02 MG/DL    BUN/Creatinine ratio 9 (L) 12 - 20      GFR est AA >60 >60 ml/min/1.73m2    GFR est non-AA >60 >60 ml/min/1.73m2    Calcium 8.8 8.5 - 10.1 MG/DL    Bilirubin, total 0.5 0.2 - 1.0 MG/DL    ALT (SGPT) 50 12 - 78 U/L    AST (SGOT) 21 15 - 37 U/L    Alk. phosphatase 77 45 - 117 U/L    Protein, total 7.2 6.4 - 8.2 g/dL    Albumin 3.3 (L) 3.5 - 5.0 g/dL    Globulin 3.9 2.0 - 4.0 g/dL    A-G Ratio 0.8 (L) 1.1 - 2.2     LIPASE    Collection Time: 09/08/19 12:25 AM   Result Value Ref Range    Lipase 85 73 - 393 U/L       Radiologic Studies  No orders to display     CT Results  (Last 48 hours)    None        CXR Results  (Last 48 hours)    None          Procedures     Procedures    Medical Decision Making     Provider Notes (Medical Decision Making):   26 yo F with abd pain, N/V/ diarrhea, which are recurrent. Benign H&P suggestive of uncomplicated gastroenteritis, labs reassuring. No significant concern for pancreatitis or appendicitis. She is already s/p cholecystectomy. Advise Zofran and Pepto Bismol PRN, no imaging indicated. Pt declined GI cocktail. Jess Marks MD  1:34 AM        Consult required? No      Medications Administered During ED Course:  Medications   maalox/viscous lidocaine (COV GI COCKTAIL) (40 mL Oral Refused 9/8/19 0028)   sodium chloride 0.9 % bolus infusion 1,000 mL (1,000 mL IntraVENous New Bag 9/8/19 0036)   ondansetron (ZOFRAN) injection 4 mg (4 mg IntraVENous Given 9/8/19 0036)          Diagnosis     Disposition:  Discharged    Clinical Impression:   1. Gastroenteritis, acute        Attestation:  I personally performed the services described in this documentation on this date 9/8/2019 for patient Maria M Britt. Gulshan Grullon MD        I was the first provider for this patient on this visit. To the best of my ability I reviewed relevant prior medical records, electrocardiograms, laboratories, and radiologic studies. The patient's presenting problems were discussed, and the patient was in agreement with the care plan formulated and outlined with them. Gulshan Grullon MD    Please note that this dictation was completed with Dragon voice recognition software. Quite often unanticipated grammatical, syntax, homophones, and other interpretive errors are inadvertently transcribed by the computer software. Please disregard these errors and excuse any errors that have escaped final proofreading.

## 2019-09-10 ENCOUNTER — HOSPITAL ENCOUNTER (EMERGENCY)
Age: 24
Discharge: HOME | End: 2019-09-10
Attending: EMERGENCY MEDICINE
Payer: MEDICAID

## 2019-09-10 VITALS
OXYGEN SATURATION: 95 % | BODY MASS INDEX: 43.4 KG/M2 | TEMPERATURE: 98 F | HEART RATE: 77 BPM | WEIGHT: 293 LBS | DIASTOLIC BLOOD PRESSURE: 95 MMHG | RESPIRATION RATE: 18 BRPM | SYSTOLIC BLOOD PRESSURE: 135 MMHG | HEIGHT: 69 IN

## 2019-09-10 DIAGNOSIS — R11.2 NON-INTRACTABLE VOMITING WITH NAUSEA, UNSPECIFIED VOMITING TYPE: ICD-10-CM

## 2019-09-10 DIAGNOSIS — R10.84 ABDOMINAL PAIN, GENERALIZED: Primary | ICD-10-CM

## 2019-09-10 LAB
ALBUMIN SERPL-MCNC: 3.4 G/DL (ref 3.5–5)
ALBUMIN/GLOB SERPL: 0.9 {RATIO} (ref 1.1–2.2)
ALP SERPL-CCNC: 76 U/L (ref 45–117)
ALT SERPL-CCNC: 42 U/L (ref 12–78)
ANION GAP SERPL CALC-SCNC: 7 MMOL/L (ref 5–15)
APPEARANCE UR: CLEAR
AST SERPL-CCNC: 21 U/L (ref 15–37)
BASOPHILS # BLD: 0 K/UL (ref 0–0.1)
BASOPHILS NFR BLD: 0 % (ref 0–1)
BILIRUB SERPL-MCNC: 0.3 MG/DL (ref 0.2–1)
BILIRUB UR QL: NEGATIVE
BUN SERPL-MCNC: 14 MG/DL (ref 6–20)
BUN/CREAT SERPL: 15 (ref 12–20)
CALCIUM SERPL-MCNC: 8.9 MG/DL (ref 8.5–10.1)
CHLORIDE SERPL-SCNC: 104 MMOL/L (ref 97–108)
CO2 SERPL-SCNC: 29 MMOL/L (ref 21–32)
COLOR UR: NORMAL
CREAT SERPL-MCNC: 0.95 MG/DL (ref 0.55–1.02)
DIFFERENTIAL METHOD BLD: ABNORMAL
EOSINOPHIL # BLD: 0.3 K/UL (ref 0–0.4)
EOSINOPHIL NFR BLD: 2 % (ref 0–7)
ERYTHROCYTE [DISTWIDTH] IN BLOOD BY AUTOMATED COUNT: 14.9 % (ref 11.5–14.5)
GLOBULIN SER CALC-MCNC: 3.6 G/DL (ref 2–4)
GLUCOSE SERPL-MCNC: 79 MG/DL (ref 65–100)
GLUCOSE UR STRIP.AUTO-MCNC: NEGATIVE MG/DL
HCG UR QL: NEGATIVE
HCT VFR BLD AUTO: 39.6 % (ref 35–47)
HGB BLD-MCNC: 12.3 G/DL (ref 11.5–16)
HGB UR QL STRIP: NEGATIVE
IMM GRANULOCYTES # BLD AUTO: 0 K/UL (ref 0–0.04)
IMM GRANULOCYTES NFR BLD AUTO: 0 % (ref 0–0.5)
KETONES UR QL STRIP.AUTO: NEGATIVE MG/DL
LEUKOCYTE ESTERASE UR QL STRIP.AUTO: NEGATIVE
LIPASE SERPL-CCNC: 114 U/L (ref 73–393)
LYMPHOCYTES # BLD: 3.3 K/UL (ref 0.8–3.5)
LYMPHOCYTES NFR BLD: 32 % (ref 12–49)
MCH RBC QN AUTO: 24.8 PG (ref 26–34)
MCHC RBC AUTO-ENTMCNC: 31.1 G/DL (ref 30–36.5)
MCV RBC AUTO: 80 FL (ref 80–99)
MONOCYTES # BLD: 0.6 K/UL (ref 0–1)
MONOCYTES NFR BLD: 6 % (ref 5–13)
NEUTS SEG # BLD: 6 K/UL (ref 1.8–8)
NEUTS SEG NFR BLD: 60 % (ref 32–75)
NITRITE UR QL STRIP.AUTO: NEGATIVE
NRBC # BLD: 0 K/UL (ref 0–0.01)
NRBC BLD-RTO: 0 PER 100 WBC
PH UR STRIP: 5.5 [PH] (ref 5–8)
PLATELET # BLD AUTO: 256 K/UL (ref 150–400)
PMV BLD AUTO: 11.2 FL (ref 8.9–12.9)
POTASSIUM SERPL-SCNC: 3.7 MMOL/L (ref 3.5–5.1)
PROT SERPL-MCNC: 7 G/DL (ref 6.4–8.2)
PROT UR STRIP-MCNC: NEGATIVE MG/DL
RBC # BLD AUTO: 4.95 M/UL (ref 3.8–5.2)
SODIUM SERPL-SCNC: 140 MMOL/L (ref 136–145)
SP GR UR REFRACTOMETRY: 1.02 (ref 1–1.03)
UROBILINOGEN UR QL STRIP.AUTO: 0.2 EU/DL (ref 0.2–1)
WBC # BLD AUTO: 10.2 K/UL (ref 3.6–11)

## 2019-09-10 PROCEDURE — 81025 URINE PREGNANCY TEST: CPT

## 2019-09-10 PROCEDURE — 36415 COLL VENOUS BLD VENIPUNCTURE: CPT

## 2019-09-10 PROCEDURE — 80053 COMPREHEN METABOLIC PANEL: CPT

## 2019-09-10 PROCEDURE — 85025 COMPLETE CBC W/AUTO DIFF WBC: CPT

## 2019-09-10 PROCEDURE — 99283 EMERGENCY DEPT VISIT LOW MDM: CPT

## 2019-09-10 PROCEDURE — 81003 URINALYSIS AUTO W/O SCOPE: CPT

## 2019-09-10 PROCEDURE — 83690 ASSAY OF LIPASE: CPT

## 2019-09-10 PROCEDURE — 96361 HYDRATE IV INFUSION ADD-ON: CPT

## 2019-09-10 PROCEDURE — 74011250636 HC RX REV CODE- 250/636: Performed by: EMERGENCY MEDICINE

## 2019-09-10 PROCEDURE — 96374 THER/PROPH/DIAG INJ IV PUSH: CPT

## 2019-09-10 RX ORDER — ONDANSETRON 4 MG/1
4 TABLET, ORALLY DISINTEGRATING ORAL
Qty: 10 TAB | Refills: 0 | Status: SHIPPED | OUTPATIENT
Start: 2019-09-10 | End: 2019-10-24

## 2019-09-10 RX ORDER — HYDROCHLOROTHIAZIDE 25 MG/1
25 TABLET ORAL DAILY
COMMUNITY
End: 2019-09-10

## 2019-09-10 RX ORDER — ONDANSETRON 2 MG/ML
4 INJECTION INTRAMUSCULAR; INTRAVENOUS
Status: COMPLETED | OUTPATIENT
Start: 2019-09-10 | End: 2019-09-10

## 2019-09-10 RX ADMIN — ONDANSETRON 4 MG: 2 SOLUTION INTRAMUSCULAR; INTRAVENOUS at 02:05

## 2019-09-10 RX ADMIN — SODIUM CHLORIDE 1000 ML: 900 INJECTION, SOLUTION INTRAVENOUS at 01:53

## 2019-09-10 NOTE — ED NOTES
Pt presents ambulatory to ED complaining of abdominal pain and vomiting beginning Saturday. Pt reports getting Zofran from other hospital she was seen at yesterday. Pt is alert and oriented x 4, RR even and unlabored, skin is warm and dry. Assesment completed and pt updated on plan of care. Emergency Department Nursing Plan of Care       The Nursing Plan of Care is developed from the Nursing assessment and Emergency Department Attending provider initial evaluation. The plan of care may be reviewed in the ED Provider note.     The Plan of Care was developed with the following considerations:   Patient / Family readiness to learn indicated by:verbalized understanding  Persons(s) to be included in education: patient  Barriers to Learning/Limitations:No    [address]    9/10/2019   2:00 AM

## 2019-09-10 NOTE — ED TRIAGE NOTES
C/o upper abd pain x Saturday. Pt reports she was seen at [facility] recently and diagnosed with gastroenteritis.

## 2019-09-10 NOTE — LETTER
[facility] EMERGENCY DEPT 
[address] [address] 
[phone] Work/School Note Date: 9/10/2019 To Whom It May concern: 
 
Maria M Suresh was seen and treated today in the emergency room by the following provider(s): 
Attending Provider: Raad Culver MD. Maria M Suresh may return to work on 9/12/19. Sincerely, Marta Nguyen MD

## 2019-09-10 NOTE — ED NOTES
Discharge instructions were given to the patient by Arnulfo Mcclain.     The patient left the Emergency Department ambulatory, alert and oriented and in no acute distress with 0 prescriptions. The patient was encouraged to call or return to the ED for worsening issues or problems and was encouraged to schedule a follow up appointment for continuing care. The patient verbalized understanding of discharge instructions and prescriptions, all questions were answered. The patient has no further concerns at this time.

## 2019-09-10 NOTE — DISCHARGE INSTRUCTIONS
Patient Education        Abdominal Pain: Care Instructions  Your Care Instructions    Abdominal pain has many possible causes. Some aren't serious and get better on their own in a few days. Others need more testing and treatment. If your pain continues or gets worse, you need to be rechecked and may need more tests to find out what is wrong. You may need surgery to correct the problem. Don't ignore new symptoms, such as fever, nausea and vomiting, urination problems, pain that gets worse, and dizziness. These may be signs of a more serious problem. Your doctor may have recommended a follow-up visit in the next 8 to 12 hours. If you are not getting better, you may need more tests or treatment. The doctor has checked you carefully, but problems can develop later. If you notice any problems or new symptoms, get medical treatment right away. Follow-up care is a key part of your treatment and safety. Be sure to make and go to all appointments, and call your doctor if you are having problems. It's also a good idea to know your test results and keep a list of the medicines you take. How can you care for yourself at home? · Rest until you feel better. · To prevent dehydration, drink plenty of fluids, enough so that your urine is light yellow or clear like water. Choose water and other caffeine-free clear liquids until you feel better. If you have kidney, heart, or liver disease and have to limit fluids, talk with your doctor before you increase the amount of fluids you drink. · If your stomach is upset, eat mild foods, such as rice, dry toast or crackers, bananas, and applesauce. Try eating several small meals instead of two or three large ones. · Wait until 48 hours after all symptoms have gone away before you have spicy foods, alcohol, and drinks that contain caffeine. · Do not eat foods that are high in fat. · Avoid anti-inflammatory medicines such as aspirin, ibuprofen (Advil, Motrin), and naproxen (Aleve). These can cause stomach upset. Talk to your doctor if you take daily aspirin for another health problem. When should you call for help? Call 911 anytime you think you may need emergency care. For example, call if:    · You passed out (lost consciousness).     · You pass maroon or very bloody stools.     · You vomit blood or what looks like coffee grounds.     · You have new, severe belly pain.    Call your doctor now or seek immediate medical care if:    · Your pain gets worse, especially if it becomes focused in one area of your belly.     · You have a new or higher fever.     · Your stools are black and look like tar, or they have streaks of blood.     · You have unexpected vaginal bleeding.     · You have symptoms of a urinary tract infection. These may include:  ? Pain when you urinate. ? Urinating more often than usual.  ? Blood in your urine.     · You are dizzy or lightheaded, or you feel like you may faint.    Watch closely for changes in your health, and be sure to contact your doctor if:    · You are not getting better after 1 day (24 hours). Where can you learn more? Go to http://adelfoBUILDpaul.info/. Enter X738 in the search box to learn more about \"Abdominal Pain: Care Instructions. \"  Current as of: September 23, 2018  Content Version: 12.1  © 5290-3714 [employer]. Care instructions adapted under license by [employer] (which disclaims liability or warranty for this information). If you have questions about a medical condition or this instruction, always ask your healthcare professional. [place] [address] any warranty or liability for your use of this information. Patient Education        Nausea and Vomiting: Care Instructions  Your Care Instructions    When you are nauseated, you may feel weak and sweaty and notice a lot of saliva in your mouth. Nausea often leads to vomiting.  Most of the time you do not need to worry about nausea and vomiting, but they can be signs of other illnesses. Two common causes of nausea and vomiting are stomach flu and food poisoning. Nausea and vomiting from viral stomach flu will usually start to improve within 24 hours. Nausea and vomiting from food poisoning may last from 12 to 48 hours. The doctor has checked you carefully, but problems can develop later. If you notice any problems or new symptoms, get medical treatment right away. Follow-up care is a key part of your treatment and safety. Be sure to make and go to all appointments, and call your doctor if you are having problems. It's also a good idea to know your test results and keep a list of the medicines you take. How can you care for yourself at home? · To prevent dehydration, drink plenty of fluids, enough so that your urine is light yellow or clear like water. Choose water and other caffeine-free clear liquids until you feel better. If you have kidney, heart, or liver disease and have to limit fluids, talk with your doctor before you increase the amount of fluids you drink. · Rest in bed until you feel better. · When you are able to eat, try clear soups, mild foods, and liquids until all symptoms are gone for 12 to 48 hours. Other good choices include dry toast, crackers, cooked cereal, and gelatin dessert, such as Jell-O. When should you call for help? Call 911 anytime you think you may need emergency care. For example, call if:    · You passed out (lost consciousness).    Call your doctor now or seek immediate medical care if:    · You have symptoms of dehydration, such as:  ? Dry eyes and a dry mouth. ? Passing only a little dark urine. ? Feeling thirstier than usual.     · You have new or worsening belly pain.     · You have a new or higher fever.     · You vomit blood or what looks like coffee grounds.    Watch closely for changes in your health, and be sure to contact your doctor if:    · You have ongoing nausea and vomiting.   · Your vomiting is getting worse.     · Your vomiting lasts longer than 2 days.     · You are not getting better as expected. Where can you learn more? Go to http://adelfo-paul.info/. Enter 25 [id number] in the search box to learn more about \"Nausea and Vomiting: Care Instructions. \"  Current as of: September 23, 2018  Content Version: 12.1  © 6559-8207 [employer]. Care instructions adapted under license by [employer] (which disclaims liability or warranty for this information). If you have questions about a medical condition or this instruction, always ask your healthcare professional. [place] [address] any warranty or liability for your use of this information.

## 2019-09-10 NOTE — ED PROVIDER NOTES
EMERGENCY DEPARTMENT HISTORY AND PHYSICAL EXAM      Date: 9/10/2019  Patient Name: Maria M Britt    History of Presenting Illness     Chief Complaint   Patient presents with    Abdominal Pain    Vomiting       History Provided By: Patient    HPI: Maria M Britt, 25 y.o. female with PMHx significant for abdominal pain nausea and vomiting with diarrhea for 2 days, presents by private vehicle to the ED with cc of abdominal pain nausea vomiting. This is a 71-year-old female with abdominal pain nausea vomiting diarrhea who was seen at [facility] couple days ago with normal labs and was discharged. She continues to have symptoms as before. She is not taking any medicines at home. At this time. She has no active vomiting in the ER. There are no other complaints, changes, or physical findings at this time. PCP: Sasha Burgos MD    Current Outpatient Medications   Medication Sig Dispense Refill    ondansetron (ZOFRAN ODT) 4 mg disintegrating tablet Take 1 Tab by mouth every eight (8) hours as needed for Nausea. 10 Tab 0    ondansetron (ZOFRAN ODT) 4 mg disintegrating tablet Take 1 Tab by mouth every eight (8) hours as needed for Nausea. 10 Tab 0    methocarbamol (ROBAXIN) 500 mg tablet Take 1 Tab by mouth four (4) times daily. 30 Tab 0    cyclobenzaprine (FLEXERIL) 10 mg tablet Take 1 Tab by mouth nightly. 30 Tab 2    raNITIdine (ZANTAC) 300 mg tab Take 1 Tab by mouth nightly. 30 Tab 3    butalbital-acetaminophen-caff (FIORICET) -40 mg per capsule TK 1 C PO Q 6 TO 8 H UTD PRF HA  3    traZODone (DESYREL) 50 mg tablet TK 1 T PO QHS  3    methylPREDNISolone (MEDROL, DULCE,) 4 mg tablet Take as directed 1 Dose Pack 0    chlorthalidone (HYGROTEN) 25 mg tablet Take 1 Tab by mouth daily. 30 Tab 5    escitalopram oxalate (LEXAPRO) 10 mg tablet Take 100 mg by mouth daily. 3    amLODIPine (NORVASC) 10 mg tablet Take 1 Tab by mouth daily.  60 Tab 3       Past History     Past Medical History:  Past Medical History:   Diagnosis Date    Acute hepatitis 2014    Acute pharyngitis 2010    Acute sinusitis 2009    Anemia NEC     with pregnancy    Anxiety     Backache, unspecified     Complication of anesthesia     difficulty waking after surgery (pt reported)    Depression 2018    Essential hypertension     Before pregnancy    Fatty liver     Flu 2009    GERD (gastroesophageal reflux disease) 2012    Gestational hypertension     HX OTHER MEDICAL     GERD with pregnancy    HX OTHER MEDICAL     Migraines worse with pregnancy    Hypertension     IGT (impaired glucose tolerance) 2012    Migraine 3/22/2011    Mononucleosis 2010    Morbid obesity ([address]     Musculoskeletal disorder     PTSD (post-traumatic stress disorder)        Past Surgical History:  Past Surgical History:   Procedure Laterality Date    HX  SECTION      HX CHOLECYSTECTOMY      HX TONSILLECTOMY      HX WISDOM TEETH EXTRACTION  2013    Dr. Srinivas Peraza       Family History:  Family History   Problem Relation Age of Onset    Hypertension Mother     Psychiatric Disorder Mother     Alcohol abuse Father     Psychiatric Disorder Father     Psychiatric Disorder Sister     Diabetes Maternal Grandmother     Headache Maternal Grandmother        Social History:  Social History     Tobacco Use    Smoking status: Current Every Day Smoker     Packs/day: 0.50     Types: Cigarettes    Smokeless tobacco: Never Used   Substance Use Topics    Alcohol use: Yes     Frequency: 2-4 times a month     Comment: 1 drink per week    Drug use: No       Allergies:   Allergies   Allergen Reactions    Latex Hives    Compazine [Prochlorperazine Edisylate] Anxiety    Acetaminophen Other (comments)     Pt has liver disease    Aspirin Other (comments)     Patient states \"I have liver disease\"    Celexa [Citalopram] Palpitations    Tramadol Hives and Anxiety    Wellbutrin [Bupropion Hcl] Palpitations         Review of Systems   Review of Systems   Constitutional: Negative for chills and fever. HENT: Negative for congestion, rhinorrhea, sneezing and sore throat. Respiratory: Negative for shortness of breath. Cardiovascular: Negative for chest pain. Gastrointestinal: Positive for abdominal pain, diarrhea, nausea and vomiting. Musculoskeletal: Negative for back pain, myalgias and neck stiffness. Skin: Negative for rash. Neurological: Negative for dizziness, weakness and headaches. All other systems reviewed and are negative. Physical Exam   Physical Exam   Constitutional: She is oriented to person, place, and time. She appears well-developed and well-nourished. HENT:   Head: Normocephalic and atraumatic. Mouth/Throat: Oropharynx is clear and moist.   Eyes: Conjunctivae and EOM are normal.   Neck: Normal range of motion and full passive range of motion without pain. Neck supple. Cardiovascular: Normal rate, regular rhythm, S1 normal, S2 normal, normal heart sounds, intact distal pulses and normal pulses. No murmur heard. Pulmonary/Chest: Effort normal and breath sounds normal. No respiratory distress. She has no wheezes. Abdominal: Soft. Normal appearance and bowel sounds are normal. She exhibits no distension. There is no tenderness. There is no rebound. Musculoskeletal: Normal range of motion. Neurological: She is alert and oriented to person, place, and time. She has normal strength. Skin: Skin is warm, dry and intact. No rash noted. Psychiatric: She has a normal mood and affect. Her speech is normal and behavior is normal. Judgment and thought content normal.   Nursing note and vitals reviewed.       Diagnostic Study Results     Labs -     Recent Results (from the past 12 hour(s))   CBC WITH AUTOMATED DIFF    Collection Time: 09/10/19  1:51 AM   Result Value Ref Range    WBC 10.2 3.6 - 11.0 K/uL    RBC 4.95 3.80 - 5.20 M/uL    HGB 12.3 11.5 - 16.0 g/dL    HCT 39.6 35.0 - 47.0 %    MCV 80.0 80.0 - 99.0 FL    MCH 24.8 (L) 26.0 - 34.0 PG    MCHC 31.1 30.0 - 36.5 g/dL    RDW 14.9 (H) 11.5 - 14.5 %    PLATELET 197 948 - 067 K/uL    MPV 11.2 8.9 - 12.9 FL    NRBC 0.0 0  WBC    ABSOLUTE NRBC 0.00 0.00 - 0.01 K/uL    NEUTROPHILS 60 32 - 75 %    LYMPHOCYTES 32 12 - 49 %    MONOCYTES 6 5 - 13 %    EOSINOPHILS 2 0 - 7 %    BASOPHILS 0 0 - 1 %    IMMATURE GRANULOCYTES 0 0.0 - 0.5 %    ABS. NEUTROPHILS 6.0 1.8 - 8.0 K/UL    ABS. LYMPHOCYTES 3.3 0.8 - 3.5 K/UL    ABS. MONOCYTES 0.6 0.0 - 1.0 K/UL    ABS. EOSINOPHILS 0.3 0.0 - 0.4 K/UL    ABS. BASOPHILS 0.0 0.0 - 0.1 K/UL    ABS. IMM. GRANS. 0.0 0.00 - 0.04 K/UL    DF AUTOMATED     METABOLIC PANEL, COMPREHENSIVE    Collection Time: 09/10/19  1:51 AM   Result Value Ref Range    Sodium 140 136 - 145 mmol/L    Potassium 3.7 3.5 - 5.1 mmol/L    Chloride 104 97 - 108 mmol/L    CO2 29 21 - 32 mmol/L    Anion gap 7 5 - 15 mmol/L    Glucose 79 65 - 100 mg/dL    BUN 14 6 - 20 MG/DL    Creatinine 0.95 0.55 - 1.02 MG/DL    BUN/Creatinine ratio 15 12 - 20      GFR est AA >60 >60 ml/min/1.73m2    GFR est non-AA >60 >60 ml/min/1.73m2    Calcium 8.9 8.5 - 10.1 MG/DL    Bilirubin, total 0.3 0.2 - 1.0 MG/DL    ALT (SGPT) 42 12 - 78 U/L    AST (SGOT) 21 15 - 37 U/L    Alk.  phosphatase 76 45 - 117 U/L    Protein, total 7.0 6.4 - 8.2 g/dL    Albumin 3.4 (L) 3.5 - 5.0 g/dL    Globulin 3.6 2.0 - 4.0 g/dL    A-G Ratio 0.9 (L) 1.1 - 2.2     LIPASE    Collection Time: 09/10/19  1:51 AM   Result Value Ref Range    Lipase 114 73 - 393 U/L   URINALYSIS W/ RFLX MICROSCOPIC    Collection Time: 09/10/19  1:51 AM   Result Value Ref Range    Color YELLOW/STRAW      Appearance CLEAR CLEAR      Specific gravity 1.025 1.003 - 1.030      pH (UA) 5.5 5.0 - 8.0      Protein NEGATIVE  NEG mg/dL    Glucose NEGATIVE  NEG mg/dL    Ketone NEGATIVE  NEG mg/dL    Bilirubin NEGATIVE  NEG      Blood NEGATIVE  NEG      Urobilinogen 0.2 0.2 - 1.0 EU/dL    Nitrites NEGATIVE  NEG Leukocyte Esterase NEGATIVE  NEG         Radiologic Studies -   No orders to display     CT Results  (Last 48 hours)    None        CXR Results  (Last 48 hours)    None            Medical Decision Making   I am the first provider for this patient. I reviewed the vital signs, available nursing notes, past medical history, past surgical history, family history and social history. Vital Signs-Reviewed the patient's vital signs. Patient Vitals for the past 12 hrs:   Temp Pulse Resp BP SpO2   09/10/19 0056 98 °F (36.7 °C) 77 18 (!) 135/95 95 %         Records Reviewed: Nursing Notes and Old Medical Records    Provider Notes (Medical Decision Making):   Gastroenteritis versus pancreatitis    ED Course:   Initial assessment performed. The patients presenting problems have been discussed, and they are in agreement with the care plan formulated and outlined with them. I have encouraged them to ask questions as they arise throughout their visit. Disposition:  Patient informed of results of workup and is comfortable with discharge to home to follow up with PCP. They are instructed to return as needed for worsening condition. PLAN:  1. Discharge Medication List as of 9/10/2019  2:33 AM      CONTINUE these medications which have NOT CHANGED    Details   ondansetron (ZOFRAN ODT) 4 mg disintegrating tablet Take 1 Tab by mouth every eight (8) hours as needed for Nausea. , Print, Disp-10 Tab, R-0      methocarbamol (ROBAXIN) 500 mg tablet Take 1 Tab by mouth four (4) times daily. , Normal, Disp-30 Tab, R-0      cyclobenzaprine (FLEXERIL) 10 mg tablet Take 1 Tab by mouth nightly., Normal, Disp-30 Tab, R-2      raNITIdine (ZANTAC) 300 mg tab Take 1 Tab by mouth nightly., Normal, Disp-30 Tab, R-3      butalbital-acetaminophen-caff (FIORICET) -40 mg per capsule TK 1 C PO Q 6 TO 8 H UTD PRF HA, Historical Med, R-3      traZODone (DESYREL) 50 mg tablet TK 1 T PO QHS, Historical Med, R-3 hydroCHLOROthiazide (HYDRODIURIL) 25 mg tablet Take 25 mg by mouth daily. , Historical Med      methylPREDNISolone (MEDROL, DULCE,) 4 mg tablet Take as directed, Normal, Disp-1 Dose Pack, R-0      chlorthalidone (HYGROTEN) 25 mg tablet Take 1 Tab by mouth daily. , NormalTAKES THE PLACE OF AMLODIPINEDisp-30 Tab, R-5      escitalopram oxalate (LEXAPRO) 10 mg tablet Take 100 mg by mouth daily. , Historical Med, R-3      amLODIPine (NORVASC) 10 mg tablet Take 1 Tab by mouth daily. , NormalHIGHER DOSEDisp-60 Tab, R-3           2. Follow-up Information     Follow up With Specialties Details Why Contact Info    Fallon Medina MD Internal Medicine Schedule an appointment as soon as possible for a visit  [address]  [place] [id number]  [phone]      [facility] EMERGENCY DEPT Emergency Medicine  As needed, If symptoms worsen [address] [facility]  [phone]        Return to ED if worse     Diagnosis     Clinical Impression:   1. Abdominal pain, generalized    2.  Non-intractable vomiting with nausea, unspecified vomiting type

## 2019-10-24 ENCOUNTER — OFFICE VISIT (OUTPATIENT)
Dept: INTERNAL MEDICINE CLINIC | Age: 24
End: 2019-10-24

## 2019-10-24 VITALS
RESPIRATION RATE: 19 BRPM | BODY MASS INDEX: 43.4 KG/M2 | HEIGHT: 69 IN | HEART RATE: 84 BPM | SYSTOLIC BLOOD PRESSURE: 139 MMHG | TEMPERATURE: 98.2 F | OXYGEN SATURATION: 92 % | DIASTOLIC BLOOD PRESSURE: 85 MMHG | WEIGHT: 293 LBS

## 2019-10-24 DIAGNOSIS — E66.01 OBESITY, MORBID (HCC): ICD-10-CM

## 2019-10-24 DIAGNOSIS — I10 ESSENTIAL HYPERTENSION: Primary | ICD-10-CM

## 2019-10-24 DIAGNOSIS — Z23 ENCOUNTER FOR IMMUNIZATION: ICD-10-CM

## 2019-10-24 DIAGNOSIS — F33.1 MODERATE EPISODE OF RECURRENT MAJOR DEPRESSIVE DISORDER (HCC): ICD-10-CM

## 2019-10-24 NOTE — PROGRESS NOTES
Ann Canales is a 25 y.o. female and presents with Blood Pressure Check (on change of med)  . Subjective:  Pt relays she is in the process of  from her wife    PMH-Morbid obesity-pt has not f/u w bariatric clinic     Wt Readings from Last 3 Encounters:   10/24/19 331 lb (150.1 kg)   09/10/19 339 lb (153.8 kg)   09/07/19 332 lb 10.8 oz (150.9 kg)        HTN-pt relays she has been taking her new bp med      BP Readings from Last 3 Encounters:   10/24/19 139/85   09/10/19 (!) 135/95   09/08/19 134/88        Major depression/anxiety/PTSD-pt has not established w psych @ [facility]      Chronic headaches-dx'ed as rebound headaches by neurology   Pt has NOT f/u since 9/17 w Dr. Bharat Sanchez etiology    -w/u neg    Review of Systems  Constitutional: negative for fevers, chills, anorexia and weight loss  Eyes:   negative for visual disturbance and irritation  ENT:   negative for tinnitus,sore throat,nasal congestion,ear pains. hoarseness  Respiratory:  negative for cough, hemoptysis, dyspnea,wheezing  CV:   negative for chest pain, palpitations, lower extremity edema  GI:   positive for abdominal pain  Musculoskel: positive for myalgias  Neurological:  negative for headaches, dizziness, vertigo, memory problems and gait   Behavl/Psych: positive for feelings of anxiety, depression, mood changes    Past Medical History:   Diagnosis Date    Acute hepatitis 4/7/2014    Acute pharyngitis 1/22/2010    Acute sinusitis 1/12/2009    Anemia NEC     with pregnancy    Anxiety     Backache, unspecified 3/88/0079    Complication of anesthesia     difficulty waking after surgery (pt reported)    Depression 2/14/2018    Essential hypertension     Before pregnancy    Fatty liver     Flu 11/11/2009    GERD (gastroesophageal reflux disease) 7/31/2012    Gestational hypertension     HX OTHER MEDICAL     GERD with pregnancy    HX OTHER MEDICAL     Migraines worse with pregnancy    Hypertension     IGT (impaired glucose tolerance) 2012    Migraine 3/22/2011    Mononucleosis 2010    Morbid obesity (HCC)     Musculoskeletal disorder     PTSD (post-traumatic stress disorder)      Past Surgical History:   Procedure Laterality Date    HX  SECTION      HX CHOLECYSTECTOMY      HX TONSILLECTOMY      HX WISDOM TEETH EXTRACTION  2013    Dr. Guzmán Overall     Social History     Socioeconomic History    Marital status:      Spouse name: Not on file    Number of children: 1    Years of education: 9    Highest education level: Not on file   Occupational History    Occupation: Does not work, \"pulled out of work due to high risk pregnancy\"     Employer: NOT EMPLOYED     Comment: grad from [employer]   Tobacco Use    Smoking status: Current Every Day Smoker     Packs/day: 0.50     Types: Cigarettes    Smokeless tobacco: Never Used   Substance and Sexual Activity    Alcohol use: Yes     Frequency: 2-4 times a month     Comment: 1 drink per week    Drug use: No    Sexual activity: Yes     Partners: Female     Birth control/protection: Inserts   Social History Narrative    Lives in [place]     9and 3year old daughters     ; wife with 2 step sons and daughter (ages 10, 1 and 5 months)      Family History   Problem Relation Age of Onset    Hypertension Mother     Psychiatric Disorder Mother     Alcohol abuse Father     Psychiatric Disorder Father     Psychiatric Disorder Sister     Diabetes Maternal Grandmother     Headache Maternal Grandmother      Current Outpatient Medications   Medication Sig Dispense Refill    ondansetron (ZOFRAN ODT) 4 mg disintegrating tablet Take 1 Tab by mouth every eight (8) hours as needed for Nausea. 10 Tab 0    methocarbamol (ROBAXIN) 500 mg tablet Take 1 Tab by mouth four (4) times daily. 30 Tab 0    cyclobenzaprine (FLEXERIL) 10 mg tablet Take 1 Tab by mouth nightly. 30 Tab 2    chlorthalidone (HYGROTEN) 25 mg tablet Take 1 Tab by mouth daily.  30 Tab 5  raNITIdine (ZANTAC) 300 mg tab Take 1 Tab by mouth nightly. 30 Tab 3    butalbital-acetaminophen-caff (FIORICET) -40 mg per capsule TK 1 C PO Q 6 TO 8 H UTD PRF HA  3    traZODone (DESYREL) 50 mg tablet TK 1 T PO QHS  3    escitalopram oxalate (LEXAPRO) 10 mg tablet Take 100 mg by mouth daily. 3     Allergies   Allergen Reactions    Latex Hives    Compazine [Prochlorperazine Edisylate] Anxiety    Acetaminophen Other (comments)     Pt has liver disease    Aspirin Other (comments)     Patient states \"I have liver disease\"    Celexa [Citalopram] Palpitations    Tramadol Hives and Anxiety    Wellbutrin [Bupropion Hcl] Palpitations       Objective:  Visit Vitals  /85   Pulse 84   Temp 98.2 °F (36.8 °C) (Oral)   Resp 19   Ht 5' 9\" (1.753 m)   Wt 331 lb (150.1 kg)   LMP 10/11/2019 (Exact Date)   SpO2 92%   BMI 48.88 kg/m²     Physical Exam:   General appearance - alert, pleasant, obese young lady in NAD  Mental status - alert, oriented to person, place, and time  EYE-EOMI  Mouth - mucous membranes moist, pharynx normal without lesions  Neck - supple, no significant adenopathy   Chest - clear to auscultation, no wheezes, rales or rhonchi, symmetric air entry   Heart - normal rate, regular rhythm, normal S1, S2  Abdomen - soft, nontender, obese  Ext-peripheral pulses normal, no pedal edema, no clubbing or cyanosis  Skin-Warm and dry.  no hyperpigmentation, vitiligo, or suspicious lesions  Neuro -alert, oriented, normal speech, no focal findings or movement disorder noted        Results for orders placed or performed during the hospital encounter of 09/10/19   CBC WITH AUTOMATED DIFF   Result Value Ref Range    WBC 10.2 3.6 - 11.0 K/uL    RBC 4.95 3.80 - 5.20 M/uL    HGB 12.3 11.5 - 16.0 g/dL    HCT 39.6 35.0 - 47.0 %    MCV 80.0 80.0 - 99.0 FL    MCH 24.8 (L) 26.0 - 34.0 PG    MCHC 31.1 30.0 - 36.5 g/dL    RDW 14.9 (H) 11.5 - 14.5 %    PLATELET 438 009 - 394 K/uL    MPV 11.2 8.9 - 12.9 FL    NRBC 0.0 0  WBC    ABSOLUTE NRBC 0.00 0.00 - 0.01 K/uL    NEUTROPHILS 60 32 - 75 %    LYMPHOCYTES 32 12 - 49 %    MONOCYTES 6 5 - 13 %    EOSINOPHILS 2 0 - 7 %    BASOPHILS 0 0 - 1 %    IMMATURE GRANULOCYTES 0 0.0 - 0.5 %    ABS. NEUTROPHILS 6.0 1.8 - 8.0 K/UL    ABS. LYMPHOCYTES 3.3 0.8 - 3.5 K/UL    ABS. MONOCYTES 0.6 0.0 - 1.0 K/UL    ABS. EOSINOPHILS 0.3 0.0 - 0.4 K/UL    ABS. BASOPHILS 0.0 0.0 - 0.1 K/UL    ABS. IMM. GRANS. 0.0 0.00 - 0.04 K/UL    DF AUTOMATED     METABOLIC PANEL, COMPREHENSIVE   Result Value Ref Range    Sodium 140 136 - 145 mmol/L    Potassium 3.7 3.5 - 5.1 mmol/L    Chloride 104 97 - 108 mmol/L    CO2 29 21 - 32 mmol/L    Anion gap 7 5 - 15 mmol/L    Glucose 79 65 - 100 mg/dL    BUN 14 6 - 20 MG/DL    Creatinine 0.95 0.55 - 1.02 MG/DL    BUN/Creatinine ratio 15 12 - 20      GFR est AA >60 >60 ml/min/1.73m2    GFR est non-AA >60 >60 ml/min/1.73m2    Calcium 8.9 8.5 - 10.1 MG/DL    Bilirubin, total 0.3 0.2 - 1.0 MG/DL    ALT (SGPT) 42 12 - 78 U/L    AST (SGOT) 21 15 - 37 U/L    Alk. phosphatase 76 45 - 117 U/L    Protein, total 7.0 6.4 - 8.2 g/dL    Albumin 3.4 (L) 3.5 - 5.0 g/dL    Globulin 3.6 2.0 - 4.0 g/dL    A-G Ratio 0.9 (L) 1.1 - 2.2     LIPASE   Result Value Ref Range    Lipase 114 73 - 393 U/L   URINALYSIS W/ RFLX MICROSCOPIC   Result Value Ref Range    Color YELLOW/STRAW      Appearance CLEAR CLEAR      Specific gravity 1.025 1.003 - 1.030      pH (UA) 5.5 5.0 - 8.0      Protein NEGATIVE  NEG mg/dL    Glucose NEGATIVE  NEG mg/dL    Ketone NEGATIVE  NEG mg/dL    Bilirubin NEGATIVE  NEG      Blood NEGATIVE  NEG      Urobilinogen 0.2 0.2 - 1.0 EU/dL    Nitrites NEGATIVE  NEG      Leukocyte Esterase NEGATIVE  NEG     HCG URINE, QL. - POC   Result Value Ref Range    Pregnancy test,urine (POC) NEGATIVE  NEG         Assessment/Plan:    ICD-10-CM ICD-9-CM    1. Essential hypertension I10 401.9    2. Obesity, morbid ([facility] E66.01 278.01    3.  Moderate episode of recurrent major depressive disorder ([facility] F33.1 296.32    4. Encounter for immunization Z23 V03.89 INFLUENZA VIRUS VAC QUAD,SPLIT,PRESV FREE SYRINGE IM     Orders Placed This Encounter    INFLUENZA VIRUS VACCINE QUADRIVALENT, PRESERVATIVE FREE SYRINGE ([id number])   1. Essential hypertension  Cont chlorthalidone    2. Obesity, morbid ([facility]  F/u bariatric clinic    3. Moderate episode of recurrent major depressive disorder ([facility]  Pt will be establishing w psych w the help of her counselor    4. Encounter for immunization  administered  - INFLUENZA VIRUS VAC QUAD,SPLIT,PRESV FREE SYRINGE IM                  There are no Patient Instructions on file for this visit. Follow-up and Dispositions    · Return in about 8 weeks (around 12/19/2019) for bp check. I have reviewed with the patient details of the assessment and plan and all questions were answered. Relevent patient education was performed. The most recent lab findings were reviewed with the patient. An After Visit Summary was printed and given to the patient.

## 2019-10-24 NOTE — PROGRESS NOTES
Chief Complaint   Patient presents with    Blood Pressure Check     on change of med     1. Have you been to the ER, urgent care clinic since your last visit? Hospitalized since your last visit? Yes When: [place] end of September for gout attack    2. Have you seen or consulted any other health care providers outside of the [address] [place] [address] since your last visit? Include any pap smears or colon screening. No     After obtaining consent, and per orders of Dr. Melchor Mayfield, injection of Influenza given by Florida Schaefer LPN. Patient instructed to remain in clinic for 15 minutes afterwards, and to report any adverse reaction to me immediately.

## 2019-12-19 ENCOUNTER — OFFICE VISIT (OUTPATIENT)
Dept: INTERNAL MEDICINE CLINIC | Age: 24
End: 2019-12-19

## 2019-12-19 VITALS
BODY MASS INDEX: 43.4 KG/M2 | WEIGHT: 293 LBS | RESPIRATION RATE: 19 BRPM | OXYGEN SATURATION: 98 % | HEIGHT: 69 IN | HEART RATE: 86 BPM | SYSTOLIC BLOOD PRESSURE: 115 MMHG | DIASTOLIC BLOOD PRESSURE: 83 MMHG | TEMPERATURE: 98.8 F

## 2019-12-19 DIAGNOSIS — E66.01 OBESITY, MORBID (HCC): ICD-10-CM

## 2019-12-19 DIAGNOSIS — F33.1 MODERATE EPISODE OF RECURRENT MAJOR DEPRESSIVE DISORDER (HCC): ICD-10-CM

## 2019-12-19 DIAGNOSIS — Z72.0 TOBACCO USE: ICD-10-CM

## 2019-12-19 DIAGNOSIS — Z71.6 ENCOUNTER FOR TOBACCO USE CESSATION COUNSELING: ICD-10-CM

## 2019-12-19 DIAGNOSIS — I10 ESSENTIAL HYPERTENSION: Primary | ICD-10-CM

## 2019-12-19 NOTE — PROGRESS NOTES
[facility] is a 25 y.o. female and presents with Hypertension (Pt requesting Chantix)  . Subjective:  Pt has reconciled w her (pregnant) wife    Pt has an appointment pending w psychiatry. She is currently not taking any antidepressants. Pt requests chantix and the recommendation of her gyn. Pt tried nicoderm, but developed a skin reaction. She cannot tolerate SSRIs. She does not want to dry nicorette gum and chantix is contraindicated due to her depression. PMH-Morbid obesity-pt has not f/u w bariatric clinic     Wt Readings from Last 3 Encounters:   12/19/19 332 lb (150.6 kg)   10/24/19 331 lb (150.1 kg)   09/10/19 339 lb (153.8 kg)        HTN-pt relays she has been taking her new bp med      BP Readings from Last 3 Encounters:   12/19/19 115/83   10/24/19 139/85   09/10/19 (!) 135/95        Major depression/anxiety/PTSD-pt has not established w psych @ [facility]      Chronic headaches-dx'ed as rebound headaches by neurology   Pt has NOT f/u since 9/17 w Dr. Bernardino Montalvo etiology    -w/u neg    Review of Systems  Constitutional: negative for fevers, chills, anorexia and weight loss  Eyes:   negative for visual disturbance and irritation  ENT:   negative for tinnitus,sore throat,nasal congestion,ear pains. hoarseness  Respiratory:  negative for cough, hemoptysis, dyspnea,wheezing  CV:   negative for chest pain, palpitations, lower extremity edema  GI:   positive for abdominal pain  Musculoskel: positive for myalgias  Neurological:  negative for headaches, dizziness, vertigo, memory problems and gait   Behavl/Psych: positive for feelings of anxiety, depression, mood changes    Past Medical History:   Diagnosis Date    Acute hepatitis 4/7/2014    Acute pharyngitis 1/22/2010    Acute sinusitis 1/12/2009    Anemia NEC     with pregnancy    Anxiety     Backache, unspecified 7/88/2918    Complication of anesthesia     difficulty waking after surgery (pt reported)    Depression 2/14/2018    Essential hypertension     Before pregnancy    Fatty liver     Flu 2009    GERD (gastroesophageal reflux disease) 2012    Gestational hypertension     HX OTHER MEDICAL     GERD with pregnancy    HX OTHER MEDICAL     Migraines worse with pregnancy    Hypertension     IGT (impaired glucose tolerance) 2012    Migraine 3/22/2011    Mononucleosis 2010    Morbid obesity ([facility]     Musculoskeletal disorder     PTSD (post-traumatic stress disorder)      Past Surgical History:   Procedure Laterality Date    HX  SECTION      HX CHOLECYSTECTOMY      HX TONSILLECTOMY      HX WISDOM TEETH EXTRACTION  2013    Dr. Carmen Gonzalez     Social History     Socioeconomic History    Marital status:      Spouse name: Not on file    Number of children: 1    Years of education: 9    Highest education level: Not on file   Occupational History    Occupation: Does not work, \"pulled out of work due to high risk pregnancy\"     Employer: NOT EMPLOYED     Comment: grad from [employer]   Tobacco Use    Smoking status: Current Every Day Smoker     Packs/day: 0.50     Types: Cigarettes    Smokeless tobacco: Never Used   Substance and Sexual Activity    Alcohol use: Yes     Frequency: 2-4 times a month     Comment: 1 drink per week    Drug use: No    Sexual activity: Yes     Partners: Female     Birth control/protection: Inserts   Social History Narrative    Lives in [address] [place] [address] and 3year old daughters     ; wife with 2 step sons and daughter (ages 10, 1 and 5 months)      Family History   Problem Relation Age of Onset    Hypertension Mother     Psychiatric Disorder Mother     Alcohol abuse Father     Psychiatric Disorder Father     Psychiatric Disorder Sister     Diabetes Maternal Grandmother     Headache Maternal Grandmother      Current Outpatient Medications   Medication Sig Dispense Refill    raNITIdine (ZANTAC) 300 mg tab Take 1 Tab by mouth nightly.  30 Tab 3    cyclobenzaprine (FLEXERIL) 10 mg tablet Take 1 Tab by mouth nightly. 30 Tab 2    chlorthalidone (HYGROTEN) 25 mg tablet Take 1 Tab by mouth daily. 30 Tab 5    ondansetron (ZOFRAN ODT) 4 mg disintegrating tablet Take 1 Tab by mouth every eight (8) hours as needed for Nausea. 10 Tab 0    methocarbamol (ROBAXIN) 500 mg tablet Take 1 Tab by mouth four (4) times daily. 30 Tab 0    butalbital-acetaminophen-caff (FIORICET) -40 mg per capsule TK 1 C PO Q 6 TO 8 H UTD PRF HA  3    traZODone (DESYREL) 50 mg tablet TK 1 T PO QHS  3    escitalopram oxalate (LEXAPRO) 10 mg tablet Take 100 mg by mouth daily. 3     Allergies   Allergen Reactions    Latex Hives    Compazine [Prochlorperazine Edisylate] Anxiety    Acetaminophen Other (comments)     Pt has liver disease    Aspirin Other (comments)     Patient states \"I have liver disease\"    Celexa [Citalopram] Palpitations    Tramadol Hives and Anxiety    Wellbutrin [Bupropion Hcl] Palpitations       Objective:  Visit Vitals  /83 (BP 1 Location: Left arm, BP Patient Position: Sitting)   Pulse 86   Temp 98.8 °F (37.1 °C) (Oral)   Resp 19   Ht 5' 9\" (1.753 m)   Wt 332 lb (150.6 kg)   LMP 12/03/2019 (Exact Date)   SpO2 98%   Breastfeeding No   BMI 49.03 kg/m²     Physical Exam:   General appearance - alert, pleasant, obese young lady in NAD  Mental status - alert, oriented to person, place, and time  EYE-EOMI  Mouth - mucous membranes moist, pharynx normal without lesions  Neck - supple, no significant adenopathy   Chest - clear to auscultation, no wheezes, rales or rhonchi, symmetric air entry   Heart - normal rate, regular rhythm, normal S1, S2  Abdomen - soft, nontender, obese  Ext-peripheral pulses normal, no pedal edema, no clubbing or cyanosis  Skin-Warm and dry.  no hyperpigmentation, vitiligo, or suspicious lesions  Neuro -alert, oriented, normal speech, no focal findings or movement disorder noted        Results for orders placed or performed during the hospital encounter of 09/10/19   CBC WITH AUTOMATED DIFF   Result Value Ref Range    WBC 10.2 3.6 - 11.0 K/uL    RBC 4.95 3.80 - 5.20 M/uL    HGB 12.3 11.5 - 16.0 g/dL    HCT 39.6 35.0 - 47.0 %    MCV 80.0 80.0 - 99.0 FL    MCH 24.8 (L) 26.0 - 34.0 PG    MCHC 31.1 30.0 - 36.5 g/dL    RDW 14.9 (H) 11.5 - 14.5 %    PLATELET 837 368 - 767 K/uL    MPV 11.2 8.9 - 12.9 FL    NRBC 0.0 0  WBC    ABSOLUTE NRBC 0.00 0.00 - 0.01 K/uL    NEUTROPHILS 60 32 - 75 %    LYMPHOCYTES 32 12 - 49 %    MONOCYTES 6 5 - 13 %    EOSINOPHILS 2 0 - 7 %    BASOPHILS 0 0 - 1 %    IMMATURE GRANULOCYTES 0 0.0 - 0.5 %    ABS. NEUTROPHILS 6.0 1.8 - 8.0 K/UL    ABS. LYMPHOCYTES 3.3 0.8 - 3.5 K/UL    ABS. MONOCYTES 0.6 0.0 - 1.0 K/UL    ABS. EOSINOPHILS 0.3 0.0 - 0.4 K/UL    ABS. BASOPHILS 0.0 0.0 - 0.1 K/UL    ABS. IMM. GRANS. 0.0 0.00 - 0.04 K/UL    DF AUTOMATED     METABOLIC PANEL, COMPREHENSIVE   Result Value Ref Range    Sodium 140 136 - 145 mmol/L    Potassium 3.7 3.5 - 5.1 mmol/L    Chloride 104 97 - 108 mmol/L    CO2 29 21 - 32 mmol/L    Anion gap 7 5 - 15 mmol/L    Glucose 79 65 - 100 mg/dL    BUN 14 6 - 20 MG/DL    Creatinine 0.95 0.55 - 1.02 MG/DL    BUN/Creatinine ratio 15 12 - 20      GFR est AA >60 >60 ml/min/1.73m2    GFR est non-AA >60 >60 ml/min/1.73m2    Calcium 8.9 8.5 - 10.1 MG/DL    Bilirubin, total 0.3 0.2 - 1.0 MG/DL    ALT (SGPT) 42 12 - 78 U/L    AST (SGOT) 21 15 - 37 U/L    Alk.  phosphatase 76 45 - 117 U/L    Protein, total 7.0 6.4 - 8.2 g/dL    Albumin 3.4 (L) 3.5 - 5.0 g/dL    Globulin 3.6 2.0 - 4.0 g/dL    A-G Ratio 0.9 (L) 1.1 - 2.2     LIPASE   Result Value Ref Range    Lipase 114 73 - 393 U/L   URINALYSIS W/ RFLX MICROSCOPIC   Result Value Ref Range    Color YELLOW/STRAW      Appearance CLEAR CLEAR      Specific gravity 1.025 1.003 - 1.030      pH (UA) 5.5 5.0 - 8.0      Protein NEGATIVE  NEG mg/dL    Glucose NEGATIVE  NEG mg/dL    Ketone NEGATIVE  NEG mg/dL    Bilirubin NEGATIVE  NEG      Blood NEGATIVE  NEG Urobilinogen 0.2 0.2 - 1.0 EU/dL    Nitrites NEGATIVE  NEG      Leukocyte Esterase NEGATIVE  NEG     HCG URINE, QL. - POC   Result Value Ref Range    Pregnancy test,urine (POC) NEGATIVE  NEG         Assessment/Plan:    ICD-10-CM ICD-9-CM    1. Essential hypertension I10 401.9    2. Moderate episode of recurrent major depressive disorder (HCC) F33.1 296.32    3. Obesity, morbid ([address] E66.01 278.01    4. Tobacco use Z72.0 305.1    5. Encounter for tobacco use cessation counseling Z71.6 V65.42      No orders of the defined types were placed in this encounter. 1. Essential hypertension  Controlled on current regimen    2. Moderate episode of recurrent major depressive disorder ([address]  F/u psych    3. Obesity, morbid ([address]  Monthly weight checks w me    4. Tobacco use  Noted  I have exhausted all smoking cessation aids except cold turkey    5. Encounter for tobacco use cessation counseling  -Patient was counseled extensively on the need to abstain from tobacco, its addictive tendencies, its deleterious effects on the lungs as well as its financial sequelae                      There are no Patient Instructions on file for this visit. Follow-up and Dispositions    · Return in about 4 weeks (around 1/16/2020) for weight check. I have reviewed with the patient details of the assessment and plan and all questions were answered. Relevent patient education was performed. The most recent lab findings were reviewed with the patient. An After Visit Summary was printed and given to the patient.

## 2019-12-19 NOTE — PROGRESS NOTES
Chief Complaint   Patient presents with    Hypertension     Pt requesting Chantix     1. Have you been to the ER, urgent care clinic since your last visit? Hospitalized since your last visit? No    2. Have you seen or consulted any other health care providers outside of the [address] [place] [address] since your last visit? Include any pap smears or colon screening.  No

## 2020-01-01 DIAGNOSIS — K21.9 GASTROESOPHAGEAL REFLUX DISEASE WITHOUT ESOPHAGITIS: ICD-10-CM

## 2020-01-01 DIAGNOSIS — M79.604 PAIN OF RIGHT LOWER EXTREMITY: ICD-10-CM

## 2020-01-01 DIAGNOSIS — I10 ESSENTIAL HYPERTENSION: ICD-10-CM

## 2020-01-01 RX ORDER — CYCLOBENZAPRINE HCL 10 MG
10 TABLET ORAL
Qty: 30 TAB | Refills: 2 | Status: CANCELLED | OUTPATIENT
Start: 2020-01-01

## 2020-01-01 RX ORDER — RANITIDINE 300 MG/1
300 TABLET ORAL
Qty: 30 TAB | Refills: 3 | Status: CANCELLED | OUTPATIENT
Start: 2020-01-01

## 2020-01-01 RX ORDER — CHLORTHALIDONE 25 MG/1
25 TABLET ORAL DAILY
Qty: 30 TAB | Refills: 5 | Status: CANCELLED | OUTPATIENT
Start: 2020-01-01

## 2020-01-02 DIAGNOSIS — K21.9 GASTROESOPHAGEAL REFLUX DISEASE WITHOUT ESOPHAGITIS: ICD-10-CM

## 2020-01-02 DIAGNOSIS — I10 ESSENTIAL HYPERTENSION: ICD-10-CM

## 2020-01-02 RX ORDER — CHLORTHALIDONE 25 MG/1
25 TABLET ORAL DAILY
Qty: 30 TAB | Refills: 5 | Status: SHIPPED | OUTPATIENT
Start: 2020-01-02

## 2020-01-02 RX ORDER — RANITIDINE 300 MG/1
300 TABLET ORAL
Qty: 30 TAB | Refills: 3 | Status: SHIPPED | OUTPATIENT
Start: 2020-01-02

## 2020-01-05 ENCOUNTER — HOSPITAL ENCOUNTER (EMERGENCY)
Age: 25
Discharge: HOME | End: 2020-01-05
Attending: EMERGENCY MEDICINE
Payer: MEDICAID

## 2020-01-05 VITALS
DIASTOLIC BLOOD PRESSURE: 86 MMHG | SYSTOLIC BLOOD PRESSURE: 128 MMHG | TEMPERATURE: 98.1 F | WEIGHT: 293 LBS | HEIGHT: 69 IN | OXYGEN SATURATION: 98 % | RESPIRATION RATE: 16 BRPM | HEART RATE: 88 BPM | BODY MASS INDEX: 43.4 KG/M2

## 2020-01-05 DIAGNOSIS — J20.9 ACUTE BRONCHITIS, UNSPECIFIED ORGANISM: Primary | ICD-10-CM

## 2020-01-05 DIAGNOSIS — M79.10 MYALGIA: ICD-10-CM

## 2020-01-05 DIAGNOSIS — Z72.0 TOBACCO ABUSE: ICD-10-CM

## 2020-01-05 LAB
APPEARANCE UR: CLEAR
BACTERIA URNS QL MICRO: NEGATIVE /HPF
BILIRUB UR QL: NEGATIVE
COLOR UR: NORMAL
EPITH CASTS URNS QL MICRO: NORMAL /LPF
FLUAV AG NPH QL IA: NEGATIVE
FLUBV AG NOSE QL IA: NEGATIVE
GLUCOSE UR STRIP.AUTO-MCNC: NEGATIVE MG/DL
HCG UR QL: NEGATIVE
HGB UR QL STRIP: NEGATIVE
KETONES UR QL STRIP.AUTO: NEGATIVE MG/DL
LEUKOCYTE ESTERASE UR QL STRIP.AUTO: NEGATIVE
NITRITE UR QL STRIP.AUTO: NEGATIVE
PH UR STRIP: 6 [PH] (ref 5–8)
PROT UR STRIP-MCNC: NEGATIVE MG/DL
RBC #/AREA URNS HPF: NORMAL /HPF (ref 0–5)
SP GR UR REFRACTOMETRY: 1.02 (ref 1–1.03)
UA: UC IF INDICATED,UAUC: NORMAL
UROBILINOGEN UR QL STRIP.AUTO: 0.2 EU/DL (ref 0.2–1)
WBC URNS QL MICRO: NORMAL /HPF (ref 0–4)

## 2020-01-05 PROCEDURE — 99284 EMERGENCY DEPT VISIT MOD MDM: CPT

## 2020-01-05 PROCEDURE — 87804 INFLUENZA ASSAY W/OPTIC: CPT

## 2020-01-05 PROCEDURE — 81001 URINALYSIS AUTO W/SCOPE: CPT

## 2020-01-05 PROCEDURE — 81025 URINE PREGNANCY TEST: CPT

## 2020-01-05 RX ORDER — GABAPENTIN 300 MG/1
CAPSULE ORAL
COMMUNITY
Start: 2019-09-10

## 2020-01-05 RX ORDER — ETONOGESTREL AND ETHINYL ESTRADIOL 11.7; 2.7 MG/1; MG/1
INSERT, EXTENDED RELEASE VAGINAL
COMMUNITY
Start: 2019-01-06

## 2020-01-05 RX ORDER — DICLOFENAC SODIUM 75 MG/1
TABLET, DELAYED RELEASE ORAL
COMMUNITY
Start: 2019-11-06

## 2020-01-05 RX ORDER — BUTALBITAL, ACETAMINOPHEN AND CAFFEINE 300; 40; 50 MG/1; MG/1; MG/1
CAPSULE ORAL
COMMUNITY
Start: 2019-01-14

## 2020-01-05 RX ORDER — DULOXETIN HYDROCHLORIDE 30 MG/1
CAPSULE, DELAYED RELEASE ORAL
COMMUNITY
Start: 2018-11-21

## 2020-01-05 RX ORDER — ESCITALOPRAM OXALATE 10 MG/1
TABLET ORAL
COMMUNITY
Start: 2019-01-06

## 2020-01-05 RX ORDER — AZITHROMYCIN 250 MG/1
TABLET, FILM COATED ORAL
Qty: 6 TAB | Refills: 0 | Status: SHIPPED | OUTPATIENT
Start: 2020-01-05 | End: 2020-01-10

## 2020-01-05 RX ORDER — CODEINE PHOSPHATE AND GUAIFENESIN 10; 100 MG/5ML; MG/5ML
5 SOLUTION ORAL
Qty: 120 ML | Refills: 0 | Status: SHIPPED | OUTPATIENT
Start: 2020-01-05 | End: 2020-01-09

## 2020-01-05 NOTE — DISCHARGE INSTRUCTIONS
Patient Education        Bronchitis: Care Instructions  Your Care Instructions    Bronchitis is inflammation of the bronchial tubes, which carry air to the lungs. The tubes swell and produce mucus, or phlegm. The mucus and inflamed bronchial tubes make you cough. You may have trouble breathing. Most cases of bronchitis are caused by viruses like those that cause colds. Antibiotics usually do not help and they may be harmful. Bronchitis usually develops rapidly and lasts about 2 to 3 weeks in otherwise healthy people. Follow-up care is a key part of your treatment and safety. Be sure to make and go to all appointments, and call your doctor if you are having problems. It's also a good idea to know your test results and keep a list of the medicines you take. How can you care for yourself at home? · Take all medicines exactly as prescribed. Call your doctor if you think you are having a problem with your medicine. · Get some extra rest.  · Take an over-the-counter pain medicine, such as acetaminophen (Tylenol), ibuprofen (Advil, Motrin), or naproxen (Aleve) to reduce fever and relieve body aches. Read and follow all instructions on the label. · Do not take two or more pain medicines at the same time unless the doctor told you to. Many pain medicines have acetaminophen, which is Tylenol. Too much acetaminophen (Tylenol) can be harmful. · Take an over-the-counter cough medicine that contains dextromethorphan to help quiet a dry, hacking cough so that you can sleep. Avoid cough medicines that have more than one active ingredient. Read and follow all instructions on the label. · Breathe moist air from a humidifier, hot shower, or sink filled with hot water. The heat and moisture will thin mucus so you can cough it out. · Do not smoke. Smoking can make bronchitis worse. If you need help quitting, talk to your doctor about stop-smoking programs and medicines.  These can increase your chances of quitting for good.  When should you call for help? Call 911 anytime you think you may need emergency care. For example, call if:    · You have severe trouble breathing.    Call your doctor now or seek immediate medical care if:    · You have new or worse trouble breathing.     · You cough up dark brown or bloody mucus (sputum).     · You have a new or higher fever.     · You have a new rash.    Watch closely for changes in your health, and be sure to contact your doctor if:    · You cough more deeply or more often, especially if you notice more mucus or a change in the color of your mucus.     · You are not getting better as expected. Where can you learn more? Go to http://adelfoHexaTechpaul.info/. Enter H333 in the search box to learn more about \"Bronchitis: Care Instructions. \"  Current as of: June 9, 2019  Content Version: 12.2  © 5804-2869 [employer]. Care instructions adapted under license by [employer] (which disclaims liability or warranty for this information). If you have questions about a medical condition or this instruction, always ask your healthcare professional. [address] any warranty or liability for your use of this information. Patient Education        Learning About Benefits From Quitting Smoking  How does quitting smoking make you healthier? If you're thinking about quitting smoking, you may have a few reasons to be smoke-free. Your health may be one of them. · When you quit smoking, you lower your risks for cancer, lung disease, heart attack, stroke, blood vessel disease, and blindness from macular degeneration. · When you're smoke-free, you get sick less often, and you heal faster. You are less likely to get colds, flu, bronchitis, and pneumonia. · As a nonsmoker, you may find that your mood is better and you are less stressed. When and how will you feel healthier?   Quitting has real health benefits that start from day 1 of being smoke-free. And the longer you stay smoke-free, the healthier you get and the better you feel. The first hours  · After just 20 minutes, your blood pressure and heart rate go down. That means there's less stress on your heart and blood vessels. · Within 12 hours, the level of carbon monoxide in your blood drops back to normal. That makes room for more oxygen. With more oxygen in your body, you may notice that you have more energy than when you smoked. After 2 weeks  · Your lungs start to work better. · Your risk of heart attack starts to drop. After 1 month  · When your lungs are clear, you cough less and breathe deeper, so it's easier to be active. · Your sense of taste and smell return. That means you can enjoy food more than you have since you started smoking. Over the years  · After 1 year, your risk of heart disease is half what it would be if you kept smoking. · After 5 years, your risk of stroke starts to shrink. Within a few years after that, it's about the same as if you'd never smoked. · After 10 years, your risk of dying from lung cancer is cut by about half. And your risk for many other types of cancer is lower too. How would quitting help others in your life? When you quit smoking, you improve the health of everyone who now breathes in your smoke. · Their heart, lung, and cancer risks drop, much like yours. · They are sick less. For babies and small children, living smoke-free means they're less likely to have ear infections, pneumonia, and bronchitis. · If you're a woman who is or will be pregnant someday, quitting smoking means a healthier . · Children who are close to you are less likely to become adult smokers. Where can you learn more? Go to http://adelfo-paul.info/. Enter [id number] [id number] [id number] in the search box to learn more about \"Learning About Benefits From Quitting Smoking. \"  Current as of: 2018  Content Version: 12.2  © 5149-3843 [employer]. Care instructions adapted under license by [employer] (which disclaims liability or warranty for this information). If you have questions about a medical condition or this instruction, always ask your healthcare professional. [address] any warranty or liability for your use of this information. Patient Education        Stopping Smoking: Care Instructions  Your Care Instructions  Cigarette smokers crave the nicotine in cigarettes. Giving it up is much harder than simply changing a habit. Your body has to stop craving the nicotine. It is hard to quit, but you can do it. There are many tools that people use to quit smoking. You may find that combining tools works best for you. There are several steps to quitting. First you get ready to quit. Then you get support to help you. After that, you learn new skills and behaviors to become a nonsmoker. For many people, a necessary step is getting and using medicine. Your doctor will help you set up the plan that best meets your needs. You may want to attend a smoking cessation program to help you quit smoking. When you choose a program, look for one that has proven success. Ask your doctor for ideas. You will greatly increase your chances of success if you take medicine as well as get counseling or join a cessation program.  Some of the changes you feel when you first quit tobacco are uncomfortable. Your body will miss the nicotine at first, and you may feel short-tempered and grumpy. You may have trouble sleeping or concentrating. Medicine can help you deal with these symptoms. You may struggle with changing your smoking habits and rituals. The last step is the tricky one: Be prepared for the smoking urge to continue for a time. This is a lot to deal with, but keep at it. You will feel better. Follow-up care is a key part of your treatment and safety.  Be sure to make and go to all appointments, and call your doctor if you are having problems. It's also a good idea to know your test results and keep a list of the medicines you take. How can you care for yourself at home? · Ask your family, friends, and coworkers for support. You have a better chance of quitting if you have help and support. · Join a support group, such as Nicotine Anonymous, for people who are trying to quit smoking. · Consider signing up for a smoking cessation program, such as the American Lung Association's Freedom from Smoking program.  · Get text messaging support. Go to the website at www.smokefree. gov to sign up for the [facility] program.  · Set a quit date. Pick your date carefully so that it is not right in the middle of a big deadline or stressful time. Once you quit, do not even take a puff. Get rid of all ashtrays and lighters after your last cigarette. Clean your house and your clothes so that they do not smell of smoke. · Learn how to be a nonsmoker. Think about ways you can avoid those things that make you reach for a cigarette. ? Avoid situations that put you at greatest risk for smoking. For some people, it is hard to have a drink with friends without smoking. For others, they might skip a coffee break with coworkers who smoke. ? Change your daily routine. Take a different route to work or eat a meal in a different place. · Cut down on stress. Calm yourself or release tension by doing an activity you enjoy, such as reading a book, taking a hot bath, or gardening. · Talk to your doctor or pharmacist about nicotine replacement therapy, which replaces the nicotine in your body. You still get nicotine but you do not use tobacco. Nicotine replacement products help you slowly reduce the amount of nicotine you need. These products come in several forms, many of them available over-the-counter:  ? Nicotine patches  ? Nicotine gum and lozenges  ?  Nicotine inhaler  · Ask your doctor about bupropion (Wellbutrin) or varenicline (Chantix), which are prescription medicines. They do not contain nicotine. They help you by reducing withdrawal symptoms, such as stress and anxiety. · Some people find hypnosis, acupuncture, and massage helpful for ending the smoking habit. · Eat a healthy diet and get regular exercise. Having healthy habits will help your body move past its craving for nicotine. · Be prepared to keep trying. Most people are not successful the first few times they try to quit. Do not get mad at yourself if you smoke again. Make a list of things you learned and think about when you want to try again, such as next week, next month, or next year. Where can you learn more? Go to http://adelfoEverestpaul.info/. Enter P642 in the search box to learn more about \"Stopping Smoking: Care Instructions. \"  Current as of: September 26, 2018  Content Version: 12.2  © 9194-4774 [employer]. Care instructions adapted under license by [employer] (which disclaims liability or warranty for this information). If you have questions about a medical condition or this instruction, always ask your healthcare professional. [address] any warranty or liability for your use of this information.

## 2020-01-05 NOTE — ED PROVIDER NOTES
EMERGENCY DEPARTMENT HISTORY AND PHYSICAL EXAM      Date: 1/5/2020  Patient Name: Maria M Britt    History of Presenting Illness     Chief Complaint   Patient presents with    Generalized Body Aches     cough and chest tightness, abdominal and lower back pains    Headache     decrease PO intake, decreased urinary output, slight dysuria     History Provided By: Patient    HPI: Maria M Britt, 25 y.o. female with multiple medical problems who presents via private vehicle to the ED with cc of generalized body aches, cough, back pain, headache, and decreased appetite for the past day and a half. Patient states multiple family members have been diagnosed with the flu recently and she thinks that she has caught it from them. She denies any fevers, chills, vomiting, or diarrhea. She did not get a flu shot this year. PMHx: Hepatitis, sinusitis, anemia, anxiety, depression, hypertension, fatty liver, GERD, and PTSD  Social Hx: Smokes 1/2 pack/day, occasional alcohol use, denies illegal drug use    PCP: Keysha Spence MD    There are no other complaints, changes, or physical findings at this time. No current facility-administered medications on file prior to encounter. Current Outpatient Medications on File Prior to Encounter   Medication Sig Dispense Refill    butalbital-acetaminophen-caff (FIORICET) -40 mg per capsule       diclofenac EC (VOLTAREN) 75 mg EC tablet TAKE 1 TABLET BY MOUTH TWICE DAILY AFTER EATING      DULoxetine (CYMBALTA) 30 mg capsule TK 1 C PO D      escitalopram oxalate (LEXAPRO) 10 mg tablet       ethinyl estradiol-etonogestrel (NUVARING) 0.12-0.015 mg/24 hr vaginal ring       gabapentin (NEURONTIN) 300 mg capsule Take one a day for first 3 days; Then take  two times a day for next 3 days; Then take  three times a day      chlorthalidone (HYGROTEN) 25 mg tablet Take 1 Tab by mouth daily. 30 Tab 5    raNITIdine (ZANTAC) 300 mg tab Take 1 Tab by mouth nightly.  30 Tab 3    cyclobenzaprine (FLEXERIL) 10 mg tablet Take 1 Tab by mouth nightly. 27 Tab 2     Past History     Past Medical History:  Past Medical History:   Diagnosis Date    Acute hepatitis 2014    Acute pharyngitis 2010    Acute sinusitis 2009    Anemia NEC     with pregnancy    Anxiety     Backache, unspecified     Complication of anesthesia     difficulty waking after surgery (pt reported)    Depression 2018    Essential hypertension     Before pregnancy    Fatty liver     Flu 2009    GERD (gastroesophageal reflux disease) 2012    Gestational hypertension     HX OTHER MEDICAL     GERD with pregnancy    HX OTHER MEDICAL     Migraines worse with pregnancy    Hypertension     IGT (impaired glucose tolerance) 2012    Migraine 3/22/2011    Mononucleosis 2010    Morbid obesity ([address]     Musculoskeletal disorder     PTSD (post-traumatic stress disorder)      Past Surgical History:  Past Surgical History:   Procedure Laterality Date    HX  SECTION      HX CHOLECYSTECTOMY      HX TONSILLECTOMY      HX WISDOM TEETH EXTRACTION  2013    Dr. Jane Gary     Family History:  Family History   Problem Relation Age of Onset    Hypertension Mother     Psychiatric Disorder Mother     Alcohol abuse Father     Psychiatric Disorder Father     Psychiatric Disorder Sister     Diabetes Maternal Grandmother     Headache Maternal Grandmother      Social History:  Social History     Tobacco Use    Smoking status: Current Every Day Smoker     Packs/day: 0.50     Types: Cigarettes    Smokeless tobacco: Never Used   Substance Use Topics    Alcohol use: Yes     Frequency: 2-4 times a month     Comment: 1 drink per week    Drug use: No     Allergies:   Allergies   Allergen Reactions    Latex Hives    Compazine [Prochlorperazine Edisylate] Anxiety    Acetaminophen Other (comments)     Pt has liver disease    Aspirin Other (comments)     Patient states \"I have liver disease\"    Celexa [Citalopram] Palpitations    Tramadol Hives and Anxiety    Wellbutrin [Bupropion Hcl] Palpitations     Review of Systems   Review of Systems   Constitutional: Negative for chills and fever. HENT: Negative for congestion, rhinorrhea, sneezing and sore throat. Eyes: Negative for redness and visual disturbance. Respiratory: Positive for cough. Negative for shortness of breath. Cardiovascular: Negative for leg swelling. Gastrointestinal: Positive for abdominal pain. Negative for nausea and vomiting. Genitourinary: Negative for difficulty urinating and frequency. Musculoskeletal: Positive for arthralgias, back pain and myalgias. Negative for neck stiffness. Skin: Negative for rash. Neurological: Positive for headaches. Negative for dizziness, syncope and weakness. Hematological: Negative for adenopathy. All other systems reviewed and are negative. Physical Exam   Physical Exam  Vitals signs and nursing note reviewed. Constitutional:       Appearance: Normal appearance. She is well-developed. She is morbidly obese. HENT:      Head: Normocephalic and atraumatic. Eyes:      Conjunctiva/sclera: Conjunctivae normal.   Neck:      Musculoskeletal: Full passive range of motion without pain, normal range of motion and neck supple. Cardiovascular:      Rate and Rhythm: Normal rate and regular rhythm. Pulses: Normal pulses. Heart sounds: Normal heart sounds, S1 normal and S2 normal. No murmur. Pulmonary:      Effort: Pulmonary effort is normal. No respiratory distress. Breath sounds: Normal breath sounds. No wheezing. Comments: Bronchospastic cough, otherwise speaking in full sentences  Abdominal:      General: Bowel sounds are normal. There is no distension. Palpations: Abdomen is soft. Tenderness: There is no tenderness. There is no rebound. Musculoskeletal: Normal range of motion. Skin:     General: Skin is warm and dry. Findings: No rash. Neurological:      Mental Status: She is alert and oriented to person, place, and time. Psychiatric:         Speech: Speech normal.         Behavior: Behavior normal.         Thought Content: Thought content normal.         Judgment: Judgment normal.       Diagnostic Study Results   Labs -     Recent Results (from the past 12 hour(s))   URINALYSIS W/ REFLEX CULTURE    Collection Time: 01/05/20  3:20 PM   Result Value Ref Range    Color YELLOW/STRAW      Appearance CLEAR CLEAR      Specific gravity 1.025 1.003 - 1.030      pH (UA) 6.0 5.0 - 8.0      Protein NEGATIVE  NEG mg/dL    Glucose NEGATIVE  NEG mg/dL    Ketone NEGATIVE  NEG mg/dL    Bilirubin NEGATIVE  NEG      Blood NEGATIVE  NEG      Urobilinogen 0.2 0.2 - 1.0 EU/dL    Nitrites NEGATIVE  NEG      Leukocyte Esterase NEGATIVE  NEG      WBC 0-4 0 - 4 /hpf    RBC 0-5 0 - 5 /hpf    Epithelial cells FEW FEW /lpf    Bacteria NEGATIVE  NEG /hpf    UA:UC IF INDICATED CULTURE NOT INDICATED BY UA RESULT CNI     HCG URINE, QL. - POC    Collection Time: 01/05/20  3:24 PM   Result Value Ref Range    Pregnancy test,urine (POC) NEGATIVE  NEG     INFLUENZA A & B AG (RAPID TEST)    Collection Time: 01/05/20  3:52 PM   Result Value Ref Range    Influenza A Antigen NEGATIVE  NEG      Influenza B Antigen NEGATIVE  NEG         Radiologic Studies -   No orders to display     No results found. Medical Decision Making   I am the first provider for this patient. I reviewed the vital signs, available nursing notes, past medical history, past surgical history, family history and social history. Vital Signs-Reviewed the patient's vital signs.   Patient Vitals for the past 12 hrs:   Temp Pulse Resp BP SpO2   01/05/20 1452 98.1 °F (36.7 °C) 88 16 128/86 98 %     Pulse Oximetry Analysis - 98% on RA    Records Reviewed: Nursing Notes    Provider Notes (Medical Decision Making):   69-year-old female presents with cough, body aches, congestion, and multiple other symptoms consistent with a viral upper respiratory infection. She is in the window to treat for flu and has had multiple family members with the flu diagnosis recently. Will swab her and if positive, start on Tamiflu. ED Course:   Initial assessment performed. The patients presenting problems have been discussed, and they are in agreement with the care plan formulated and outlined with them. I have encouraged them to ask questions as they arise throughout their visit. Labs were unremarkable and flu test was negative. Patient also asked me to look at her umbilicus as she occasionally has some discharge from it. I did not appreciate any fluctuance or erythema on exam.  Will treat with azithromycin and cough medication and have patient follow-up with her primary care doctor. Tobacco Cessation Counseling   I spent 5 minutes discussing the medical risks of prolonged smoking habits and advised the patient of the benefits of the cessation of smoking, providing specific suggestions on how to quit. Stevie Gregorio MD    Progress Note:   Updated pt on all returned results and findings. Discussed the importance of proper follow up as referred below along with return precautions. Pt in agreement with the care plan and expresses agreement with and understanding of all items discussed. Disposition:  Discharge Note:  The pt is ready for discharge. The pt's signs, symptoms, diagnosis, and discharge instructions have been discussed and pt has conveyed their understanding. The pt is to follow up as recommended or return to ER should their symptoms worsen. Plan has been discussed and pt is in agreement. PLAN:  1.    Current Discharge Medication List      START taking these medications    Details   azithromycin (ZITHROMAX Z-DULCE) 250 mg tablet Take 2 tabs on day 1, then 1 tab daily for the next 4 days  Qty: 6 Tab, Refills: 0      guaiFENesin-codeine (ROBITUSSIN AC) 100-10 mg/5 mL solution Take 5 mL by mouth three (3) times daily as needed for Cough for up to 4 days. Max Daily Amount: 15 mL. Qty: 120 mL, Refills: 0    Associated Diagnoses: Acute bronchitis, unspecified organism           2. Follow-up Information     Follow up With Specialties Details Why Contact Info    Barrie Alvares MD Internal Medicine Schedule an appointment as soon as possible for a visit  [address]  [address] [id number] [phone]      [facility] EMERGENCY DEPT Emergency Medicine  As needed, If symptoms worsen [address] [facility]  [phone]        Return to ED if worse     Diagnosis     Clinical Impression:   1. Acute bronchitis, unspecified organism    2. Myalgia    3. Tobacco abuse            Please note that this dictation was completed with Dragon, computer voice recognition software. Quite often unanticipated grammatical, syntax, homophones, and other interpretive errors are inadvertently transcribed by the computer software. Please disregard these errors. Additionally, please excuse any errors that have escaped final proofreading.

## 2020-01-05 NOTE — ED NOTES
Discharge instructions were given to the patient by ERNESTO Pedraza RN. .     The patient left the Emergency Department ambulatory, alert and oriented and in no acute distress with 2 prescription(s). The patient was encouraged to call or return to the ED for worsening symptoms or problems and was encouraged to schedule a follow up appointment for continuing care. Patient leaving ED accompanied by self. The patient verbalized understanding of discharge instructions and prescriptions, all questions were answered. The patient has no further concerns at this time. Patient declined wheelchair transfer upon ED discharge.

## 2020-01-05 NOTE — ED NOTES
Emergency Department Nursing Plan of Care       The Nursing Plan of Care is developed from the Nursing assessment and Emergency Department Attending provider initial evaluation. The plan of care may be reviewed in the ED Provider note. The Plan of Care was developed with the following considerations:   Patient / Family readiness to learn indicated by:verbalized understanding  Persons(s) to be included in education: patient  Barriers to Learning/Limitations:No    Signed     Bryce Pierre    1/5/2020   3:31 PM    Patient is alert and oriented x 4 and in no acute distress at this time. Respirations are at a regular rate, depth, and pattern. Patient updated on plan of care and has no questions or concerns at this time. Call bell within reach. Will continue to monitor. Please reference nursing assessment. poor plus

## 2020-01-28 ENCOUNTER — EMERGENCY (EMERGENCY)
Facility: HOSPITAL | Age: 25
LOS: 1 days | Discharge: HOME | End: 2020-01-28
Attending: EMERGENCY MEDICINE | Admitting: EMERGENCY MEDICINE
Payer: MEDICAID

## 2020-01-28 VITALS
OXYGEN SATURATION: 99 % | HEIGHT: 69 IN | SYSTOLIC BLOOD PRESSURE: 156 MMHG | RESPIRATION RATE: 18 BRPM | WEIGHT: 214.95 LBS | DIASTOLIC BLOOD PRESSURE: 97 MMHG | HEART RATE: 81 BPM | TEMPERATURE: 98 F

## 2020-01-28 LAB
APPEARANCE UR: CLEAR — SIGNIFICANT CHANGE UP
BACTERIA # UR AUTO: PRESENT /HPF
BILIRUB UR-MCNC: ABNORMAL
COLOR SPEC: YELLOW — SIGNIFICANT CHANGE UP
DIFF PNL FLD: NEGATIVE — SIGNIFICANT CHANGE UP
EPI CELLS # UR: ABNORMAL /HPF (ref 0–5)
FLU A RESULT: SIGNIFICANT CHANGE UP
FLU A RESULT: SIGNIFICANT CHANGE UP
FLUAV AG NPH QL: SIGNIFICANT CHANGE UP
FLUBV AG NPH QL: SIGNIFICANT CHANGE UP
GLUCOSE UR QL: NEGATIVE — SIGNIFICANT CHANGE UP
HCG UR QL: NEGATIVE — SIGNIFICANT CHANGE UP
KETONES UR-MCNC: ABNORMAL MG/DL
LEUKOCYTE ESTERASE UR-ACNC: ABNORMAL
NITRITE UR-MCNC: NEGATIVE — SIGNIFICANT CHANGE UP
PH UR: 6.5 — SIGNIFICANT CHANGE UP (ref 5–8)
PROT UR-MCNC: NEGATIVE MG/DL — SIGNIFICANT CHANGE UP
RSV RESULT: SIGNIFICANT CHANGE UP
RSV RNA RESP QL NAA+PROBE: SIGNIFICANT CHANGE UP
S PYO AG SPEC QL IA: NEGATIVE — SIGNIFICANT CHANGE UP
SP GR SPEC: 1.02 — SIGNIFICANT CHANGE UP (ref 1–1.03)
UROBILINOGEN FLD QL: 1 E.U./DL — SIGNIFICANT CHANGE UP
WBC UR QL: ABNORMAL /HPF

## 2020-01-28 PROCEDURE — 99284 EMERGENCY DEPT VISIT MOD MDM: CPT | Mod: 25

## 2020-01-28 PROCEDURE — 71046 X-RAY EXAM CHEST 2 VIEWS: CPT | Mod: 26

## 2020-01-28 RX ORDER — ALBUTEROL 90 UG/1
2 AEROSOL, METERED ORAL
Qty: 1 | Refills: 0
Start: 2020-01-28 | End: 2020-02-01

## 2020-01-28 RX ORDER — ALBUTEROL 90 UG/1
2.5 AEROSOL, METERED ORAL ONCE
Refills: 0 | Status: COMPLETED | OUTPATIENT
Start: 2020-01-28 | End: 2020-01-28

## 2020-01-28 RX ORDER — AZITHROMYCIN 500 MG/1
1 TABLET, FILM COATED ORAL
Qty: 1 | Refills: 0
Start: 2020-01-28

## 2020-01-28 RX ADMIN — ALBUTEROL 2.5 MILLIGRAM(S): 90 AEROSOL, METERED ORAL at 11:37

## 2020-01-28 NOTE — ED PROVIDER NOTE - PATIENT PORTAL LINK FT
You can access the FollowMyHealth Patient Portal offered by [facility] by registering at the following website: [email or web address]. By joining [employer]’s FollowMyHealth portal, you will also be able to view your health information using other applications (apps) compatible with our system.

## 2020-01-28 NOTE — ED PROVIDER NOTE - PROGRESS NOTE DETAILS
Better after neb. No evidence of influenza. Probable atypical bronchitis. Will give Zpak and Albuterol inhaler.

## 2020-01-28 NOTE — ED PROVIDER NOTE - OBJECTIVE STATEMENT
23 y/o female with PMHx of liver disease presents to ED c/o cold symptoms x 2 days. Patient reports sinus pressure, rhinorrhea, cough,  wheeze, sore throat, and pleuritic CP. Denies any fever, chills, photophobia, or HA, no recent international travel.

## 2020-01-28 NOTE — ED PROVIDER NOTE - CLINICAL SUMMARY MEDICAL DECISION MAKING FREE TEXT BOX
25 y/o female presents with cold symptoms. DDx: walking PNA, flu, bronchitis, viral URI. Will obtain CXR to r/o PNA, obtain flu swab to r/o influenza and rapid Strep. Patient is requesting pregnancy testing, will obtain UA and upreg. Will provide Albuterol, will reassess.

## 2020-01-28 NOTE — ED ADULT NURSE NOTE - OBJECTIVE STATEMENT
24y female presents to ED c/o cough, sinus congestion, and body aches. Pt has been having productive (green mucus) cough, sinus congestion and body aches for two days. Pt endorses nausea, denies vomiting, fever, chills. Pt speaking in full and complete sentences. A&Ox3.

## 2020-01-28 NOTE — ED PROVIDER NOTE - CHPI ED SYMPTOMS NEG
"Oncology Nurse Navigator telephone outreach attempt to verify pt is scheduled for medical oncology follow up. Listed number states \"the subscriber you have dialed is not in service.\" Email sent urging pt to make an appointment with medical oncology ASAP. Ching, manager for med onc, was informed.   "
no fever/no chills/no photophobia, HA

## 2020-01-28 NOTE — ED PROVIDER NOTE - RESPIRATORY, MLM
Rhonchi in the left upper lobe, diffuse end expiratory wheezing that is scattered but mild, no retractions, no word dyspnea.

## 2020-01-28 NOTE — ED ADULT TRIAGE NOTE - CHIEF COMPLAINT QUOTE
pt c/o cold symptoms and sinus pressure, requesting preg testing lmp 1/3/20 afebrile endorses hx liver disease

## 2020-01-28 NOTE — ED PROVIDER NOTE - DIAGNOSTIC INTERPRETATION
Interpreted by MD Dr. Arechiga  Chest x-ray, 2 views  Lungs clear, heart shadow normal, bony structures normal, no free air under diaphragm, no PTX

## 2020-01-28 NOTE — ED PROVIDER NOTE - ENMT, MLM
Airway patent. Mild erythema of the throat, no exudates. + hoarse voice. Throat has no vesicles, no oropharyngeal exudates and uvula is midline.

## 2020-01-30 LAB
CULTURE RESULTS: SIGNIFICANT CHANGE UP
SPECIMEN SOURCE: SIGNIFICANT CHANGE UP

## 2020-02-03 DIAGNOSIS — J40 BRONCHITIS, NOT SPECIFIED AS ACUTE OR CHRONIC: ICD-10-CM

## 2020-02-03 DIAGNOSIS — R05 COUGH: ICD-10-CM
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
## 2020-12-08 ENCOUNTER — GYNECOLOGY VISIT (OUTPATIENT)
Dept: OBGYN | Facility: CLINIC | Age: 25
End: 2020-12-08
Payer: MEDICAID

## 2020-12-08 VITALS — WEIGHT: 293 LBS | DIASTOLIC BLOOD PRESSURE: 92 MMHG | SYSTOLIC BLOOD PRESSURE: 148 MMHG

## 2020-12-08 DIAGNOSIS — Z32.01 ENCOUNTER FOR PREGNANCY TEST, RESULT POSITIVE: ICD-10-CM

## 2020-12-08 DIAGNOSIS — O09.90 HIGH RISK PREGNANCY, ANTEPARTUM: ICD-10-CM

## 2020-12-08 DIAGNOSIS — I10 CHRONIC HYPERTENSION: ICD-10-CM

## 2020-12-08 PROCEDURE — 99203 OFFICE O/P NEW LOW 30 MIN: CPT | Mod: 25 | Performed by: OBSTETRICS & GYNECOLOGY

## 2020-12-08 PROCEDURE — 76830 TRANSVAGINAL US NON-OB: CPT | Performed by: OBSTETRICS & GYNECOLOGY

## 2020-12-08 RX ORDER — ASPIRIN 81 MG/1
81 TABLET, CHEWABLE ORAL DAILY
Qty: 100 TAB | Refills: 4 | Status: SHIPPED | OUTPATIENT
Start: 2020-12-08 | End: 2022-06-23

## 2020-12-08 NOTE — NON-PROVIDER
Pt here for DUB.    Pt states she is having severe heartburn, and constant nausea.   Good# [phone]  LMP: 10/9/2020, 8w4d today   Pharmacy confirmed

## 2020-12-08 NOTE — PROGRESS NOTES
Cc: Confirmation of pregnancy    HPI:  The patient is a 25 y.o.  here for confirmation of pregnancy exam.    She denies  fetal movement,  denies  vaginal bleeding,  denies  leakage of fluid,  denies contractions.   She denies nausea/vomiting, denies headache, and denies dysuria.      Review of systems:  Pertinent positives documented in HPI and all other systems reviewed & are negative    Gyn History: LMP 2020.  Periods regular monthly lasting 9 days.  Menarche age 9  History of trichomonas status post her last delivery in 2017 which was treated both in the hospital and in the office.  Positive history of HPV vaccination  Positive slightly abnormal Pap smear in 2019 patient was told needs 1 year follow-up.  HINA's contraception in the past    Pregnancy related complications:    OB History    Para Term  AB Living   4 2 2   1 2   SAB TAB Ectopic Molar Multiple Live Births   1         2      # Outcome Date GA Lbr Uriah/2nd Weight Sex Delivery Anes PTL Lv   4 Current            3 SAB  5w0d          2 Term 17 37w0d  3.175 kg (7 lb) F CS-LTranv  N EARL      Birth Comments: IOL cholestasis, emergency c/s for fetal intolerance   1 Term 11 41w0d  3.062 kg (6 lb 12 oz) F Vag-Spont  N EARL     Past Medical History:   Diagnosis Date   • Anemia    • Anxiety    • Depression    • GERD (gastroesophageal reflux disease)    • Hypertension      Past Surgical History:   Procedure Laterality Date   • ABDOMINAL EXPLORATION     • PRIMARY C SECTION     • TONSILLECTOMY Bilateral      Social History     Socioeconomic History   • Marital status: Single     Spouse name: Not on file   • Number of children: Not on file   • Years of education: Not on file   • Highest education level: Not on file   Occupational History   • Not on file   Social Needs   • Financial resource strain: Not on file   • Food insecurity     Worry: Not on file     Inability: Not on file   • Transportation needs     Medical: Not on  file     Non-medical: Not on file   Tobacco Use   • Smoking status: Current Every Day Smoker     Types: Cigarettes   • Smokeless tobacco: Never Used   Substance and Sexual Activity   • Alcohol use: Not Currently   • Drug use: Not Currently   • Sexual activity: Yes     Partners: Male     Comment: None    Lifestyle   • Physical activity     Days per week: Not on file     Minutes per session: Not on file   • Stress: Not on file   Relationships   • Social connections     Talks on phone: Not on file     Gets together: Not on file     Attends Mandaen service: Not on file     Active member of club or organization: Not on file     Attends meetings of clubs or organizations: Not on file     Relationship status: Not on file   • Intimate partner violence     Fear of current or ex partner: Not on file     Emotionally abused: Not on file     Physically abused: Not on file     Forced sexual activity: Not on file   Other Topics Concern   • Not on file   Social History Narrative   • Not on file     Family History   Problem Relation Age of Onset   • Hypertension Mother      Allergies:   Allergies as of 12/08/2020 - Reviewed 12/08/2020   Allergen Reaction Noted   • Celexa Anxiety 12/08/2020   • Compazine Vomiting 12/08/2020   • Latex Hives 12/08/2020   • Prozac [fluoxetine] Hives 12/08/2020   • Wellbutrin [bupropion] Anxiety 12/08/2020       PE:    /92   Wt (!) 151.5 kg (334 lb)     General:appears stated age, is in no apparent distress  Head: normocephalic, non-tender  Neck: neck is supple, no jugular venous distension  Abdomen: Bowel sounds positive, nondistended, soft, nontender x4, no rebound or guarding. No organomegaly. No masses.  Female GYN: normal female external genitalia without lesions     Skin: No rashes, or ulcers or lesions seen  Psychiatric: Patient shows appropriate affect, is alert and oriented x3, intact judgment and insight.    transvaginal scan and per my read:    Indication: missed menses, positive  pregnancy test.   LMP 10/9/2020 making her RAVINDRA 2021    Findings: lizama intrauterine pregnancy @ 8-0 weeks by CRL. Positive yolk sac. Positive fetal cardiac activity @ 180 BPM. Right ovary not seen. Left Ovary not seen. Cervical length 4.04cm.   No free fluid in the cul-de-sac.    Impression: viable IUP @ 8-0 weeks. EDC by US of 2021    DATIN2021 by LMP c/w 8wk US as per ACOG guidelines.    A/P:   1. Encounter for pregnancy test, result positive     2. Chronic hypertension      since 15yo       # Newly diagnosed pregnancy  SAB precautions discussed  Increase water intake and encouraged healthy nutrition.  Begin prenatal vitamins.  PNL given today    #cHTN  - baseline PEC labs ordered.   - pt to start baby ASA at 16 weeks; Rx sent to [facility] today  - pt to start taking Bps at home. Has a BP cuff.     #Hx cholestasis of pregnancy  - advised on risks of cholestasis during this pregnancy. Baseline CMP ordered    #Hx  section: Patient states she was induced for cholestasis at 37 weeks.  She went to 5 to 6 cm however after rupture, she had an emergency  due to fetal intolerance.  - plan for mode of delivery counseling at 28 weeks or so.     Spent 15 minutes in face-to-face patient contact in which greater than 50% of that visit was spent in counseling/coordination of care of newly diagnosed pregnancy including medical and surgical options of care.    F/u in 4 weeks for new OB visit

## 2021-01-05 ENCOUNTER — INITIAL PRENATAL (OUTPATIENT)
Dept: OBGYN | Facility: CLINIC | Age: 26
End: 2021-01-05
Payer: MEDICAID

## 2021-01-05 VITALS — WEIGHT: 293 LBS | SYSTOLIC BLOOD PRESSURE: 140 MMHG | DIASTOLIC BLOOD PRESSURE: 88 MMHG

## 2021-01-05 DIAGNOSIS — O99.341 DEPRESSION DURING PREGNANCY IN FIRST TRIMESTER: ICD-10-CM

## 2021-01-05 DIAGNOSIS — Z87.59 HISTORY OF CHOLESTASIS DURING PREGNANCY: ICD-10-CM

## 2021-01-05 DIAGNOSIS — Z72.0 TOBACCO USE: ICD-10-CM

## 2021-01-05 DIAGNOSIS — F12.90 MARIJUANA USE: ICD-10-CM

## 2021-01-05 DIAGNOSIS — Z98.891 HISTORY OF CESAREAN DELIVERY: ICD-10-CM

## 2021-01-05 DIAGNOSIS — F32.A DEPRESSION DURING PREGNANCY IN FIRST TRIMESTER: ICD-10-CM

## 2021-01-05 DIAGNOSIS — Z34.90 PRENATAL CARE, ANTEPARTUM: ICD-10-CM

## 2021-01-05 DIAGNOSIS — Z87.19 HISTORY OF CHOLESTASIS DURING PREGNANCY: ICD-10-CM

## 2021-01-05 DIAGNOSIS — O10.919 CHRONIC HYPERTENSION AFFECTING PREGNANCY: ICD-10-CM

## 2021-01-05 DIAGNOSIS — F41.9 ANXIETY: ICD-10-CM

## 2021-01-05 LAB
APPEARANCE UR: NORMAL
APPEARANCE UR: NORMAL
BILIRUB UR STRIP-MCNC: NORMAL MG/DL
BILIRUB UR STRIP-MCNC: NORMAL MG/DL
COLOR UR AUTO: NORMAL
COLOR UR AUTO: NORMAL
GLUCOSE UR STRIP.AUTO-MCNC: NEGATIVE MG/DL
GLUCOSE UR STRIP.AUTO-MCNC: NORMAL MG/DL
KETONES UR STRIP.AUTO-MCNC: NEGATIVE MG/DL
KETONES UR STRIP.AUTO-MCNC: NORMAL MG/DL
LEUKOCYTE ESTERASE UR QL STRIP.AUTO: NEGATIVE
LEUKOCYTE ESTERASE UR QL STRIP.AUTO: NORMAL
NITRITE UR QL STRIP.AUTO: NEGATIVE
NITRITE UR QL STRIP.AUTO: NORMAL
PH UR STRIP.AUTO: 6 [PH] (ref 5–8)
PH UR STRIP.AUTO: NORMAL [PH] (ref 5–8)
PROT UR QL STRIP: NEGATIVE MG/DL
PROT UR QL STRIP: NORMAL
RBC UR QL AUTO: NEGATIVE
RBC UR QL AUTO: NORMAL
SP GR UR STRIP.AUTO: 1.02
SP GR UR STRIP.AUTO: NORMAL
UROBILINOGEN UR STRIP-MCNC: NORMAL MG/DL
UROBILINOGEN UR STRIP-MCNC: NORMAL MG/DL

## 2021-01-05 PROCEDURE — 90471 IMMUNIZATION ADMIN: CPT | Performed by: NURSE PRACTITIONER

## 2021-01-05 PROCEDURE — 81002 URINALYSIS NONAUTO W/O SCOPE: CPT | Performed by: NURSE PRACTITIONER

## 2021-01-05 PROCEDURE — 90686 IIV4 VACC NO PRSV 0.5 ML IM: CPT | Performed by: NURSE PRACTITIONER

## 2021-01-05 PROCEDURE — 0500F INITIAL PRENATAL CARE VISIT: CPT | Mod: 25 | Performed by: NURSE PRACTITIONER

## 2021-01-05 RX ORDER — PYRIDOXINE HCL (VITAMIN B6) 25 MG
25 TABLET ORAL EVERY 4 HOURS
Qty: 60 TAB | Refills: 5 | Status: SHIPPED | OUTPATIENT
Start: 2021-01-05 | End: 2022-06-23

## 2021-01-05 RX ORDER — PNV NO.95/FERROUS FUM/FOLIC AC 28MG-0.8MG
TABLET ORAL
COMMUNITY
End: 2022-06-23

## 2021-01-05 ASSESSMENT — EDINBURGH POSTNATAL DEPRESSION SCALE (EPDS)
I HAVE LOOKED FORWARD WITH ENJOYMENT TO THINGS: RATHER LESS THAN I USED TO
I HAVE BEEN ANXIOUS OR WORRIED FOR NO GOOD REASON: YES, SOMETIMES
I HAVE FELT SAD OR MISERABLE: NOT VERY OFTEN
I HAVE FELT SCARED OR PANICKY FOR NO GOOD REASON: NO, NOT MUCH
I HAVE BEEN SO UNHAPPY THAT I HAVE HAD DIFFICULTY SLEEPING: NOT VERY OFTEN
I HAVE BLAMED MYSELF UNNECESSARILY WHEN THINGS WENT WRONG: NOT VERY OFTEN
I HAVE BEEN ABLE TO LAUGH AND SEE THE FUNNY SIDE OF THINGS: NOT QUITE SO MUCH NOW
I HAVE BEEN SO UNHAPPY THAT I HAVE BEEN CRYING: ONLY OCCASIONALLY
THE THOUGHT OF HARMING MYSELF HAS OCCURRED TO ME: NEVER
TOTAL SCORE: 11
THINGS HAVE BEEN GETTING ON TOP OF ME: YES, SOMETIMES I HAVEN'T BEEN COPING AS WELL AS USUAL

## 2021-01-05 NOTE — PROGRESS NOTES
"Subjective:   Sintia Jain is a 25 y.o.  who presents for her new OB exam.  She is 12w4d with an RAVINDRA of Estimated Date of Delivery: 21 by LMP she was tracking and sure of. She is feeling well and has no concerns at this time. Denies VB, LOF, contractions or pain. No ER visits or previous care in this pregnancy. Denies dysuria, vaginal DC, fever. Reports she is very tired, nausea all day and headaches on and off. BP checks at home have been similar to our checks here.  Reports unsure fetal movement. Desires about AFP.  Declines CF.  Desires NIPT testing.     Past Medical History:   Diagnosis Date   • Anemia    • Anxiety    • Depression    • GERD (gastroesophageal reflux disease)    • Hypertension        Psych Hx: Reports hx of depression and anxiety previously in counseling. Would like to restart, declines meds at this time.       Past Surgical History:   Procedure Laterality Date   • ABDOMINAL EXPLORATION     • PRIMARY C SECTION     • TONSILLECTOMY Bilateral         OB History    Para Term  AB Living   4 2 2   1 2   SAB TAB Ectopic Molar Multiple Live Births   1         2      # Outcome Date GA Lbr Uriah/2nd Weight Sex Delivery Anes PTL Lv   4 Current            3 SAB 2020 5w0d          2 Term 17 37w0d  3.175 kg (7 lb) F CS-LTranv  N EARL      Birth Comments: IOL cholestasis, emergency c/s for fetal intolerance   1 Term 11 41w0d  3.062 kg (6 lb 12 oz) F Vag-Spont  N EARL      Obstetric Comments   Pregnancy planned and desired. FOB involved and supportive.  Pt does not work outside of home.        Gynecological Hx: Reports hx of trich, treated. Denies any hx of STIs, including HSV. Denies any vulvovaginal disorders and no hx of abnormal cervical cytology. Last pap ? \"Slightly abnormal\" repeat in one year.     Sexual Hx: One current male partner, who is FOB, same from last pregnancy     Contraceptive Hx: Has used pills in the past and has since discontinued use.     Family History "   Problem Relation Age of Onset   • Hypertension Mother    • Cancer Mother         Uterine   • Diabetes Maternal Grandmother      Denies any genetic disorders in family history.     Social History     Socioeconomic History   • Marital status: Single     Spouse name: Not on file   • Number of children: Not on file   • Years of education: Not on file   • Highest education level: Not on file   Occupational History   • Not on file   Social Needs   • Financial resource strain: Not on file   • Food insecurity     Worry: Not on file     Inability: Not on file   • Transportation needs     Medical: Not on file     Non-medical: Not on file   Tobacco Use   • Smoking status: Current Every Day Smoker     Types: Cigarettes   • Smokeless tobacco: Never Used   • Tobacco comment: cutting back to quit   Substance and Sexual Activity   • Alcohol use: Not Currently   • Drug use: Yes     Types: Marijuana     Comment: Seldom   • Sexual activity: Yes     Partners: Male     Comment: None    Lifestyle   • Physical activity     Days per week: Not on file     Minutes per session: Not on file   • Stress: Not on file   Relationships   • Social connections     Talks on phone: Not on file     Gets together: Not on file     Attends Gnosticism service: Not on file     Active member of club or organization: Not on file     Attends meetings of clubs or organizations: Not on file     Relationship status: Not on file   • Intimate partner violence     Fear of current or ex partner: Not on file     Emotionally abused: Not on file     Physically abused: Not on file     Forced sexual activity: Not on file   Other Topics Concern   • Not on file   Social History Narrative   • Not on file       BELTRAN is involved and lives with Sintia Jain.  Pregnancy is unplanned but desired.    She is currently not working, denies any heavy lifting or exposure to potential teratogens like environmental or occupational toxins.   Denies alcohol use, drug use, or tobacco use in  pregnancy.   Denies any current or hx of sexual, emotional or physical abuse or trauma.     Current Medications: PNV, starting baby aspirin   Allergies: see allergy list   Objective:      Vitals:    01/05/21 0846   BP: 140/88   Weight: (!) 152 kg (335 lb)        See Prenatal Physical and Prenatal Vitals  UA WNL today      Assessment:      1.  IUP @ 12w4d per LMP      2.  S=D      3.  See problem list as follows      4. BSUS shows fetal cardiac activity 160s, intrauterine pregnancy      There are no active problems to display for this patient.        Plan:   - GC/CT & pap done today   - Plans to cut back on tobacco herself  - HTN labs ordered, will start baby aspirin after 14 weeks  - Will plan for TOLAC counseling farther along    - Does not plan to use marijuana, tried a few times to see if helps with nausea   - Unisom and B6 sent in   - Counseling resources given based on S insurance   - Prenatal labs ordered - lab slip given  - Discussed PNV, nutrition, adequate water intake, and exercise/weight gain in pregnancy  - NOB informational packet with anticipatory guidance given  - Information on Centering Pregnancy given, groups cancelled until further notice due to COVID-19   - S/sx of pregnancy warning signs and PTL precautions given  - Complete OB US in 8 wks  - Return to [facility] in 4 wks

## 2021-01-05 NOTE — PROGRESS NOTES
NOB today  LMP 10/9/20  Last pap today  [phone] (home)   Pharmacy confirmed  On PNV  Cystic Fibrosis test declined  AFP desired  Flu vaccine given today Right Deltoid. Screening checklist reviewed with patient. VIS given. Verified by USHA

## 2021-01-11 DIAGNOSIS — Z34.90 PRENATAL CARE, ANTEPARTUM: ICD-10-CM

## 2021-01-14 ENCOUNTER — TELEPHONE (OUTPATIENT)
Dept: OBGYN | Facility: CLINIC | Age: 26
End: 2021-01-14

## 2021-01-14 NOTE — TELEPHONE ENCOUNTER
----- Message from AMANDA Knox sent at 1/11/2021  9:58 AM PST -----  Call pt and see if she has any yeast symptoms.      1/14/2021 1057 Left message for pt to call back regarding pap results.   1/15/2021 1536 Left message for pt to call back regarding lab results.   1/18/2021  0908 pt called back and asked for yeast symptoms, pt stated she doesn't have any symptoms of yeast at the moment. Advised pt to call us back if she develops itching or heavy white d/c. Pt agreed and verbalized understanding.

## 2021-02-02 ENCOUNTER — ROUTINE PRENATAL (OUTPATIENT)
Dept: OBGYN | Facility: CLINIC | Age: 26
End: 2021-02-02
Payer: MEDICAID

## 2021-02-02 VITALS — DIASTOLIC BLOOD PRESSURE: 112 MMHG | SYSTOLIC BLOOD PRESSURE: 146 MMHG | WEIGHT: 293 LBS

## 2021-02-02 DIAGNOSIS — O09.899 SUPERVISION OF OTHER HIGH RISK PREGNANCY, ANTEPARTUM: Primary | ICD-10-CM

## 2021-02-02 PROCEDURE — 90040 PR PRENATAL FOLLOW UP: CPT | Performed by: NURSE PRACTITIONER

## 2021-02-02 RX ORDER — LABETALOL 200 MG/1
200 TABLET, FILM COATED ORAL 2 TIMES DAILY
Qty: 60 TAB | Refills: 2 | Status: SHIPPED | OUTPATIENT
Start: 2021-02-02 | End: 2022-06-23

## 2021-02-02 NOTE — PROGRESS NOTES
S) Pt is a 25 y.o.   at 16w4d  gestation. Routine prenatal care today. No complaints today. Feels some muscle pain on the left side of her abdomen, comes and goes sporadically. Comfort measures reviewed. BP elevated today, had previously been on medication, but stopped in the first trimester. Consulted with MD today, and recommended starting Labetalol 200 mg BID. She will follow up in 2 weeks for BP review with MD.  labor/SAB precautions reviewed, all questions answered. Has not yet done labs, but will do ASAP.    Fetal movement Normal  Cramping no  VB no  LOF no   Denies dysuria. Generally feels well today. Good self-care activities identified. Denies headaches, swelling, visual changes, or epigastric pain .     O) /112   Wt (!) 146 kg (322 lb 6.4 oz)         Labs:       PNL: Not done yet       GCT: Too early        AFP: Not done yet       GBS: N/A       Pertinent ultrasound -        Scheduled for 3/2/21    A) IUP at 16w4d       S=D         Patient Active Problem List    Diagnosis Date Noted   • Supervision of other high risk pregnancy, antepartum 2021   • Chronic hypertension affecting pregnancy 2021   • History of  delivery 2021   • History of cholestasis during pregnancy 2021   • Tobacco use 2021   • Marijuana use 2021   • Depression    • Anxiety           SVE: deferred       Chaperone offered: n/a         TDAP: no       FLU: yes        BTL: no       : no       C/S Consent: no       IOL or C/S scheduled: no       LAST PAP: 21- negative         P) s/s ptl vs general discomforts. Fetal movements reviewed. General ed and anticipatory guidance. Nutrition/exercise/vitamin. Plans breast Plans pp contraception- unsure  Continue PNV.

## 2021-02-02 NOTE — PROGRESS NOTES
Pt. Here for OB/FU. Reports some flutters    Good # [phone]  Pt. Denies VB, LOF, or UC's.   Pharmacy verified.   Chaperone offered and not indicated  Pt states that she has a horrible pain on her left side that just started this morning.  U/S on 03/02/21

## 2021-02-08 ENCOUNTER — TELEPHONE (OUTPATIENT)
Dept: OBGYN | Facility: CLINIC | Age: 26
End: 2021-02-08

## 2021-02-08 NOTE — TELEPHONE ENCOUNTER
Pt called triage line stating she was not sure if she should go to the ER because she is having severe abdominal pain, denies bleeding or spotting pain is on her upper abdominal area. Called pt pt states she is currently at [facility] ER that they were able to listen for baby's heart beat, she is not dilated, they are going to start an IV on her and talking about transferring her to [place] due to not having proper U/S equipment. Instructed pt to call us if and when she gets discharged to let us know what she was told. Pt verbalized understanding and will comply.

## 2021-02-23 ENCOUNTER — HOSPITAL ENCOUNTER (EMERGENCY)
Facility: MEDICAL CENTER | Age: 26
End: 2021-02-23
Attending: OBSTETRICS & GYNECOLOGY | Admitting: OBSTETRICS & GYNECOLOGY
Payer: MEDICAID

## 2021-02-23 ENCOUNTER — APPOINTMENT (OUTPATIENT)
Dept: RADIOLOGY | Facility: MEDICAL CENTER | Age: 26
End: 2021-02-23
Attending: OBSTETRICS & GYNECOLOGY
Payer: MEDICAID

## 2021-02-23 ENCOUNTER — TELEPHONE (OUTPATIENT)
Dept: OBGYN | Facility: CLINIC | Age: 26
End: 2021-02-23

## 2021-02-23 VITALS
WEIGHT: 293 LBS | OXYGEN SATURATION: 96 % | DIASTOLIC BLOOD PRESSURE: 88 MMHG | TEMPERATURE: 98.1 F | HEIGHT: 65 IN | RESPIRATION RATE: 18 BRPM | HEART RATE: 80 BPM | SYSTOLIC BLOOD PRESSURE: 141 MMHG | BODY MASS INDEX: 48.82 KG/M2

## 2021-02-23 LAB
ALBUMIN SERPL BCP-MCNC: 3.2 G/DL (ref 3.2–4.9)
ALBUMIN/GLOB SERPL: 1 G/DL
ALP SERPL-CCNC: 88 U/L (ref 30–99)
ALT SERPL-CCNC: 147 U/L (ref 2–50)
ANION GAP SERPL CALC-SCNC: 12 MMOL/L (ref 7–16)
AST SERPL-CCNC: 64 U/L (ref 12–45)
BILIRUB SERPL-MCNC: 0.3 MG/DL (ref 0.1–1.5)
BUN SERPL-MCNC: 8 MG/DL (ref 8–22)
CALCIUM SERPL-MCNC: 9.4 MG/DL (ref 8.5–10.5)
CHLORIDE SERPL-SCNC: 102 MMOL/L (ref 96–112)
CO2 SERPL-SCNC: 23 MMOL/L (ref 20–33)
CREAT SERPL-MCNC: 0.57 MG/DL (ref 0.5–1.4)
CREAT UR-MCNC: 165.72 MG/DL
ERYTHROCYTE [DISTWIDTH] IN BLOOD BY AUTOMATED COUNT: 43.8 FL (ref 35.9–50)
GLOBULIN SER CALC-MCNC: 3.2 G/DL (ref 1.9–3.5)
GLUCOSE SERPL-MCNC: 110 MG/DL (ref 65–99)
HCT VFR BLD AUTO: 37.2 % (ref 37–47)
HGB BLD-MCNC: 12.1 G/DL (ref 12–16)
MCH RBC QN AUTO: 27.8 PG (ref 27–33)
MCHC RBC AUTO-ENTMCNC: 32.5 G/DL (ref 33.6–35)
MCV RBC AUTO: 85.3 FL (ref 81.4–97.8)
PLATELET # BLD AUTO: 237 K/UL (ref 164–446)
PMV BLD AUTO: 11.9 FL (ref 9–12.9)
POTASSIUM SERPL-SCNC: 3.5 MMOL/L (ref 3.6–5.5)
PROT SERPL-MCNC: 6.4 G/DL (ref 6–8.2)
PROT UR-MCNC: 13 MG/DL (ref 0–15)
PROT/CREAT UR: 78 MG/G (ref 10–107)
RBC # BLD AUTO: 4.36 M/UL (ref 4.2–5.4)
SODIUM SERPL-SCNC: 137 MMOL/L (ref 135–145)
URATE SERPL-MCNC: 6.7 MG/DL (ref 1.9–8.2)
WBC # BLD AUTO: 12.8 K/UL (ref 4.8–10.8)

## 2021-02-23 PROCEDURE — 85027 COMPLETE CBC AUTOMATED: CPT

## 2021-02-23 PROCEDURE — 93970 EXTREMITY STUDY: CPT | Mod: 26 | Performed by: INTERNAL MEDICINE

## 2021-02-23 PROCEDURE — 82570 ASSAY OF URINE CREATININE: CPT

## 2021-02-23 PROCEDURE — 93970 EXTREMITY STUDY: CPT

## 2021-02-23 PROCEDURE — 80053 COMPREHEN METABOLIC PANEL: CPT

## 2021-02-23 PROCEDURE — 302449 STATCHG TRIAGE ONLY (STATISTIC)

## 2021-02-23 PROCEDURE — A9270 NON-COVERED ITEM OR SERVICE: HCPCS | Performed by: ADVANCED PRACTICE MIDWIFE

## 2021-02-23 PROCEDURE — 84550 ASSAY OF BLOOD/URIC ACID: CPT

## 2021-02-23 PROCEDURE — 36415 COLL VENOUS BLD VENIPUNCTURE: CPT

## 2021-02-23 PROCEDURE — 84156 ASSAY OF PROTEIN URINE: CPT

## 2021-02-23 PROCEDURE — 700102 HCHG RX REV CODE 250 W/ 637 OVERRIDE(OP): Performed by: ADVANCED PRACTICE MIDWIFE

## 2021-02-23 RX ORDER — NIFEDIPINE 30 MG/1
30 TABLET, EXTENDED RELEASE ORAL ONCE
Status: COMPLETED | OUTPATIENT
Start: 2021-02-23 | End: 2021-02-23

## 2021-02-23 RX ADMIN — NIFEDIPINE 30 MG: 30 TABLET, FILM COATED, EXTENDED RELEASE ORAL at 20:48

## 2021-02-23 ASSESSMENT — PAIN SCALES - GENERAL: PAINLEVEL: 7

## 2021-02-23 NOTE — TELEPHONE ENCOUNTER
Pt called stating that she was experiencing extreme pain in her calves, to the point that she could not walk. Stated she had also been experiencing a migraine for about a week that was not being relieved by Tylenol, stated she has been maxing out her dosage each day with no relief. Pt is currently taking 200 mg of labetalol 2x daily for elevated BP. Per pt she has been unable to monitor BP at home due to her machine being broken. I consulted with  who suggested pt be seen in Triage. I informed the pt and she stated she would try and get a ride from her mom to get down to triage. Pt had no further questions or concerns.

## 2021-02-24 ENCOUNTER — ROUTINE PRENATAL (OUTPATIENT)
Dept: OBGYN | Facility: CLINIC | Age: 26
End: 2021-02-24
Payer: MEDICAID

## 2021-02-24 VITALS — DIASTOLIC BLOOD PRESSURE: 98 MMHG | BODY MASS INDEX: 53.42 KG/M2 | SYSTOLIC BLOOD PRESSURE: 142 MMHG | WEIGHT: 293 LBS

## 2021-02-24 DIAGNOSIS — Z98.891 HISTORY OF CESAREAN DELIVERY: ICD-10-CM

## 2021-02-24 DIAGNOSIS — O10.919 CHRONIC HYPERTENSION AFFECTING PREGNANCY: ICD-10-CM

## 2021-02-24 DIAGNOSIS — F41.9 ANXIETY: ICD-10-CM

## 2021-02-24 DIAGNOSIS — F32.A DEPRESSION DURING PREGNANCY IN FIRST TRIMESTER: ICD-10-CM

## 2021-02-24 DIAGNOSIS — K76.0 FATTY LIVER: ICD-10-CM

## 2021-02-24 DIAGNOSIS — Z87.19 HISTORY OF CHOLESTASIS DURING PREGNANCY: ICD-10-CM

## 2021-02-24 DIAGNOSIS — Z87.59 HISTORY OF CHOLESTASIS DURING PREGNANCY: ICD-10-CM

## 2021-02-24 DIAGNOSIS — O09.90 HIGH RISK PREGNANCY, ANTEPARTUM: ICD-10-CM

## 2021-02-24 DIAGNOSIS — O99.341 DEPRESSION DURING PREGNANCY IN FIRST TRIMESTER: ICD-10-CM

## 2021-02-24 PROCEDURE — 90040 PR PRENATAL FOLLOW UP: CPT | Performed by: OBSTETRICS & GYNECOLOGY

## 2021-02-24 RX ORDER — NIFEDIPINE 30 MG/1
30 TABLET, EXTENDED RELEASE ORAL DAILY
Qty: 60 TABLET | Refills: 2 | Status: SHIPPED | OUTPATIENT
Start: 2021-02-24 | End: 2022-06-23

## 2021-02-24 ASSESSMENT — FIBROSIS 4 INDEX: FIB4 SCORE: 0.56

## 2021-02-24 NOTE — DISCHARGE INSTRUCTIONS
Labor and Birth Information    The normal length of a pregnancy is 39-41 weeks.  labor is when labor starts before 37 completed weeks of pregnancy.  What are the risk factors for  labor?   labor is more likely to occur in women who:  · Have certain infections during pregnancy such as a bladder infection, sexually transmitted infection, or infection inside the uterus (chorioamnionitis).  · Have a shorter-than-normal cervix.  · Have gone into  labor before.  · Have had surgery on their cervix.  · Are younger than age 17 or older than age 35.  · Are .  · Are pregnant with twins or multiple babies (multiple gestation).  · Take street drugs or smoke while pregnant.  · Do not gain enough weight while pregnant.  · Became pregnant shortly after having been pregnant.  What are the symptoms of  labor?  Symptoms of  labor include:  · Cramps similar to those that can happen during a menstrual period. The cramps may happen with diarrhea.  · Pain in the abdomen or lower back.  · Regular uterine contractions that may feel like tightening of the abdomen.  · A feeling of increased pressure in the pelvis.  · Increased watery or bloody mucus discharge from the vagina.  · Water breaking (ruptured amniotic sac).  Why is it important to recognize signs of  labor?  It is important to recognize signs of  labor because babies who are born prematurely may not be fully developed. This can put them at an increased risk for:  · Long-term (chronic) heart and lung problems.  · Difficulty immediately after birth with regulating body systems, including blood sugar, body temperature, heart rate, and breathing rate.  · Bleeding in the brain.  · Cerebral palsy.  · Learning difficulties.  · Death.  These risks are highest for babies who are born before 34 weeks of pregnancy.  How is  labor treated?  Treatment depends on the length of your pregnancy, your condition,  and the health of your baby. It may involve:  · Having a stitch (suture) placed in your cervix to prevent your cervix from opening too early (cerclage).  · Taking or being given medicines, such as:  ? Hormone medicines. These may be given early in pregnancy to help support the pregnancy.  ? Medicine to stop contractions.  ? Medicines to help mature the baby’s lungs. These may be prescribed if the risk of delivery is high.  ? Medicines to prevent your baby from developing cerebral palsy.  If the labor happens before 34 weeks of pregnancy, you may need to stay in the hospital.  What should I do if I think I am in  labor?  If you think that you are going into  labor, call your health care provider right away.  How can I prevent  labor in future pregnancies?  To increase your chance of having a full-term pregnancy:  · Do not use any tobacco products, such as cigarettes, chewing tobacco, and e-cigarettes. If you need help quitting, ask your health care provider.  · Do not use street drugs or medicines that have not been prescribed to you during your pregnancy.  · Talk with your health care provider before taking any herbal supplements, even if you have been taking them regularly.  · Make sure you gain a healthy amount of weight during your pregnancy.  · Watch for infection. If you think that you might have an infection, get it checked right away.  · Make sure to tell your health care provider if you have gone into  labor before.  This information is not intended to replace advice given to you by your health care provider. Make sure you discuss any questions you have with your health care provider.  Document Released: 2005 Document Revised: 04/10/2020 Document Reviewed: 05/10/2017  Elsevier Patient Education ©  [employer]

## 2021-02-24 NOTE — PROGRESS NOTES
S: Pt presents for routine OB follow up.  No contractions, vaginal bleeding, or leaking fluids. Good fetal movement.    Questions answered.    O: /98   Wt (!) 146 kg (321 lb)   LMP 10/09/2020 (Exact Date)   BMI 53.42 kg/m²   Patients' weight gain, fluid intake and exercise level discussed.  Vitals, fundal height , fetal position, and FHR reviewed on flowsheet    Lab:  Recent Results (from the past 336 hour(s))   PROTEIN/CREAT RATIO URINE    Collection Time: 02/23/21  6:30 PM   Result Value Ref Range    Total Protein, Urine 13.0 0.0 - 15.0 mg/dL    Creatinine, Random Urine 165.72 mg/dL    Protein Creatinine Ratio 78 10 - 107 mg/g   CBC WITHOUT DIFFERENTIAL    Collection Time: 02/23/21  6:31 PM   Result Value Ref Range    WBC 12.8 (H) 4.8 - 10.8 K/uL    RBC 4.36 4.20 - 5.40 M/uL    Hemoglobin 12.1 12.0 - 16.0 g/dL    Hematocrit 37.2 37.0 - 47.0 %    MCV 85.3 81.4 - 97.8 fL    MCH 27.8 27.0 - 33.0 pg    MCHC 32.5 (L) 33.6 - 35.0 g/dL    RDW 43.8 35.9 - 50.0 fL    Platelet Count 237 164 - 446 K/uL    MPV 11.9 9.0 - 12.9 fL   Comp Metabolic Panel    Collection Time: 02/23/21  6:31 PM   Result Value Ref Range    Sodium 137 135 - 145 mmol/L    Potassium 3.5 (L) 3.6 - 5.5 mmol/L    Chloride 102 96 - 112 mmol/L    Co2 23 20 - 33 mmol/L    Anion Gap 12.0 7.0 - 16.0    Glucose 110 (H) 65 - 99 mg/dL    Bun 8 8 - 22 mg/dL    Creatinine 0.57 0.50 - 1.40 mg/dL    Calcium 9.4 8.5 - 10.5 mg/dL    AST(SGOT) 64 (H) 12 - 45 U/L    ALT(SGPT) 147 (H) 2 - 50 U/L    Alkaline Phosphatase 88 30 - 99 U/L    Total Bilirubin 0.3 0.1 - 1.5 mg/dL    Albumin 3.2 3.2 - 4.9 g/dL    Total Protein 6.4 6.0 - 8.2 g/dL    Globulin 3.2 1.9 - 3.5 g/dL    A-G Ratio 1.0 g/dL   URIC ACID    Collection Time: 02/23/21  6:31 PM   Result Value Ref Range    Uric Acid 6.7 1.9 - 8.2 mg/dL   ESTIMATED GFR    Collection Time: 02/23/21  6:31 PM   Result Value Ref Range    GFR If African American >60 >60 mL/min/1.73 m 2    GFR If Non  >60 >60  mL/min/1.73 m 2       A/P:  25 y.o.  at 19w5d presents for routine obstetric follow-up.    RAVINDRA  by LMP equals 8 ultrasound  PNL: Pending  Genetic screen: Pending  Immunizations: tdap [], flu []  Ultrasounds: []  GBS: []  Problems this pregnancy:   --Chronic hypertension   Unable to tolerate labetalol, started on Procardia 30 mg XL on    [] Maternal echo ordered   [] Refusing baby aspirin due to fatty liver disease and told to not take it previously  --BMI 53    [] Early 1 hour ordered with A1c  --History of    Wants to TOLAC, if it is too high risk would like to proceed with  with BTL; will need counseling further in the pregnancy as her risks change  --History of fatty liver disease   Baseline liver enzymes elevated   [] Right upper quadrant ultrasound ordered   [] If fatty liver disease is confirmed with ultrasound, need to consult MFM  --History of cholestasis of pregnancy   Complicated presentation due to elevated liver enzymes at baseline   Consider checking bile acids around 35 weeks    Delivery Plan: Wants to TOLAC, if it is too high risk would like to proceed with  with BTL  PPBCM: []      All of the above was discussed at great length.  Ultrasound was provided for her liver and her heart.  She is to get her labs done as soon as possible.  Referral sent to dietitian, may not be able to do due to finances and insurance coverage.  Will likely need to see MFM, will wait for genetic screening and ultrasound.  We did delicate discussion about keeping her pregnancy as she does not have support from the father of the baby, she is very high risk due to her chronic conditions and I am worried about her health throughout the pregnancy.  She states that she will not terminate and has her mother to help her.  She currently does not have a car, she is working to get that changed, but as of now transportation is a major problem for her.  We discussed diet and exercise as well as  increasing water intake.  She is also supposed to start using compression stockings which she can get at [facility].  She is complaining of a headache, however this is reproducible at the suboccipital region and a suboccipital release was performed with relief of her headache.    To see physicians only for the rest of the pregnancy due to the above concerns and an appointment was made for her on March 2 with Dr. Chastity Andrade.

## 2021-02-24 NOTE — PROGRESS NOTES
25 y.o.  here to LDA3 c/o severe bilateral lower calf pain for 2 days, pt states she is unable to walk. H/a for last week, unrelieved by Tylenol. 19.4 gestation. See significant allergy list.    PIH labs, DVT studies ordered.     Report to Kylee LOUISE RN.

## 2021-02-24 NOTE — PROGRESS NOTES
Pt here today for OB follow up  Pt states was seen in L&D for bilateral calf pain x 2 days, dopplers WNL, headaches are worse, seeing white spots, Labetolol upsets stomach and vomits. [facility] suggested being tested for Hyperemesis.  Reports -FM  Good # [phone]  Pharmacy Confirmed.  Chaperone offered and provided.

## 2021-02-24 NOTE — PROGRESS NOTES
"1900 - Report received from ASHA Arteaga RN. Pt here for elevated BP's, PIH labs, bilateral calf pain for 2 days. Currently awaiting pending PIH labs and doppler studies. Pt has a hx of elevated BP during pregnancy and is on Labetalol BID which was started approximately 2 weeks ago. She states \"I vomit within 30-45 minutes after taking the labetalol.\"   1930 - This RN to bedside. 1 minute Doppler obtained - FHT in the 130's. Serial BP's Q20 minutes.   2015 - TIFFANIE Rodriguez CNM updated. Discussed pending doppler US's and current BP's. Orders received to continue Q30 min BP monitoring and give a one time dose of Procardia XL 30 mg now.   2210 - Call received from TIFFANIE Rodriguez CNM. BP's discussed. Orders received to d/c pt home. She is to follow up in the morning at 0800 at [facility].   2215 - Plan of care discussed with Pt. Pt verbalizes understanding of plan and states she will be at the appt in the morning at 0800. She is instructed to return to LND for worsening HA, blurred vision, visual disturbances, extreme swelling. Pt ambulated off unit, stable on feet with mother.   "

## 2021-03-02 ENCOUNTER — HOSPITAL ENCOUNTER (OUTPATIENT)
Dept: RADIOLOGY | Facility: MEDICAL CENTER | Age: 26
End: 2021-03-02
Attending: OBSTETRICS & GYNECOLOGY
Payer: MEDICAID

## 2021-03-02 ENCOUNTER — APPOINTMENT (OUTPATIENT)
Dept: RADIOLOGY | Facility: IMAGING CENTER | Age: 26
End: 2021-03-02
Attending: NURSE PRACTITIONER
Payer: MEDICAID

## 2021-03-02 ENCOUNTER — ROUTINE PRENATAL (OUTPATIENT)
Dept: OBGYN | Facility: CLINIC | Age: 26
End: 2021-03-02
Payer: MEDICAID

## 2021-03-02 VITALS — BODY MASS INDEX: 53.98 KG/M2 | WEIGHT: 293 LBS | SYSTOLIC BLOOD PRESSURE: 132 MMHG | DIASTOLIC BLOOD PRESSURE: 88 MMHG

## 2021-03-02 DIAGNOSIS — Z98.891 HISTORY OF CESAREAN DELIVERY: ICD-10-CM

## 2021-03-02 DIAGNOSIS — Z87.59 HISTORY OF CHOLESTASIS DURING PREGNANCY: ICD-10-CM

## 2021-03-02 DIAGNOSIS — K76.0 FATTY LIVER: ICD-10-CM

## 2021-03-02 DIAGNOSIS — F12.90 MARIJUANA USE: ICD-10-CM

## 2021-03-02 DIAGNOSIS — Z34.90 PRENATAL CARE, ANTEPARTUM: ICD-10-CM

## 2021-03-02 DIAGNOSIS — O10.919 CHRONIC HYPERTENSION AFFECTING PREGNANCY: ICD-10-CM

## 2021-03-02 DIAGNOSIS — Z87.19 HISTORY OF CHOLESTASIS DURING PREGNANCY: ICD-10-CM

## 2021-03-02 DIAGNOSIS — Z72.0 TOBACCO USE: ICD-10-CM

## 2021-03-02 DIAGNOSIS — Z34.92 SECOND TRIMESTER PREGNANCY: ICD-10-CM

## 2021-03-02 PROCEDURE — 90040 PR PRENATAL FOLLOW UP: CPT | Performed by: OBSTETRICS & GYNECOLOGY

## 2021-03-02 PROCEDURE — 76705 ECHO EXAM OF ABDOMEN: CPT

## 2021-03-02 PROCEDURE — 76805 OB US >/= 14 WKS SNGL FETUS: CPT | Mod: TC | Performed by: NURSE PRACTITIONER

## 2021-03-02 ASSESSMENT — FIBROSIS 4 INDEX: FIB4 SCORE: 0.56

## 2021-03-02 NOTE — PROGRESS NOTES
Pt's here for OB follow-up  Reports + fetal movement  No VB, LOF or UC's.  Good Phone #: [phone]  Pharmacy verified.   Pt states no complaints for today.

## 2021-03-02 NOTE — PROGRESS NOTES
S: Pt presents for routine OB follow up. Good fetal movement.  No contractions, vaginal bleeding, or leakage of fluid.    Questions answered.    O: /88   Wt (!) 147 kg (324 lb 6.4 oz)   LMP 10/09/2020 (Exact Date)   BMI 53.98 kg/m²   Patients' weight gain, fluid intake and exercise level discussed.  Vitals, fundal height , fetal position, and FHR reviewed on flowsheet    Lab:  Recent Results (from the past 336 hour(s))   PROTEIN/CREAT RATIO URINE    Collection Time: 02/23/21  6:30 PM   Result Value Ref Range    Total Protein, Urine 13.0 0.0 - 15.0 mg/dL    Creatinine, Random Urine 165.72 mg/dL    Protein Creatinine Ratio 78 10 - 107 mg/g   CBC WITHOUT DIFFERENTIAL    Collection Time: 02/23/21  6:31 PM   Result Value Ref Range    WBC 12.8 (H) 4.8 - 10.8 K/uL    RBC 4.36 4.20 - 5.40 M/uL    Hemoglobin 12.1 12.0 - 16.0 g/dL    Hematocrit 37.2 37.0 - 47.0 %    MCV 85.3 81.4 - 97.8 fL    MCH 27.8 27.0 - 33.0 pg    MCHC 32.5 (L) 33.6 - 35.0 g/dL    RDW 43.8 35.9 - 50.0 fL    Platelet Count 237 164 - 446 K/uL    MPV 11.9 9.0 - 12.9 fL   Comp Metabolic Panel    Collection Time: 02/23/21  6:31 PM   Result Value Ref Range    Sodium 137 135 - 145 mmol/L    Potassium 3.5 (L) 3.6 - 5.5 mmol/L    Chloride 102 96 - 112 mmol/L    Co2 23 20 - 33 mmol/L    Anion Gap 12.0 7.0 - 16.0    Glucose 110 (H) 65 - 99 mg/dL    Bun 8 8 - 22 mg/dL    Creatinine 0.57 0.50 - 1.40 mg/dL    Calcium 9.4 8.5 - 10.5 mg/dL    AST(SGOT) 64 (H) 12 - 45 U/L    ALT(SGPT) 147 (H) 2 - 50 U/L    Alkaline Phosphatase 88 30 - 99 U/L    Total Bilirubin 0.3 0.1 - 1.5 mg/dL    Albumin 3.2 3.2 - 4.9 g/dL    Total Protein 6.4 6.0 - 8.2 g/dL    Globulin 3.2 1.9 - 3.5 g/dL    A-G Ratio 1.0 g/dL   URIC ACID    Collection Time: 02/23/21  6:31 PM   Result Value Ref Range    Uric Acid 6.7 1.9 - 8.2 mg/dL   ESTIMATED GFR    Collection Time: 02/23/21  6:31 PM   Result Value Ref Range    GFR If African American >60 >60 mL/min/1.73 m 2    GFR If Non African American  >60 >60 mL/min/1.73 m 2       A/P:  25 y.o.  at 20w4d presents for routine obstetric follow-up.  Size equals dates and/or scan    1.  Continue prenatal vitamins.  2.  Fetal kick counts.  3.  Exercise at least 30 minutes daily.  4.  Drink at least 2L of water daily  5.  Labor precautions educated.  6.  Follow-up in 4 weeks.      Taking procardia- BP looks good today  Labs done  showed elevated liver enzymes- good to have baseline  Echo first week of April  RUQ US done today but results arent back  Anatomy scan today  Encouraged to stop smoking     RAVINDRA  by LMP equals 8 ultrasound  PNL: Pending- going to do this Thursday  Genetic screen: Pending  Immunizations: tdap [], flu []  Ultrasounds: [scheduled for later today]  GBS: []  Problems this pregnancy:   --Chronic hypertension   Unable to tolerate labetalol, started on Procardia 30 mg XL on    [] Maternal echo ordered- scheduled for first week    [] Refusing baby aspirin due to fatty liver disease and told to not take it previously  --BMI 53    [] Early 1 hour ordered with A1c  --History of    Wants to TOLAC, if it is too high risk would like to proceed with  with BTL; will need counseling further in the pregnancy as her risks change  --History of fatty liver disease   Baseline liver enzymes elevated   [] Right upper quadrant ultrasound ordered   [] If fatty liver disease is confirmed with ultrasound, need to consult MFM  --History of cholestasis of pregnancy   Complicated presentation due to elevated liver enzymes at baseline   Consider checking bile acids around 35 weeks    Delivery Plan: Wants to TOLAC, if it is too high risk would like to proceed with  with BTL  PPBCM: []

## 2021-03-16 ENCOUNTER — HOSPITAL ENCOUNTER (OUTPATIENT)
Dept: LAB | Facility: MEDICAL CENTER | Age: 26
End: 2021-03-16
Attending: OBSTETRICS & GYNECOLOGY
Payer: MEDICAID

## 2021-03-16 ENCOUNTER — HOSPITAL ENCOUNTER (OUTPATIENT)
Dept: LAB | Facility: MEDICAL CENTER | Age: 26
End: 2021-03-16
Attending: NURSE PRACTITIONER
Payer: MEDICAID

## 2021-03-16 DIAGNOSIS — Z34.90 PRENATAL CARE, ANTEPARTUM: ICD-10-CM

## 2021-03-16 DIAGNOSIS — O10.919 CHRONIC HYPERTENSION AFFECTING PREGNANCY: ICD-10-CM

## 2021-03-16 DIAGNOSIS — O09.90 HIGH RISK PREGNANCY, ANTEPARTUM: ICD-10-CM

## 2021-03-16 PROCEDURE — 84156 ASSAY OF PROTEIN URINE: CPT | Mod: XU

## 2021-03-16 PROCEDURE — 83615 LACTATE (LD) (LDH) ENZYME: CPT

## 2021-03-16 PROCEDURE — 86901 BLOOD TYPING SEROLOGIC RH(D): CPT

## 2021-03-16 PROCEDURE — 87389 HIV-1 AG W/HIV-1&-2 AB AG IA: CPT

## 2021-03-16 PROCEDURE — 86850 RBC ANTIBODY SCREEN: CPT

## 2021-03-16 PROCEDURE — 80307 DRUG TEST PRSMV CHEM ANLYZR: CPT

## 2021-03-16 PROCEDURE — 86803 HEPATITIS C AB TEST: CPT

## 2021-03-16 PROCEDURE — 83036 HEMOGLOBIN GLYCOSYLATED A1C: CPT

## 2021-03-16 PROCEDURE — 82570 ASSAY OF URINE CREATININE: CPT | Mod: XU

## 2021-03-16 PROCEDURE — 85025 COMPLETE CBC W/AUTO DIFF WBC: CPT

## 2021-03-16 PROCEDURE — 82950 GLUCOSE TEST: CPT

## 2021-03-16 PROCEDURE — 86780 TREPONEMA PALLIDUM: CPT

## 2021-03-16 PROCEDURE — 84550 ASSAY OF BLOOD/URIC ACID: CPT

## 2021-03-16 PROCEDURE — 81001 URINALYSIS AUTO W/SCOPE: CPT | Mod: XU

## 2021-03-16 PROCEDURE — 86762 RUBELLA ANTIBODY: CPT

## 2021-03-16 PROCEDURE — 36415 COLL VENOUS BLD VENIPUNCTURE: CPT

## 2021-03-16 PROCEDURE — 81511 FTL CGEN ABNOR FOUR ANAL: CPT

## 2021-03-16 PROCEDURE — 86900 BLOOD TYPING SEROLOGIC ABO: CPT

## 2021-03-16 PROCEDURE — 80053 COMPREHEN METABOLIC PANEL: CPT

## 2021-03-16 PROCEDURE — 87340 HEPATITIS B SURFACE AG IA: CPT

## 2021-03-17 ENCOUNTER — TELEPHONE (OUTPATIENT)
Dept: OBGYN | Facility: CLINIC | Age: 26
End: 2021-03-17

## 2021-03-17 DIAGNOSIS — Z3A.22 22 WEEKS GESTATION OF PREGNANCY: ICD-10-CM

## 2021-03-17 LAB
ABO GROUP BLD: NORMAL
ALBUMIN SERPL BCP-MCNC: 3.5 G/DL (ref 3.2–4.9)
ALBUMIN/GLOB SERPL: 1.2 G/DL
ALP SERPL-CCNC: 84 U/L (ref 30–99)
ALT SERPL-CCNC: 60 U/L (ref 2–50)
ANION GAP SERPL CALC-SCNC: 15 MMOL/L (ref 7–16)
APPEARANCE UR: CLEAR
AST SERPL-CCNC: 34 U/L (ref 12–45)
BACTERIA #/AREA URNS HPF: NEGATIVE /HPF
BASOPHILS # BLD AUTO: 0.5 % (ref 0–1.8)
BASOPHILS # BLD: 0.08 K/UL (ref 0–0.12)
BILIRUB SERPL-MCNC: 0.3 MG/DL (ref 0.1–1.5)
BILIRUB UR QL STRIP.AUTO: NEGATIVE
BLD GP AB SCN SERPL QL: NORMAL
BUN SERPL-MCNC: 8 MG/DL (ref 8–22)
CALCIUM SERPL-MCNC: 8.8 MG/DL (ref 8.5–10.5)
CHLORIDE SERPL-SCNC: 100 MMOL/L (ref 96–112)
CO2 SERPL-SCNC: 19 MMOL/L (ref 20–33)
COLOR UR: YELLOW
CREAT SERPL-MCNC: 0.59 MG/DL (ref 0.5–1.4)
CREAT UR-MCNC: 153.6 MG/DL
EOSINOPHIL # BLD AUTO: 0.11 K/UL (ref 0–0.51)
EOSINOPHIL NFR BLD: 0.7 % (ref 0–6.9)
EPI CELLS #/AREA URNS HPF: NORMAL /HPF
ERYTHROCYTE [DISTWIDTH] IN BLOOD BY AUTOMATED COUNT: 44.9 FL (ref 35.9–50)
EST. AVERAGE GLUCOSE BLD GHB EST-MCNC: 117 MG/DL
GLOBULIN SER CALC-MCNC: 3 G/DL (ref 1.9–3.5)
GLUCOSE 1H P 50 G GLC PO SERPL-MCNC: 151 MG/DL (ref 70–139)
GLUCOSE SERPL-MCNC: 150 MG/DL (ref 65–99)
GLUCOSE UR STRIP.AUTO-MCNC: NEGATIVE MG/DL
HBA1C MFR BLD: 5.7 % (ref 4–5.6)
HBV SURFACE AG SER QL: ABNORMAL
HCT VFR BLD AUTO: 38.6 % (ref 37–47)
HCV AB SER QL: NORMAL
HGB BLD-MCNC: 12.6 G/DL (ref 12–16)
HIV 1+2 AB+HIV1 P24 AG SERPL QL IA: NORMAL
HYALINE CASTS #/AREA URNS LPF: NORMAL /LPF
IMM GRANULOCYTES # BLD AUTO: 0.05 K/UL (ref 0–0.11)
IMM GRANULOCYTES NFR BLD AUTO: 0.3 % (ref 0–0.9)
KETONES UR STRIP.AUTO-MCNC: NEGATIVE MG/DL
LDH SERPL L TO P-CCNC: 139 U/L (ref 107–266)
LEUKOCYTE ESTERASE UR QL STRIP.AUTO: ABNORMAL
LYMPHOCYTES # BLD AUTO: 2.19 K/UL (ref 1–4.8)
LYMPHOCYTES NFR BLD: 14.2 % (ref 22–41)
MCH RBC QN AUTO: 28.3 PG (ref 27–33)
MCHC RBC AUTO-ENTMCNC: 32.6 G/DL (ref 33.6–35)
MCV RBC AUTO: 86.7 FL (ref 81.4–97.8)
MICRO URNS: ABNORMAL
MONOCYTES # BLD AUTO: 0.48 K/UL (ref 0–0.85)
MONOCYTES NFR BLD AUTO: 3.1 % (ref 0–13.4)
MUCOUS THREADS #/AREA URNS HPF: NORMAL /HPF
NEUTROPHILS # BLD AUTO: 12.56 K/UL (ref 2–7.15)
NEUTROPHILS NFR BLD: 81.2 % (ref 44–72)
NITRITE UR QL STRIP.AUTO: NEGATIVE
NRBC # BLD AUTO: 0 K/UL
NRBC BLD-RTO: 0 /100 WBC
PH UR STRIP.AUTO: 6 [PH] (ref 5–8)
PLATELET # BLD AUTO: 254 K/UL (ref 164–446)
PMV BLD AUTO: 11.9 FL (ref 9–12.9)
POTASSIUM SERPL-SCNC: 3.8 MMOL/L (ref 3.6–5.5)
PROT SERPL-MCNC: 6.5 G/DL (ref 6–8.2)
PROT UR QL STRIP: NEGATIVE MG/DL
PROT UR-MCNC: 13 MG/DL (ref 0–15)
PROT/CREAT UR: 85 MG/G (ref 10–107)
RBC # BLD AUTO: 4.45 M/UL (ref 4.2–5.4)
RBC # URNS HPF: NORMAL /HPF
RBC UR QL AUTO: NEGATIVE
RH BLD: NORMAL
RUBV AB SER QL: 68.3 IU/ML
SODIUM SERPL-SCNC: 134 MMOL/L (ref 135–145)
SP GR UR STRIP.AUTO: 1.02
TREPONEMA PALLIDUM IGG+IGM AB [PRESENCE] IN SERUM OR PLASMA BY IMMUNOASSAY: ABNORMAL
URATE SERPL-MCNC: 5.8 MG/DL (ref 1.9–8.2)
UROBILINOGEN UR STRIP.AUTO-MCNC: 0.2 MG/DL
WBC # BLD AUTO: 15.5 K/UL (ref 4.8–10.8)
WBC #/AREA URNS HPF: NORMAL /HPF

## 2021-03-17 NOTE — TELEPHONE ENCOUNTER
----- Message from AMANDA Knox sent at 3/17/2021  8:02 AM PDT -----  Pt needs three hour. Ordered     03/17/21 10:38 AM    Pt phone no longer inservice. Unable to contact. Will put TODD in chart.

## 2021-03-18 LAB
AMPHET CTO UR CFM-MCNC: NEGATIVE NG/ML
BARBITURATES CTO UR CFM-MCNC: NEGATIVE NG/ML
BENZODIAZ CTO UR CFM-MCNC: NEGATIVE NG/ML
CANNABINOIDS CTO UR CFM-MCNC: POSITIVE NG/ML
COCAINE CTO UR CFM-MCNC: NEGATIVE NG/ML
DRUG COMMENT 753798: NORMAL
METHADONE CTO UR CFM-MCNC: NEGATIVE NG/ML
OPIATES CTO UR CFM-MCNC: NEGATIVE NG/ML
PCP CTO UR CFM-MCNC: NEGATIVE NG/ML
PROPOXYPH CTO UR CFM-MCNC: NEGATIVE NG/ML

## 2021-03-19 LAB
# FETUSES US: NORMAL
AFP MOM SERPL: 1.33
AFP SERPL-MCNC: 71 NG/ML
AGE - REPORTED: 26.1 YR
CURRENT SMOKER: YES
FAMILY MEMBER DISEASES HX: NO
GA METHOD: NORMAL
GA: NORMAL WK
HCG MOM SERPL: 0.06
HCG SERPL-ACNC: 724 IU/L
HX OF HEREDITARY DISORDERS: NO
IDDM PATIENT QL: NO
INHIBIN A MOM SERPL: 0.73
INHIBIN A SERPL-MCNC: 113 PG/ML
INTEGRATED SCN PATIENT-IMP: NORMAL
PATHOLOGY STUDY: NORMAL
SPECIMEN DRAWN SERPL: NORMAL
U ESTRIOL MOM SERPL: 1.39
U ESTRIOL SERPL-MCNC: 3.56 NG/ML

## 2021-03-22 LAB — THC UR CFM-MCNC: 27 NG/ML

## 2021-03-31 ENCOUNTER — ROUTINE PRENATAL (OUTPATIENT)
Dept: OBGYN | Facility: CLINIC | Age: 26
End: 2021-03-31
Payer: MEDICAID

## 2021-03-31 VITALS — DIASTOLIC BLOOD PRESSURE: 98 MMHG | SYSTOLIC BLOOD PRESSURE: 138 MMHG | WEIGHT: 293 LBS | BODY MASS INDEX: 55.58 KG/M2

## 2021-03-31 DIAGNOSIS — O10.919 CHRONIC HYPERTENSION AFFECTING PREGNANCY: ICD-10-CM

## 2021-03-31 DIAGNOSIS — Z98.891 HISTORY OF CESAREAN DELIVERY: ICD-10-CM

## 2021-03-31 DIAGNOSIS — O09.899 SUPERVISION OF OTHER HIGH RISK PREGNANCY, ANTEPARTUM: ICD-10-CM

## 2021-03-31 PROCEDURE — 90040 PR PRENATAL FOLLOW UP: CPT | Performed by: OBSTETRICS & GYNECOLOGY

## 2021-03-31 ASSESSMENT — FIBROSIS 4 INDEX: FIB4 SCORE: 0.43

## 2021-03-31 NOTE — PROGRESS NOTES
Pt here today for OB follow up  Pt states that from time to time her feet swell.  Reports +FM  Good # [phone]  Pharmacy Confirmed.  Chaperone offered and provided

## 2021-03-31 NOTE — PROGRESS NOTES
S: Pt presents for routine OB follow up.  No contractions, vaginal bleeding, or leaking fluids. Good fetal movement.    Questions answered.    O: /98   Wt (!) 152 kg (334 lb)   LMP 10/09/2020 (Exact Date)   BMI 55.58 kg/m²   Patients' weight gain, fluid intake and exercise level discussed.  Vitals, fundal height , fetal position, and FHR reviewed on flowsheet    Lab:No results found for this or any previous visit (from the past 336 hour(s)).    A/P:  25 y.o.  at 24w5d presents for routine obstetric follow-up.    RAVINDRA  by LMP equals 8 ultrasound  PNL: Pending  Genetic screen: Pending  Immunizations: tdap [], flu []  Ultrasounds: [scheduled for later today]  GBS: []  Problems this pregnancy:   --Chronic hypertension   Unable to tolerate labetalol, started on Procardia 30 mg XL on    [] Maternal echo ordered- scheduled for first week of April   [] Refusing baby aspirin due to fatty liver disease and told to not take it previously  --BMI 53    [] Early 1 hour ordered with A1c  --History of    Wants to TOLAC, if it is too high risk would like to proceed with  with BTL; will need counseling further in the pregnancy as her risks change  --History of fatty liver disease   Baseline liver enzymes elevated   [x] Right upper quadrant ultrasound ordered - RUQ US wnl   [] If fatty liver disease is confirmed with ultrasound, need to consult [facility]  --History of cholestasis of pregnancy   Complicated presentation due to elevated liver enzymes at baseline   Consider checking bile acids around 35 weeks    Delivery Plan: Wants to TOLAC, if it is too high risk would like to proceed with  with BTL  PPBCM: []      Sending to M

## 2021-04-07 ENCOUNTER — TELEPHONE (OUTPATIENT)
Dept: OBGYN | Facility: CLINIC | Age: 26
End: 2021-04-07

## 2021-04-07 NOTE — TELEPHONE ENCOUNTER
----- Message from AMANDA Knox sent at 3/17/2021  8:02 AM PDT -----  Pt needs three hour. Ordered     Pt notified of abnormal 1hr gtt and need to do 3hr gtt this time. Pt instructed to fast 10-12hrs prior to testing. Pt informed she is only allow to drink plain water during fasting time. Pt will call lab to schedule an appt. Pt notified will be staying in the labs for the 3hr. Pt agreed to do it. Pt verbalized understanding.

## 2021-05-18 ENCOUNTER — ROUTINE PRENATAL (OUTPATIENT)
Dept: OBGYN | Facility: CLINIC | Age: 26
End: 2021-05-18
Payer: MEDICAID

## 2021-05-18 VITALS — DIASTOLIC BLOOD PRESSURE: 80 MMHG | BODY MASS INDEX: 51.75 KG/M2 | WEIGHT: 293 LBS | SYSTOLIC BLOOD PRESSURE: 130 MMHG

## 2021-05-18 DIAGNOSIS — Z87.19 HISTORY OF CHOLESTASIS DURING PREGNANCY: ICD-10-CM

## 2021-05-18 DIAGNOSIS — O10.919 CHRONIC HYPERTENSION AFFECTING PREGNANCY: ICD-10-CM

## 2021-05-18 DIAGNOSIS — O09.899 SUPERVISION OF OTHER HIGH RISK PREGNANCY, ANTEPARTUM: ICD-10-CM

## 2021-05-18 DIAGNOSIS — Z98.891 HISTORY OF CESAREAN DELIVERY: ICD-10-CM

## 2021-05-18 DIAGNOSIS — Z87.59 HISTORY OF CHOLESTASIS DURING PREGNANCY: ICD-10-CM

## 2021-05-18 PROCEDURE — 90471 IMMUNIZATION ADMIN: CPT | Performed by: OBSTETRICS & GYNECOLOGY

## 2021-05-18 PROCEDURE — 90040 PR PRENATAL FOLLOW UP: CPT | Performed by: OBSTETRICS & GYNECOLOGY

## 2021-05-18 PROCEDURE — 90715 TDAP VACCINE 7 YRS/> IM: CPT | Performed by: OBSTETRICS & GYNECOLOGY

## 2021-05-18 ASSESSMENT — FIBROSIS 4 INDEX: FIB4 SCORE: 0.43

## 2021-05-18 NOTE — LETTER
"Count Your Baby's Movements  Another step to a healthy delivery    April Jain             Dept: [phone]    How Many Weeks Pregnant?31w4d    Date to Begin Countin21              How to use this chart    One way for your physician to keep track of your baby's health is by knowing how often the baby moves (or \"kicks\") in your womb.  You can help your physician to do this by using this chart every day.    Every day, you should see how many hours it takes for your baby to move 10 times.  Start in the morning, as soon as you get up.    · First, write down the time your baby moves until you get to 10.  · Check off one box every time your baby moves until you get to 10.  · Write down the time you finished counting in the last column.  · Total how long it took to count up all 10 movements.  · Finally, fill in the box that shows how long this took.  After counting 10 movements, you no longer have to count any more that day.  The next morning, just start counting again as soon as you get up.    What should you call a \"movement\"?  It is hard to say, because it will feel different from one mother to another and from one pregnancy to the next.  The important thing is that you count the movements the same way throughout your pregnancy.  If you have more questions, you should ask your physician.    Count carefully every day!  SAMPLE:  Week 28    How many hours did it take to feel 10 movements?       Start  Time     1     2     3     4     5     6     7     8     9     10   Finish Time   Mon 8:20 ·  ·  ·  ·  ·  ·  ·  ·  ·  ·  11:40                  Sat               Sun                 IMPORTANT: You should contact your physician if it takes more than two hours for you to feel 10 movements.  Each morning, write down the time and start to count the movements of your baby.  Keep track by checking off one box every time you feel one movement.  When you have felt 10 " "\"kicks\", write down the time you finished counting in the last column.  Then fill in the   box (over the check rae) for the number of hours it took.  Be sure to read the complete instructions on the previous page.            "

## 2021-05-18 NOTE — PROGRESS NOTES
Pt here today for OB follow up @ 31w4d  Pt states that hands and feet are itchy   Reports +FM  Good # [phone]  Pharmacy Confirmed.  Tdap today

## 2021-05-25 ENCOUNTER — ROUTINE PRENATAL (OUTPATIENT)
Dept: OBGYN | Facility: CLINIC | Age: 26
End: 2021-05-25
Payer: MEDICAID

## 2021-05-25 DIAGNOSIS — O10.919 CHRONIC HYPERTENSION AFFECTING PREGNANCY: ICD-10-CM

## 2021-05-25 DIAGNOSIS — Z87.19 HISTORY OF CHOLESTASIS DURING PREGNANCY: Primary | ICD-10-CM

## 2021-05-25 DIAGNOSIS — Z87.59 HISTORY OF CHOLESTASIS DURING PREGNANCY: Primary | ICD-10-CM

## 2021-05-25 PROCEDURE — 99999 PR NO CHARGE: CPT | Performed by: OBSTETRICS & GYNECOLOGY

## 2021-06-08 ENCOUNTER — APPOINTMENT (OUTPATIENT)
Dept: CARDIOLOGY | Facility: MEDICAL CENTER | Age: 26
End: 2021-06-08
Attending: OBSTETRICS & GYNECOLOGY
Payer: MEDICAID

## 2021-06-14 ENCOUNTER — APPOINTMENT (OUTPATIENT)
Dept: RADIOLOGY | Facility: IMAGING CENTER | Age: 26
End: 2021-06-14
Attending: OBSTETRICS & GYNECOLOGY
Payer: MEDICAID

## 2021-06-14 DIAGNOSIS — O09.899 SUPERVISION OF OTHER HIGH RISK PREGNANCY, ANTEPARTUM: ICD-10-CM

## 2021-06-14 PROCEDURE — 76816 OB US FOLLOW-UP PER FETUS: CPT | Mod: TC | Performed by: OBSTETRICS & GYNECOLOGY

## 2021-06-23 ENCOUNTER — HOSPITAL ENCOUNTER (OUTPATIENT)
Dept: LAB | Facility: MEDICAL CENTER | Age: 26
End: 2021-06-23
Attending: NURSE PRACTITIONER
Payer: MEDICAID

## 2021-06-23 ENCOUNTER — HOSPITAL ENCOUNTER (OUTPATIENT)
Dept: LAB | Facility: MEDICAL CENTER | Age: 26
End: 2021-06-23
Attending: OBSTETRICS & GYNECOLOGY
Payer: MEDICAID

## 2021-06-23 DIAGNOSIS — Z87.59 HISTORY OF CHOLESTASIS DURING PREGNANCY: ICD-10-CM

## 2021-06-23 DIAGNOSIS — O09.899 SUPERVISION OF OTHER HIGH RISK PREGNANCY, ANTEPARTUM: ICD-10-CM

## 2021-06-23 DIAGNOSIS — Z3A.22 22 WEEKS GESTATION OF PREGNANCY: ICD-10-CM

## 2021-06-23 DIAGNOSIS — Z87.19 HISTORY OF CHOLESTASIS DURING PREGNANCY: ICD-10-CM

## 2021-06-23 PROCEDURE — 82951 GLUCOSE TOLERANCE TEST (GTT): CPT

## 2021-06-23 PROCEDURE — 82952 GTT-ADDED SAMPLES: CPT

## 2021-06-23 PROCEDURE — 36415 COLL VENOUS BLD VENIPUNCTURE: CPT

## 2021-06-23 PROCEDURE — 85027 COMPLETE CBC AUTOMATED: CPT

## 2021-06-23 PROCEDURE — 80053 COMPREHEN METABOLIC PANEL: CPT

## 2021-06-23 PROCEDURE — 82239 BILE ACIDS TOTAL: CPT

## 2021-06-23 PROCEDURE — 86780 TREPONEMA PALLIDUM: CPT

## 2021-06-24 LAB
ALBUMIN SERPL BCP-MCNC: 3.5 G/DL (ref 3.2–4.9)
ALBUMIN/GLOB SERPL: 1.1 G/DL
ALP SERPL-CCNC: 170 U/L (ref 30–99)
ALT SERPL-CCNC: 59 U/L (ref 2–50)
ANION GAP SERPL CALC-SCNC: 14 MMOL/L (ref 7–16)
AST SERPL-CCNC: 44 U/L (ref 12–45)
BILIRUB SERPL-MCNC: 0.5 MG/DL (ref 0.1–1.5)
BUN SERPL-MCNC: 11 MG/DL (ref 8–22)
CALCIUM SERPL-MCNC: 9 MG/DL (ref 8.5–10.5)
CHLORIDE SERPL-SCNC: 101 MMOL/L (ref 96–112)
CO2 SERPL-SCNC: 20 MMOL/L (ref 20–33)
CREAT SERPL-MCNC: 0.69 MG/DL (ref 0.5–1.4)
ERYTHROCYTE [DISTWIDTH] IN BLOOD BY AUTOMATED COUNT: 41.3 FL (ref 35.9–50)
FASTING STATUS PATIENT QL REPORTED: NORMAL
GLOBULIN SER CALC-MCNC: 3.2 G/DL (ref 1.9–3.5)
GLUCOSE 1H P CHAL SERPL-MCNC: 129 MG/DL (ref 65–180)
GLUCOSE 2H P CHAL SERPL-MCNC: 125 MG/DL (ref 65–155)
GLUCOSE 3H P CHAL SERPL-MCNC: 130 MG/DL (ref 65–140)
GLUCOSE BS SERPL-MCNC: 83 MG/DL (ref 65–95)
GLUCOSE SERPL-MCNC: 79 MG/DL (ref 65–99)
HCT VFR BLD AUTO: 35.9 % (ref 37–47)
HGB BLD-MCNC: 11.7 G/DL (ref 12–16)
MCH RBC QN AUTO: 27.9 PG (ref 27–33)
MCHC RBC AUTO-ENTMCNC: 32.6 G/DL (ref 33.6–35)
MCV RBC AUTO: 85.5 FL (ref 81.4–97.8)
PLATELET # BLD AUTO: 344 K/UL (ref 164–446)
PMV BLD AUTO: 11.9 FL (ref 9–12.9)
POTASSIUM SERPL-SCNC: 4.3 MMOL/L (ref 3.6–5.5)
PROT SERPL-MCNC: 6.7 G/DL (ref 6–8.2)
RBC # BLD AUTO: 4.2 M/UL (ref 4.2–5.4)
SODIUM SERPL-SCNC: 135 MMOL/L (ref 135–145)
TREPONEMA PALLIDUM IGG+IGM AB [PRESENCE] IN SERUM OR PLASMA BY IMMUNOASSAY: NORMAL
WBC # BLD AUTO: 20.4 K/UL (ref 4.8–10.8)

## 2021-06-25 ENCOUNTER — ANESTHESIA EVENT (OUTPATIENT)
Dept: OBGYN | Facility: MEDICAL CENTER | Age: 26
End: 2021-06-25
Payer: MEDICAID

## 2021-06-25 ENCOUNTER — HOSPITAL ENCOUNTER (INPATIENT)
Facility: MEDICAL CENTER | Age: 26
LOS: 6 days | End: 2021-07-01
Attending: OBSTETRICS & GYNECOLOGY | Admitting: OBSTETRICS & GYNECOLOGY
Payer: MEDICAID

## 2021-06-25 ENCOUNTER — ANESTHESIA (OUTPATIENT)
Dept: OBGYN | Facility: MEDICAL CENTER | Age: 26
End: 2021-06-25
Payer: MEDICAID

## 2021-06-25 DIAGNOSIS — O09.899 SUPERVISION OF OTHER HIGH RISK PREGNANCY, ANTEPARTUM: ICD-10-CM

## 2021-06-25 PROBLEM — E66.01 MORBID OBESITY (HCC): Status: ACTIVE | Noted: 2021-06-25

## 2021-06-25 LAB
ALBUMIN SERPL BCP-MCNC: 3.5 G/DL (ref 3.2–4.9)
ALBUMIN/GLOB SERPL: 1 G/DL
ALP SERPL-CCNC: 170 U/L (ref 30–99)
ALT SERPL-CCNC: 89 U/L (ref 2–50)
ANION GAP SERPL CALC-SCNC: 13 MMOL/L (ref 7–16)
APPEARANCE UR: ABNORMAL
AST SERPL-CCNC: 70 U/L (ref 12–45)
BASOPHILS # BLD AUTO: 0.5 % (ref 0–1.8)
BASOPHILS # BLD: 0.09 K/UL (ref 0–0.12)
BILE AC SERPL-SCNC: 6 UMOL/L (ref 0–10)
BILIRUB SERPL-MCNC: 0.5 MG/DL (ref 0.1–1.5)
BUN SERPL-MCNC: 8 MG/DL (ref 8–22)
CALCIUM SERPL-MCNC: 8.9 MG/DL (ref 8.5–10.5)
CHLORIDE SERPL-SCNC: 106 MMOL/L (ref 96–112)
CO2 SERPL-SCNC: 21 MMOL/L (ref 20–33)
COLOR UR AUTO: ABNORMAL
CREAT SERPL-MCNC: 0.69 MG/DL (ref 0.5–1.4)
EOSINOPHIL # BLD AUTO: 0.15 K/UL (ref 0–0.51)
EOSINOPHIL NFR BLD: 0.8 % (ref 0–6.9)
ERYTHROCYTE [DISTWIDTH] IN BLOOD BY AUTOMATED COUNT: 41.4 FL (ref 35.9–50)
GLOBULIN SER CALC-MCNC: 3.5 G/DL (ref 1.9–3.5)
GLUCOSE SERPL-MCNC: 76 MG/DL (ref 65–99)
GLUCOSE UR QL STRIP.AUTO: NEGATIVE MG/DL
HCT VFR BLD AUTO: 35.1 % (ref 37–47)
HGB BLD-MCNC: 11.4 G/DL (ref 12–16)
HOLDING TUBE BB 8507: NORMAL
IMM GRANULOCYTES # BLD AUTO: 0.12 K/UL (ref 0–0.11)
IMM GRANULOCYTES NFR BLD AUTO: 0.7 % (ref 0–0.9)
KETONES UR QL STRIP.AUTO: ABNORMAL MG/DL
LEUKOCYTE ESTERASE UR QL STRIP.AUTO: ABNORMAL
LYMPHOCYTES # BLD AUTO: 2.53 K/UL (ref 1–4.8)
LYMPHOCYTES NFR BLD: 14.3 % (ref 22–41)
MCH RBC QN AUTO: 27.9 PG (ref 27–33)
MCHC RBC AUTO-ENTMCNC: 32.5 G/DL (ref 33.6–35)
MCV RBC AUTO: 86 FL (ref 81.4–97.8)
MONOCYTES # BLD AUTO: 1.16 K/UL (ref 0–0.85)
MONOCYTES NFR BLD AUTO: 6.6 % (ref 0–13.4)
NEUTROPHILS # BLD AUTO: 13.6 K/UL (ref 2–7.15)
NEUTROPHILS NFR BLD: 77.1 % (ref 44–72)
NITRITE UR QL STRIP.AUTO: NEGATIVE
NRBC # BLD AUTO: 0 K/UL
NRBC BLD-RTO: 0 /100 WBC
PH UR STRIP.AUTO: 6 [PH] (ref 5–8)
PLATELET # BLD AUTO: 320 K/UL (ref 164–446)
PMV BLD AUTO: 11.5 FL (ref 9–12.9)
POTASSIUM SERPL-SCNC: 4.4 MMOL/L (ref 3.6–5.5)
PROT SERPL-MCNC: 7 G/DL (ref 6–8.2)
PROT UR QL STRIP: 100 MG/DL
RBC # BLD AUTO: 4.08 M/UL (ref 4.2–5.4)
RBC UR QL AUTO: ABNORMAL
SODIUM SERPL-SCNC: 140 MMOL/L (ref 135–145)
SP GR UR STRIP.AUTO: 1.02 (ref 1–1.03)
URATE SERPL-MCNC: 7.5 MG/DL (ref 1.9–8.2)
WBC # BLD AUTO: 17.7 K/UL (ref 4.8–10.8)

## 2021-06-25 PROCEDURE — 59515 CESAREAN DELIVERY: CPT | Performed by: OBSTETRICS & GYNECOLOGY

## 2021-06-25 PROCEDURE — 700105 HCHG RX REV CODE 258: Performed by: ANESTHESIOLOGY

## 2021-06-25 PROCEDURE — 700111 HCHG RX REV CODE 636 W/ 250 OVERRIDE (IP): Performed by: ANESTHESIOLOGY

## 2021-06-25 PROCEDURE — A9270 NON-COVERED ITEM OR SERVICE: HCPCS | Performed by: OBSTETRICS & GYNECOLOGY

## 2021-06-25 PROCEDURE — 36415 COLL VENOUS BLD VENIPUNCTURE: CPT

## 2021-06-25 PROCEDURE — 770002 HCHG ROOM/CARE - OB PRIVATE (112)

## 2021-06-25 PROCEDURE — 700102 HCHG RX REV CODE 250 W/ 637 OVERRIDE(OP): Performed by: ANESTHESIOLOGY

## 2021-06-25 PROCEDURE — 160041 HCHG SURGERY MINUTES - EA ADDL 1 MIN LEVEL 4: Performed by: OBSTETRICS & GYNECOLOGY

## 2021-06-25 PROCEDURE — 160009 HCHG ANES TIME/MIN: Performed by: OBSTETRICS & GYNECOLOGY

## 2021-06-25 PROCEDURE — 84550 ASSAY OF BLOOD/URIC ACID: CPT

## 2021-06-25 PROCEDURE — A9270 NON-COVERED ITEM OR SERVICE: HCPCS | Performed by: ANESTHESIOLOGY

## 2021-06-25 PROCEDURE — 0UB70ZZ EXCISION OF BILATERAL FALLOPIAN TUBES, OPEN APPROACH: ICD-10-PCS | Performed by: OBSTETRICS & GYNECOLOGY

## 2021-06-25 PROCEDURE — 96374 THER/PROPH/DIAG INJ IV PUSH: CPT

## 2021-06-25 PROCEDURE — 160048 HCHG OR STATISTICAL LEVEL 1-5: Performed by: OBSTETRICS & GYNECOLOGY

## 2021-06-25 PROCEDURE — 302449 STATCHG TRIAGE ONLY (STATISTIC)

## 2021-06-25 PROCEDURE — 81002 URINALYSIS NONAUTO W/O SCOPE: CPT

## 2021-06-25 PROCEDURE — 80053 COMPREHEN METABOLIC PANEL: CPT

## 2021-06-25 PROCEDURE — 160002 HCHG RECOVERY MINUTES (STAT): Performed by: OBSTETRICS & GYNECOLOGY

## 2021-06-25 PROCEDURE — 700102 HCHG RX REV CODE 250 W/ 637 OVERRIDE(OP): Performed by: OBSTETRICS & GYNECOLOGY

## 2021-06-25 PROCEDURE — 88302 TISSUE EXAM BY PATHOLOGIST: CPT

## 2021-06-25 PROCEDURE — 700101 HCHG RX REV CODE 250: Performed by: ANESTHESIOLOGY

## 2021-06-25 PROCEDURE — 700111 HCHG RX REV CODE 636 W/ 250 OVERRIDE (IP): Performed by: OBSTETRICS & GYNECOLOGY

## 2021-06-25 PROCEDURE — 85025 COMPLETE CBC W/AUTO DIFF WBC: CPT

## 2021-06-25 PROCEDURE — 160035 HCHG PACU - 1ST 60 MINS PHASE I: Performed by: OBSTETRICS & GYNECOLOGY

## 2021-06-25 PROCEDURE — 58611 LIGATE OVIDUCT(S) ADD-ON: CPT | Performed by: OBSTETRICS & GYNECOLOGY

## 2021-06-25 PROCEDURE — 160029 HCHG SURGERY MINUTES - 1ST 30 MINS LEVEL 4: Performed by: OBSTETRICS & GYNECOLOGY

## 2021-06-25 RX ORDER — SODIUM CHLORIDE, SODIUM LACTATE, POTASSIUM CHLORIDE, CALCIUM CHLORIDE 600; 310; 30; 20 MG/100ML; MG/100ML; MG/100ML; MG/100ML
INJECTION, SOLUTION INTRAVENOUS CONTINUOUS
Status: DISCONTINUED | OUTPATIENT
Start: 2021-06-25 | End: 2021-06-26

## 2021-06-25 RX ORDER — DEXAMETHASONE SODIUM PHOSPHATE 4 MG/ML
INJECTION, SOLUTION INTRA-ARTICULAR; INTRALESIONAL; INTRAMUSCULAR; INTRAVENOUS; SOFT TISSUE PRN
Status: DISCONTINUED | OUTPATIENT
Start: 2021-06-25 | End: 2021-06-25 | Stop reason: SURG

## 2021-06-25 RX ORDER — SIMETHICONE 80 MG
80 TABLET,CHEWABLE ORAL 4 TIMES DAILY PRN
Status: DISCONTINUED | OUTPATIENT
Start: 2021-06-25 | End: 2021-07-01 | Stop reason: HOSPADM

## 2021-06-25 RX ORDER — SODIUM CHLORIDE, SODIUM GLUCONATE, SODIUM ACETATE, POTASSIUM CHLORIDE AND MAGNESIUM CHLORIDE 526; 502; 368; 37; 30 MG/100ML; MG/100ML; MG/100ML; MG/100ML; MG/100ML
INJECTION, SOLUTION INTRAVENOUS
Status: DISCONTINUED | OUTPATIENT
Start: 2021-06-25 | End: 2021-06-25 | Stop reason: SURG

## 2021-06-25 RX ORDER — ACETAMINOPHEN 500 MG
1000 TABLET ORAL EVERY 8 HOURS
Status: DISCONTINUED | OUTPATIENT
Start: 2021-06-25 | End: 2021-07-01 | Stop reason: HOSPADM

## 2021-06-25 RX ORDER — OXYTOCIN 10 [USP'U]/ML
INJECTION, SOLUTION INTRAMUSCULAR; INTRAVENOUS PRN
Status: DISCONTINUED | OUTPATIENT
Start: 2021-06-25 | End: 2021-06-25 | Stop reason: SURG

## 2021-06-25 RX ORDER — OXYCODONE HYDROCHLORIDE 5 MG/1
5 TABLET ORAL EVERY 4 HOURS PRN
Status: DISCONTINUED | OUTPATIENT
Start: 2021-06-26 | End: 2021-06-27

## 2021-06-25 RX ORDER — LABETALOL 100 MG/1
200 TABLET, FILM COATED ORAL TWICE DAILY
Status: DISCONTINUED | OUTPATIENT
Start: 2021-06-26 | End: 2021-06-29

## 2021-06-25 RX ORDER — MORPHINE SULFATE 0.5 MG/ML
INJECTION, SOLUTION EPIDURAL; INTRATHECAL; INTRAVENOUS PRN
Status: DISCONTINUED | OUTPATIENT
Start: 2021-06-25 | End: 2021-06-25 | Stop reason: SURG

## 2021-06-25 RX ORDER — CITRIC ACID/SODIUM CITRATE 334-500MG
30 SOLUTION, ORAL ORAL ONCE
Status: COMPLETED | OUTPATIENT
Start: 2021-06-25 | End: 2021-06-25

## 2021-06-25 RX ORDER — NIFEDIPINE 30 MG/1
30 TABLET, EXTENDED RELEASE ORAL
Status: DISCONTINUED | OUTPATIENT
Start: 2021-06-26 | End: 2021-06-29

## 2021-06-25 RX ORDER — BISACODYL 10 MG
10 SUPPOSITORY, RECTAL RECTAL PRN
Status: DISCONTINUED | OUTPATIENT
Start: 2021-06-25 | End: 2021-07-01 | Stop reason: HOSPADM

## 2021-06-25 RX ORDER — DIPHENHYDRAMINE HYDROCHLORIDE 50 MG/ML
25 INJECTION INTRAMUSCULAR; INTRAVENOUS EVERY 6 HOURS PRN
Status: ACTIVE | OUTPATIENT
Start: 2021-06-25 | End: 2021-06-26

## 2021-06-25 RX ORDER — KETOROLAC TROMETHAMINE 30 MG/ML
INJECTION, SOLUTION INTRAMUSCULAR; INTRAVENOUS PRN
Status: DISCONTINUED | OUTPATIENT
Start: 2021-06-25 | End: 2021-06-25 | Stop reason: SURG

## 2021-06-25 RX ORDER — MISOPROSTOL 200 UG/1
800 TABLET ORAL
Status: DISCONTINUED | OUTPATIENT
Start: 2021-06-25 | End: 2021-07-01 | Stop reason: HOSPADM

## 2021-06-25 RX ORDER — HYDROMORPHONE HYDROCHLORIDE 1 MG/ML
0.2 INJECTION, SOLUTION INTRAMUSCULAR; INTRAVENOUS; SUBCUTANEOUS
Status: ACTIVE | OUTPATIENT
Start: 2021-06-25 | End: 2021-06-26

## 2021-06-25 RX ORDER — DIPHENHYDRAMINE HYDROCHLORIDE 50 MG/ML
12.5 INJECTION INTRAMUSCULAR; INTRAVENOUS EVERY 6 HOURS PRN
Status: ACTIVE | OUTPATIENT
Start: 2021-06-25 | End: 2021-06-26

## 2021-06-25 RX ORDER — LABETALOL HYDROCHLORIDE 5 MG/ML
20-40 INJECTION, SOLUTION INTRAVENOUS PRN
Status: DISCONTINUED | OUTPATIENT
Start: 2021-06-25 | End: 2021-06-26

## 2021-06-25 RX ORDER — HYDROMORPHONE HYDROCHLORIDE 1 MG/ML
0.4 INJECTION, SOLUTION INTRAMUSCULAR; INTRAVENOUS; SUBCUTANEOUS
Status: ACTIVE | OUTPATIENT
Start: 2021-06-25 | End: 2021-06-26

## 2021-06-25 RX ORDER — ACETAMINOPHEN 500 MG
1000 TABLET ORAL EVERY 6 HOURS
Status: DISPENSED | OUTPATIENT
Start: 2021-06-25 | End: 2021-06-26

## 2021-06-25 RX ORDER — KETOROLAC TROMETHAMINE 30 MG/ML
30 INJECTION, SOLUTION INTRAMUSCULAR; INTRAVENOUS EVERY 6 HOURS
Status: DISCONTINUED | OUTPATIENT
Start: 2021-06-26 | End: 2021-06-26

## 2021-06-25 RX ORDER — METOCLOPRAMIDE HYDROCHLORIDE 5 MG/ML
10 INJECTION INTRAMUSCULAR; INTRAVENOUS ONCE
Status: COMPLETED | OUTPATIENT
Start: 2021-06-25 | End: 2021-06-25

## 2021-06-25 RX ORDER — IBUPROFEN 800 MG/1
800 TABLET ORAL EVERY 8 HOURS
Status: DISCONTINUED | OUTPATIENT
Start: 2021-06-26 | End: 2021-07-01 | Stop reason: HOSPADM

## 2021-06-25 RX ORDER — BUPIVACAINE HYDROCHLORIDE 7.5 MG/ML
INJECTION, SOLUTION INTRASPINAL PRN
Status: DISCONTINUED | OUTPATIENT
Start: 2021-06-25 | End: 2021-06-25 | Stop reason: SURG

## 2021-06-25 RX ORDER — SODIUM CHLORIDE, SODIUM LACTATE, POTASSIUM CHLORIDE, CALCIUM CHLORIDE 600; 310; 30; 20 MG/100ML; MG/100ML; MG/100ML; MG/100ML
INJECTION, SOLUTION INTRAVENOUS PRN
Status: DISCONTINUED | OUTPATIENT
Start: 2021-06-25 | End: 2021-06-27

## 2021-06-25 RX ORDER — OXYCODONE HYDROCHLORIDE 5 MG/1
5 TABLET ORAL EVERY 4 HOURS PRN
Status: ACTIVE | OUTPATIENT
Start: 2021-06-25 | End: 2021-06-26

## 2021-06-25 RX ORDER — PHENYLEPHRINE HYDROCHLORIDE 10 MG/ML
INJECTION, SOLUTION INTRAMUSCULAR; INTRAVENOUS; SUBCUTANEOUS PRN
Status: DISCONTINUED | OUTPATIENT
Start: 2021-06-25 | End: 2021-06-25 | Stop reason: SURG

## 2021-06-25 RX ORDER — NIFEDIPINE 10 MG/1
10 CAPSULE ORAL ONCE
Status: COMPLETED | OUTPATIENT
Start: 2021-06-25 | End: 2021-06-25

## 2021-06-25 RX ORDER — ONDANSETRON 4 MG/1
4 TABLET, ORALLY DISINTEGRATING ORAL EVERY 6 HOURS PRN
Status: DISCONTINUED | OUTPATIENT
Start: 2021-06-25 | End: 2021-07-01 | Stop reason: HOSPADM

## 2021-06-25 RX ORDER — HYDRALAZINE HYDROCHLORIDE 20 MG/ML
5-10 INJECTION INTRAMUSCULAR; INTRAVENOUS PRN
Status: DISCONTINUED | OUTPATIENT
Start: 2021-06-25 | End: 2021-06-26

## 2021-06-25 RX ORDER — ONDANSETRON 2 MG/ML
INJECTION INTRAMUSCULAR; INTRAVENOUS PRN
Status: DISCONTINUED | OUTPATIENT
Start: 2021-06-25 | End: 2021-06-25 | Stop reason: SURG

## 2021-06-25 RX ORDER — ONDANSETRON 2 MG/ML
4 INJECTION INTRAMUSCULAR; INTRAVENOUS EVERY 6 HOURS PRN
Status: ACTIVE | OUTPATIENT
Start: 2021-06-25 | End: 2021-06-26

## 2021-06-25 RX ORDER — SCOLOPAMINE TRANSDERMAL SYSTEM 1 MG/1
PATCH, EXTENDED RELEASE TRANSDERMAL PRN
Status: DISCONTINUED | OUTPATIENT
Start: 2021-06-25 | End: 2021-06-25 | Stop reason: SURG

## 2021-06-25 RX ORDER — IBUPROFEN 800 MG/1
800 TABLET ORAL EVERY 8 HOURS
Status: DISCONTINUED | OUTPATIENT
Start: 2021-06-25 | End: 2021-06-25

## 2021-06-25 RX ORDER — MISOPROSTOL 200 UG/1
600 TABLET ORAL
Status: DISCONTINUED | OUTPATIENT
Start: 2021-06-25 | End: 2021-07-01 | Stop reason: HOSPADM

## 2021-06-25 RX ORDER — CEFAZOLIN SODIUM 1 G/3ML
INJECTION, POWDER, FOR SOLUTION INTRAMUSCULAR; INTRAVENOUS PRN
Status: DISCONTINUED | OUTPATIENT
Start: 2021-06-25 | End: 2021-06-25 | Stop reason: SURG

## 2021-06-25 RX ORDER — DOCUSATE SODIUM 100 MG/1
100 CAPSULE, LIQUID FILLED ORAL 2 TIMES DAILY PRN
Status: DISCONTINUED | OUTPATIENT
Start: 2021-06-25 | End: 2021-07-01 | Stop reason: HOSPADM

## 2021-06-25 RX ORDER — OXYCODONE HYDROCHLORIDE 10 MG/1
10 TABLET ORAL EVERY 4 HOURS PRN
Status: DISPENSED | OUTPATIENT
Start: 2021-06-25 | End: 2021-06-26

## 2021-06-25 RX ORDER — METHYLERGONOVINE MALEATE 0.2 MG/ML
0.2 INJECTION INTRAVENOUS
Status: DISCONTINUED | OUTPATIENT
Start: 2021-06-25 | End: 2021-07-01 | Stop reason: HOSPADM

## 2021-06-25 RX ORDER — ONDANSETRON 2 MG/ML
4 INJECTION INTRAMUSCULAR; INTRAVENOUS EVERY 6 HOURS PRN
Status: DISCONTINUED | OUTPATIENT
Start: 2021-06-25 | End: 2021-07-01 | Stop reason: HOSPADM

## 2021-06-25 RX ORDER — SODIUM CHLORIDE, SODIUM GLUCONATE, SODIUM ACETATE, POTASSIUM CHLORIDE AND MAGNESIUM CHLORIDE 526; 502; 368; 37; 30 MG/100ML; MG/100ML; MG/100ML; MG/100ML; MG/100ML
1500 INJECTION, SOLUTION INTRAVENOUS ONCE
Status: COMPLETED | OUTPATIENT
Start: 2021-06-25 | End: 2021-06-25

## 2021-06-25 RX ORDER — OXYCODONE HYDROCHLORIDE 5 MG/1
5 TABLET ORAL EVERY 4 HOURS PRN
Status: DISCONTINUED | OUTPATIENT
Start: 2021-06-25 | End: 2021-06-25

## 2021-06-25 RX ADMIN — SODIUM CITRATE AND CITRIC ACID MONOHYDRATE 30 ML: 500; 334 SOLUTION ORAL at 16:38

## 2021-06-25 RX ADMIN — SODIUM CHLORIDE, SODIUM GLUCONATE, SODIUM ACETATE, POTASSIUM CHLORIDE AND MAGNESIUM CHLORIDE: 526; 502; 368; 37; 30 INJECTION, SOLUTION INTRAVENOUS at 16:49

## 2021-06-25 RX ADMIN — Medication 125 ML/HR: at 18:17

## 2021-06-25 RX ADMIN — SODIUM CHLORIDE, SODIUM GLUCONATE, SODIUM ACETATE, POTASSIUM CHLORIDE AND MAGNESIUM CHLORIDE 1500 ML: 526; 502; 368; 37; 30 INJECTION, SOLUTION INTRAVENOUS at 16:15

## 2021-06-25 RX ADMIN — PHENYLEPHRINE HYDROCHLORIDE 20 MCG/MIN: 10 INJECTION INTRAVENOUS at 17:19

## 2021-06-25 RX ADMIN — SCOPALAMINE 1 PATCH: 1 PATCH, EXTENDED RELEASE TRANSDERMAL at 17:51

## 2021-06-25 RX ADMIN — ONDANSETRON 4 MG: 2 INJECTION INTRAMUSCULAR; INTRAVENOUS at 17:23

## 2021-06-25 RX ADMIN — OXYTOCIN 20 UNITS: 10 INJECTION, SOLUTION INTRAMUSCULAR; INTRAVENOUS at 17:38

## 2021-06-25 RX ADMIN — METOCLOPRAMIDE 10 MG: 5 INJECTION, SOLUTION INTRAMUSCULAR; INTRAVENOUS at 16:39

## 2021-06-25 RX ADMIN — HYDRALAZINE HYDROCHLORIDE 5 MG: 20 INJECTION INTRAMUSCULAR; INTRAVENOUS at 13:39

## 2021-06-25 RX ADMIN — PHENYLEPHRINE HYDROCHLORIDE 100 MCG: 10 INJECTION INTRAVENOUS at 17:23

## 2021-06-25 RX ADMIN — FENTANYL CITRATE 15 MCG: 50 INJECTION, SOLUTION INTRAMUSCULAR; INTRAVENOUS at 17:13

## 2021-06-25 RX ADMIN — FAMOTIDINE 20 MG: 10 INJECTION INTRAVENOUS at 16:39

## 2021-06-25 RX ADMIN — KETOROLAC TROMETHAMINE 30 MG: 30 INJECTION, SOLUTION INTRAMUSCULAR at 18:10

## 2021-06-25 RX ADMIN — CEFAZOLIN 3 G: 330 INJECTION, POWDER, FOR SOLUTION INTRAMUSCULAR; INTRAVENOUS at 17:26

## 2021-06-25 RX ADMIN — NIFEDIPINE 10 MG: 10 CAPSULE ORAL at 14:38

## 2021-06-25 RX ADMIN — HYDRALAZINE HYDROCHLORIDE 10 MG: 20 INJECTION INTRAMUSCULAR; INTRAVENOUS at 14:02

## 2021-06-25 RX ADMIN — MORPHINE SULFATE 150 MCG: 0.5 INJECTION, SOLUTION EPIDURAL; INTRATHECAL; INTRAVENOUS at 17:13

## 2021-06-25 RX ADMIN — BUPIVACAINE HYDROCHLORIDE IN DEXTROSE 12 MG: 7.5 INJECTION, SOLUTION SUBARACHNOID at 17:13

## 2021-06-25 RX ADMIN — DEXAMETHASONE SODIUM PHOSPHATE 4 MG: 4 INJECTION, SOLUTION INTRA-ARTICULAR; INTRALESIONAL; INTRAMUSCULAR; INTRAVENOUS; SOFT TISSUE at 17:30

## 2021-06-25 ASSESSMENT — FIBROSIS 4 INDEX: FIB4 SCORE: 0.43

## 2021-06-25 ASSESSMENT — LIFESTYLE VARIABLES
EVER FELT BAD OR GUILTY ABOUT YOUR DRINKING: NO
TOTAL SCORE: 0
TOTAL SCORE: 0
ALCOHOL_USE: NO
EVER HAD A DRINK FIRST THING IN THE MORNING TO STEADY YOUR NERVES TO GET RID OF A HANGOVER: NO
CONSUMPTION TOTAL: INCOMPLETE
EVER_SMOKED: YES
TOTAL SCORE: 0
HAVE YOU EVER FELT YOU SHOULD CUT DOWN ON YOUR DRINKING: NO
HAVE PEOPLE ANNOYED YOU BY CRITICIZING YOUR DRINKING: NO

## 2021-06-25 ASSESSMENT — PATIENT HEALTH QUESTIONNAIRE - PHQ9
2. FEELING DOWN, DEPRESSED, IRRITABLE, OR HOPELESS: NOT AT ALL
1. LITTLE INTEREST OR PLEASURE IN DOING THINGS: NOT AT ALL
SUM OF ALL RESPONSES TO PHQ9 QUESTIONS 1 AND 2: 0

## 2021-06-25 ASSESSMENT — ENCOUNTER SYMPTOMS
CONSTITUTIONAL NEGATIVE: 1
CARDIOVASCULAR NEGATIVE: 1
NEUROLOGICAL NEGATIVE: 1
RESPIRATORY NEGATIVE: 1
PSYCHIATRIC NEGATIVE: 1
GASTROINTESTINAL NEGATIVE: 1
MUSCULOSKELETAL NEGATIVE: 1
EYES NEGATIVE: 1

## 2021-06-25 ASSESSMENT — PAIN SCALES - GENERAL
PAIN_LEVEL: 0
PAINLEVEL: 4

## 2021-06-25 ASSESSMENT — COPD QUESTIONNAIRES
COPD SCREENING SCORE: 0
IN THE PAST 12 MONTHS DO YOU DO LESS THAN YOU USED TO BECAUSE OF YOUR BREATHING PROBLEMS: DISAGREE/UNSURE
DURING THE PAST 4 WEEKS HOW MUCH DID YOU FEEL SHORT OF BREATH: NONE/LITTLE OF THE TIME
DO YOU EVER COUGH UP ANY MUCUS OR PHLEGM?: NO/ONLY WITH OCCASIONAL COLDS OR INFECTIONS
HAVE YOU SMOKED AT LEAST 100 CIGARETTES IN YOUR ENTIRE LIFE: NO/DON'T KNOW

## 2021-06-25 ASSESSMENT — PAIN DESCRIPTION - PAIN TYPE: TYPE: ACUTE PAIN

## 2021-06-25 NOTE — PROGRESS NOTES
1400 received report from Bere LAGUNA Rn. Assumed care.  1425 Dr. Seals at bedside, POC discussed. Admission order received. Will proceed with c/s with BTL to follow other cases on the floor, tenatively around 1700. MD aware of serial BP. Orders received for Q30min BP and nifedipine 10mg once now. Pt prepped for surgery. Will continue with POC.   1649 pt in OR 1  1738 viable baby boy born via Dr. Seals, APGARs 8/8  1826 pt out of OR to PACU bed 1 in stable condition.   1900 Report to NOC RN, assumed care, POC discussed, all questions answered.

## 2021-06-25 NOTE — PROGRESS NOTES
EDC  37 0     1145-pt presents from home with c/o pelvic pressure and burning when she voids, states that she had an appointment at [facility] and that she was late and due to her complaints they told her to come here for evaluation, no c/o leaking, bleeding, pain or uc's, states baby is moving normally, placed on external monitors, vs taken and set for q 10 min due to elevation  1215-report given to Dr Seals, reviewed pressures, wants urine dipped  1243-ua dipped, 2+ protein noted, unable to report to Dr Seals at this time as he is in c/s   1330-severe range pressures noted, went into OR, reported to Dr Seals, hypertensive protocol ordered  1335- iv started, labs drawn and sent   1339- first dose of hydralazine given  1400-bp recheck, second dose given, report given to Esperanza DURAN, POC discussed

## 2021-06-25 NOTE — ANESTHESIA PREPROCEDURE EVALUATION
Relevant Problems   NEURO   (positive) History of cholestasis during pregnancy      CARDIAC   (positive) Chronic hypertension affecting pregnancy         (positive) Fatty liver      OB   (positive) Chronic hypertension affecting pregnancy   (positive) History of  delivery      Other   (positive) Morbid obesity (HCC)       Physical Exam    Airway   Mallampati: II  TM distance: >3 FB  Neck ROM: full       Cardiovascular - normal exam  Rhythm: regular  Rate: normal  (-) murmur     Dental - normal exam           Pulmonary - normal exam  Breath sounds clear to auscultation     Abdominal   (+) obese     Neurological - normal exam                 Anesthesia Plan    ASA 3   ASA physical status 3 criteria: morbid obesity - BMI greater than or equal to 40    Plan - spinal   Neuraxial block will be primary anesthetic                Postoperative Plan: Postoperative administration of opioids is intended.    Pertinent diagnostic labs and testing reviewed    Informed Consent:    Anesthetic plan and risks discussed with patient.

## 2021-06-25 NOTE — H&P
Obstetrics & Gynecology History & Physical Note    Date of Service  2021    Chief Complaint  Pelvic pressure , Prev C/S , with Chronic HTN     History of Presenting Illness  Azra Perez is a 26 y.o.  at 37w0d 2021, by Last Menstrual Period. Patient's last menstrual period was 10/09/2020 (exact date). She is being admitted for     Prenatal care is at [facility]  and is complicated by:  1. HTN , : not taking  Labetalol, or ASA, last dose meds last PM   2. Last Seen > 6 weeks ago     Patient Active Problem List    Diagnosis Date Noted   • Macrosomia: AC >99%, EFW 98% 2021   • Fatty liver 2021   • Supervision of other high risk pregnancy, antepartum 2021   • Chronic hypertension affecting pregnancy 2021   • History of  delivery 2021   • History of cholestasis during pregnancy 2021   • Tobacco use 2021   • Marijuana use 2021   • Depression    • Anxiety        Obstetric History  OB History    Para Term  AB Living   4 2 2   1 2   SAB TAB Ectopic Molar Multiple Live Births   1         2      # Outcome Date GA Lbr Uriah/2nd Weight Sex Delivery Anes PTL Lv   4 Current            3 SAB 2020 5w0d          2 Term 17 37w0d  3.175 kg (7 lb) F CS-LTranv  N EARL      Birth Comments: IOL cholestasis, emergency c/s for fetal intolerance   1 Term 11 41w0d  3.062 kg (6 lb 12 oz) F Vag-Spont  N EARL      Obstetric Comments   Pregnancy planned and desired. FOB involved and supportive.  Pt does not work outside of home.       Gynecologic History  None     Review of Systems  Review of Systems   Constitutional: Negative.    HENT: Negative.    Eyes: Negative.    Respiratory: Negative.    Cardiovascular: Negative.    Gastrointestinal: Negative.    Genitourinary: Negative.    Musculoskeletal: Negative.    Skin: Negative.    Neurological: Negative.    Endo/Heme/Allergies: Negative.    Psychiatric/Behavioral: Negative.        Medical  History   has a past medical history of Anxiety, Depression, and GERD (gastroesophageal reflux disease). She also has no past medical history of Addisons disease (HCC), Adrenal disorder (HCC), Allergy, Arrhythmia, Arthritis, Asthma, Blood transfusion without reported diagnosis, Cancer ([facility]), Cataract, CHF (congestive heart failure) (HCC), Clotting disorder (HCC), COPD (chronic obstructive pulmonary disease) (HCC), Cushings syndrome (HCC), Diabetes (HCC), Diabetic neuropathy (HCC), Glaucoma, Goiter, Head ache, Heart attack (HCC), Heart murmur, HIV (human immunodeficiency virus infection) ([facility]), Hyperlipidemia, IBD (inflammatory bowel disease), Kidney disease, Meningitis, Migraine, Muscle disorder, Osteoporosis, Parathyroid disorder (HCC), Pituitary disease (HCC), Pulmonary emphysema (HCC), Seizure ([facility]), Sickle cell disease (HCC), Stroke ([facility]), Substance abuse ([facility]), Thyroid disease, Tuberculosis, or Urinary tract infection.    Surgical History   has a past surgical history that includes primary c section; abdominal exploration; and tonsillectomy (Bilateral).     Family History  family history includes Cancer in her mother; Diabetes in her maternal grandmother; Hypertension in her mother.     Social History   reports that she has been smoking cigarettes. She has never used smokeless tobacco. She reports previous alcohol use. She reports current drug use. Drug: Marijuana.    Allergies  Allergies   Allergen Reactions   • Celexa Anxiety   • Compazine Vomiting   • Latex Hives   • Prozac [Fluoxetine] Hives   • Wellbutrin [Bupropion] Anxiety       Medications  Prior to Admission Medications   Prescriptions Last Dose Informant Patient Reported? Taking?   NIFEdipine SR (PROCARDIA XL) 30 MG tablet   No No   Sig: Take 1 tablet by mouth every day.   Prenatal Vit-Fe Fumarate-FA (PRENATAL VITAMINS) 28-0.8 MG Tab   Yes No   Sig: Take  by mouth.   aspirin (ASA) 81 MG Chew Tab chewable tablet   No No   Sig: Chew 1 Tab every day.  Start at 16 weeks (Feb 1st)   Patient not taking: Reported on 3/2/2021   doxylamine (UNISOM) 25 MG Tab tablet   No No   Sig: Take 0.5 Tabs by mouth every bedtime.   Patient not taking: Reported on 3/2/2021   labetalol (NORMODYNE) 200 MG Tab   No No   Sig: Take 1 Tab by mouth 2 times a day.   Patient not taking: Reported on 3/2/2021   pyridoxine (VITAMIN B-6) 25 MG Tab   No No   Sig: Take 1 Tab by mouth every 4 hours.   Patient not taking: Reported on 3/2/2021      Facility-Administered Medications: None       Physical Exam  Vitals:    06/25/21 1325 06/25/21 1335 06/25/21 1346 06/25/21 1359   BP: (!) 164/112 (!) 161/111 (!) 173/100 (!) 168/93   Pulse: 90 83 96 93   Resp:       Temp:       TempSrc:       SpO2:       Weight:       Height:           General:   alert, cooperative, morbidly obese, no distress   Skin:   normal   HEENT:  extraocular movements intact   Lungs:   clear to auscultation bilaterally   Heart:   regular rate and rhythm   Breasts:   deferred   Abdomen:  Abdomen soft, non-tender; gravid., Large Abdominal Pannus    Pelvis: Exam deferred.   FHT:  130 BPM Fetal heart variability: moderate   [place]: External US   Presentations:   by exam    Cervix:     Dilation:      Effacement:      Station:       Consistency:      Position:         Laboratory:  Prenatal Results     General (Most Recent Result)     Test Value Reference Range Date Time    ABO  AB   03/16/21 1024    Rh  POS   03/16/21 1024    Antibody screen  NEG   03/16/21 1024    HbA1c  5.7 % 4.0 - 5.6 03/16/21 0918    Chlamydia by PCR        Gonorrhea by PCR        RPR/Syphilus  Non-Reactive  Non-Reactive 06/23/21 1019    HSV 1/2 by PCR (non-serum)        HSV 1/2 (serum)        HSV 1        HSV 2        HPV (16)        HBsAg  Non-Reactive  Non-Reactive 03/16/21 0922    HIV-1 HIV-2 Antibodies  Non-Reactive  Non Reactive 03/16/21 0922    Rubella  68.30 IU/mL  03/16/21 0922    Tb              Pap Smear (Most Recent Result)     Test Value Reference Range  Date Time    Pap smear        Pap smear w/HPV        Pap smear w/CTNG        Pap smar w/HPV CTNG        Pap smear (reflex HPV ACUS)        Pap smear (reflex HPV ASCUS w/CTNG)  (See Report)    01/05/21     Pathology gyn specimen              Urinalysis (Most Recent Result)     Test Value Reference Range Date Time    Urinalysis        POC urinalysis  (See Report)    01/05/21 0825    Urine drug screen (w/o conf)  (See Report)    03/16/21 0922    Urine culture ([id number])        Urine culture ([id number])        Urine Protein/Creatinine Ratio  85 mg/g 10 - 107 03/16/21 0922          Urinalysis, Culture if indicated     Test Value Reference Range Date Time    Color  Yellow   03/16/21 0922    Appearance  Clear   03/16/21 0922    Specific Gravity  1.021  <1.035 03/16/21 0922    PH  6.0  5.0 - 8.0 03/16/21 0922    Glucose  Negative mg/dL Negative 03/16/21 0922    Ketones  Negative mg/dL Negative 03/16/21 0922    Protein  Negative mg/dL Negative 03/16/21 0922    Bilirubin  Negative  Negative 03/16/21 0922    Nitrites  Negative  Negative 03/16/21 0922    Leukocytes Esterase  Small  Negative 03/16/21 0922    Blood  Negative  Negative 03/16/21 0922    Comment  Microscopic   03/16/21 0922    Culture              Urine Drug Screen     Test Value Reference Range Date Time    Amphetamines        Barbiturates  Negative ng/mL Cutoff 200 03/16/21 0922    Benzodiazepines  Negative ng/mL Cutoff 200 03/16/21 0922    Cocaine  Negative ng/mL Cutoff 150 03/16/21 0922    Methadone  Negative ng/mL Cutoff 150 03/16/21 0922    Opiates        Oxycodone        Phencylidine  Negative ng/mL Cutoff 25 03/16/21 0922    Propoxyphene        Marijuana Metabolite  Positive ng/mL Cutoff 20 03/16/21 0922          1st Trimester     Test Value Reference Range Date Time    Hgb        Hct        Fasting Glucose Tolerance        GTT, 1 hour        GTT, 2 hours        GTT, 3 hours              2nd Trimester     Test Value Reference Range Date Time    Hgb  12.6  g/dL 12.0 - 16.0 03/16/21 0922       12.1 g/dL 12.0 - 16.0 02/23/21 1831    Hct  38.6 % 37.0 - 47.0 03/16/21 0922       37.2 % 37.0 - 47.0 02/23/21 1831    AST  34 U/L 12 - 45 03/16/21 0923       64 U/L 12 - 45 02/23/21 1831    ALT  60 U/L 2 - 50 03/16/21 0923       147 U/L 2 - 50 02/23/21 1831    Uric Acid  5.8 mg/dL 1.9 - 8.2 03/16/21 0923       6.7 mg/dL 1.9 - 8.2 02/23/21 1831    Fasting Glucose Tolerance        GTT, 1 hour  151 mg/dL 70 - 139 03/16/21 0921    GTT, 2 hours        GTT, 3 hours              3rd Trimester     Test Value Reference Range Date Time    Hgb  11.4 g/dL 12.0 - 16.0 06/25/21 1330       11.7 g/dL 12.0 - 16.0 06/23/21 1019    Hct  35.1 % 37.0 - 47.0 06/25/21 1330       35.9 % 37.0 - 47.0 06/23/21 1019    Platelet count  320 K/uL 164 - 446 06/25/21 1330       344 K/uL 164 - 446 06/23/21 1019    GBS (TOMLINSON BROTH)        Fasting Glucose Tolerance        GTT, 1 hour        GTT, 2 hous  125 mg/dL 65 - 155 06/23/21 1030    GTT, 3hours  130 mg/dL 65 - 140 06/23/21 1030          Congenital Disease Screening     Test Value Reference Range Date Time    First Trimester Screen        Quad Screen        BH Electrophoresis        Cystic Fibrosis Carrier Study        SMA        AFP Maternal Serum         AFP Tetra  (See Report)    03/16/21 0918    NIPT              Legend    ^: Historical                          Urinalysis:    No results found     Imaging:  U/S   Narrative & Impression     6/14/2021 9:26 AM     HISTORY/REASON FOR EXAM:  Routine screening for abnormality; chronic HTN.     TECHNIQUE/EXAM DESCRIPTION: OB limited ultrasound.     COMPARISON:  OB complete ultrasound 3/2/2021.     FINDINGS:  Fetal Lie:  Vertex  LMP:  10/9/2020  Clinical RAVINDRA by LMP:  7/16/2021     Placenta (Location):  Anterior  Placenta Previa: No  Placental stGstrstastdstest:st st1st Amniotic Fluid Volume:  PEACE = 26.85 cm     Fetal Heart Rate:  142 bpm     Cervical Length:  Not seen     No maternal adnexal mass is identified.     Fetal  Biometry  BPD    9.22 cm, 37 weeks 3 days, (94.8%)  HC    33.50 cm, 38 weeks 2 days, (85.1%)  AC    34.35 cm, 38 weeks 2 days, (>99%)  Femur Length    7.48 cm, 38 weeks 2 days, (96.1%)  Humerus Length    6.57 cm, 38 weeks 1 day, (>99%)     EGA by this US:  38 weeks 1 day  RAVINDRA by this US: 2021  RAVINDRA by 1st US:  2021     Estimated Fetal Weight:  3415 grams  EFW Percentile: 97.9%     Comments:  Mild bilateral pelviectasis of the fetal kidneys. Right fetal renal pelvis measures 0.6 cm, and left fetal renal pelvis measures 0.55 cm.     IMPRESSION:     Single intrauterine pregnancy of an estimated gestational age of 38 weeks 1 day with an estimated date of delivery of 2021.     Fetal biometry as above.     Mild bilateral fetal renal pelviectasis. Consider follow-up with  ultrasound.     INTERPRETING LOCATION: [address] [place] [address] [place], [id number]    Assessment:  26 y.o.  37w0d who presents:HTN, : poor control, Prev C/s x 1  Polyhydramnios   LGA   Fetal Renal pyelectasis  Morbid obesity   Hx of Fatty liver with elevated transaminases       Plan:  No new Assessment & Plan notes have been filed under this hospital service since the last note was generated.  Service: Obstetrics & Gynecology    1. Admit to L&D  2. Repeat C/s  , BS   4. GBS: no result  5. Pain Control: regional     VTE prophylaxis: SCD's    Ronnie Seals M.D.

## 2021-06-26 LAB
ERYTHROCYTE [DISTWIDTH] IN BLOOD BY AUTOMATED COUNT: 42.6 FL (ref 35.9–50)
HCT VFR BLD AUTO: 27.4 % (ref 37–47)
HGB BLD-MCNC: 8.9 G/DL (ref 12–16)
MCH RBC QN AUTO: 28.3 PG (ref 27–33)
MCHC RBC AUTO-ENTMCNC: 32.5 G/DL (ref 33.6–35)
MCV RBC AUTO: 87.3 FL (ref 81.4–97.8)
PLATELET # BLD AUTO: 282 K/UL (ref 164–446)
PMV BLD AUTO: 11.6 FL (ref 9–12.9)
RBC # BLD AUTO: 3.14 M/UL (ref 4.2–5.4)
WBC # BLD AUTO: 18.9 K/UL (ref 4.8–10.8)

## 2021-06-26 PROCEDURE — 85027 COMPLETE CBC AUTOMATED: CPT

## 2021-06-26 PROCEDURE — 36415 COLL VENOUS BLD VENIPUNCTURE: CPT

## 2021-06-26 PROCEDURE — A9270 NON-COVERED ITEM OR SERVICE: HCPCS | Performed by: OBSTETRICS & GYNECOLOGY

## 2021-06-26 PROCEDURE — 770002 HCHG ROOM/CARE - OB PRIVATE (112)

## 2021-06-26 PROCEDURE — 700102 HCHG RX REV CODE 250 W/ 637 OVERRIDE(OP): Performed by: STUDENT IN AN ORGANIZED HEALTH CARE EDUCATION/TRAINING PROGRAM

## 2021-06-26 PROCEDURE — A9270 NON-COVERED ITEM OR SERVICE: HCPCS | Performed by: STUDENT IN AN ORGANIZED HEALTH CARE EDUCATION/TRAINING PROGRAM

## 2021-06-26 PROCEDURE — 700102 HCHG RX REV CODE 250 W/ 637 OVERRIDE(OP): Performed by: OBSTETRICS & GYNECOLOGY

## 2021-06-26 PROCEDURE — 700102 HCHG RX REV CODE 250 W/ 637 OVERRIDE(OP): Performed by: ANESTHESIOLOGY

## 2021-06-26 PROCEDURE — 700111 HCHG RX REV CODE 636 W/ 250 OVERRIDE (IP): Performed by: ANESTHESIOLOGY

## 2021-06-26 PROCEDURE — A9270 NON-COVERED ITEM OR SERVICE: HCPCS | Performed by: ANESTHESIOLOGY

## 2021-06-26 RX ORDER — OXYCODONE HCL 5 MG/5 ML
5 SOLUTION, ORAL ORAL
Status: DISCONTINUED | OUTPATIENT
Start: 2021-06-26 | End: 2021-06-26

## 2021-06-26 RX ORDER — LABETALOL HYDROCHLORIDE 5 MG/ML
5 INJECTION, SOLUTION INTRAVENOUS
Status: DISCONTINUED | OUTPATIENT
Start: 2021-06-26 | End: 2021-06-26

## 2021-06-26 RX ORDER — SODIUM CHLORIDE, SODIUM GLUCONATE, SODIUM ACETATE, POTASSIUM CHLORIDE AND MAGNESIUM CHLORIDE 526; 502; 368; 37; 30 MG/100ML; MG/100ML; MG/100ML; MG/100ML; MG/100ML
500 INJECTION, SOLUTION INTRAVENOUS CONTINUOUS
Status: DISCONTINUED | OUTPATIENT
Start: 2021-06-26 | End: 2021-06-26

## 2021-06-26 RX ORDER — HYDROMORPHONE HYDROCHLORIDE 1 MG/ML
0.2 INJECTION, SOLUTION INTRAMUSCULAR; INTRAVENOUS; SUBCUTANEOUS
Status: DISCONTINUED | OUTPATIENT
Start: 2021-06-26 | End: 2021-06-26

## 2021-06-26 RX ORDER — OXYCODONE HCL 5 MG/5 ML
10 SOLUTION, ORAL ORAL
Status: DISCONTINUED | OUTPATIENT
Start: 2021-06-26 | End: 2021-06-26

## 2021-06-26 RX ORDER — HALOPERIDOL 5 MG/ML
1 INJECTION INTRAMUSCULAR
Status: DISCONTINUED | OUTPATIENT
Start: 2021-06-26 | End: 2021-06-26

## 2021-06-26 RX ORDER — HYDRALAZINE HYDROCHLORIDE 20 MG/ML
5 INJECTION INTRAMUSCULAR; INTRAVENOUS
Status: DISCONTINUED | OUTPATIENT
Start: 2021-06-26 | End: 2021-06-26

## 2021-06-26 RX ORDER — DIPHENHYDRAMINE HYDROCHLORIDE 50 MG/ML
12.5 INJECTION INTRAMUSCULAR; INTRAVENOUS
Status: DISCONTINUED | OUTPATIENT
Start: 2021-06-26 | End: 2021-06-26

## 2021-06-26 RX ORDER — METOPROLOL TARTRATE 1 MG/ML
1 INJECTION, SOLUTION INTRAVENOUS
Status: DISCONTINUED | OUTPATIENT
Start: 2021-06-26 | End: 2021-06-26

## 2021-06-26 RX ORDER — HYDROMORPHONE HYDROCHLORIDE 1 MG/ML
0.1 INJECTION, SOLUTION INTRAMUSCULAR; INTRAVENOUS; SUBCUTANEOUS
Status: DISCONTINUED | OUTPATIENT
Start: 2021-06-26 | End: 2021-06-26

## 2021-06-26 RX ORDER — HYDROMORPHONE HYDROCHLORIDE 1 MG/ML
0.4 INJECTION, SOLUTION INTRAMUSCULAR; INTRAVENOUS; SUBCUTANEOUS
Status: DISCONTINUED | OUTPATIENT
Start: 2021-06-26 | End: 2021-06-26

## 2021-06-26 RX ORDER — ONDANSETRON 2 MG/ML
4 INJECTION INTRAMUSCULAR; INTRAVENOUS
Status: DISCONTINUED | OUTPATIENT
Start: 2021-06-26 | End: 2021-06-26

## 2021-06-26 RX ADMIN — KETOROLAC TROMETHAMINE 30 MG: 30 INJECTION, SOLUTION INTRAMUSCULAR; INTRAVENOUS at 00:00

## 2021-06-26 RX ADMIN — LABETALOL HYDROCHLORIDE 200 MG: 100 TABLET, FILM COATED ORAL at 11:59

## 2021-06-26 RX ADMIN — ACETAMINOPHEN 1000 MG: 500 TABLET ORAL at 11:59

## 2021-06-26 RX ADMIN — ACETAMINOPHEN 1000 MG: 500 TABLET ORAL at 22:05

## 2021-06-26 RX ADMIN — IBUPROFEN 800 MG: 800 TABLET, FILM COATED ORAL at 18:41

## 2021-06-26 RX ADMIN — LABETALOL HYDROCHLORIDE 200 MG: 100 TABLET, FILM COATED ORAL at 01:32

## 2021-06-26 RX ADMIN — ACETAMINOPHEN 1000 MG: 500 TABLET ORAL at 05:38

## 2021-06-26 RX ADMIN — NIFEDIPINE 30 MG: 30 TABLET, FILM COATED, EXTENDED RELEASE ORAL at 05:39

## 2021-06-26 RX ADMIN — ACETAMINOPHEN 1000 MG: 500 TABLET ORAL at 00:00

## 2021-06-26 RX ADMIN — OXYCODONE HYDROCHLORIDE 10 MG: 10 TABLET ORAL at 16:13

## 2021-06-26 RX ADMIN — SIMETHICONE 80 MG: 80 TABLET, CHEWABLE ORAL at 12:00

## 2021-06-26 ASSESSMENT — PAIN DESCRIPTION - PAIN TYPE
TYPE: SURGICAL PAIN
TYPE: ACUTE PAIN
TYPE: ACUTE PAIN
TYPE: SURGICAL PAIN

## 2021-06-26 NOTE — PROGRESS NOTES
Assumed patient care. Took report from Gigi RN. Assessed patient, VS stable and within defined parameters, fundus firm at U, lochia light rubra. No pain/redness/swelling in calves.  Patient reports pain as none. Oriented patient to the post partum unit including room features, scheduled medications, welcome letter, and the post partum depression scale. Educated patient on room safety and infant safety. Patient understands and verbalizes safe infant sleeping practices. Current COVID-19 mask policy gone over. Patient and support person acknowledge policy. Current visitor policy discussed. Patient and support person acknowledge policy. Call light within reach. Will continue to monitor patient's vitals.     0400- Set up pump for mother. Reviewed settings 80 CPMs x 2 min, 60 CPMs for duration of pumping. Encouraged to pump every 3 hours while awake, and may have 1 five hour stretch at night to allow for rest. Showed which pump parts to wash, and how to label milk.

## 2021-06-26 NOTE — CARE PLAN
The patient is Stable - Low risk of patient condition declining or worsening    Shift Goals  Clinical Goals: increased urine output, ambulate in halls    Progress made toward(s) clinical / shift goals:  patient has good urine output this afternoon, and she has been ambulating in halls 2 times this shift.  She has visited infant in the NICU 3 times this shift.    Patient is not progressing towards the following goals:

## 2021-06-26 NOTE — L&D DELIVERY NOTE
OB  Delivery Note    2021  Azra SierraCoin  26 y.o.    Review the Delivery Report for details.     Gestational Age: 37w0d  /Para:   Labor Complications: None   Estimated Blood Loss:   Delivery Blood Loss  21 1731 - 21 1905    Est. Blood Loss Anesthesia 600 mL    Total  600 mL        Delivery Type: , Low Transverse   ROM to Delivery Time: 0h 00m   Sex: Male  [place] Weight: 3.62 kg (7 lb 15.7 oz)    1 Minute 5 Minute 10 Minute   Apgar Totals: 8   8           Details    Pre-Op Diagnosis: 1. Intrauterine pregnancy at 37w0d  2. Prev C/S   3. Chronic HTN : Poor control  5. Desires Sterilization   4. Polyhydramnios    Delivery Justification Patient was admitted to Labor and Delivery at 37w0d for repeat    Post-Op Diagnosis: 1. S/A   Indications:  Repeat    Procedure: Repeat  , Low Transverse  via   incision   Staff Surgeon(s):  Ronnie Seals M.D.  Circulator: Esperanza Wyman R.N.  Scrub Person: Julieta Sigala  L&ELIE Baby  Nurse: Eda Cutler R.N.   Anesthesia: Spinal    Findings: Male infant delivered, weighing 3.62 kg (7 lb 15.7 oz) . Normal uterus, tubes, and ovaries.     Informed Consent:  The risks, benefits, complications, and alternatives were discussed with the patient. The patient understood that the risks of  section include, but are not limited to: injury to nearby structures or organs, infection, blood loss and possible need for transfusion, and potential need for more surgery including hysterectomy. The patient stated understanding and desired to proceed. All questions were answered. The site of surgery was properly noted and marked. The patient was identified as Five SierraCoin and the procedure verified as a  delivery. A Time Out was held and the above information confirmed.    Procedure Details:  After adequate Spinal  anesthesia was ascertained, the patient was prepped and draped in a normal  supine position with a wedge under the right hip.Large Pannus required taping tpo allow access to lower abdomen ,  A pfannensteil incision was made.  The incision was taken down to the fascia, which was incised medial to lateral.  The rectus muscles were dissected off the rectus sheath.  There was separation of the rectus sheath superiorly and the peritoneum was entered atraumatically, extended superiorly and inferiorly.  An Gabriella O retractor was placed. The lower uterine segment was identified.  A low transverse incision was made in the uterus.  It was then extended digitally and the membranes were intact with clear fluid.  The infant was born in a vertex position.  It was a Living  Male infant.  Placenta removal: Manual Removal . Placental disposition: discarded . The uterus was repaired in situ with an gabriella-o retractor, cleansed of all clots and membranes.  The lower segment was dilated for lochia egress.  Attention was made towards closing the uterus in a 2-layer fashion with #1 Chromic suture ligature in a running interlocking stitch with hemostatis assured.  The gutters were cleansed of all clots.  Tubal ligation was performed using Salpingectomy with ligasure .  Normal tubes and ovaries were noted.  The fascia was closed with 0 Vicryl going right to left, left to right, crossing in the midline with a running interlocking stitch.  The subcutaneous tissues was copiously irrigated.  Small capillary bleeders individually coagulated.  The subcutaneous tissues were approximated with interrupted 3-0 Vicryl and the skin with subcuticular suture with 4-0 Vicryl.   Steri Strips / Mypilex / tegaderm for dressing       The patient tolerated the procedure well. Sponge, instrument, and needle counts were correct and the patient was taken to the recovery room in good and stable condition.    Ronnie Seals M.D.

## 2021-06-26 NOTE — CARE PLAN
The patient is Stable - Low risk of patient condition declining or worsening    Shift Goals  Clinical Goals: minimal lochia, stable BP    Progress made toward(s) clinical / shift goals:      Patient is not progressing towards the following goals:  Problem: Psychosocial - Postpartum  Goal: Patient will verbalize and demonstrate effective bonding and parenting behavior  Outcome: Not Progressing    Infant under oxyhood and transferred to NICU          Problem: Altered Physiologic Condition  Goal: Patient physiologically stable as evidenced by normal lochia, palpable uterine involution and vitals within normal limits  Outcome: Progressing       Problem: Psychosocial - Postpartum  Goal: Patient will verbalize and demonstrate effective bonding and parenting behavior  Outcome: Not Progressing

## 2021-06-26 NOTE — OR SURGEON
Immediate Post OP Note    PreOp Diagnosis: 37 week IUP                                 Prev C/s x 1                                 Chronic HTN : Poorly controlled                                 Polyhydramnios                                  Desires Sterilization                 PostOp Diagnosis: 37 week IUP                                 Prev C/s x 1                                 Chronic HTN : Poorly controlled                                 Polyhydramnios                                  Desires Sterilization         Procedure(s):   SECTION, REPEAT - Wound Class: Clean Contaminated    Surgeon(s):  Ronnie Seals M.D.    Anesthesiologist/Type of Anesthesia:  Anesthesiologist: Holden Soto M.D./Spinal    Surgical Staff:  Circulator: Esperanza Wyman R.N.  Scrub Person: Julieta SMITH&ELIE Baby  Nurse: Eda Cutler R.N.    Specimens removed if any:  * No specimens in log *    : Tubes : Right/Left     Estimated Blood Loss: 650 cc    Findings: viable male apgar 8/8    Complications: none         2021 7:01 PM Ronnie Seals M.D.

## 2021-06-26 NOTE — ANESTHESIA POSTPROCEDURE EVALUATION
Patient: Five SierraCoin    Procedure Summary     Date: 21 Room / Location: [facility] OR 01 / SURGERY LABOR AND DELIVERY    Anesthesia Start:  Anesthesia Stop:     Procedure:  SECTION, REPEAT (Abdomen) Diagnosis: (same, del)    Surgeons: Ronnie Seals M.D. Responsible Provider: Holden Soto M.D.    Anesthesia Type: spinal ASA Status: 3          Final Anesthesia Type: spinal  Last vitals  BP   Blood Pressure: 136/80    Temp   36.8 °C (98.2 °F)    Pulse   78   Resp   16    SpO2   94 %      Anesthesia Post Evaluation    Patient location during evaluation: PACU  Patient participation: complete - patient participated  Level of consciousness: awake and alert  Pain score: 0    Airway patency: patent  Anesthetic complications: no  Cardiovascular status: hemodynamically stable  Respiratory status: acceptable  Hydration status: euvolemic    PONV: none          No complications documented.     Nurse Pain Score: 0 (NPRS)

## 2021-06-26 NOTE — DISCHARGE PLANNING
Ongoing: [facility] 3 night request was faxed over to the [facility] as requested.

## 2021-06-26 NOTE — PROGRESS NOTES
Obstetrics & Gynecology Post-Delivery Progress Note    Date of Service      26 y.o.  s/p  for repeat  Delivery date: 21    Events  No events    Subjective  Pain: Yes,  controlled  Bleeding: lochia minimal  Tolerating PO: yes  Voiding: bermeo in place  Ambulating: yes  Passing flatus: Yes  Feeding: breastfeeding well    Objective  24hr VS:  Temp:  [35.8 °C (96.5 °F)-36.8 °C (98.2 °F)] 36.3 °C (97.3 °F)  Pulse:  [] 67  Resp:  [16-20] 20  BP: (115-179)/() 117/65  SpO2:  [91 %-97 %] 91 %    Physical Exam  General: well  Chest/Breasts: nipples intact   Abdomen: nontender, soft  Fundus: firm and below umbilicus  Incision: dressing clean, dry, intact  Perineum: deferred  Extremities: symmetric and no edema, calves nontender    Labs:  WBC 18.9  Hgb 8.9    Medications  oxytocin, 2,000 mL/hr, Intravenous, Once  acetaminophen, 1,000 mg, Oral, Q6HR  ketorolac, 30 mg, Intravenous, Q6HRS  acetaminophen, 1,000 mg, Oral, Q8HRS  NIFEdipine SR, 30 mg, Oral, Q DAY  labetalol, 200 mg, Oral, TWICE DAILY  ibuprofen, 800 mg, Oral, Q8HRS      misoprostol, oxyCODONE immediate-release, oxyCODONE immediate release, HYDROmorphone, HYDROmorphone, ondansetron, diphenhydrAMINE, diphenhydrAMINE **OR** diphenhydrAMINE **OR** naloxone 0.4 mg in 1000 mL infusion, LR, oxytocin, misoprostol, methylergonovine, docusate sodium, bisacodyl, simethicone, ondansetron **OR** ondansetron, oxyCODONE immediate release      Assessment/Plan  Five SierraCoin is a 26 y.o.yo  s/p postop day #1  s/p  for repeat    - Post care: bermeo still in place  - repeat CBC tomorrow AM  - Pain: controlled  - Rh+, Rubella Immune  - Method of Feeding: plans to breastfeed  - Method of Contraception: tubal ligation (already done)  VTE prophylaxis: SCDs    - Disposition: likely home postop day 2-3

## 2021-06-26 NOTE — PROGRESS NOTES
1905 Report received from Esperanza DURAN  1945 Pt transferred to  in stable condition via guryun. Report given to Nu DURAN. Infant in NBN.

## 2021-06-26 NOTE — ANESTHESIA PROCEDURE NOTES
Spinal Block    Date/Time: 6/25/2021 4:58 PM  Performed by: Holden Soto M.D.  Authorized by: Holden Soto M.D.     Patient Location:  OR  Start Time:  6/25/2021 4:58 PM  End Time:  6/25/2021 5:13 PM  Reason for Block: primary anesthetic    patient identified, IV checked, site marked, risks and benefits discussed, surgical consent, monitors and equipment checked, pre-op evaluation and timeout performed    Patient Position:  Sitting  Prep: ChloraPrep, patient draped and sterile technique    Monitoring:  Blood pressure, continuous pulse oximetry and heart rate  Approach:  Midline  Location:  L3-4  Injection Technique:  Single-shot  Skin infiltration:  Lidocaine  Strength:  1%  Dose:  3ml  Needle Type:  Sirena (5 inch Sirena through 17g epidural Tuhoy needle)  Needle Gauge:  25 G  CSF flowing pre/post injection:  Yes  Sensory Level:  T4   Multiple unsuccessful attempts with Pencan and 5 inch Sirena needle through the spinal kit introducer needle. 17 gauge Tuhoy needle advanced into epidural space with CARMEN with saline at 8 cm. 5 inch Sirena 25 gauge spinal needle advanced through Tuhoy. +CSF.

## 2021-06-26 NOTE — DISCHARGE PLANNING
Discharge Planning Assessment Post Partum    Reason for Referral: History of THC, Depression and anxiety  Address: [address] [place] [address] 11 in [place], [id number]  Type of Living Situation: Lives with girlfriend and two daughters  Mom Diagnosis:   Baby Diagnosis: Birth  Primary Language: English    Name of Baby: Deanna Jain  Father of the Baby: Ryan Becerra  Involved in baby’s care? No. MOB is under an alias as she reports she does not FOB to be involved and did not disclose why  Contact Information: Did not disclose    [facility]  Mom's PCP: Does not have one and was educated how to obtain one  PCP for new baby:MOB reports she will set baby up with [place] as her other children to that office.     Support System: reports girlfriend Ro and her mother  Coping/Bonding between mother & baby: Yes, baby in in NICU and MOB [plans to visit the baby  Source of Feeding: Breast feed  Supplies for Infant: Car seat, bassinet, pack and play, clothing, wipes, and etc.     Mom's Insurance: Medcaiid  Baby Covered on Insurance: Will baby  Mother Employed/School: No  Other children in the home/names & ages: Jesica Jain  2011 and Azalia Jain  2017    Financial Hardship/Income: No, per MOB.   Mom's Mental status: Alert and oriented times 4. Mood/affect: good. Reports no SI/HI  Services used prior to admit: Substidy housing, SNAP, Medicaid and will apply for WIC.     CPS History:MOB reports not with her children, but she had CPS involved when she was a child.   Psychiatric History: Depression and anxiety. MOB reports she will follow up with counseling after admission and was recommended by Monica Simon to seeking treatment for EMDR and CBT. MOB reports she see a psychiatrist once a month and is medication. Thsi writer received sign and symptoms of post partum depression with MOB and MOB's partner Ro and discussed where to seek mental health help if needed.     Domestic Violence History: None, per MOB. MOB appeared to have a swollen eye and reports she obtain the swollen eye while admitted and discussed with RN her concerns.   Drug/ETOH History: None, per MOB. Per order to SW there is a THC history.    Resources Provided: None. MOB declined need resources.   Referrals Made: MOB requested [facility] resources as baby will in NICU and she does not have a reliable car to come to [place] and reports her mother drives, but she will need to drop off other family remember off at week in between assisting MOB.      Clearance for Discharge: MOB and baby are NOT cleared by  as baby is pending a urine drug screen.     Ongoing Plan:  will assist with [facility] referral.This writer faxed referral and was told the parents have to stay at the [facility] for at least 5 days to be approved. MOB reports she will talk to NICU about how long the baby is expected to have to stay and updated SW.

## 2021-06-26 NOTE — PROGRESS NOTES
Report received and assumed care of patient.   Plan of care discussed. Pain management plan discussed. Patient will notify RN when she would like pain medication.  Patient encouraged to call for any needs.

## 2021-06-26 NOTE — DISCHARGE PLANNING
Ongoing: UDS was negative. This writer faxed 5 night request and spoke to Cristina  to [facility] who reports they are expecting MOB tomorrow and will have them sign the house rules upon arrival.

## 2021-06-26 NOTE — ANESTHESIA TIME REPORT
Anesthesia Start and Stop Event Times     Date Time Event    2021 1616 Ready for Procedure     1649 Anesthesia Start     1831 Anesthesia Stop        Responsible Staff  21    Name Role Begin End    Holden Soto M.D. Anesth 1649 1831        Preop Diagnosis (Free Text):  Pre-op Diagnosis     Hypertension        Preop Diagnosis (Codes):    Post op Diagnosis  Previous  section  Term IUP at 37 weeks gestation, lizama    Premium Reason  A. 3PM - 7AM    Comments:

## 2021-06-27 LAB
ALBUMIN SERPL BCP-MCNC: 2.7 G/DL (ref 3.2–4.9)
ALBUMIN/GLOB SERPL: 1 G/DL
ALP SERPL-CCNC: 110 U/L (ref 30–99)
ALT SERPL-CCNC: 72 U/L (ref 2–50)
ANION GAP SERPL CALC-SCNC: 9 MMOL/L (ref 7–16)
AST SERPL-CCNC: 25 U/L (ref 12–45)
BASOPHILS # BLD AUTO: 0.4 % (ref 0–1.8)
BASOPHILS # BLD: 0.07 K/UL (ref 0–0.12)
BILIRUB SERPL-MCNC: 0.2 MG/DL (ref 0.1–1.5)
BUN SERPL-MCNC: 14 MG/DL (ref 8–22)
CALCIUM SERPL-MCNC: 8.8 MG/DL (ref 8.5–10.5)
CHLORIDE SERPL-SCNC: 101 MMOL/L (ref 96–112)
CO2 SERPL-SCNC: 25 MMOL/L (ref 20–33)
CREAT SERPL-MCNC: 0.77 MG/DL (ref 0.5–1.4)
EOSINOPHIL # BLD AUTO: 0.21 K/UL (ref 0–0.51)
EOSINOPHIL NFR BLD: 1.2 % (ref 0–6.9)
ERYTHROCYTE [DISTWIDTH] IN BLOOD BY AUTOMATED COUNT: 42.6 FL (ref 35.9–50)
GLOBULIN SER CALC-MCNC: 2.7 G/DL (ref 1.9–3.5)
GLUCOSE SERPL-MCNC: 78 MG/DL (ref 65–99)
HCT VFR BLD AUTO: 23.4 % (ref 37–47)
HGB BLD-MCNC: 7.6 G/DL (ref 12–16)
IMM GRANULOCYTES # BLD AUTO: 0.11 K/UL (ref 0–0.11)
IMM GRANULOCYTES NFR BLD AUTO: 0.6 % (ref 0–0.9)
LYMPHOCYTES # BLD AUTO: 3.69 K/UL (ref 1–4.8)
LYMPHOCYTES NFR BLD: 21.7 % (ref 22–41)
MCH RBC QN AUTO: 28.4 PG (ref 27–33)
MCHC RBC AUTO-ENTMCNC: 32.5 G/DL (ref 33.6–35)
MCV RBC AUTO: 87.3 FL (ref 81.4–97.8)
MONOCYTES # BLD AUTO: 1.3 K/UL (ref 0–0.85)
MONOCYTES NFR BLD AUTO: 7.6 % (ref 0–13.4)
NEUTROPHILS # BLD AUTO: 11.64 K/UL (ref 2–7.15)
NEUTROPHILS NFR BLD: 68.5 % (ref 44–72)
NRBC # BLD AUTO: 0 K/UL
NRBC BLD-RTO: 0 /100 WBC
PLATELET # BLD AUTO: 246 K/UL (ref 164–446)
PMV BLD AUTO: 11.7 FL (ref 9–12.9)
POTASSIUM SERPL-SCNC: 4.1 MMOL/L (ref 3.6–5.5)
PROT SERPL-MCNC: 5.4 G/DL (ref 6–8.2)
RBC # BLD AUTO: 2.68 M/UL (ref 4.2–5.4)
SODIUM SERPL-SCNC: 135 MMOL/L (ref 135–145)
WBC # BLD AUTO: 17 K/UL (ref 4.8–10.8)

## 2021-06-27 PROCEDURE — 85025 COMPLETE CBC W/AUTO DIFF WBC: CPT

## 2021-06-27 PROCEDURE — A9270 NON-COVERED ITEM OR SERVICE: HCPCS | Performed by: STUDENT IN AN ORGANIZED HEALTH CARE EDUCATION/TRAINING PROGRAM

## 2021-06-27 PROCEDURE — A9270 NON-COVERED ITEM OR SERVICE: HCPCS | Performed by: OBSTETRICS & GYNECOLOGY

## 2021-06-27 PROCEDURE — 770002 HCHG ROOM/CARE - OB PRIVATE (112)

## 2021-06-27 PROCEDURE — 36415 COLL VENOUS BLD VENIPUNCTURE: CPT

## 2021-06-27 PROCEDURE — 80053 COMPREHEN METABOLIC PANEL: CPT

## 2021-06-27 PROCEDURE — 700102 HCHG RX REV CODE 250 W/ 637 OVERRIDE(OP): Performed by: OBSTETRICS & GYNECOLOGY

## 2021-06-27 PROCEDURE — 700102 HCHG RX REV CODE 250 W/ 637 OVERRIDE(OP): Performed by: STUDENT IN AN ORGANIZED HEALTH CARE EDUCATION/TRAINING PROGRAM

## 2021-06-27 RX ORDER — FERROUS SULFATE 325(65) MG
325 TABLET ORAL
Status: DISCONTINUED | OUTPATIENT
Start: 2021-06-27 | End: 2021-07-01 | Stop reason: HOSPADM

## 2021-06-27 RX ORDER — OXYCODONE HYDROCHLORIDE 10 MG/1
10 TABLET ORAL EVERY 4 HOURS PRN
Status: DISCONTINUED | OUTPATIENT
Start: 2021-06-27 | End: 2021-07-01 | Stop reason: HOSPADM

## 2021-06-27 RX ADMIN — IBUPROFEN 800 MG: 800 TABLET, FILM COATED ORAL at 18:56

## 2021-06-27 RX ADMIN — FERROUS SULFATE TAB 325 MG (65 MG ELEMENTAL FE) 325 MG: 325 (65 FE) TAB at 10:04

## 2021-06-27 RX ADMIN — IBUPROFEN 800 MG: 800 TABLET, FILM COATED ORAL at 01:35

## 2021-06-27 RX ADMIN — OXYCODONE HYDROCHLORIDE 10 MG: 10 TABLET ORAL at 10:05

## 2021-06-27 RX ADMIN — ACETAMINOPHEN 1000 MG: 500 TABLET ORAL at 05:35

## 2021-06-27 RX ADMIN — OXYCODONE 5 MG: 5 TABLET ORAL at 05:35

## 2021-06-27 RX ADMIN — LABETALOL HYDROCHLORIDE 200 MG: 100 TABLET, FILM COATED ORAL at 01:35

## 2021-06-27 RX ADMIN — ACETAMINOPHEN 1000 MG: 500 TABLET ORAL at 22:12

## 2021-06-27 RX ADMIN — LABETALOL HYDROCHLORIDE 200 MG: 100 TABLET, FILM COATED ORAL at 12:03

## 2021-06-27 RX ADMIN — OXYCODONE HYDROCHLORIDE 10 MG: 10 TABLET ORAL at 15:48

## 2021-06-27 RX ADMIN — ACETAMINOPHEN 1000 MG: 500 TABLET ORAL at 15:48

## 2021-06-27 RX ADMIN — NIFEDIPINE 30 MG: 30 TABLET, FILM COATED, EXTENDED RELEASE ORAL at 05:35

## 2021-06-27 RX ADMIN — IBUPROFEN 800 MG: 800 TABLET, FILM COATED ORAL at 10:04

## 2021-06-27 ASSESSMENT — PAIN DESCRIPTION - PAIN TYPE
TYPE: SURGICAL PAIN
TYPE: ACUTE PAIN
TYPE: SURGICAL PAIN
TYPE: ACUTE PAIN
TYPE: ACUTE PAIN
TYPE: SURGICAL PAIN
TYPE: SURGICAL PAIN

## 2021-06-27 NOTE — PROGRESS NOTES
Obstetrics & Gynecology Post-Delivery Progress Note    Date of Service      26 y.o.  s/p  for repeat  Delivery date: 21    Events  No events    Subjective  Pain: Yes,  controlled , medicine helps but not all the way  Bleeding: lochia minimal  Tolerating PO: yes  Voiding: without difficulty  Ambulating: yes  Passing flatus: Yes  Feeding: breastfeeding well    Objective  24hr VS:  Temp:  [35.9 °C (96.6 °F)-36.7 °C (98 °F)] 36.7 °C (98 °F)  Pulse:  [62-82] 72  Resp:  [16-20] 18  BP: (115-127)/(64-73) 127/73  SpO2:  [94 %-98 %] 98 %    Physical Exam  General: well  Chest/Breasts: nipples intact   Abdomen: soft , mild tenderness  Fundus: firm and below umbilicus  Incision: dressing clean, dry, intact  Perineum: deferred  Extremities: symmetric and no edema, calves nontender    Labs:  hgb 7.6    Medications  ferrous sulfate, 325 mg, Oral, QDAY with Breakfast  acetaminophen, 1,000 mg, Oral, Q8HRS  NIFEdipine SR, 30 mg, Oral, Q DAY  labetalol, 200 mg, Oral, TWICE DAILY  ibuprofen, 800 mg, Oral, Q8HRS      misoprostol, oxytocin, misoprostol, methylergonovine, docusate sodium, bisacodyl, simethicone, ondansetron **OR** ondansetron, oxyCODONE immediate release      Assessment/Plan  Five [employer] is a 26 y.o.yo  s/p postop day #2  s/p  for repeat    - Post care: meeting all goals  - hgb 7.6, started PO iron  - f/u results of CMP that was re-drawn this morning  - Pain: could use better control, will increase oxycodone to 10 mg Q4h PRN for severe pain, continue with scheduled tylenol 1000 and ibuprofen 800  - Rh+, Rubella Immune  - Method of Feeding: plans to breastfeed  - Method of Contraception: tubal ligation (already done)  VTE prophylaxis: SCDs    - Disposition: likely home postop day 3

## 2021-06-27 NOTE — LACTATION NOTE
Reinforced pumping schedule with mother. She has a history of low milk production so we discussed strategies for maximizing milk. Extra pumping supplies provided. She does not know if she is currently active with her WIC services in [place], advised to call them this week and update. I told her she can rent a breast pump at discharge in the meantime.

## 2021-06-27 NOTE — CARE PLAN
The patient is Stable - Low risk of patient condition declining or worsening    Shift Goals  Clinical Goals: bleeding WDL  Patient Goals: pain controlled  Family Goals: bond with infant/NICU visits    Progress made toward(s) clinical / shift goals:    Problem: Knowledge Deficit - Postpartum  Goal: Patient will verbalize and demonstrate understanding of self and infant care  Outcome: Progressing     Problem: Psychosocial - Postpartum  Goal: Patient will verbalize and demonstrate effective bonding and parenting behavior  Outcome: Progressing     Problem: Altered Physiologic Condition  Goal: Patient physiologically stable as evidenced by normal lochia, palpable uterine involution and vitals within normal limits  Outcome: Progressing     Problem: Infection - Postpartum  Goal: Postpartum patient will be free of signs and symptoms of infection  Outcome: Progressing     Problem: Respiratory/Oxygenation Function Post-Surgical  Goal: Patient will achieve/maintain normal respiratory rate/effort  Outcome: Progressing     Problem: Bowel Elimination - Post Surgical  Goal: Patient will resume regular bowel sounds and function with no discomfort or distention  Outcome: Progressing     Problem: Early Mobilization - Post Surgery  Goal: Early mobilization post surgery  Outcome: Progressing     Problem: Pain - Standard  Goal: Alleviation of pain or a reduction in pain to the patient’s comfort goal  Outcome: Progressing       Patient is not progressing towards the following goals:

## 2021-06-28 LAB
BASOPHILS # BLD AUTO: 0.5 % (ref 0–1.8)
BASOPHILS # BLD: 0.08 K/UL (ref 0–0.12)
EOSINOPHIL # BLD AUTO: 0.27 K/UL (ref 0–0.51)
EOSINOPHIL NFR BLD: 1.8 % (ref 0–6.9)
ERYTHROCYTE [DISTWIDTH] IN BLOOD BY AUTOMATED COUNT: 43.6 FL (ref 35.9–50)
HCT VFR BLD AUTO: 23.5 % (ref 37–47)
HGB BLD-MCNC: 7.6 G/DL (ref 12–16)
IMM GRANULOCYTES # BLD AUTO: 0.12 K/UL (ref 0–0.11)
IMM GRANULOCYTES NFR BLD AUTO: 0.8 % (ref 0–0.9)
LYMPHOCYTES # BLD AUTO: 3.44 K/UL (ref 1–4.8)
LYMPHOCYTES NFR BLD: 23.3 % (ref 22–41)
MCH RBC QN AUTO: 28.4 PG (ref 27–33)
MCHC RBC AUTO-ENTMCNC: 32.3 G/DL (ref 33.6–35)
MCV RBC AUTO: 87.7 FL (ref 81.4–97.8)
MONOCYTES # BLD AUTO: 1.25 K/UL (ref 0–0.85)
MONOCYTES NFR BLD AUTO: 8.5 % (ref 0–13.4)
NEUTROPHILS # BLD AUTO: 9.61 K/UL (ref 2–7.15)
NEUTROPHILS NFR BLD: 65.1 % (ref 44–72)
NRBC # BLD AUTO: 0 K/UL
NRBC BLD-RTO: 0 /100 WBC
PATHOLOGY CONSULT NOTE: NORMAL
PLATELET # BLD AUTO: 270 K/UL (ref 164–446)
PMV BLD AUTO: 11.4 FL (ref 9–12.9)
RBC # BLD AUTO: 2.68 M/UL (ref 4.2–5.4)
WBC # BLD AUTO: 14.8 K/UL (ref 4.8–10.8)

## 2021-06-28 PROCEDURE — A9270 NON-COVERED ITEM OR SERVICE: HCPCS | Performed by: OBSTETRICS & GYNECOLOGY

## 2021-06-28 PROCEDURE — 36415 COLL VENOUS BLD VENIPUNCTURE: CPT

## 2021-06-28 PROCEDURE — A9270 NON-COVERED ITEM OR SERVICE: HCPCS | Performed by: STUDENT IN AN ORGANIZED HEALTH CARE EDUCATION/TRAINING PROGRAM

## 2021-06-28 PROCEDURE — 700102 HCHG RX REV CODE 250 W/ 637 OVERRIDE(OP): Performed by: STUDENT IN AN ORGANIZED HEALTH CARE EDUCATION/TRAINING PROGRAM

## 2021-06-28 PROCEDURE — 85025 COMPLETE CBC W/AUTO DIFF WBC: CPT

## 2021-06-28 PROCEDURE — 770002 HCHG ROOM/CARE - OB PRIVATE (112)

## 2021-06-28 PROCEDURE — 700102 HCHG RX REV CODE 250 W/ 637 OVERRIDE(OP): Performed by: OBSTETRICS & GYNECOLOGY

## 2021-06-28 RX ORDER — HYDROCODONE BITARTRATE AND ACETAMINOPHEN 5; 325 MG/1; MG/1
1 TABLET ORAL EVERY 4 HOURS PRN
Qty: 20 TABLET | Refills: 0 | Status: SHIPPED | OUTPATIENT
Start: 2021-06-28 | End: 2021-07-01 | Stop reason: SDUPTHER

## 2021-06-28 RX ORDER — PSEUDOEPHEDRINE HCL 30 MG
100 TABLET ORAL 2 TIMES DAILY PRN
Qty: 60 CAPSULE | Refills: 0 | Status: SHIPPED | OUTPATIENT
Start: 2021-06-28 | End: 2021-07-01

## 2021-06-28 RX ORDER — IBUPROFEN 600 MG/1
600 TABLET ORAL EVERY 8 HOURS
Qty: 30 TABLET | Refills: 0 | Status: SHIPPED | OUTPATIENT
Start: 2021-06-28 | End: 2022-06-23

## 2021-06-28 RX ORDER — FERROUS SULFATE 325(65) MG
325 TABLET ORAL
Qty: 30 TABLET | Refills: 0 | Status: SHIPPED | OUTPATIENT
Start: 2021-06-28 | End: 2021-07-01

## 2021-06-28 RX ADMIN — LABETALOL HYDROCHLORIDE 200 MG: 100 TABLET, FILM COATED ORAL at 01:26

## 2021-06-28 RX ADMIN — ACETAMINOPHEN 1000 MG: 500 TABLET ORAL at 21:46

## 2021-06-28 RX ADMIN — ACETAMINOPHEN 1000 MG: 500 TABLET ORAL at 13:39

## 2021-06-28 RX ADMIN — OXYCODONE HYDROCHLORIDE 10 MG: 10 TABLET ORAL at 21:53

## 2021-06-28 RX ADMIN — OXYCODONE HYDROCHLORIDE 10 MG: 10 TABLET ORAL at 09:29

## 2021-06-28 RX ADMIN — IBUPROFEN 800 MG: 800 TABLET, FILM COATED ORAL at 18:08

## 2021-06-28 RX ADMIN — IBUPROFEN 800 MG: 800 TABLET, FILM COATED ORAL at 02:47

## 2021-06-28 RX ADMIN — FERROUS SULFATE TAB 325 MG (65 MG ELEMENTAL FE) 325 MG: 325 (65 FE) TAB at 09:29

## 2021-06-28 RX ADMIN — IBUPROFEN 800 MG: 800 TABLET, FILM COATED ORAL at 10:07

## 2021-06-28 RX ADMIN — NIFEDIPINE 30 MG: 30 TABLET, FILM COATED, EXTENDED RELEASE ORAL at 06:06

## 2021-06-28 RX ADMIN — OXYCODONE HYDROCHLORIDE 10 MG: 10 TABLET ORAL at 13:39

## 2021-06-28 RX ADMIN — LABETALOL HYDROCHLORIDE 200 MG: 100 TABLET, FILM COATED ORAL at 13:03

## 2021-06-28 RX ADMIN — ACETAMINOPHEN 1000 MG: 500 TABLET ORAL at 06:06

## 2021-06-28 ASSESSMENT — PAIN DESCRIPTION - PAIN TYPE
TYPE: SURGICAL PAIN
TYPE: ACUTE PAIN;SURGICAL PAIN
TYPE: ACUTE PAIN;SURGICAL PAIN
TYPE: SURGICAL PAIN

## 2021-06-28 NOTE — DISCHARGE SUMMARY
SECTION DISCHARGE SUMMARY    PATIENT ID:  NAME:  Azra Perez  MRN:               [id number]  YOB: 1995  DATE OF ADMISSION: 2021   DATE OF DISCHARGE:2021     DISCHARGE DIAGNOSES:  1. Intrauterine gestation at term.  2. Delivered viable male infant.    PROCEDURES PERFORMED:  1. repeat low transverse  section.  2.  Bilateral tubal Salpingectomy.    COMPLICATIONS: None.    HOSPITAL COURSE: This is a 26 y.o. year-old female  4, now para 3013, who was admitted at 37w0d for repeat  section and sterilization. Pregnancy was complicated by gHTN. Her  course was uncomplicated.  The patient was admitted for RLTCS at 37w for gHTN. Informed consent was obtained. Under spinal anesthesia an uncomplicated repeat low transverse  section and bilateral tubal ligation were performed. A viable male infant with Apgars of 8 and 8 was delivered. The patient's postoperative course was uneventful. She remained afebrile with stable vital signs. Pt has been ambulating, tolerating a regular diet and has had return of normal GI function. Pain has been well controlled with Narcotics. The wound is healing nicely.     LABS:   Recent Labs     21  1330 21  1330 21  0514 21  0433 21  0431   WBC 17.7*   < > 18.9* 17.0* 14.8*   RBC 4.08*   < > 3.14* 2.68* 2.68*   HEMOGLOBIN 11.4*   < > 8.9* 7.6* 7.6*   HEMATOCRIT 35.1*   < > 27.4* 23.4* 23.5*   MCV 86.0   < > 87.3 87.3 87.7   MCH 27.9   < > 28.3 28.4 28.4   RDW 41.4   < > 42.6 42.6 43.6   PLATELETCT 320   < > 282 246 270   MPV 11.5   < > 11.6 11.7 11.4   NEUTSPOLYS 77.10*  --   --  68.50 65.10   LYMPHOCYTES 14.30*  --   --  21.70* 23.30   MONOCYTES 6.60  --   --  7.60 8.50   EOSINOPHILS 0.80  --   --  1.20 1.80   BASOPHILS 0.50  --   --  0.40 0.50    < > = values in this interval not displayed.        Chris, Five   Home Medication Instructions CON:[id number]    Printed on:21 8029    Medication Information                      aspirin (ASA) 81 MG Chew Tab chewable tablet  Chew 1 Tab every day. Start at 16 weeks (Feb 1st)             docusate sodium 100 MG Cap  Take 100 mg by mouth 2 times a day as needed for Constipation.             doxylamine (UNISOM) 25 MG Tab tablet  Take 0.5 Tabs by mouth every bedtime.             ferrous sulfate 325 (65 Fe) MG tablet  Take 1 tablet by mouth every morning with breakfast.             HYDROcodone-acetaminophen (NORCO) 5-325 MG Tab per tablet  Take 1 tablet by mouth every four hours as needed for up to 5 days.             ibuprofen (MOTRIN) 600 MG Tab  Take 1 tablet by mouth every 8 hours.             labetalol (NORMODYNE) 200 MG Tab  Take 1 Tab by mouth 2 times a day.             NIFEdipine SR (PROCARDIA XL) 30 MG tablet  Take 1 tablet by mouth every day.             Prenatal Vit-Fe Fumarate-FA (PRENATAL VITAMINS) 28-0.8 MG Tab  Take  by mouth.             pyridoxine (VITAMIN B-6) 25 MG Tab  Take 1 Tab by mouth every 4 hours.                 CONDITION: Stable.    DISPOSITION: Home.    ACTIVITY: Slow increase as tolerated. No lifting heavier than baby. Strict pelvic rest, no intercourse or any object inserted into vagina x 5 weeks.    DIET:  Regular    FOLLOW UP:   1) [facility] and also in 3 weeks.

## 2021-06-28 NOTE — PROGRESS NOTES
Abdominal binders have been sent to pt's nursing station # [phone] for staff to apply and fit to pt when ready.

## 2021-06-28 NOTE — CARE PLAN
Problem: Pain - Standard  Goal: Alleviation of pain or a reduction in pain to the patient’s comfort goal  Outcome: Progressing  Note: Scheduled medicine given       Shift Goals  Clinical Goals: bleeding WDL  Patient Goals: pain management  Family Goals: bond with infant/NICU visits    Progress made toward(s) clinical / shift goals:  Progressing    Patient is not progressing towards the following goals:

## 2021-06-28 NOTE — LACTATION NOTE
This note was copied from a baby's chart.  Mom is a 25 y/o P3 who delivered baby boy at 37 wks and is being cared for in the NICU. Mom reports darker and enlarged areolas during pregnancy. Mom denies any breast surgeries, diabetes, thyroid or fertility issues. Mom+reports she has been pumping and got half a syringe with last pump. Girlfriend at bedside, FOB not involved or allowed to visit. Mom does not have an available pump at home and LC recommended to call WIC office and get pump lined up for availability when discharged.   Lactation will follow up with mom before discharge.  LC ordered manual pump for mother to take home.

## 2021-06-28 NOTE — PROGRESS NOTES
0700 - Bedside report received from Dora HUANG RN. Patient care assumed. Chart, prenatal labs, and orders reviewed  0930 - Pt assessment complete, wnl. Fundus firm with minimal discharge. Pt ambulating to bathroom and voiding without difficulty.  incision covered with Mepilex dressing, CDI. Patient denies any dizziness or lightheadedness, any calf pain/tenderness, chest pain or SOB, respiratory symptoms, or any recent ill contacts. Plan of care discussed with patient for the day including pumping every 3 hours, pain management, and ambulation in halls. Pain medication plan discussed with patient; patient advised of scheduled medications, patient states she will call if PRN pain medication is wanted. All questions/concerns addressed at this time. Call light within reach, encouraged to call with needs. Will continue with routine postpartum cares.

## 2021-06-28 NOTE — CARE PLAN
Problem: Early Mobilization - Post Surgery  Goal: Early mobilization post surgery  Outcome: Progressing  Note: Encourage more ambulation   The patient is hemodynamically stable    Shift Goals  Clinical Goals: bleeding WDL  Patient Goals: pain controlled, more ambulation,   Family Goals: bond with infant/NICU visits    Progress made toward(s) clinical / shift goals:  Progressing    Patient is not progressing towards the following goals:

## 2021-06-28 NOTE — PROGRESS NOTES
"1500:Report received from Eda DURAN. Assumed patient care. Fundus firm, light lochia noted. Mepilex silver dressing in place to incision, dressing clean,dry, and intact. Plan of care discussed with patient; questions have been answered at this time. Patient needs have been met at this time. Patient encouraged to call when needing assistance. Call light within reach. Rounding in place. /91   Pulse 90   Temp 36.7 °C (98 °F) (Oral)   Resp 20   Ht 1.651 m (5' 5\")   Wt (!) 141 kg (311 lb)   LMP 10/09/2020 (Exact Date)   SpO2 98%   Breastfeeding Yes Comment: pumping, baby in NICU  BMI 51.75 kg/m² .   "

## 2021-06-29 PROCEDURE — A9270 NON-COVERED ITEM OR SERVICE: HCPCS | Performed by: STUDENT IN AN ORGANIZED HEALTH CARE EDUCATION/TRAINING PROGRAM

## 2021-06-29 PROCEDURE — A9270 NON-COVERED ITEM OR SERVICE: HCPCS | Performed by: OBSTETRICS & GYNECOLOGY

## 2021-06-29 PROCEDURE — 700102 HCHG RX REV CODE 250 W/ 637 OVERRIDE(OP): Performed by: STUDENT IN AN ORGANIZED HEALTH CARE EDUCATION/TRAINING PROGRAM

## 2021-06-29 PROCEDURE — 770002 HCHG ROOM/CARE - OB PRIVATE (112)

## 2021-06-29 PROCEDURE — 700102 HCHG RX REV CODE 250 W/ 637 OVERRIDE(OP): Performed by: OBSTETRICS & GYNECOLOGY

## 2021-06-29 RX ORDER — LABETALOL 100 MG/1
200 TABLET, FILM COATED ORAL TWICE DAILY
Status: DISCONTINUED | OUTPATIENT
Start: 2021-06-30 | End: 2021-07-01 | Stop reason: HOSPADM

## 2021-06-29 RX ORDER — NIFEDIPINE 30 MG/1
30 TABLET, EXTENDED RELEASE ORAL 2 TIMES DAILY
Status: DISCONTINUED | OUTPATIENT
Start: 2021-06-29 | End: 2021-07-01 | Stop reason: HOSPADM

## 2021-06-29 RX ORDER — NIFEDIPINE 30 MG/1
30 TABLET, EXTENDED RELEASE ORAL ONCE
Status: DISCONTINUED | OUTPATIENT
Start: 2021-06-29 | End: 2021-06-29

## 2021-06-29 RX ORDER — LABETALOL 100 MG/1
200 TABLET, FILM COATED ORAL ONCE
Status: COMPLETED | OUTPATIENT
Start: 2021-06-29 | End: 2021-06-29

## 2021-06-29 RX ADMIN — IBUPROFEN 800 MG: 800 TABLET, FILM COATED ORAL at 18:06

## 2021-06-29 RX ADMIN — ACETAMINOPHEN 1000 MG: 500 TABLET ORAL at 22:12

## 2021-06-29 RX ADMIN — NIFEDIPINE 30 MG: 30 TABLET, FILM COATED, EXTENDED RELEASE ORAL at 19:52

## 2021-06-29 RX ADMIN — ACETAMINOPHEN 1000 MG: 500 TABLET ORAL at 06:33

## 2021-06-29 RX ADMIN — OXYCODONE HYDROCHLORIDE 10 MG: 10 TABLET ORAL at 06:33

## 2021-06-29 RX ADMIN — LABETALOL HYDROCHLORIDE 200 MG: 100 TABLET, FILM COATED ORAL at 22:13

## 2021-06-29 RX ADMIN — ACETAMINOPHEN 1000 MG: 500 TABLET ORAL at 14:11

## 2021-06-29 RX ADMIN — FERROUS SULFATE TAB 325 MG (65 MG ELEMENTAL FE) 325 MG: 325 (65 FE) TAB at 08:29

## 2021-06-29 RX ADMIN — LABETALOL HYDROCHLORIDE 200 MG: 100 TABLET, FILM COATED ORAL at 14:11

## 2021-06-29 RX ADMIN — IBUPROFEN 800 MG: 800 TABLET, FILM COATED ORAL at 10:31

## 2021-06-29 RX ADMIN — OXYCODONE HYDROCHLORIDE 10 MG: 10 TABLET ORAL at 20:02

## 2021-06-29 RX ADMIN — LABETALOL HYDROCHLORIDE 200 MG: 100 TABLET, FILM COATED ORAL at 01:45

## 2021-06-29 RX ADMIN — NIFEDIPINE 30 MG: 30 TABLET, FILM COATED, EXTENDED RELEASE ORAL at 06:33

## 2021-06-29 RX ADMIN — IBUPROFEN 800 MG: 800 TABLET, FILM COATED ORAL at 01:45

## 2021-06-29 ASSESSMENT — PAIN DESCRIPTION - PAIN TYPE
TYPE: ACUTE PAIN;SURGICAL PAIN
TYPE: ACUTE PAIN
TYPE: ACUTE PAIN

## 2021-06-29 ASSESSMENT — EDINBURGH POSTNATAL DEPRESSION SCALE (EPDS)
THINGS HAVE BEEN GETTING ON TOP OF ME: NO, MOST OF THE TIME I HAVE COPED QUITE WELL
I HAVE BEEN ANXIOUS OR WORRIED FOR NO GOOD REASON: HARDLY EVER
I HAVE FELT SAD OR MISERABLE: NOT VERY OFTEN
I HAVE BLAMED MYSELF UNNECESSARILY WHEN THINGS WENT WRONG: NOT VERY OFTEN
I HAVE BEEN SO UNHAPPY THAT I HAVE HAD DIFFICULTY SLEEPING: NOT VERY OFTEN
I HAVE LOOKED FORWARD WITH ENJOYMENT TO THINGS: RATHER LESS THAN I USED TO
I HAVE BEEN SO UNHAPPY THAT I HAVE BEEN CRYING: ONLY OCCASIONALLY
I HAVE FELT SCARED OR PANICKY FOR NO GOOD REASON: NO, NOT MUCH
I HAVE BEEN ABLE TO LAUGH AND SEE THE FUNNY SIDE OF THINGS: NOT QUITE SO MUCH NOW
THE THOUGHT OF HARMING MYSELF HAS OCCURRED TO ME: NEVER

## 2021-06-29 NOTE — DISCHARGE SUMMARY
Discharge Summary:     Date of Admission: 2021  Date of Discharge: 21      Admitting diagnosis:    1. Pregnancy @ 37w0d  2. Chronic HTN  3. Hx c/s  4. Polyhydramnios  5. LGA  6. Obesity  7. Hx fatty liver      Discharge Diagnosis:   1. Status post  for repeat.  2. S/p BLS    Pregnancy Complications: chronic HTN  Tubal Ligation:  yes    Past Medical History:   Diagnosis Date   • Anxiety    • Depression    • GERD (gastroesophageal reflux disease)      OB History    Para Term  AB Living   4 3 3   1 3   SAB TAB Ectopic Molar Multiple Live Births   1       0 3      # Outcome Date GA Lbr Uriah/2nd Weight Sex Delivery Anes PTL Lv   4 Term 21 37w0d  3.62 kg (7 lb 15.7 oz) M CS-LTranv Spinal N EARL   3 SAB 2020 5w0d          2 Term 17 37w0d  3.175 kg (7 lb) F CS-LTranv  N EARL      Birth Comments: IOL cholestasis, emergency c/s for fetal intolerance   1 Term 11 41w0d  3.062 kg (6 lb 12 oz) F Vag-Spont  N EARL      Obstetric Comments   Pregnancy planned and desired. FOB involved and supportive.  Pt does not work outside of home.     Past Surgical History:   Procedure Laterality Date   • REPEAT C SECTION  2021    Procedure:  SECTION, REPEAT;  Surgeon: Ronnie Seals M.D.;  Location: [facility];  Service: Labor and Delivery   • ABDOMINAL EXPLORATION     • PRIMARY C SECTION     • TONSILLECTOMY Bilateral      Celexa, Compazine, Latex, Prozac [fluoxetine], and Wellbutrin [bupropion]    Patient Active Problem List   Diagnosis   • Chronic hypertension affecting pregnancy   • History of  delivery   • History of cholestasis during pregnancy   • Tobacco use   • Marijuana use   • Depression   • Anxiety   • Supervision of other high risk pregnancy, antepartum   • Fatty liver   • Macrosomia: AC >99%, EFW 98%   • Morbid obesity (HCC)       Hospital Course:   26 y.o. , now para 3, was admitted with the above mentioned diagnosis, underwent Repeat   repeat,  for repeat. Pt gave birth to baby boy with APGARs of 8/8 and weight 3620g.  Patient's postpartum course was unremarkable, with progressive advancement in diet , ambulation and toleration of oral analgesia. Patient without complaints today and desires discharge.  For postpartum contraception, pt desires tubal ligation (completed).    Physical Exam:  Temp:  [36.3 °C (97.3 °F)-36.7 °C (98.1 °F)] 36.3 °C (97.3 °F)  Pulse:  [86-96] 94  Resp:  [17-20] 18  BP: (122-150)/() 141/95  SpO2:  [96 %-99 %] 99 %  Physical Exam  General: well  Chest/Breasts: nipples intact   Abdomen: nontender, soft  Fundus: firm and below umbilicus  Incision: dressing clean, dry, intact  Perineum: deferred  Extremities: symmetric and no edema, calves nontender    Current Facility-Administered Medications   Medication Dose   • ferrous sulfate tablet 325 mg  325 mg   • oxyCODONE immediate release (ROXICODONE) tablet 10 mg  10 mg   • oxytocin (PITOCIN) infusion (for postpartum)   mL/hr   • miSOPROStol (CYTOTEC) tablet 800 mcg  800 mcg   • PRN oxytocin (PITOCIN) (20 Units/1000 mL) PRN for excessive uterine bleeding - See Admin Instr  125-999 mL/hr   • miSOPROStol (CYTOTEC) tablet 600 mcg  600 mcg   • methylergonovine (METHERGINE) injection 0.2 mg  0.2 mg   • docusate sodium (COLACE) capsule 100 mg  100 mg   • bisacodyl (DULCOLAX) suppository 10 mg  10 mg   • simethicone (MYLICON) chewable tab 80 mg  80 mg   • acetaminophen (TYLENOL) tablet 1,000 mg  1,000 mg   • ondansetron (ZOFRAN) syringe/vial injection 4 mg  4 mg    Or   • ondansetron (ZOFRAN ODT) dispertab 4 mg  4 mg   • NIFEdipine SR (PROCARDIA-XL) tablet 30 mg  30 mg   • labetalol (NORMODYNE) tablet 200 mg  200 mg   • ibuprofen (MOTRIN) tablet 800 mg  800 mg       Recent Labs     21  0433 21  0431   WBC 17.0* 14.8*   RBC 2.68* 2.68*   HEMOGLOBIN 7.6* 7.6*   HEMATOCRIT 23.4* 23.5*   MCV 87.3 87.7   MCH 28.4 28.4   MCHC 32.5* 32.3*   RDW 42.6  43.6   PLATELETCT 246 270   MPV 11.7 11.4       Activity:   Discharge to home  Pelvic Rest x 6 weeks  Call or come to ED for: heavy vaginal bleeding, fever >100.4, severe abdominal pain, severe headache, chest pain, shortness of breath,  N/V, incisional drainage, or other concerns.      Assessment:  Course complicated by somewhat difficult to control pain, pain controlled with medication regimen at discharge.  Started on iron supplementation     Follow up:  [facility] weeks for vaginal delivery; 1 week for incision check for  delivery.      Discharge Instructions:  Pelvic rest x 6 weeks  No heavy lifting until cleared by physician  Return to ED or come to the office for severe headache, shortness of breath, chest pain, heavy vaginal bleeding, incisional drainage, foul smelling vaginal discharge, or fever >100.4     Discharge Meds:   Current Outpatient Medications   Medication Sig Dispense Refill   • ferrous sulfate 325 (65 Fe) MG tablet Take 1 tablet by mouth every morning with breakfast. 30 tablet 0   • docusate sodium 100 MG Cap Take 100 mg by mouth 2 times a day as needed for Constipation. 60 capsule 0   • ibuprofen (MOTRIN) 600 MG Tab Take 1 tablet by mouth every 8 hours. 30 tablet 0   • HYDROcodone-acetaminophen (NORCO) 5-325 MG Tab per tablet Take 1 tablet by mouth every four hours as needed for up to 5 days. 20 tablet 0

## 2021-06-29 NOTE — PROGRESS NOTES
7012- Bedside report received from ROGER Silvestre.  Patient denied needs.  0915- Patient assessment done.  Patient stated that she is voiding without difficulty and passing flatus.  Patient denied dizziness and stated that she is walking without difficulty.  Discussed pain management plan, to include MD orders for scheduled ibuprofen and tylenol.  Reviewed plan of care.  Patient encouraged to read the written patient discharge education/instruction sheet.  1000- Patient is not in the room.  1030- Patient back in the room.  Patient medicated with ibuprofen per MD order.  1200- Labetalol held due to patient not being in the room.  1300- Patient is not in the room.  1400- Patient is back in the room and labetalol given.  Patient's blood pressure = 175/107.  Patient denied headache, blurred vision, epigastric pain, and nausea.  Dr. Edmond notified.  Telephone order received to re-check blood pressure in one hour.  1528- Blood pressure = 141/89, heart rate = 93.  Dr. Edmond notified.  Telephone order received to re-check blood pressure in one hour and notify MD.  1649- Blood pressure = 136/89, heart rate = 93.  Dr. Edmond notified.  Per Dr. Edmond, patient may discharge as previously ordered.  8464- Dr. Edmond at bedside to speak with patient.

## 2021-06-29 NOTE — CARE PLAN
The patient is Stable - Low risk of patient condition declining or worsening    Shift Goals  Clinical Goals: adequate pain control   Patient Goals: pain controlled  Family Goals: bond with infant/NICU visits    Progress made toward(s) clinical / shift goals:  pain adequately controlled with prn and scheduled pain medication.         Problem: Pain - Standard  Goal: Alleviation of pain or a reduction in pain to the patient’s comfort goal  Outcome: Progressing    Patient finds effectiveness in receiving her scheduled pain medications around the clock and taking PRN narcotics when her pain reaches a level of 7/10. She is able to care for herself independently and perform ADLs at her stated pain level, and she is able to sleep comfortably following pain medication administration.

## 2021-06-29 NOTE — LACTATION NOTE
This note was copied from a baby's chart.  Mom is not in room at this time. LC provided two cup diaphragms for pumping per nursing requests. Will follow up when patient returns to room.

## 2021-06-29 NOTE — CARE PLAN
The patient is Stable - Low risk of patient condition declining or worsening    Shift Goals  Clinical Goals: Pt will report adequate pain control  Patient Goals: pain controlled  Family Goals: bond with infant/NICU visits    Progress made toward(s) clinical / shift goals:  MET

## 2021-06-29 NOTE — PROGRESS NOTES
Received report from ROGER Mooney. Infant being care for in the NICU. Pt resting in bed, discussed plan of care and pain management for the night. Pt states she will call staff if she requires pain interventions or assistance through the day. PRN medication administered per MAR. No further needs at this time.      0200- Patient called this RN to assess her dressing. She stated she was itchy and was scratching near her incisional dressing and was worried it was coming off. When assessed, the corner of the dressing did appear to be lifting slightly on the bottom left side but overall the dressing remained intact over the incision and clean and dry. Encouraged the patient not to scratch the edges of the dressing to avoid it from possibly coming off.     0635- Patient stated it felt like her dressing was lifting more on the edge. MD jurado and stated it was okay to reinforce the mepilex with tegaderm as it was only slightly lifting on the left corner and otherwise the dressing looked intact. Dressing reinforced.

## 2021-06-30 PROCEDURE — A9270 NON-COVERED ITEM OR SERVICE: HCPCS | Performed by: OBSTETRICS & GYNECOLOGY

## 2021-06-30 PROCEDURE — 700102 HCHG RX REV CODE 250 W/ 637 OVERRIDE(OP): Performed by: STUDENT IN AN ORGANIZED HEALTH CARE EDUCATION/TRAINING PROGRAM

## 2021-06-30 PROCEDURE — 700102 HCHG RX REV CODE 250 W/ 637 OVERRIDE(OP): Performed by: OBSTETRICS & GYNECOLOGY

## 2021-06-30 PROCEDURE — A9270 NON-COVERED ITEM OR SERVICE: HCPCS | Performed by: STUDENT IN AN ORGANIZED HEALTH CARE EDUCATION/TRAINING PROGRAM

## 2021-06-30 PROCEDURE — 770002 HCHG ROOM/CARE - OB PRIVATE (112)

## 2021-06-30 RX ORDER — NIFEDIPINE 30 MG
30 TABLET, EXTENDED RELEASE ORAL 2 TIMES DAILY
Qty: 60 TABLET | Refills: 0 | Status: SHIPPED | OUTPATIENT
Start: 2021-06-30 | End: 2021-07-01

## 2021-06-30 RX ORDER — LABETALOL 200 MG/1
200 TABLET, FILM COATED ORAL 2 TIMES DAILY
Qty: 60 TABLET | Refills: 0 | Status: SHIPPED | OUTPATIENT
Start: 2021-06-30 | End: 2021-07-01

## 2021-06-30 RX ADMIN — IBUPROFEN 800 MG: 800 TABLET, FILM COATED ORAL at 17:45

## 2021-06-30 RX ADMIN — NIFEDIPINE 30 MG: 30 TABLET, FILM COATED, EXTENDED RELEASE ORAL at 06:25

## 2021-06-30 RX ADMIN — IBUPROFEN 800 MG: 800 TABLET, FILM COATED ORAL at 10:40

## 2021-06-30 RX ADMIN — NIFEDIPINE 30 MG: 30 TABLET, FILM COATED, EXTENDED RELEASE ORAL at 17:45

## 2021-06-30 RX ADMIN — LABETALOL HYDROCHLORIDE 200 MG: 100 TABLET, FILM COATED ORAL at 06:25

## 2021-06-30 RX ADMIN — ACETAMINOPHEN 1000 MG: 500 TABLET ORAL at 06:25

## 2021-06-30 RX ADMIN — ACETAMINOPHEN 1000 MG: 500 TABLET ORAL at 16:52

## 2021-06-30 RX ADMIN — FERROUS SULFATE TAB 325 MG (65 MG ELEMENTAL FE) 325 MG: 325 (65 FE) TAB at 10:40

## 2021-06-30 RX ADMIN — LABETALOL HYDROCHLORIDE 200 MG: 100 TABLET, FILM COATED ORAL at 17:45

## 2021-06-30 RX ADMIN — IBUPROFEN 800 MG: 800 TABLET, FILM COATED ORAL at 01:51

## 2021-06-30 ASSESSMENT — PAIN DESCRIPTION - PAIN TYPE
TYPE: SURGICAL PAIN
TYPE: ACUTE PAIN;SURGICAL PAIN
TYPE: SURGICAL PAIN
TYPE: ACUTE PAIN;SURGICAL PAIN
TYPE: SURGICAL PAIN
TYPE: SURGICAL PAIN

## 2021-06-30 NOTE — PROGRESS NOTES
1814- Notified by PAMELA Ramsay, of patient's vitals signs, heart rate = 108 and blood pressure = 168/127.  This RN spoke with patient.  Patient denied headache, blurred vision, epigastric pain, and nausea.  1825- Dr. Cerda notified of patient's heart rate and blood pressure.  Telephone order received to re-check blood pressure in 15 minutes.  1842- Heart rate = 93, Blood pressure = 128/75 while right side lying.  Patient briefly awake to do blood pressure.  Dr. Cerda notified.  Telephone order for Nifedipine XL 30 mg PO once, now.

## 2021-06-30 NOTE — PROGRESS NOTES
Evaluated patient after BP of 175/107 this afternoon after missed dose of scheduled labetalol. Discussed with patient that we would like to get blood pressure check at the office on Thursday of this week and to continue BID dosing of labetalol upon discharge. RN related that patient is discharging to [facility] and that she does not have any labetalol. Therefore, we will keep patient overnight to ensure 24 hours without severe range pressures. We will plan to print Rx for patient upon discharge.

## 2021-06-30 NOTE — PROGRESS NOTES
Received report from ROGER Hylton. Infant being cared for in the NICU. Pt resting in bed, discussed plan of care and pain management for the night. Pt states she will call staff if she requires pain interventions or assistance through the day. PRN medication administered per MAR. No further needs at this time.

## 2021-06-30 NOTE — PROGRESS NOTES
Postpartum note:      S:  Pt reports feeling well. She denied headache, vision changes. Pain is well-controlled. She is eating and drinking. She is voiding and stooling. She is ambulating without feeling dizzy. She expressed that she knew the importance of taking her blood pressure medications.     Patient Vitals for the past 4 hrs:   BP Temp Temp src Pulse Resp SpO2   21 0600 130/90 36.4 °C (97.6 °F) Temporal 89 19 97 %     Gen: AAO, NAD  Heart: RRR, no murmur  Lungs: CTAB, no wheezing or rales  Abd: soft, nontender, incision clean, dry, intact  Ext: EUCEDA WNL, no lower extremity edema    No orders to display       No new labs or imaging this morning    A/P: 26 y.o. , POD5, s/p C/S for poorly controlled blood pressure and repeat. Patient has been progressing well and is meeting all goals post-op. She has poorly controlled HTN. Yesterday she had two severe range pressures and so was kept another night for blood pressure management. Her current regimen is labetalol 200 mg BID and Procardia XL 30 mg BID. We have provided written scripts for both these medications. She is discharging to [facility]. We will discharge after 24 hours without severe range pressures.     Eric Edmond D.O.

## 2021-06-30 NOTE — CARE PLAN
The patient is Stable - Low risk of patient condition declining or worsening    Shift Goals  Clinical Goals: adequate pain control   Patient Goals: pain controlled  Family Goals: bond with infant/NICU visits    Progress made toward(s) clinical / shift goals:  Pain effectively being controlled with prn and scheduled pain medications.     Patient is not progressing towards the following goals:

## 2021-06-30 NOTE — PROGRESS NOTES
0700 - Report and updates received from Nirmala WILLIAM RN. Patient care assumed. Chart, prenatal labs, and orders reviewed  0745 - Patient not in room at this time.  0930 - Patient not in room at this time.  1000 - Patient returned to room  1040 - Attempted assessment, patient pumping at this time. Patient medicated for pain per MAR order.  1135 - Attempted assessment, patient resting in bed not wanting assessment done at this time.  1325 - Assessment completed.  1400 - Patient not in room, down in NICU  1500 - Patient not in room, down in NICU.  1600 - Patient not in room, down in NICU.  1630 - Patient returned to room.   1725 - Discharge instructions reviewed with patient and signed by patient. Follow up appointments and prescriptions reviewed with and given to patient. All questions addressed at this time. Will medicate patient with 1800 dose of Labetalol, Procardia, and Ibuprofen.   1735 - VS taken for administration of Labetalol and Procardia; BP - 159/103, HR - 92. Will recheck her BP and notify MD.  1813 - Dr. Sullivan notified of patient's high blood pressure. Telephone order received to cancel discharge.   1830 - Patient notified and charge RN notified.

## 2021-06-30 NOTE — DISCHARGE SUMMARY
Discharge Summary:     Date of Admission: 2021  Date of Discharge: 21      Admitting diagnosis:    1. Pregnancy @ 37w0d  2. Chronic HTN  3. Hx c/s  4. Polyhydramnios  5. LGA  6. Obesity  7. Hx fatty liver    Discharge Diagnosis:   1. Status post  for repeat.  2. S/p BLS    Pregnancy Complications: chronic HTN  Tubal Ligation:  yes    Past Medical History:   Diagnosis Date   • Anxiety    • Depression    • GERD (gastroesophageal reflux disease)      OB History    Para Term  AB Living   4 3 3   1 3   SAB TAB Ectopic Molar Multiple Live Births   1       0 3      # Outcome Date GA Lbr Uriah/2nd Weight Sex Delivery Anes PTL Lv   4 Term 21 37w0d  3.62 kg (7 lb 15.7 oz) M CS-LTranv Spinal N EARL   3 SAB 2020 5w0d          2 Term 17 37w0d  3.175 kg (7 lb) F CS-LTranv  N EARL      Birth Comments: IOL cholestasis, emergency c/s for fetal intolerance   1 Term 11 41w0d  3.062 kg (6 lb 12 oz) F Vag-Spont  N EARL      Obstetric Comments   Pregnancy planned and desired. FOB involved and supportive.  Pt does not work outside of home.     Past Surgical History:   Procedure Laterality Date   • REPEAT C SECTION  2021    Procedure:  SECTION, REPEAT;  Surgeon: Ronnie Seals M.D.;  Location: [facility];  Service: Labor and Delivery   • ABDOMINAL EXPLORATION     • PRIMARY C SECTION     • TONSILLECTOMY Bilateral      Celexa, Compazine, Latex, Prozac [fluoxetine], and Wellbutrin [bupropion]    Patient Active Problem List   Diagnosis   • Chronic hypertension affecting pregnancy   • History of  delivery   • History of cholestasis during pregnancy   • Tobacco use   • Marijuana use   • Depression   • Anxiety   • Supervision of other high risk pregnancy, antepartum   • Fatty liver   • Macrosomia: AC >99%, EFW 98%   • Morbid obesity (HCC)       Hospital Course:   26 y.o. , now para 3, was admitted with the above mentioned diagnosis, underwent Repeat   repeat,  for repeat. Pt gave birth to baby boy with APGARs of 8/8 and weight 3620g.  Patient's postpartum course was remarkable for elevated blood pressures in severe range on . Her blood pressure regimen was adjusted to labetalol 200 mg BID and Procardia 30 mg BID. Progressive advancement in diet , ambulation and toleration of oral analgesia. Patient without complaints today and desires discharge.  For postpartum contraception, pt desires tubal ligation (completed).    Physical Exam:  Temp:  [36.3 °C (97.3 °F)-36.8 °C (98.2 °F)] 36.4 °C (97.6 °F)  Pulse:  [] 89  Resp:  [16-19] 19  BP: (128-175)/() 130/90  SpO2:  [97 %-99 %] 97 %  Physical Exam  General: well  Chest/Breasts: nipples intact   Abdomen: nontender, soft  Fundus: firm and below umbilicus  Incision: dressing clean, dry, intact  Perineum: deferred  Extremities: no edema, calves nontender    Current Facility-Administered Medications   Medication Dose   • labetalol (NORMODYNE) tablet 200 mg  200 mg   • NIFEdipine SR (PROCARDIA-XL) tablet 30 mg  30 mg   • ferrous sulfate tablet 325 mg  325 mg   • oxyCODONE immediate release (ROXICODONE) tablet 10 mg  10 mg   • oxytocin (PITOCIN) infusion (for postpartum)   mL/hr   • miSOPROStol (CYTOTEC) tablet 800 mcg  800 mcg   • PRN oxytocin (PITOCIN) (20 Units/1000 mL) PRN for excessive uterine bleeding - See Admin Instr  125-999 mL/hr   • miSOPROStol (CYTOTEC) tablet 600 mcg  600 mcg   • methylergonovine (METHERGINE) injection 0.2 mg  0.2 mg   • docusate sodium (COLACE) capsule 100 mg  100 mg   • bisacodyl (DULCOLAX) suppository 10 mg  10 mg   • simethicone (MYLICON) chewable tab 80 mg  80 mg   • acetaminophen (TYLENOL) tablet 1,000 mg  1,000 mg   • ondansetron (ZOFRAN) syringe/vial injection 4 mg  4 mg    Or   • ondansetron (ZOFRAN ODT) dispertab 4 mg  4 mg   • ibuprofen (MOTRIN) tablet 800 mg  800 mg       Recent Labs     21  0431   WBC 14.8*   RBC 2.68*   HEMOGLOBIN 7.6*    HEMATOCRIT 23.5*   MCV 87.7   MCH 28.4   MCHC 32.3*   RDW 43.6   PLATELETCT 270   MPV 11.4       Activity:   Discharge to home  Pelvic Rest x 6 weeks  Call or come to ED for: heavy vaginal bleeding, fever >100.4, severe abdominal pain, severe headache, chest pain, shortness of breath,  N/V, incisional drainage, or other concerns.      Assessment:  Course complicated by elevated blood pressures in severe range, we will ensure 24 hours of non-severe range blood pressures prior to discharge     Follow up:  [facility] in 5 weeks for vaginal delivery; 1 week for incision check for  delivery.      Discharge Instructions:  Pelvic rest x 6 weeks  No heavy lifting until cleared by physician  Return to ED or come to the office for severe headache, shortness of breath, chest pain, heavy vaginal bleeding, incisional drainage, foul smelling vaginal discharge, or fever >100.4     Discharge Meds:   Current Outpatient Medications   Medication Sig Dispense Refill   • labetalol (NORMODYNE) 200 MG Tab Take 1 tablet by mouth 2 times a day. 60 tablet 0   • NIFEdipine SR (ADALAT CC) 30 MG CR tablet Take 1 tablet by mouth 2 times a day. 60 tablet 0   • ferrous sulfate 325 (65 Fe) MG tablet Take 1 tablet by mouth every morning with breakfast. 30 tablet 0   • docusate sodium 100 MG Cap Take 100 mg by mouth 2 times a day as needed for Constipation. 60 capsule 0   • ibuprofen (MOTRIN) 600 MG Tab Take 1 tablet by mouth every 8 hours. 30 tablet 0   • HYDROcodone-acetaminophen (NORCO) 5-325 MG Tab per tablet Take 1 tablet by mouth every four hours as needed for up to 5 days. 20 tablet 0

## 2021-07-01 ENCOUNTER — PHARMACY VISIT (OUTPATIENT)
Dept: PHARMACY | Facility: MEDICAL CENTER | Age: 26
End: 2021-07-01
Payer: COMMERCIAL

## 2021-07-01 VITALS
SYSTOLIC BLOOD PRESSURE: 130 MMHG | TEMPERATURE: 97.5 F | WEIGHT: 293 LBS | HEIGHT: 65 IN | RESPIRATION RATE: 18 BRPM | BODY MASS INDEX: 48.82 KG/M2 | DIASTOLIC BLOOD PRESSURE: 77 MMHG | HEART RATE: 85 BPM | OXYGEN SATURATION: 96 %

## 2021-07-01 PROCEDURE — 700102 HCHG RX REV CODE 250 W/ 637 OVERRIDE(OP): Performed by: STUDENT IN AN ORGANIZED HEALTH CARE EDUCATION/TRAINING PROGRAM

## 2021-07-01 PROCEDURE — 700102 HCHG RX REV CODE 250 W/ 637 OVERRIDE(OP): Performed by: OBSTETRICS & GYNECOLOGY

## 2021-07-01 PROCEDURE — A9270 NON-COVERED ITEM OR SERVICE: HCPCS | Performed by: STUDENT IN AN ORGANIZED HEALTH CARE EDUCATION/TRAINING PROGRAM

## 2021-07-01 PROCEDURE — RXMED WILLOW AMBULATORY MEDICATION CHARGE: Performed by: STUDENT IN AN ORGANIZED HEALTH CARE EDUCATION/TRAINING PROGRAM

## 2021-07-01 PROCEDURE — A9270 NON-COVERED ITEM OR SERVICE: HCPCS | Performed by: OBSTETRICS & GYNECOLOGY

## 2021-07-01 RX ORDER — HYDROCODONE BITARTRATE AND ACETAMINOPHEN 5; 325 MG/1; MG/1
1 TABLET ORAL EVERY 4 HOURS PRN
Qty: 20 TABLET | Refills: 0 | Status: SHIPPED | OUTPATIENT
Start: 2021-07-01 | End: 2021-07-06

## 2021-07-01 RX ORDER — PSEUDOEPHEDRINE HCL 30 MG
100 TABLET ORAL 2 TIMES DAILY
Qty: 60 CAPSULE | Refills: 0 | Status: SHIPPED | OUTPATIENT
Start: 2021-07-01 | End: 2022-06-23

## 2021-07-01 RX ORDER — NIFEDIPINE 30 MG
30 TABLET, EXTENDED RELEASE ORAL 2 TIMES DAILY
Qty: 120 TABLET | Refills: 0 | Status: SHIPPED | OUTPATIENT
Start: 2021-07-01 | End: 2021-08-30

## 2021-07-01 RX ORDER — FERROUS SULFATE 325(65) MG
325 TABLET ORAL
Qty: 30 TABLET | Refills: 0 | Status: SHIPPED | OUTPATIENT
Start: 2021-07-01 | End: 2021-07-31

## 2021-07-01 RX ORDER — LABETALOL 200 MG/1
200 TABLET, FILM COATED ORAL 2 TIMES DAILY
Qty: 120 TABLET | Refills: 0 | Status: SHIPPED | OUTPATIENT
Start: 2021-07-01 | End: 2021-08-30

## 2021-07-01 RX ADMIN — ACETAMINOPHEN 1000 MG: 500 TABLET ORAL at 01:24

## 2021-07-01 RX ADMIN — IBUPROFEN 800 MG: 800 TABLET, FILM COATED ORAL at 09:42

## 2021-07-01 RX ADMIN — ACETAMINOPHEN 1000 MG: 500 TABLET ORAL at 09:42

## 2021-07-01 RX ADMIN — LABETALOL HYDROCHLORIDE 200 MG: 100 TABLET, FILM COATED ORAL at 06:04

## 2021-07-01 RX ADMIN — DOCUSATE SODIUM 100 MG: 100 CAPSULE, LIQUID FILLED ORAL at 09:42

## 2021-07-01 RX ADMIN — FERROUS SULFATE TAB 325 MG (65 MG ELEMENTAL FE) 325 MG: 325 (65 FE) TAB at 09:41

## 2021-07-01 RX ADMIN — IBUPROFEN 800 MG: 800 TABLET, FILM COATED ORAL at 01:24

## 2021-07-01 RX ADMIN — NIFEDIPINE 30 MG: 30 TABLET, FILM COATED, EXTENDED RELEASE ORAL at 06:04

## 2021-07-01 NOTE — DISCHARGE INSTRUCTIONS
PATIENT DISCHARGE EDUCATION INSTRUCTION SHEET      REASONS TO CALL YOUR OBSTETRICIAN  · Persistent fever, shaking, chills (Temperature higher than 100.4) may indicate you have an infection  · Heavy bleeding: soaking more than 1 pad per hour; Passing clots an egg-sized clot or bigger may mean you have an postpartum hemorrhage  · Foul odor from vagina or bad smelling or discolored discharge or blood  · Breast infection (Mastitis symptoms); breast pain, chills, fever, redness or red streaks, may feel flu like symptoms  · Urinary pain, burning or frequency  · Incision that is not healing, increased redness, swelling, tenderness or pain, or any pus from episiotomy or  site may mean you have an infection  · Redness, swelling, warmth, or painful to touch in the calf area of your leg may mean you have a blood clot  · Severe or intensified depression, thoughts or feelings of wanting to hurt yourself or someone else   · Pain in chest, obstructed breathing or shortness of breath (trouble catching your breath) may mean you are having a postpartum complication. Call your provider immediately   · Headache that does not get better, even after taking medicine, a bad headache with vision changes or pain in the upper right area of your belly may mean you have high blood pressure or post birth preeclampsia. Call your provider immediately    HAND WASHING  All family and friends should wash their hands:  · Before and after holding the baby  · Before feeding the baby  · After using the restroom or changing the baby's diaper    WOUND CARE  Ask your physician for additional care instructions. In general:  ·  Incision:  · May shower and pat incision dry   · Keep the incision clean and dry  · There should not be any opening or pus from the incision  · Continue to walk at home 3 times a day   · Do NOT lift anything heavier than your baby (over 10 pounds)  · Encourage family to help participate in care of the  to allow  "rest and mom time to heal  VAGINAL CARE AND BLEEDING  · Nothing inside vagina for 6 weeks:   · No sexual intercourse, tampons or douching  · Bleeding may continue for 2-4 weeks. Amount and color may vary  · Soaking 1 pad or more in an hour for several hours is considered heavy bleeding  · Passing large egg sized blood clots can be concerning  · If you feel like you have heavy bleeding or are having increasing amount of blood clots call your Obstetrician immediately  · If you begin feeling faint upon standing, feeling sick to your stomach, have clammy skin, a really fast heartbeat, have chills, start feeling confused, dizzy, sleepy or weak, or feeling like you're going to faint call your Obstetrician immediately    HYPERTENSION   Preeclampsia or gestational hypertension are types of high blood pressure that only pregnant women can get. It is important for you to be aware of symptoms to seek early intervention and treatment. If you have any of these symptoms immediately call your Obstetrician    · Vision changes or blurred vision   · Severe headache or pain that is unrelieved with medication and will not go away  · Persistent pain in upper abdomen or shoulder   · Increased swelling of face, feet, or hands  · Difficulty breathing or shortness of breath at rest  · Urinating less than usual    URINATION AND BOWEL MOVEMENTS  · Eating more fiber (bran cereal, fruits, and vegetables) and drinking plenty of fluids will help to avoid constipation  · Urinary frequency and urgency after childbirth is normal  · If you experience any urinary pain, burning or frequency call your provider    BIRTH CONTROL  · It is possible to become pregnant at any time after delivery and while breastfeeding  · Plan to discuss a method of birth control with your physician at your post delivery follow up visit    POSTPARTUM BLUES  During the first few days after birth, you may experience a sense of the \"blues\" which may include impatience, " "irritability or even crying. These feelings come and go quickly. However, as many as 1 in 10 women experience emotional symptoms known as postpartum depression.     POSTPARTUM DEPRESSION  May start as early as the second or third day after delivery or take several weeks or months to develop. Symptoms of \"blues\" are present, but are more intense: Crying spells; loss of appetite; feelings of hopelessness or loss of control; fear of touching the baby; over concern or no concern at all about the baby; little or no concern about your own appearance/caring for yourself; and/or inability to sleep or excessive sleeping. Contact your Obstetrician if you are experiencing any of these symptoms     PREVENTING SHAKEN BABY  If you are angry or stressed, PUT THE BABY IN THE CRIB, step away, take some deep breaths, and wait until you are calm to care for the baby. DO NOT SHAKE THE BABY. You are not alone, call a supporter for help.  · [facility] 24/7 crisis call line (534-261-7333) or (1-280.613.1372)  · You can also text them, text \"ANSWER\" (904377)      "

## 2021-07-01 NOTE — PROGRESS NOTES
Report received from Aubrie DURAN. Pt assessment complete. Pt states pain is tolerable at this time. Pt has scheduled Tylenol and Motrin and will be given as ordered. Pt is using the breast pump and taking expressed milk to NICU when she goes to visit baby. Reviewed POC for this evening with the pt. Pt encouraged to call with needs, concerns, questions. Will continue postpartum care.     0124: pt stated pain at a 7, but declined roxicodone, heat and ice packs at this time. Pt did take Motrin and tyleno; and requested to get some sleep. Pt advised to call if needing stronger pain medicine.

## 2021-07-01 NOTE — DISCHARGE SUMMARY
Latex, Prozac [fluoxetine], and Wellbutrin [bupropion]         Patient Active Problem List   Diagnosis   • Chronic hypertension affecting pregnancy   • History of  delivery   • History of cholestasis during pregnancy   • Tobacco use   • Marijuana use   • Depression   • Anxiety   • Supervision of other high risk pregnancy, antepartum   • Fatty liver   • Macrosomia: AC >99%, EFW 98%   • Morbid obesity (HCC)         Hospital Course:   26 y.o. , now para 3, was admitted with the above mentioned diagnosis, underwent Repeat  repeat,  for repeat. Pt gave birth to baby boy with APGARs of 8/8 and weight 3620g.  Patient's postpartum course was remarkable for elevated blood pressures in severe range on . Her blood pressure regimen was adjusted to labetalol 200 mg BID and Procardia 30 mg BID. Progressive advancement in diet , ambulation and toleration of oral analgesia. Patient without complaints today and desires discharge.  For postpartum contraception, pt desires tubal ligation (completed).     Physical Exam:  Temp:  [36.3 °C (97.3 °F)-36.8 °C (98.2 °F)] 36.4 °C (97.6 °F)  Pulse:  [] 89  Resp:  [16-19] 19  BP: (128-175)/() 130/90  SpO2:  [97 %-99 %] 97 %  Physical Exam  General: well  Chest/Breasts: nipples intact   Abdomen: nontender, soft  Fundus: firm and below umbilicus  Incision: dressing clean, dry, intact  Perineum: deferred  Extremities: no edema, calves nontender          Current Facility-Administered Medications   Medication Dose   • labetalol (NORMODYNE) tablet 200 mg  200 mg   • NIFEdipine SR (PROCARDIA-XL) tablet 30 mg  30 mg   • ferrous sulfate tablet 325 mg  325 mg   • oxyCODONE immediate release (ROXICODONE) tablet 10 mg  10 mg   • oxytocin (PITOCIN) infusion (for postpartum)   mL/hr   • miSOPROStol (CYTOTEC) tablet 800 mcg  800 mcg   • PRN oxytocin (PITOCIN) (20 Units/1000 mL) PRN for excessive uterine bleeding - See Admin Instr  125-999 mL/hr   •  miSOPROStol (CYTOTEC) tablet 600 mcg  600 mcg   • methylergonovine (METHERGINE) injection 0.2 mg  0.2 mg   • docusate sodium (COLACE) capsule 100 mg  100 mg   • bisacodyl (DULCOLAX) suppository 10 mg  10 mg   • simethicone (MYLICON) chewable tab 80 mg  80 mg   • acetaminophen (TYLENOL) tablet 1,000 mg  1,000 mg   • ondansetron (ZOFRAN) syringe/vial injection 4 mg  4 mg     Or   • ondansetron (ZOFRAN ODT) dispertab 4 mg  4 mg   • ibuprofen (MOTRIN) tablet 800 mg  800 mg             Recent Labs     21  0431   WBC 14.8*   RBC 2.68*   HEMOGLOBIN 7.6*   HEMATOCRIT 23.5*   MCV 87.7   MCH 28.4   MCHC 32.3*   RDW 43.6   PLATELETCT 270   MPV 11.4         Activity:   Discharge to home  Pelvic Rest x 6 weeks  Call or come to ED for: heavy vaginal bleeding, fever >100.4, severe abdominal pain, severe headache, chest pain, shortness of breath,  N/V, incisional drainage, or other concerns.        Assessment:  Course complicated by elevated blood pressures in severe range, we will ensure 24 hours of non-severe range blood pressures prior to discharge     Follow up:  [facility] in 5 weeks for vaginal delivery; 1 week for incision check for  delivery.       Discharge Instructions:  Pelvic rest x 6 weeks  No heavy lifting until cleared by physician  Return to ED or come to the office for severe headache, shortness of breath, chest pain, heavy vaginal bleeding, incisional drainage, foul smelling vaginal discharge, or fever >100.4  - home after meds to bed delivers medication      Discharge Meds:          Current Outpatient Medications   Medication Sig Dispense Refill   • labetalol (NORMODYNE) 200 MG Tab Take 1 tablet by mouth 2 times a day. 60 tablet 0   • NIFEdipine SR (ADALAT CC) 30 MG CR tablet Take 1 tablet by mouth 2 times a day. 60 tablet 0   • ferrous sulfate 325 (65 Fe) MG tablet Take 1 tablet by mouth every morning with breakfast. 30 tablet 0   • docusate sodium 100 MG Cap Take 100 mg by mouth 2 times  a day as needed for Constipation. 60 capsule 0   • ibuprofen (MOTRIN) 600 MG Tab Take 1 tablet by mouth every 8 hours. 30 tablet 0   • HYDROcodone-acetaminophen (NORCO) 5-325 MG Tab per tablet Take 1 tablet by mouth every four hours as needed for up to 5 days.

## 2021-07-01 NOTE — DISCHARGE PLANNING
:     LSW met with pt and S/O @ baby's bedside in NICU. Stated that they wanted to make sure they could stay at the [facility]. LSW stated that they were aware of her coming and would contact. LSW called [facility], took my message and would call back but they are very busy. Pt stated she had information on how to call, LSW instructed her to do that and let me know if she wasn't able to hear back.     MASOUD Paris

## 2021-07-01 NOTE — DISCHARGE PLANNING
Meds-to-Beds: Discharge prescription orders listed below delivered to patient's bedside. ROGER Armstrong notified. Patient counseled. Patient presented valid photo ID.     Cristobal, April   Home Medication Instructions CON:[id number]    Printed on:07/01/21 1000   Medication Information                      docusate sodium 100 MG Cap  Take 1 capsule by mouth 2 times a day.             ferrous sulfate 325 (65 Fe) MG tablet  Take 1 tablet by mouth every morning with breakfast for 30 days.             HYDROcodone-acetaminophen (NORCO) 5-325 MG Tab per tablet  Take 1 tablet by mouth every four hours as needed for up to 5 days.             labetalol (NORMODYNE) 200 MG Tab  Take 1 tablet by mouth 2 times a day for 60 days.             NIFEdipine (ADALAT CC) 30 MG CR tablet  Take 1 tablet by mouth 2 times a day for 60 days.               Analy Escobar, PharmD

## 2021-07-01 NOTE — LACTATION NOTE
Attempted to meet with MOB.  MOB not in room.  Will attempt to meet with MOB when she is back on the unit.    1000- Observed MOB in hallway close to the elevators.  Attempted to speak with her briefly as she was discharging home. MOB stated she has an appointment with Lactation today down in the NICU and will speak to this LC then.  MOB then proceeded to get on the elevator with girlfriend and belongings in hand.

## 2021-07-01 NOTE — CARE PLAN
The patient is Stable - Low risk of patient condition declining or worsening    Shift Goals  Clinical Goals: maintain VS within parameters  Patient Goals: pain controlled  Family Goals: bond with infant/NICU visits    Progress made toward(s) clinical / shift goals:  pt has been able to maintain blood pressure within parameters set by provider. Pt stated no c/o headache, blurred vision, RUQ pain.     Patient is not progressing towards the following goals:

## 2021-07-01 NOTE — CARE PLAN
The patient is Stable - Low risk of patient condition declining or worsening    Shift Goals  Clinical Goals: blood pressures will be WDL  Patient Goals: pain controlled  Family Goals: bond with infant/NICU visits    Progress made toward(s) clinical / shift goals:  Patient's blood pressures have been out of severe range.    Patient is not progressing towards the following goals: N/A

## 2021-07-01 NOTE — CARE PLAN
The patient is Stable - Low risk of patient condition declining or worsening    Shift Goals  Clinical Goals: clinically stable  Patient Goals: pain controlled  Family Goals: bond with infant/NICU visits    Progress made toward(s) clinical / shift goals:  Clinically stable    Patient is not progressing towards the following goals:

## 2022-06-23 ENCOUNTER — OFFICE VISIT (OUTPATIENT)
Dept: URGENT CARE | Facility: PHYSICIAN GROUP | Age: 27
End: 2022-06-23
Payer: MEDICAID

## 2022-06-23 ENCOUNTER — HOSPITAL ENCOUNTER (OUTPATIENT)
Facility: MEDICAL CENTER | Age: 27
End: 2022-06-23
Attending: NURSE PRACTITIONER
Payer: MEDICAID

## 2022-06-23 VITALS
BODY MASS INDEX: 44.41 KG/M2 | WEIGHT: 293 LBS | TEMPERATURE: 98.5 F | HEART RATE: 90 BPM | HEIGHT: 68 IN | SYSTOLIC BLOOD PRESSURE: 130 MMHG | DIASTOLIC BLOOD PRESSURE: 84 MMHG | RESPIRATION RATE: 16 BRPM | OXYGEN SATURATION: 94 %

## 2022-06-23 DIAGNOSIS — R30.9 PAINFUL URINATION: ICD-10-CM

## 2022-06-23 DIAGNOSIS — N30.00 ACUTE CYSTITIS WITHOUT HEMATURIA: ICD-10-CM

## 2022-06-23 DIAGNOSIS — H10.32 ACUTE BACTERIAL CONJUNCTIVITIS OF LEFT EYE: ICD-10-CM

## 2022-06-23 LAB
APPEARANCE UR: CLEAR
BILIRUB UR STRIP-MCNC: NEGATIVE MG/DL
COLOR UR AUTO: YELLOW
GLUCOSE UR STRIP.AUTO-MCNC: NEGATIVE MG/DL
KETONES UR STRIP.AUTO-MCNC: NEGATIVE MG/DL
LEUKOCYTE ESTERASE UR QL STRIP.AUTO: NEGATIVE
NITRITE UR QL STRIP.AUTO: NEGATIVE
PH UR STRIP.AUTO: 6 [PH] (ref 5–8)
PROT UR QL STRIP: NEGATIVE MG/DL
RBC UR QL AUTO: NEGATIVE
SP GR UR STRIP.AUTO: 1.01
UROBILINOGEN UR STRIP-MCNC: 0.2 MG/DL

## 2022-06-23 PROCEDURE — 87086 URINE CULTURE/COLONY COUNT: CPT

## 2022-06-23 PROCEDURE — 81002 URINALYSIS NONAUTO W/O SCOPE: CPT | Performed by: NURSE PRACTITIONER

## 2022-06-23 PROCEDURE — 99204 OFFICE O/P NEW MOD 45 MIN: CPT | Mod: 25 | Performed by: NURSE PRACTITIONER

## 2022-06-23 RX ORDER — PSEUDOEPHED/ACETAMINOPH/DIPHEN 30MG-500MG
TABLET ORAL
COMMUNITY
Start: 2022-06-02 | End: 2022-06-23

## 2022-06-23 RX ORDER — ONDANSETRON 2 MG/ML
INJECTION INTRAMUSCULAR; INTRAVENOUS
COMMUNITY
Start: 2022-06-02 | End: 2022-06-23

## 2022-06-23 RX ORDER — SODIUM CHLORIDE 9 MG/ML
INJECTION, SOLUTION INTRAVENOUS
COMMUNITY
Start: 2022-06-02 | End: 2022-06-23

## 2022-06-23 RX ORDER — KETOROLAC TROMETHAMINE 30 MG/ML
INJECTION, SOLUTION INTRAMUSCULAR; INTRAVENOUS
COMMUNITY
Start: 2022-06-02 | End: 2022-06-23

## 2022-06-23 RX ORDER — MAGNESIUM SULFATE HEPTAHYDRATE 40 MG/ML
INJECTION, SOLUTION INTRAVENOUS
COMMUNITY
Start: 2022-06-02 | End: 2022-06-23

## 2022-06-23 RX ORDER — NITROFURANTOIN 25; 75 MG/1; MG/1
100 CAPSULE ORAL EVERY 12 HOURS
Qty: 10 CAPSULE | Refills: 0 | Status: SHIPPED | OUTPATIENT
Start: 2022-06-23 | End: 2022-06-28

## 2022-06-23 RX ORDER — POLYMYXIN B SULFATE AND TRIMETHOPRIM 1; 10000 MG/ML; [USP'U]/ML
1 SOLUTION OPHTHALMIC EVERY 4 HOURS
Qty: 10 ML | Refills: 0 | Status: SHIPPED | OUTPATIENT
Start: 2022-06-23 | End: 2022-06-30

## 2022-06-23 RX ORDER — ONDANSETRON 4 MG/1
TABLET, ORALLY DISINTEGRATING ORAL
COMMUNITY
Start: 2022-06-02 | End: 2022-06-23

## 2022-06-23 ASSESSMENT — ENCOUNTER SYMPTOMS
EYE DISCHARGE: 1
EYE REDNESS: 1
EYE PAIN: 0
FEVER: 0
BLURRED VISION: 0
FLANK PAIN: 0
PHOTOPHOBIA: 0
CHILLS: 0
DOUBLE VISION: 0

## 2022-06-23 ASSESSMENT — FIBROSIS 4 INDEX: FIB4 SCORE: 0.29

## 2022-06-23 NOTE — LETTER
June 23, 2022         Patient: Sintia Jain   YOB: 1995   Date of Visit: 6/23/2022           To Whom it May Concern:    Sintia Jain was seen in my clinic on 6/23/2022. She may return to work on 6/25/22 or after as soon as symptoms improve and she has been taking antibiotic eye drops for at least 24 hours.  Please excuse her absence.    If you have any questions or concerns, please don't hesitate to call.        Sincerely,           YVON Odell.  Electronically Signed

## 2022-06-23 NOTE — PROGRESS NOTES
"Subjective     Sintia Jain is a 27 y.o. female who presents with Conjunctivitis (Left eye x1 day), Flank Pain (Right side), and Painful Urination (X 1 day)            April comes in today with a 1 day history of dysuria and suprapubic discomfort that radiates to the right lower abdomen.  No fever, chills, myalgias, nausea/vomiting, or flank pain.  No hematuria.  No history of kidney stones or pyelonephritis.    As a second concern she reports that she was at the pool yesterday and got water in her eyes.  She noted new onset gritty sensation in the left eye and woke with left conjunctival redness and matting purulence. Denies any orbital trauma or foreign body.    Not taking any meds to treat the symptoms.        Review of Systems   Constitutional: Negative for chills, fever and malaise/fatigue.   Eyes: Positive for discharge and redness. Negative for blurred vision, double vision, photophobia and pain.   Genitourinary: Positive for dysuria, frequency and urgency. Negative for flank pain and hematuria.     Medications, Allergies, and current problem list reviewed today in Epic         Objective     Blood Pressure 130/84   Pulse 90   Temperature 36.9 °C (98.5 °F) (Temporal)   Respiration 16   Height 1.727 m (5' 8\")   Weight (Abnormal) 156 kg (343 lb)   Oxygen Saturation 94%   Body Mass Index 52.15 kg/m²      Physical Exam  Vitals reviewed.   Constitutional:       General: She is not in acute distress.     Appearance: Normal appearance. She is well-developed. She is obese. She is not ill-appearing, toxic-appearing or diaphoretic.   HENT:      Head: Normocephalic.   Eyes:      General: No scleral icterus.        Right eye: No discharge.         Left eye: Discharge present.     Extraocular Movements: Extraocular movements intact.      Pupils: Pupils are equal, round, and reactive to light.      Comments: Mild left conjunctival injection with scant matting purulence at inner canthus.  Trace swelling of the upper " and lower left eyelids.  No bruising, rash or lesion.  No evidence of trauma or foreign debris.     Neck:      Thyroid: No thyromegaly.      Vascular: No JVD.      Trachea: No tracheal deviation.   Cardiovascular:      Rate and Rhythm: Normal rate and regular rhythm.      Heart sounds: Normal heart sounds. No murmur heard.    No friction rub. No gallop.   Pulmonary:      Effort: Pulmonary effort is normal. No respiratory distress.      Breath sounds: Normal breath sounds. No stridor. No wheezing, rhonchi or rales.   Chest:      Chest wall: No tenderness.   Abdominal:      Tenderness: There is abdominal tenderness in the suprapubic area. There is no right CVA tenderness, left CVA tenderness or guarding.   Skin:     General: Skin is warm and dry.      Coloration: Skin is not jaundiced or pale.   Neurological:      Mental Status: She is alert and oriented to person, place, and time.                POCT Urinalysis  Order: [id number]   Status: Final result     Visible to patient: No (scheduled for 6/24/2022  2:06 PM)     Next appt: None     Dx: Painful urination     0 Result Notes    Component Ref Range & Units  4:05 PM   (6/23/22) 12 mo ago   (6/25/21) 1 yr ago   (1/5/21) 1 yr ago   (1/5/21)   POC Color Negative yellow  Orange Abnormal  R   R   R    POC Appearance Negative clear  Cloudy Abnormal  R   R   R    POC Leukocyte Esterase Negative negative  Small Abnormal   Negative   R    POC Nitrites Negative negative  Negative  Negative   R    POC Urobiligen Negative (0.2) mg/dL 0.2    R   R    POC Protein Negative mg/dL negative  100 Abnormal   Negative   R    POC Urine PH 5.0 - 8.0 6.0  6.0  6.0   R    POC Blood Negative negative  Moderate Abnormal   Negative   R    POC Specific Gravity <1.005 - >1.030 1.015  1.025 R  1.025   R    POC Ketones Negative mg/dL negative  Trace Abnormal   Negative   R    POC Bilirubin Negative mg/dL negative    R   R    POC Glucose Negative mg/dL negative  Negative  Negative   R    Resulting  [facility]                Specimen Collected: 06/23/22  4:05 PM Last Resulted: 06/23/22  4:06 PM                        Assessment & Plan        1. Acute bacterial conjunctivitis of left eye  - polymixin-trimethoprim (POLYTRIM) 93060-8.1 UNIT/ML-% Solution; Administer 1 Drop into the left eye every 4 hours for 7 days.  Dispense: 10 mL; Refill: 0    Discussed with April that conjunctivitis can be bacterial, viral, or allergic/irritant.  Will treat presuming a bacterial infection.  Advised that it is highly contagious and avoid touching face and eyes.  Clean any crusting or matting from eyes with a warm cloth and launder cloth before reuse.  RTC in 2-3 days if symptoms do not improve, sooner if worse.    2. Acute cystitis without hematuria  - Urine Culture; Future  - nitrofurantoin (MACROBID) 100 MG Cap; Take 1 Capsule by mouth every 12 hours for 5 days.  Dispense: 10 Capsule; Refill: 0  3. Painful urination  - POCT Urinalysis    Discussed exam findings with April. During visit, UA machine was not functioning and UA results were not available.  Treated for presumptive UTI.  Differential reviewed.  After patient departed the clinic and treatment had been ordered, UA was successfully performed; unremarkable.  If culture is negative as well, will instruct patient to discontinue antibiotics and follow up for further evaluation and care for any persistent symptoms.  Maintain adequate po hydration.  To ER if pain worsens, or if fever or vomiting develop.  RTC in 5 days if symptoms persist, sooner if worse.    She verbalized understanding of and agreed with plan of care.

## 2022-06-24 DIAGNOSIS — N30.00 ACUTE CYSTITIS WITHOUT HEMATURIA: ICD-10-CM

## 2022-06-27 LAB
BACTERIA UR CULT: NORMAL
SIGNIFICANT IND 70042: NORMAL
SITE SITE: NORMAL
SOURCE SOURCE: NORMAL

## 2022-10-21 ENCOUNTER — APPOINTMENT (OUTPATIENT)
Dept: URGENT CARE | Facility: PHYSICIAN GROUP | Age: 27
End: 2022-10-21
Payer: MEDICAID

## 2022-11-30 ENCOUNTER — PRE-ADMISSION TESTING (OUTPATIENT)
Dept: ADMISSIONS | Facility: MEDICAL CENTER | Age: 27
DRG: 621 | End: 2022-11-30
Attending: SURGERY
Payer: MEDICAID

## 2022-11-30 RX ORDER — PANTOPRAZOLE SODIUM 20 MG/1
20 TABLET, DELAYED RELEASE ORAL
Status: ON HOLD | COMMUNITY
End: 2023-06-06

## 2022-11-30 RX ORDER — LISINOPRIL 20 MG/1
20 TABLET ORAL
COMMUNITY
Start: 2022-10-18 | End: 2023-03-06

## 2022-11-30 NOTE — OR NURSING
Patient lives in [place], plans to complete pre-op lab work 12/1/2022 at [facility] in [place]. Pre-op orders are in chart. Noted on surgery checklist, EKG per anesthesia protocol to be completed day of procedure.

## 2022-12-02 ENCOUNTER — HOSPITAL ENCOUNTER (OUTPATIENT)
Dept: LAB | Facility: MEDICAL CENTER | Age: 27
End: 2022-12-02
Attending: SURGERY
Payer: MEDICAID

## 2022-12-02 LAB
INR PPP: 0.93 (ref 0.87–1.13)
PROTHROMBIN TIME: 12.4 SEC (ref 12–14.6)

## 2022-12-02 PROCEDURE — 80053 COMPREHEN METABOLIC PANEL: CPT

## 2022-12-02 PROCEDURE — 36415 COLL VENOUS BLD VENIPUNCTURE: CPT

## 2022-12-02 PROCEDURE — 85025 COMPLETE CBC W/AUTO DIFF WBC: CPT

## 2022-12-02 PROCEDURE — 83036 HEMOGLOBIN GLYCOSYLATED A1C: CPT

## 2022-12-02 PROCEDURE — 85610 PROTHROMBIN TIME: CPT

## 2022-12-03 LAB
ALBUMIN SERPL BCP-MCNC: 4 G/DL (ref 3.2–4.9)
ALBUMIN/GLOB SERPL: 1.4 G/DL
ALP SERPL-CCNC: 89 U/L (ref 30–99)
ALT SERPL-CCNC: 85 U/L (ref 2–50)
ANION GAP SERPL CALC-SCNC: 10 MMOL/L (ref 7–16)
AST SERPL-CCNC: 42 U/L (ref 12–45)
BASOPHILS # BLD AUTO: 0.5 % (ref 0–1.8)
BASOPHILS # BLD: 0.04 K/UL (ref 0–0.12)
BILIRUB SERPL-MCNC: 0.6 MG/DL (ref 0.1–1.5)
BUN SERPL-MCNC: 9 MG/DL (ref 8–22)
CALCIUM SERPL-MCNC: 9.3 MG/DL (ref 8.5–10.5)
CHLORIDE SERPL-SCNC: 108 MMOL/L (ref 96–112)
CO2 SERPL-SCNC: 23 MMOL/L (ref 20–33)
CREAT SERPL-MCNC: 0.66 MG/DL (ref 0.5–1.4)
EOSINOPHIL # BLD AUTO: 0.08 K/UL (ref 0–0.51)
EOSINOPHIL NFR BLD: 0.9 % (ref 0–6.9)
ERYTHROCYTE [DISTWIDTH] IN BLOOD BY AUTOMATED COUNT: 44.5 FL (ref 35.9–50)
EST. AVERAGE GLUCOSE BLD GHB EST-MCNC: 117 MG/DL
GFR SERPLBLD CREATININE-BSD FMLA CKD-EPI: 123 ML/MIN/1.73 M 2
GLOBULIN SER CALC-MCNC: 2.8 G/DL (ref 1.9–3.5)
GLUCOSE SERPL-MCNC: 80 MG/DL (ref 65–99)
HBA1C MFR BLD: 5.7 % (ref 4–5.6)
HCT VFR BLD AUTO: 38.3 % (ref 37–47)
HGB BLD-MCNC: 12.1 G/DL (ref 12–16)
IMM GRANULOCYTES # BLD AUTO: 0.03 K/UL (ref 0–0.11)
IMM GRANULOCYTES NFR BLD AUTO: 0.4 % (ref 0–0.9)
LYMPHOCYTES # BLD AUTO: 2.73 K/UL (ref 1–4.8)
LYMPHOCYTES NFR BLD: 31.9 % (ref 22–41)
MCH RBC QN AUTO: 25.1 PG (ref 27–33)
MCHC RBC AUTO-ENTMCNC: 31.6 G/DL (ref 33.6–35)
MCV RBC AUTO: 79.5 FL (ref 81.4–97.8)
MONOCYTES # BLD AUTO: 0.44 K/UL (ref 0–0.85)
MONOCYTES NFR BLD AUTO: 5.1 % (ref 0–13.4)
NEUTROPHILS # BLD AUTO: 5.23 K/UL (ref 2–7.15)
NEUTROPHILS NFR BLD: 61.2 % (ref 44–72)
NRBC # BLD AUTO: 0 K/UL
NRBC BLD-RTO: 0 /100 WBC
PLATELET # BLD AUTO: 270 K/UL (ref 164–446)
PMV BLD AUTO: 11.2 FL (ref 9–12.9)
POTASSIUM SERPL-SCNC: 4.6 MMOL/L (ref 3.6–5.5)
PROT SERPL-MCNC: 6.8 G/DL (ref 6–8.2)
RBC # BLD AUTO: 4.82 M/UL (ref 4.2–5.4)
SODIUM SERPL-SCNC: 141 MMOL/L (ref 135–145)
WBC # BLD AUTO: 8.6 K/UL (ref 4.8–10.8)

## 2022-12-08 ENCOUNTER — ANESTHESIA EVENT (OUTPATIENT)
Dept: SURGERY | Facility: MEDICAL CENTER | Age: 27
DRG: 621 | End: 2022-12-08
Payer: MEDICAID

## 2022-12-09 ENCOUNTER — HOSPITAL ENCOUNTER (INPATIENT)
Facility: MEDICAL CENTER | Age: 27
LOS: 1 days | DRG: 621 | End: 2022-12-10
Attending: SURGERY | Admitting: SURGERY
Payer: MEDICAID

## 2022-12-09 ENCOUNTER — ANESTHESIA (OUTPATIENT)
Dept: SURGERY | Facility: MEDICAL CENTER | Age: 27
DRG: 621 | End: 2022-12-09
Payer: MEDICAID

## 2022-12-09 LAB
GLUCOSE BLD STRIP.AUTO-MCNC: 139 MG/DL (ref 65–99)
HCG UR QL: NEGATIVE

## 2022-12-09 PROCEDURE — 160009 HCHG ANES TIME/MIN: Performed by: SURGERY

## 2022-12-09 PROCEDURE — 160031 HCHG SURGERY MINUTES - 1ST 30 MINS LEVEL 5: Performed by: SURGERY

## 2022-12-09 PROCEDURE — 700102 HCHG RX REV CODE 250 W/ 637 OVERRIDE(OP): Performed by: ANESTHESIOLOGY

## 2022-12-09 PROCEDURE — 700102 HCHG RX REV CODE 250 W/ 637 OVERRIDE(OP): Performed by: SURGERY

## 2022-12-09 PROCEDURE — 00797 ANES IPER UPR ABD GSTR PX MO: CPT | Performed by: ANESTHESIOLOGY

## 2022-12-09 PROCEDURE — 700111 HCHG RX REV CODE 636 W/ 250 OVERRIDE (IP): Performed by: ANESTHESIOLOGY

## 2022-12-09 PROCEDURE — 160002 HCHG RECOVERY MINUTES (STAT): Performed by: SURGERY

## 2022-12-09 PROCEDURE — 700101 HCHG RX REV CODE 250: Performed by: SURGERY

## 2022-12-09 PROCEDURE — 502714 HCHG ROBOTIC SURGERY SERVICES: Performed by: SURGERY

## 2022-12-09 PROCEDURE — C1729 CATH, DRAINAGE: HCPCS | Performed by: SURGERY

## 2022-12-09 PROCEDURE — 81025 URINE PREGNANCY TEST: CPT

## 2022-12-09 PROCEDURE — C9113 INJ PANTOPRAZOLE SODIUM, VIA: HCPCS | Performed by: SURGERY

## 2022-12-09 PROCEDURE — 0BQT4ZZ REPAIR DIAPHRAGM, PERCUTANEOUS ENDOSCOPIC APPROACH: ICD-10-PCS | Performed by: SURGERY

## 2022-12-09 PROCEDURE — 0D164ZA BYPASS STOMACH TO JEJUNUM, PERCUTANEOUS ENDOSCOPIC APPROACH: ICD-10-PCS | Performed by: SURGERY

## 2022-12-09 PROCEDURE — 8E0W4CZ ROBOTIC ASSISTED PROCEDURE OF TRUNK REGION, PERCUTANEOUS ENDOSCOPIC APPROACH: ICD-10-PCS | Performed by: SURGERY

## 2022-12-09 PROCEDURE — 160048 HCHG OR STATISTICAL LEVEL 1-5: Performed by: SURGERY

## 2022-12-09 PROCEDURE — 700111 HCHG RX REV CODE 636 W/ 250 OVERRIDE (IP): Performed by: SURGERY

## 2022-12-09 PROCEDURE — 110371 HCHG SHELL REV 272: Performed by: SURGERY

## 2022-12-09 PROCEDURE — A9270 NON-COVERED ITEM OR SERVICE: HCPCS | Performed by: ANESTHESIOLOGY

## 2022-12-09 PROCEDURE — 160042 HCHG SURGERY MINUTES - EA ADDL 1 MIN LEVEL 5: Performed by: SURGERY

## 2022-12-09 PROCEDURE — 82962 GLUCOSE BLOOD TEST: CPT

## 2022-12-09 PROCEDURE — 700101 HCHG RX REV CODE 250: Performed by: ANESTHESIOLOGY

## 2022-12-09 PROCEDURE — 700105 HCHG RX REV CODE 258: Performed by: SURGERY

## 2022-12-09 PROCEDURE — 770001 HCHG ROOM/CARE - MED/SURG/GYN PRIV*

## 2022-12-09 PROCEDURE — 160035 HCHG PACU - 1ST 60 MINS PHASE I: Performed by: SURGERY

## 2022-12-09 PROCEDURE — A9270 NON-COVERED ITEM OR SERVICE: HCPCS | Performed by: SURGERY

## 2022-12-09 PROCEDURE — 700105 HCHG RX REV CODE 258: Performed by: ANESTHESIOLOGY

## 2022-12-09 RX ORDER — GABAPENTIN 300 MG/1
300 CAPSULE ORAL ONCE
Status: COMPLETED | OUTPATIENT
Start: 2022-12-09 | End: 2022-12-09

## 2022-12-09 RX ORDER — SUCCINYLCHOLINE CHLORIDE 20 MG/ML
INJECTION INTRAMUSCULAR; INTRAVENOUS PRN
Status: DISCONTINUED | OUTPATIENT
Start: 2022-12-09 | End: 2022-12-09 | Stop reason: SURG

## 2022-12-09 RX ORDER — MIDAZOLAM HYDROCHLORIDE 1 MG/ML
INJECTION INTRAMUSCULAR; INTRAVENOUS PRN
Status: DISCONTINUED | OUTPATIENT
Start: 2022-12-09 | End: 2022-12-09 | Stop reason: SURG

## 2022-12-09 RX ORDER — HALOPERIDOL 5 MG/ML
1 INJECTION INTRAMUSCULAR
Status: DISCONTINUED | OUTPATIENT
Start: 2022-12-09 | End: 2022-12-09 | Stop reason: HOSPADM

## 2022-12-09 RX ORDER — ACETAMINOPHEN 500 MG
1000 TABLET ORAL EVERY 6 HOURS
Status: DISCONTINUED | OUTPATIENT
Start: 2022-12-09 | End: 2022-12-10 | Stop reason: HOSPADM

## 2022-12-09 RX ORDER — SCOLOPAMINE TRANSDERMAL SYSTEM 1 MG/1
1 PATCH, EXTENDED RELEASE TRANSDERMAL ONCE
Status: DISCONTINUED | OUTPATIENT
Start: 2022-12-09 | End: 2022-12-09 | Stop reason: HOSPADM

## 2022-12-09 RX ORDER — CEFAZOLIN SODIUM 1 G/3ML
INJECTION, POWDER, FOR SOLUTION INTRAMUSCULAR; INTRAVENOUS PRN
Status: DISCONTINUED | OUTPATIENT
Start: 2022-12-09 | End: 2022-12-09 | Stop reason: SURG

## 2022-12-09 RX ORDER — ONDANSETRON 2 MG/ML
4 INJECTION INTRAMUSCULAR; INTRAVENOUS
Status: COMPLETED | OUTPATIENT
Start: 2022-12-09 | End: 2022-12-09

## 2022-12-09 RX ORDER — HYDROMORPHONE HYDROCHLORIDE 2 MG/ML
INJECTION, SOLUTION INTRAMUSCULAR; INTRAVENOUS; SUBCUTANEOUS PRN
Status: DISCONTINUED | OUTPATIENT
Start: 2022-12-09 | End: 2022-12-09 | Stop reason: SURG

## 2022-12-09 RX ORDER — GABAPENTIN 250 MG/5ML
300 SOLUTION ORAL 3 TIMES DAILY
Status: DISCONTINUED | OUTPATIENT
Start: 2022-12-09 | End: 2022-12-10 | Stop reason: HOSPADM

## 2022-12-09 RX ORDER — HYDROMORPHONE HYDROCHLORIDE 1 MG/ML
0.2 INJECTION, SOLUTION INTRAMUSCULAR; INTRAVENOUS; SUBCUTANEOUS
Status: DISCONTINUED | OUTPATIENT
Start: 2022-12-09 | End: 2022-12-09 | Stop reason: HOSPADM

## 2022-12-09 RX ORDER — IPRATROPIUM BROMIDE AND ALBUTEROL SULFATE 2.5; .5 MG/3ML; MG/3ML
3 SOLUTION RESPIRATORY (INHALATION)
Status: DISCONTINUED | OUTPATIENT
Start: 2022-12-09 | End: 2022-12-09 | Stop reason: HOSPADM

## 2022-12-09 RX ORDER — ONDANSETRON 2 MG/ML
INJECTION INTRAMUSCULAR; INTRAVENOUS PRN
Status: DISCONTINUED | OUTPATIENT
Start: 2022-12-09 | End: 2022-12-09 | Stop reason: SURG

## 2022-12-09 RX ORDER — HYDROMORPHONE HYDROCHLORIDE 1 MG/ML
0.1 INJECTION, SOLUTION INTRAMUSCULAR; INTRAVENOUS; SUBCUTANEOUS
Status: DISCONTINUED | OUTPATIENT
Start: 2022-12-09 | End: 2022-12-09 | Stop reason: HOSPADM

## 2022-12-09 RX ORDER — HYDROMORPHONE HYDROCHLORIDE 1 MG/ML
0.4 INJECTION, SOLUTION INTRAMUSCULAR; INTRAVENOUS; SUBCUTANEOUS
Status: DISCONTINUED | OUTPATIENT
Start: 2022-12-09 | End: 2022-12-09 | Stop reason: HOSPADM

## 2022-12-09 RX ORDER — SODIUM CHLORIDE, SODIUM LACTATE, POTASSIUM CHLORIDE, CALCIUM CHLORIDE 600; 310; 30; 20 MG/100ML; MG/100ML; MG/100ML; MG/100ML
INJECTION, SOLUTION INTRAVENOUS
Status: DISCONTINUED | OUTPATIENT
Start: 2022-12-09 | End: 2022-12-09 | Stop reason: SURG

## 2022-12-09 RX ORDER — LABETALOL HYDROCHLORIDE 5 MG/ML
5 INJECTION, SOLUTION INTRAVENOUS
Status: DISCONTINUED | OUTPATIENT
Start: 2022-12-09 | End: 2022-12-09 | Stop reason: HOSPADM

## 2022-12-09 RX ORDER — PANTOPRAZOLE SODIUM 40 MG/10ML
40 INJECTION, POWDER, LYOPHILIZED, FOR SOLUTION INTRAVENOUS 2 TIMES DAILY
Status: DISCONTINUED | OUTPATIENT
Start: 2022-12-09 | End: 2022-12-10 | Stop reason: HOSPADM

## 2022-12-09 RX ORDER — SODIUM CHLORIDE, SODIUM LACTATE, POTASSIUM CHLORIDE, CALCIUM CHLORIDE 600; 310; 30; 20 MG/100ML; MG/100ML; MG/100ML; MG/100ML
INJECTION, SOLUTION INTRAVENOUS CONTINUOUS
Status: DISCONTINUED | OUTPATIENT
Start: 2022-12-09 | End: 2022-12-10 | Stop reason: HOSPADM

## 2022-12-09 RX ORDER — KETOROLAC TROMETHAMINE 30 MG/ML
30 INJECTION, SOLUTION INTRAMUSCULAR; INTRAVENOUS EVERY 6 HOURS
Status: DISCONTINUED | OUTPATIENT
Start: 2022-12-09 | End: 2022-12-10 | Stop reason: HOSPADM

## 2022-12-09 RX ORDER — ONDANSETRON 2 MG/ML
4 INJECTION INTRAMUSCULAR; INTRAVENOUS EVERY 6 HOURS
Status: DISCONTINUED | OUTPATIENT
Start: 2022-12-09 | End: 2022-12-10 | Stop reason: HOSPADM

## 2022-12-09 RX ORDER — ACETAMINOPHEN 10 MG/ML
1 INJECTION, SOLUTION INTRAVENOUS ONCE
Status: COMPLETED | OUTPATIENT
Start: 2022-12-09 | End: 2022-12-09

## 2022-12-09 RX ORDER — MAGNESIUM SULFATE HEPTAHYDRATE 40 MG/ML
INJECTION, SOLUTION INTRAVENOUS PRN
Status: DISCONTINUED | OUTPATIENT
Start: 2022-12-09 | End: 2022-12-09 | Stop reason: SURG

## 2022-12-09 RX ORDER — BUPIVACAINE HYDROCHLORIDE AND EPINEPHRINE 5; 5 MG/ML; UG/ML
INJECTION, SOLUTION EPIDURAL; INTRACAUDAL; PERINEURAL
Status: DISCONTINUED | OUTPATIENT
Start: 2022-12-09 | End: 2022-12-09 | Stop reason: HOSPADM

## 2022-12-09 RX ORDER — LIDOCAINE HYDROCHLORIDE 20 MG/ML
INJECTION, SOLUTION EPIDURAL; INFILTRATION; INTRACAUDAL; PERINEURAL PRN
Status: DISCONTINUED | OUTPATIENT
Start: 2022-12-09 | End: 2022-12-09 | Stop reason: SURG

## 2022-12-09 RX ORDER — DIPHENHYDRAMINE HYDROCHLORIDE 50 MG/ML
12.5 INJECTION INTRAMUSCULAR; INTRAVENOUS
Status: DISCONTINUED | OUTPATIENT
Start: 2022-12-09 | End: 2022-12-09 | Stop reason: HOSPADM

## 2022-12-09 RX ORDER — DEXMEDETOMIDINE HYDROCHLORIDE 100 UG/ML
INJECTION, SOLUTION INTRAVENOUS PRN
Status: DISCONTINUED | OUTPATIENT
Start: 2022-12-09 | End: 2022-12-09 | Stop reason: SURG

## 2022-12-09 RX ORDER — ROCURONIUM BROMIDE 10 MG/ML
INJECTION, SOLUTION INTRAVENOUS PRN
Status: DISCONTINUED | OUTPATIENT
Start: 2022-12-09 | End: 2022-12-09 | Stop reason: SURG

## 2022-12-09 RX ORDER — SIMETHICONE 125 MG
125 TABLET,CHEWABLE ORAL 3 TIMES DAILY PRN
Status: DISCONTINUED | OUTPATIENT
Start: 2022-12-09 | End: 2022-12-10 | Stop reason: HOSPADM

## 2022-12-09 RX ORDER — SODIUM CHLORIDE, SODIUM LACTATE, POTASSIUM CHLORIDE, CALCIUM CHLORIDE 600; 310; 30; 20 MG/100ML; MG/100ML; MG/100ML; MG/100ML
INJECTION, SOLUTION INTRAVENOUS CONTINUOUS
Status: DISCONTINUED | OUTPATIENT
Start: 2022-12-09 | End: 2022-12-09 | Stop reason: HOSPADM

## 2022-12-09 RX ORDER — PROMETHAZINE HYDROCHLORIDE 25 MG/1
25 SUPPOSITORY RECTAL EVERY 4 HOURS PRN
Status: DISCONTINUED | OUTPATIENT
Start: 2022-12-09 | End: 2022-12-10 | Stop reason: HOSPADM

## 2022-12-09 RX ORDER — BACLOFEN 10 MG/1
10 TABLET ORAL 3 TIMES DAILY
Status: DISCONTINUED | OUTPATIENT
Start: 2022-12-09 | End: 2022-12-10 | Stop reason: HOSPADM

## 2022-12-09 RX ORDER — HYDROMORPHONE HYDROCHLORIDE 1 MG/ML
0.5 INJECTION, SOLUTION INTRAMUSCULAR; INTRAVENOUS; SUBCUTANEOUS
Status: DISCONTINUED | OUTPATIENT
Start: 2022-12-09 | End: 2022-12-10 | Stop reason: HOSPADM

## 2022-12-09 RX ORDER — OXYCODONE HCL 5 MG/5 ML
5 SOLUTION, ORAL ORAL
Status: DISCONTINUED | OUTPATIENT
Start: 2022-12-09 | End: 2022-12-10 | Stop reason: HOSPADM

## 2022-12-09 RX ORDER — OXYCODONE HCL 5 MG/5 ML
10 SOLUTION, ORAL ORAL
Status: COMPLETED | OUTPATIENT
Start: 2022-12-09 | End: 2022-12-09

## 2022-12-09 RX ORDER — HYDRALAZINE HYDROCHLORIDE 20 MG/ML
5 INJECTION INTRAMUSCULAR; INTRAVENOUS
Status: DISCONTINUED | OUTPATIENT
Start: 2022-12-09 | End: 2022-12-09 | Stop reason: HOSPADM

## 2022-12-09 RX ORDER — OXYCODONE HCL 5 MG/5 ML
5 SOLUTION, ORAL ORAL
Status: COMPLETED | OUTPATIENT
Start: 2022-12-09 | End: 2022-12-09

## 2022-12-09 RX ORDER — LISINOPRIL 20 MG/1
20 TABLET ORAL
Status: DISCONTINUED | OUTPATIENT
Start: 2022-12-09 | End: 2022-12-10 | Stop reason: HOSPADM

## 2022-12-09 RX ORDER — OXYCODONE HCL 10 MG/1
10 TABLET, FILM COATED, EXTENDED RELEASE ORAL ONCE
Status: COMPLETED | OUTPATIENT
Start: 2022-12-09 | End: 2022-12-09

## 2022-12-09 RX ORDER — MEPERIDINE HYDROCHLORIDE 25 MG/ML
12.5 INJECTION INTRAMUSCULAR; INTRAVENOUS; SUBCUTANEOUS
Status: DISCONTINUED | OUTPATIENT
Start: 2022-12-09 | End: 2022-12-09 | Stop reason: HOSPADM

## 2022-12-09 RX ORDER — HEPARIN SODIUM 5000 [USP'U]/ML
5000 INJECTION, SOLUTION INTRAVENOUS; SUBCUTANEOUS ONCE
Status: COMPLETED | OUTPATIENT
Start: 2022-12-09 | End: 2022-12-09

## 2022-12-09 RX ORDER — ACETAMINOPHEN 500 MG
1000 TABLET ORAL EVERY 6 HOURS PRN
Status: DISCONTINUED | OUTPATIENT
Start: 2022-12-14 | End: 2022-12-10 | Stop reason: HOSPADM

## 2022-12-09 RX ORDER — OXYCODONE HCL 5 MG/5 ML
10 SOLUTION, ORAL ORAL
Status: DISCONTINUED | OUTPATIENT
Start: 2022-12-09 | End: 2022-12-10 | Stop reason: HOSPADM

## 2022-12-09 RX ORDER — HEPARIN SODIUM 5000 [USP'U]/ML
7500 INJECTION, SOLUTION INTRAVENOUS; SUBCUTANEOUS EVERY 8 HOURS
Status: DISCONTINUED | OUTPATIENT
Start: 2022-12-09 | End: 2022-12-10 | Stop reason: HOSPADM

## 2022-12-09 RX ADMIN — SUCCINYLCHOLINE CHLORIDE 160 MG: 20 INJECTION, SOLUTION INTRAMUSCULAR; INTRAVENOUS; PARENTERAL at 12:32

## 2022-12-09 RX ADMIN — MIDAZOLAM HYDROCHLORIDE 2 MG: 1 INJECTION, SOLUTION INTRAMUSCULAR; INTRAVENOUS at 12:25

## 2022-12-09 RX ADMIN — SODIUM CHLORIDE, POTASSIUM CHLORIDE, SODIUM LACTATE AND CALCIUM CHLORIDE: 600; 310; 30; 20 INJECTION, SOLUTION INTRAVENOUS at 13:15

## 2022-12-09 RX ADMIN — PANTOPRAZOLE SODIUM 40 MG: 40 INJECTION, POWDER, FOR SOLUTION INTRAVENOUS at 18:14

## 2022-12-09 RX ADMIN — KETOROLAC TROMETHAMINE 30 MG: 30 INJECTION, SOLUTION INTRAMUSCULAR; INTRAVENOUS at 18:15

## 2022-12-09 RX ADMIN — HYDROMORPHONE HYDROCHLORIDE 0.4 MG: 2 INJECTION INTRAMUSCULAR; INTRAVENOUS; SUBCUTANEOUS at 13:17

## 2022-12-09 RX ADMIN — CEFAZOLIN 3 G: 330 INJECTION, POWDER, FOR SOLUTION INTRAMUSCULAR; INTRAVENOUS at 12:32

## 2022-12-09 RX ADMIN — ACETAMINOPHEN 1000 MG: 500 TABLET ORAL at 23:44

## 2022-12-09 RX ADMIN — FENTANYL CITRATE 50 MCG: 50 INJECTION, SOLUTION INTRAMUSCULAR; INTRAVENOUS at 12:28

## 2022-12-09 RX ADMIN — ROCURONIUM BROMIDE 20 MG: 10 INJECTION, SOLUTION INTRAVENOUS at 13:04

## 2022-12-09 RX ADMIN — ONDANSETRON 4 MG: 2 INJECTION INTRAMUSCULAR; INTRAVENOUS at 20:30

## 2022-12-09 RX ADMIN — SUGAMMADEX 200 MG: 100 INJECTION, SOLUTION INTRAVENOUS at 14:58

## 2022-12-09 RX ADMIN — PROPOFOL 200 MG: 10 INJECTION, EMULSION INTRAVENOUS at 12:32

## 2022-12-09 RX ADMIN — LIDOCAINE HYDROCHLORIDE 80 MG: 20 INJECTION, SOLUTION EPIDURAL; INFILTRATION; INTRACAUDAL at 12:32

## 2022-12-09 RX ADMIN — KETOROLAC TROMETHAMINE 30 MG: 30 INJECTION, SOLUTION INTRAMUSCULAR; INTRAVENOUS at 23:45

## 2022-12-09 RX ADMIN — GABAPENTIN 300 MG: 250 SOLUTION ORAL at 20:30

## 2022-12-09 RX ADMIN — OXYCODONE HYDROCHLORIDE 10 MG: 10 TABLET, FILM COATED, EXTENDED RELEASE ORAL at 09:59

## 2022-12-09 RX ADMIN — ROCURONIUM BROMIDE 10 MG: 10 INJECTION, SOLUTION INTRAVENOUS at 13:40

## 2022-12-09 RX ADMIN — ONDANSETRON 4 MG: 2 INJECTION INTRAMUSCULAR; INTRAVENOUS at 15:32

## 2022-12-09 RX ADMIN — HYDROMORPHONE HYDROCHLORIDE 0.4 MG: 2 INJECTION INTRAMUSCULAR; INTRAVENOUS; SUBCUTANEOUS at 12:52

## 2022-12-09 RX ADMIN — OXYCODONE HYDROCHLORIDE 5 MG: 5 SOLUTION ORAL at 15:32

## 2022-12-09 RX ADMIN — ONDANSETRON 4 MG: 2 INJECTION INTRAMUSCULAR; INTRAVENOUS at 14:58

## 2022-12-09 RX ADMIN — SODIUM CHLORIDE, POTASSIUM CHLORIDE, SODIUM LACTATE AND CALCIUM CHLORIDE: 600; 310; 30; 20 INJECTION, SOLUTION INTRAVENOUS at 18:22

## 2022-12-09 RX ADMIN — DEXMEDETOMIDINE 25 MCG: 200 INJECTION, SOLUTION INTRAVENOUS at 12:41

## 2022-12-09 RX ADMIN — MAGNESIUM SULFATE HEPTAHYDRATE 4 G: 40 INJECTION, SOLUTION INTRAVENOUS at 14:18

## 2022-12-09 RX ADMIN — GABAPENTIN 300 MG: 300 CAPSULE ORAL at 09:59

## 2022-12-09 RX ADMIN — ACETAMINOPHEN 1 G: 10 INJECTION INTRAVENOUS at 10:26

## 2022-12-09 RX ADMIN — LISINOPRIL 20 MG: 20 TABLET ORAL at 20:30

## 2022-12-09 RX ADMIN — SODIUM CHLORIDE, POTASSIUM CHLORIDE, SODIUM LACTATE AND CALCIUM CHLORIDE: 600; 310; 30; 20 INJECTION, SOLUTION INTRAVENOUS at 12:20

## 2022-12-09 RX ADMIN — ROCURONIUM BROMIDE 40 MG: 10 INJECTION, SOLUTION INTRAVENOUS at 12:41

## 2022-12-09 RX ADMIN — HEPARIN SODIUM 7500 UNITS: 5000 INJECTION, SOLUTION INTRAVENOUS; SUBCUTANEOUS at 19:03

## 2022-12-09 RX ADMIN — HYDROMORPHONE HYDROCHLORIDE 0.4 MG: 2 INJECTION INTRAMUSCULAR; INTRAVENOUS; SUBCUTANEOUS at 14:59

## 2022-12-09 RX ADMIN — FENTANYL CITRATE 50 MCG: 50 INJECTION, SOLUTION INTRAMUSCULAR; INTRAVENOUS at 12:48

## 2022-12-09 RX ADMIN — ROCURONIUM BROMIDE 10 MG: 10 INJECTION, SOLUTION INTRAVENOUS at 14:32

## 2022-12-09 RX ADMIN — HYDROMORPHONE HYDROCHLORIDE 0.4 MG: 2 INJECTION INTRAMUSCULAR; INTRAVENOUS; SUBCUTANEOUS at 12:49

## 2022-12-09 RX ADMIN — ROCURONIUM BROMIDE 20 MG: 10 INJECTION, SOLUTION INTRAVENOUS at 14:04

## 2022-12-09 RX ADMIN — HYDROMORPHONE HYDROCHLORIDE 0.4 MG: 2 INJECTION INTRAMUSCULAR; INTRAVENOUS; SUBCUTANEOUS at 13:43

## 2022-12-09 RX ADMIN — HEPARIN SODIUM 5000 UNITS: 5000 INJECTION, SOLUTION INTRAVENOUS; SUBCUTANEOUS at 10:18

## 2022-12-09 ASSESSMENT — COGNITIVE AND FUNCTIONAL STATUS - GENERAL
MOVING TO AND FROM BED TO CHAIR: A LITTLE
HELP NEEDED FOR BATHING: A LITTLE
TURNING FROM BACK TO SIDE WHILE IN FLAT BAD: A LITTLE
SUGGESTED CMS G CODE MODIFIER MOBILITY: CK
TOILETING: A LITTLE
DRESSING REGULAR LOWER BODY CLOTHING: A LITTLE
MOVING FROM LYING ON BACK TO SITTING ON SIDE OF FLAT BED: A LITTLE
SUGGESTED CMS G CODE MODIFIER DAILY ACTIVITY: CJ
WALKING IN HOSPITAL ROOM: A LITTLE
DAILY ACTIVITIY SCORE: 20
DRESSING REGULAR UPPER BODY CLOTHING: A LITTLE
MOBILITY SCORE: 18
STANDING UP FROM CHAIR USING ARMS: A LITTLE
CLIMB 3 TO 5 STEPS WITH RAILING: A LITTLE

## 2022-12-09 ASSESSMENT — PAIN DESCRIPTION - PAIN TYPE
TYPE: SURGICAL PAIN
TYPE: ACUTE PAIN
TYPE: SURGICAL PAIN
TYPE: ACUTE PAIN;SURGICAL PAIN
TYPE: SURGICAL PAIN

## 2022-12-09 ASSESSMENT — LIFESTYLE VARIABLES
DOES PATIENT WANT TO STOP DRINKING: NO
CONSUMPTION TOTAL: NEGATIVE
AVERAGE NUMBER OF DAYS PER WEEK YOU HAVE A DRINK CONTAINING ALCOHOL: 0
TOTAL SCORE: 0
HAVE YOU EVER FELT YOU SHOULD CUT DOWN ON YOUR DRINKING: NO
EVER HAD A DRINK FIRST THING IN THE MORNING TO STEADY YOUR NERVES TO GET RID OF A HANGOVER: NO
TOTAL SCORE: 0
ON A TYPICAL DAY WHEN YOU DRINK ALCOHOL HOW MANY DRINKS DO YOU HAVE: 0
TOTAL SCORE: 0
HAVE PEOPLE ANNOYED YOU BY CRITICIZING YOUR DRINKING: NO
ALCOHOL_USE: NO
HOW MANY TIMES IN THE PAST YEAR HAVE YOU HAD 5 OR MORE DRINKS IN A DAY: 0
EVER FELT BAD OR GUILTY ABOUT YOUR DRINKING: NO

## 2022-12-09 ASSESSMENT — PAIN SCALES - GENERAL: PAIN_LEVEL: 3

## 2022-12-09 ASSESSMENT — PATIENT HEALTH QUESTIONNAIRE - PHQ9
SUM OF ALL RESPONSES TO PHQ9 QUESTIONS 1 AND 2: 0
2. FEELING DOWN, DEPRESSED, IRRITABLE, OR HOPELESS: NOT AT ALL
1. LITTLE INTEREST OR PLEASURE IN DOING THINGS: NOT AT ALL

## 2022-12-09 ASSESSMENT — FIBROSIS 4 INDEX: FIB4 SCORE: 0.46

## 2022-12-09 NOTE — ANESTHESIA TIME REPORT
Anesthesia Start and Stop Event Times     Date Time Event    12/9/2022 1129 Ready for Procedure     1220 Anesthesia Start     1518 Anesthesia Stop        Responsible Staff  12/09/22    Name Role Begin End    Alyssia Mason M.D. Anesth 1220 1518        Overtime Reason:  no overtime (within assigned shift)    Comments:

## 2022-12-09 NOTE — ANESTHESIA PREPROCEDURE EVALUATION
Case: [id number] Date/Time: 12/09/22 1115    Procedures:       ROBOTIC WU EN Y GASTRIC BYPASS, HIATAL HERNIA REPAIR      REPAIR, HERNIA, HIATAL, ROBOT-ASSISTED, LAPAROSCOPIC    Pre-op diagnosis: MORBID OBESITY    Location: [place] [address] / SURGERY [facility]    Surgeons: Aime To M.D.          Relevant Problems   CARDIAC   (positive) Chronic hypertension affecting pregnancy         (positive) Fatty liver      OB   (positive) Chronic hypertension affecting pregnancy      Other   (positive) Anxiety   (positive) Depression   (positive) Marijuana use   (positive) Morbid obesity (HCC)   (positive) Tobacco use       Physical Exam    Airway   Mallampati: III  TM distance: >3 FB  Neck ROM: full       Cardiovascular - normal exam  Rhythm: regular  Rate: normal  (-) murmur     Dental - normal exam             Pulmonary - normal exam  Breath sounds clear to auscultation     Abdominal    Neurological - normal exam               Anesthesia Plan    ASA 3   ASA physical status 3 criteria: morbid obesity - BMI greater than or equal to 40    Plan - general       Airway plan will be ETT          Induction: intravenous    Postoperative Plan: Postoperative administration of opioids is intended.    Pertinent diagnostic labs and testing reviewed    Informed Consent:    Anesthetic plan and risks discussed with patient.    Use of blood products discussed with: patient whom consented to blood products.

## 2022-12-09 NOTE — OP REPORT
[facility]    OPERATIVE REPORT         DATE OF OPERATION: 12/9/2022    SURGEON: Aime To MD MPH FACS [facility]    ASSISTANT(S): None    ANESTHESIOLOGIST(S): Alyssia Mason MD    ANESTHESIA: General endotracheal    PREOPERATIVE DIAGNOSES:   HTN, DEE, GERD and PCOS  Morbid obesity (Body mass index is 50.92 kg/m².)    POSTOPERATIVE DIAGNOSES:   HTN, DEE, GERD and PCOS  Morbid obesity (Body mass index is 50.92 kg/m².)  Moderate-size, sliding hiatal hernia (Type I)    OPERATION(S) PERFORMED:  Robotic Mony-en-Y gastric bypass and hiatal hernia repair    ESTIMATED BLOOD LOSS: 20 ml    URINE OUTPUT: Not recorded    COMPLICATIONS: None    SPECIMEN(S): None    DISPOSITION: The patient tolerated the procedure well and was transferred to the recovery room in stable condition.    OPERATIVE FINDINGS: Normal gastric anatomy and a moderate-size, sliding hiatal hernia (type I).    INDICATIONS: The patient is a 27 y.o. female, with a diagnosis of morbid obesity, whose current Body mass index is 50.92 kg/m². Her comorbidities include: HTN, DEE, GERD and PCOS. The patient was recommended to undergo a robotic vs laparoscopic, possible open, Mony-en-Y gastric bypass, and possible hiatal hernia repair, with possible EGD.    We discussed the risks of surgery including infection, bleeding, need for transfusion, blood clot formation, pulmonary embolus, pneumonia, leak or perforation, recurrence of symptoms, and death. In addition, the risks of general anesthetic were discussed, including MI, CVA, reaction to anesthetic medications, or sudden death. The patient understands all of the above risks and all questions were answered to her satisfaction.    OPERATIVE PROCEDURE: The patient was brought to the Operating Room and placed on the operating table in supine position. Venodynes were placed on the patient's legs. Antibiotics (3 g) were given intravenously for surgical prophylaxis. Five thousand units of  heparin had been given subcutaneously for deep venous thrombosis prophylaxis in the preop holding area. The patient was induced under general anesthesia and intubated. The abdomen was then prepped and draped in the usual sterile fashion. A time-out was done where the name of the patient and the procedure were verified.    The procedure was begun by injecting local anesthetic into subcutaneous tissue approximately 15 cm below the xiphoid process and just left of the midline. A skin incision was then made using a scalpel and a 5 mm OptiView trocar was placed under direct vision, with assistance of a 5-mm, 0-degree scope. The abdomen was insufflated successfully to a pressure of 15 mmHg. The scope was switched to a 5-mm, 30-degree and the abdomen was inspected - no injuries were noted from initial trocar placement. Next, four additional bladeless trocars were placed in a similar fashion, a robotic 8 mm and 12 mm in the right upper quadrant and two 8 mm trocars in the left upper quadrant. The Milad liver retractor was inserted through a separate subxiphoid incision and the left lobe of liver was elevated anteriorly. The patient was then placed in a steep reverse Trendelenburg position. The da Nacho Xi robotic system was then successfully docked to the patient's left side and the remainder of this operation was performed with assistance of the robot.     Upon inspection of the gastroesophageal junction, a moderate-size, sliding hiatal hernia (type I) was noted. This was dissected free from the esophagus and from the right and left crura. The decision was made to repair this hernia after performing the gastrojejunostomy. Dissection was then done bluntly along the angle of His. Attention then turned to the lesser curvature of stomach, where a small window was created at approximately the 5 cm rae, in order to produce a small gastric pouch. This window was created posteriorly beyond the stomach and a robotic, 60 mm,  KAY stapler with a blue load was placed horizontally across the stomach and fired. A 40-Fr bougie was then placed into the stomach along the lesser curvature and additional blue loads of the stapler were fired vertically to complete the formation of gastric pouch along the bougie. The omentum was then reflected superiorly in order to lift the transverse colon and expose the ligament of Treitz. One hundred twenty five centimeters of small bowel were counted from the ligament of Treitz and that loop of jejunum was brought up into proximity to the gastric pouch in antecolic-antegastric fashion. Next, a 3-cm gastrojejunostomy was formed by using a blue load of the robotic KAY stapler to create the posterior wall. The bougie was then advanced from the pouch into the Mony limb and the anterior wall of anastomosis was closed in two layers by using 3-0 PDS (Stratafix) sutures. The anastomosis was inspected and found to be intact and hemostatic.      Next, the biliopancreatic limb was divided proximally to the gastrojejunostomy anastomosis by using another white load of the robotic stapler. The Vessel sealer was used to divide the small bowel mesentery, being careful not to compromise the blood supply to the bowel. Then, an additional 100 cm of jejunum were counted out and at that position a jejunojejunostomy was created by using a white load of the robotic KAY stapler to create the posterior wall. The common enterotomy was sutured closed with a running 3-0 PDS (Stratafix) suture. The anastomosis was inspected and found to be intact. The mesenteric defects at the jejunojejunostomy was identified and closed by using running 2-0 non-absorbable V-Loc suture. The attention was now turned to the hiatal hernia and the stomach pouch was retracted slightly in a cephalad direction to ensure that the gastroesophageal junction was located below the hiatus of the diaphragm. The right and left crura were approximated posterior to the  esophagus by using four 0-Ethibond suture(s). Next, the air-leak test was performed by introducing saline into the left upper abdomen in order to submerge the gastric pouch and gastrojejunostomy 'under water'. The Mony limb was occluded and the stomach pouch was insufflated. The leak test was negative and hemostasis was excellent. The Milad retractor was removed from the abdomen under direct vision. The 12 mm trocar site was closed in two layers with 0-Vicryl for the fascia and 4-0 Monocryl for the skin. The remainder of trocar sites were closed with 4-0 Monocryl sutures only. The wounds were cleaned, dried, and covered with Dermabond. The sponge and instrument counts were correct x 2. The patient was then extubated and transferred to the PACU in stable condition.     Dr. To was present and scrubbed throughout the entirety of this case.

## 2022-12-09 NOTE — ANESTHESIA PROCEDURE NOTES
Airway    Date/Time: 12/9/2022 12:33 PM  Performed by: Alyssia Mason M.D.  Authorized by: Alyssia Mason M.D.     Location:  OR  Urgency:  Elective  Difficult Airway: No    Indications for Airway Management:  Anesthesia      Spontaneous Ventilation: absent    Sedation Level:  Deep  Preoxygenated: Yes    Patient Position:  Sniffing  Mask Difficulty Assessment:  2 - vent by mask + OA or adjuvant +/- NMBA  Final Airway Type:  Endotracheal airway  Final Endotracheal Airway:  ETT  Cuffed: Yes    Technique Used for Successful ETT Placement:  Direct laryngoscopy    Insertion Site:  Oral  Blade Type:  Stan  Laryngoscope Blade/Videolaryngoscope Blade Size:  3  ETT Size (mm):  7.0  Measured from:  Teeth  ETT to Teeth (cm):  22  Placement Verified by: auscultation and capnometry    Cormack-Lehane Classification:  Grade IIa - partial view of glottis  Number of Attempts at Approach:  1

## 2022-12-10 VITALS
HEIGHT: 69 IN | TEMPERATURE: 98.8 F | SYSTOLIC BLOOD PRESSURE: 165 MMHG | HEART RATE: 57 BPM | DIASTOLIC BLOOD PRESSURE: 93 MMHG | WEIGHT: 293 LBS | RESPIRATION RATE: 18 BRPM | BODY MASS INDEX: 43.4 KG/M2 | OXYGEN SATURATION: 93 %

## 2022-12-10 LAB
ANION GAP SERPL CALC-SCNC: 11 MMOL/L (ref 7–16)
BUN SERPL-MCNC: 8 MG/DL (ref 8–22)
CALCIUM SERPL-MCNC: 8.7 MG/DL (ref 8.5–10.5)
CHLORIDE SERPL-SCNC: 103 MMOL/L (ref 96–112)
CO2 SERPL-SCNC: 23 MMOL/L (ref 20–33)
CREAT SERPL-MCNC: 0.86 MG/DL (ref 0.5–1.4)
ERYTHROCYTE [DISTWIDTH] IN BLOOD BY AUTOMATED COUNT: 45.2 FL (ref 35.9–50)
GFR SERPLBLD CREATININE-BSD FMLA CKD-EPI: 95 ML/MIN/1.73 M 2
GLUCOSE BLD STRIP.AUTO-MCNC: 90 MG/DL (ref 65–99)
GLUCOSE SERPL-MCNC: 102 MG/DL (ref 65–99)
HCT VFR BLD AUTO: 37 % (ref 37–47)
HGB BLD-MCNC: 11.5 G/DL (ref 12–16)
MCH RBC QN AUTO: 25.2 PG (ref 27–33)
MCHC RBC AUTO-ENTMCNC: 31.1 G/DL (ref 33.6–35)
MCV RBC AUTO: 81 FL (ref 81.4–97.8)
PLATELET # BLD AUTO: 274 K/UL (ref 164–446)
PMV BLD AUTO: 11.6 FL (ref 9–12.9)
POTASSIUM SERPL-SCNC: 3.7 MMOL/L (ref 3.6–5.5)
RBC # BLD AUTO: 4.57 M/UL (ref 4.2–5.4)
SODIUM SERPL-SCNC: 137 MMOL/L (ref 135–145)
WBC # BLD AUTO: 11.3 K/UL (ref 4.8–10.8)

## 2022-12-10 PROCEDURE — C9113 INJ PANTOPRAZOLE SODIUM, VIA: HCPCS | Performed by: SURGERY

## 2022-12-10 PROCEDURE — 82962 GLUCOSE BLOOD TEST: CPT

## 2022-12-10 PROCEDURE — A9270 NON-COVERED ITEM OR SERVICE: HCPCS | Performed by: SURGERY

## 2022-12-10 PROCEDURE — 700111 HCHG RX REV CODE 636 W/ 250 OVERRIDE (IP): Performed by: SURGERY

## 2022-12-10 PROCEDURE — 700102 HCHG RX REV CODE 250 W/ 637 OVERRIDE(OP): Performed by: SURGERY

## 2022-12-10 PROCEDURE — 80048 BASIC METABOLIC PNL TOTAL CA: CPT

## 2022-12-10 PROCEDURE — 85027 COMPLETE CBC AUTOMATED: CPT

## 2022-12-10 PROCEDURE — 36415 COLL VENOUS BLD VENIPUNCTURE: CPT

## 2022-12-10 RX ADMIN — ACETAMINOPHEN 1000 MG: 500 TABLET ORAL at 12:38

## 2022-12-10 RX ADMIN — HEPARIN SODIUM 7500 UNITS: 5000 INJECTION, SOLUTION INTRAVENOUS; SUBCUTANEOUS at 03:30

## 2022-12-10 RX ADMIN — KETOROLAC TROMETHAMINE 30 MG: 30 INJECTION, SOLUTION INTRAMUSCULAR; INTRAVENOUS at 05:49

## 2022-12-10 RX ADMIN — GABAPENTIN 300 MG: 250 SOLUTION ORAL at 12:38

## 2022-12-10 RX ADMIN — ACETAMINOPHEN 1000 MG: 500 TABLET ORAL at 05:50

## 2022-12-10 RX ADMIN — BACLOFEN 10 MG: 10 TABLET ORAL at 12:39

## 2022-12-10 RX ADMIN — ONDANSETRON 4 MG: 2 INJECTION INTRAMUSCULAR; INTRAVENOUS at 05:50

## 2022-12-10 RX ADMIN — KETOROLAC TROMETHAMINE 30 MG: 30 INJECTION, SOLUTION INTRAMUSCULAR; INTRAVENOUS at 12:38

## 2022-12-10 RX ADMIN — GABAPENTIN 300 MG: 250 SOLUTION ORAL at 06:10

## 2022-12-10 RX ADMIN — PANTOPRAZOLE SODIUM 40 MG: 40 INJECTION, POWDER, FOR SOLUTION INTRAVENOUS at 05:49

## 2022-12-10 RX ADMIN — OXYCODONE HYDROCHLORIDE 5 MG: 5 SOLUTION ORAL at 12:49

## 2022-12-10 RX ADMIN — ONDANSETRON 4 MG: 2 INJECTION INTRAMUSCULAR; INTRAVENOUS at 12:38

## 2022-12-10 RX ADMIN — HEPARIN SODIUM 7500 UNITS: 5000 INJECTION, SOLUTION INTRAVENOUS; SUBCUTANEOUS at 12:48

## 2022-12-10 RX ADMIN — BACLOFEN 10 MG: 10 TABLET ORAL at 05:50

## 2022-12-10 NOTE — PROGRESS NOTES
Discharging Patient home per physician order.  Discharged with Family.  Demonstrated understanding of discharge instructions, follow up appointments, prescriptions, home care for surgical wound and nursing care instructions.  Ambulating without assistance, voiding without difficulty, pain well controlled, tolerating oral medications, oxygen saturation greater than 90%, tolerating diet.  Educational handouts given and discussed.  Verbalized understanding of discharge instructions and educational handouts.  All questions answered.  Belongings with patient at time of discharge.

## 2022-12-10 NOTE — ANESTHESIA POSTPROCEDURE EVALUATION
Patient: April Anamaria Jain    Procedure Summary     Date: 12/09/22 Room / Location: [place] [address] / SURGERY [facility]    Anesthesia Start: 1220 Anesthesia Stop: 1518    Procedures:       ROBOTIC WU EN Y GASTRIC BYPASS, HIATAL HERNIA REPAIR (Abdomen)      REPAIR, HERNIA, HIATAL, ROBOT-ASSISTED, LAPAROSCOPIC (Abdomen) Diagnosis: (MORBID OBESITY)    Surgeons: Aime To M.D. Responsible Provider: Alyssia Mason M.D.    Anesthesia Type: general ASA Status: 3          Final Anesthesia Type: general  Last vitals  BP   Blood Pressure: (!) 147/87    Temp   36.1 °C (97 °F)    Pulse   (!) 55   Resp   12    SpO2   99 %      Anesthesia Post Evaluation    Patient location during evaluation: PACU  Patient participation: complete - patient participated  Level of consciousness: awake and alert  Pain score: 3    Airway patency: patent  Anesthetic complications: no  Cardiovascular status: hemodynamically stable  Respiratory status: acceptable  Hydration status: euvolemic    PONV: none          There were no known notable events for this encounter.     Nurse Pain Score: 3 (NPRS)

## 2022-12-10 NOTE — DISCHARGE SUMMARY
Discharge Summary    CHIEF COMPLAINT ON ADMISSION  No chief complaint on file.      Reason for Admission  HTN, DEE, GERD and PCOS  2.   Morbid obesity (Body mass index is 50.92 kg/m².)  3.   Moderate-size, sliding hiatal hernia (Type I)    Admission Date  12/9/2022    CODE STATUS  Full Code    HPI & HOSPITAL COURSE  This is a 27 y.o. female here with HTN, DEE, GERD, PCOS, morbid obesity (Body mass index is 50.92 kg/m².) and a moderate-size, sliding hiatal hernia (Type I). She underwent a robotic Mony-en-Y gastric bypass and hiatal hernia repair on 12/9/2022 and her postoperative recovery was unremarkable. She was tolerating clear liquids, voiding spontaneously, and ambulating in the hallways w/o difficulties. Thus, patient was found to be stable for discharge on POD #1, 12/10/2022. Therefore, she is discharged in good and stable condition to home with close outpatient follow-up.    The patient recovered much more quickly than anticipated on admission.    Discharge Date  12/10/2022    FOLLOW UP ITEMS POST DISCHARGE  None    DISCHARGE DIAGNOSES  Active Problems:    * No active hospital problems. *  Resolved Problems:    * No resolved hospital problems. *      FOLLOW UP  No future appointments.  No follow-up provider specified.    MEDICATIONS ON DISCHARGE     Medication List        CONTINUE taking these medications        Instructions   lisinopril 20 MG Tabs  Commonly known as: PRINIVIL   Take 20 mg by mouth every day.  Dose: 20 mg     MULTIVITAMIN ADULT PO   Take 1 Tablet by mouth every day.  Dose: 1 Tablet     pantoprazole 20 MG tablet  Commonly known as: PROTONIX   Take 20 mg by mouth every day.  Dose: 20 mg              Allergies  Allergies   Allergen Reactions    Celexa Anxiety    Compazine Vomiting    Latex Hives    Prozac [Fluoxetine] Hives    Wellbutrin [Bupropion] Anxiety    Tramadol        DIET  Orders Placed This Encounter   Procedures    Diet Order Diet: Clear Liquid; Miscellaneous modifications:  (optional): Bariatric; Tray Modifications (optional): No Straws     Standing Status:   Standing     Number of Occurrences:   1     Order Specific Question:   Diet:     Answer:   Clear Liquid [10]     Order Specific Question:   Miscellaneous modifications: (optional)     Answer:   Bariatric [3]     Order Specific Question:   Tray Modifications (optional)     Answer:   No Straws       ACTIVITY  As tolerated.    CONSULTATIONS  None    PROCEDURES  Robotic Mony-en-Y gastric bypass and hiatal hernia repair on 12/9/2022.    LABORATORY  Lab Results   Component Value Date    SODIUM 141 12/02/2022    POTASSIUM 4.6 12/02/2022    CHLORIDE 108 12/02/2022    CO2 23 12/10/2022    GLUCOSE 102 (H) 12/10/2022    BUN 8 12/10/2022    CREATININE 0.86 12/10/2022        Lab Results   Component Value Date    WBC 11.3 (H) 12/10/2022    HEMOGLOBIN 11.5 (L) 12/10/2022    HEMATOCRIT 37.0 12/10/2022    PLATELETCT 274 12/10/2022        Total time of the discharge process exceeds 15 minutes.

## 2022-12-10 NOTE — CARE PLAN
The patient is Stable - Low risk of patient condition declining or worsening    Shift Goals  Clinical Goals: pain control, diet tolerance, ambulation  Patient Goals: pain control    Progress made toward(s) clinical / shift goals:  Patient has been given her scheduled ketorolac. Patient ambulated with assistance up to restroom and back to bed.     Patient is not progressing towards the following goals: Patient has not yet been cleared to discharge.    Problem: Pain - Standard  Goal: Alleviation of pain or a reduction in pain to the patient’s comfort goal  Outcome: Progressing     Problem: Knowledge Deficit - Standard  Goal: Patient and family/care givers will demonstrate understanding of plan of care, disease process/condition, diagnostic tests and medications  Outcome: Progressing

## 2022-12-10 NOTE — DISCHARGE INSTRUCTIONS
[facility]  Post Weight-Loss Surgery Discharge Instructions      1. DIET: Follow the diet progression detailed in your post-op booklet. Progressing as instructed will make for a smooth transition after surgery and prevent any discomfort, associated with eating foods before your stomach is ready, or eating too much. Drinking water for hydration and protein shakes are much more important than food intake in the first week or two after surgery. Drink enough water to keep your urine pale yellow. Increase water intake if your urine is a darker yellow or if have burning with urination. Occasional nausea and vomiting in the immediate post-operative period are normal. Please take your anti-nausea medication (e.g. “Zofran” or ondansetron) as instructed and continue to stay hydrated.    2. SUPPLEMENTS: Start your supplements 1-2 weeks after surgery. You may need to cut some supplements in half, or crush them, initially. Follow the guidelines from your supplement handout given to you during your pre-op class.     3. ACTIVITIES: After discharge from the hospital, you may resume full routine activities. However, there should be no heavy lifting (greater than 15 pounds) and no strenuous activities until after your follow-up visit. Otherwise, routine activities of daily living are acceptable. More movement and walking after surgery will speed up your recovery process.    4. DRIVING: You may drive whenever you are off narcotic pain medications and are able to perform the activities needed to drive, i.e. turning, bending, twisting, etc, without significant pain.    5. BATHING AND WOUND CARE: You may get your incisions wet 24 hours after surgery. You may shower, but do not submerge in a bath, hot tub, or swimming pool for at least two weeks. If present, dressings may come off after 24 hours. Your incisions are covered with special skin glue - it should peel off on its own within the next few weeks. If you have  steri-strips covering your incisions, they will fall off over the next few weeks. You may notice some clear or slightly bloody drainage from your wounds and there may be some mild redness or bruising around your incision sites. This is normal.    6. BOWEL FUNCTION: Constipation is common after this operation, especially with narcotic pain medications. The combination of pain medication, dehydration, and decreased activity levels can cause constipation in otherwise normal patients. If you feel this is occurring, take a stool softener (Colace, Senna, etc.) or a laxative (Milk of Magnesia, Miralax, etc.) until the problem has resolved. Diarrhea, to a lesser degree, in the first few days after surgery can also be normal. You may notice that it is dark in color or see blood, which is also normal, and should subside in the first week post-op.    7. HOME MEDICATION/PAIN MEDICATION: You may resume all home medications as prescribed, except for your diabetic medications. Please reach out to your Primary Care Physician or Endocrinologist to discuss readjusting your diabetic medications. You have been given a prescription for pain medication preoperatively. Please take these as directed. It is important to remember not to take medications on an empty stomach as this may cause nausea. For minimal discomfort you may use liquid Tylenol 650 mg every 4 hours. DO NOT exceed 4,000 mg of Tylenol in 24 hours. DO NOT use non-steroidal anti-inflammatory medication such as: Aspirin, Ibuprofen, Advil, Motrin, Aleve; or steroid medications. These medications can cause ulcers after weight loss surgery.    8. CALL OUR OFFICE - [phone] - IF YOU HAVE:     1. Fevers to more than 101.5 F   2. Leg pain or swelling  3. Drainage or fluid from incision that may be foul smelling, increased tenderness or soreness at the wound, or the wound edges are no longer together, redness or  swelling at the incision site.   4. Night sweats   5. Shaking or  chills   6. Persistent nausea or vomiting for over 24 hours. Use your anti-nausea medication as prescribed.   7. Call 911 if sudden onset of chest pain or shortness of breath that does not improve with 5-10 minutes of rest.    9. FOLLOW UP APPOINTMENT: Contact our office at [phone] for a follow-up appointment within 2 weeks following your procedure. Our office hours are Monday-Thursday, 7.30 am-4.30 pm and from 8.00 am-3.00 pm on Friday. Please try to call during these hours when we are better able to assist you.    If you have any additional questions, please do not hesitate to call our office and speak to the staff or provider on call.    Office Address:  [address]  [address]  [place] [id number]    Thank you for this opportunity to provide excellent surgical care for you today.     Bariatric Surgery, Care After  Refer to this sheet in the next few weeks. These instructions provide you with information on caring for yourself after your procedure. Your health care provider may also give you more specific instructions. Your treatment has been planned according to current medical practices, but problems sometimes occur. Call your health care provider if you have any problems or questions after your procedure.  WHAT TO EXPECT AFTER THE PROCEDURE  After your procedure, it is typical to have the following sensations:  Soreness.  Sluggishness.  Tiredness.  Moodiness.  Chilliness.  It is not unusual to have dry skin and some hair loss after bariatric surgery.  HOME CARE INSTRUCTIONS  Do not drink alcohol, use public transportation, or sign important papers for at least 1 day following surgery.  Do not resume physical activities or drive until directed by your surgeon.  Avoid lifting anything over 10 pounds (4.5 kg) for 6 weeks following surgery, or until approved by your surgeon.    Only take over-the-counter or prescription medicines for pain, discomfort, or fever as directed  by your surgeon.    Resume your diet as directed by your surgeon.  You will resume eating with liquids and pureed foods, then soft foods, and progress to a more normal diet over time. Follow your surgeon or dietitian's guidelines for what and how much to eat and drink. You will need to eat slowly, so as not to cause discomfort and vomiting.  Use showers for bathing as directed by your surgeon.    Change dressings as directed by your surgeon.  Schedule a follow-up appointment with your surgeon as directed.  SEEK MEDICAL CARE IF:   There is redness, swelling, or increasing pain in the wound.    There is pus coming from the wound.    There is drainage from the wound lasting longer than 1 day.    An unexplained oral temperature above 102°F (38.9°C) develops.    You notice a foul smell coming from the wound or dressing.    There is a breaking open of the wound (edges not staying together) after stitches have been removed.    You notice increasing pain in the shoulder-strap areas.    You develop episodes of dizziness or faint while standing.    You develop shortness of breath.    You develop persistent nausea or vomiting.    Your soreness seems to be getting worse rather than better.    SEEK IMMEDIATE MEDICAL CARE IF:   You develop a rash.    You have difficulty breathing.    You develop, or feel you are developing, any reaction or side effects to medicines.  FOR MORE INFORMATION  American Society for Bariatric Surgery: www.asbs.org  Weight-control Information Network (WIN): win.niddk.nih.gov     This information is not intended to replace advice given to you by your health care provider. Make sure you discuss any questions you have with your health care provider.     Document Released: 12/18/2006 Document Revised: 01/08/2016 Document Reviewed: 06/18/2014  What[employer]rend[employer] Interactive Patient Education ©2016 [employer]

## 2022-12-10 NOTE — PROGRESS NOTES
4 Eyes Skin Assessment Completed by Chelsea RN and ***, RN.    Head WDL  Ears WDL  Nose WDL  Mouth WDL  Neck WDL  Breast/Chest WDL  Shoulder Blades WDL  Spine WDL  (R) Arm/Elbow/Hand WDL  (L) Arm/Elbow/Hand WDL  Abdomen Incision 5x lap sites w/ dermabond, BRITTNY.   Groin WDL  Scrotum/Coccyx/Buttocks WDL  (R) Leg WDL  (L) Leg WDL  (R) Heel/Foot/Toe WDL  (L) Heel/Foot/Toe WDL          Devices In Places Pulse Ox      Interventions In Place Pillows and Pressure Redistribution Mattress    Possible Skin Injury No    Pictures Uploaded Into Epic N/A  Wound Consult Placed N/A  RN Wound Prevention Protocol Ordered No

## 2022-12-10 NOTE — PROGRESS NOTES
Bedside report received from day shift nurse.  Pt A&Ox4  Tolerating bariatric clear diet, denies n/v. Hypoactive bowel sounds, denies flatus, LBM PTA. IV access through 18g LFA that is running LR @ 100 mL/hr.  5x lap sites w/ dermabond, BRITTNY, CDI.   Saturating >90% on RA.  Pt ambulates x1-self.  Pain is controlled through medication orders. Updated on plan of care. Safety education provided. Bed locked in low. Call light within reach. Rounding in place.

## 2022-12-10 NOTE — CARE PLAN
The patient is Stable - Low risk of patient condition declining or worsening    Shift Goals  Clinical Goals: Pain control, rest  Patient Goals: Rest    Progress made toward(s) clinical / shift goals:  Pt resting. Pain managed w/ medication per MD order.     Patient is not progressing towards the following goals:

## 2022-12-10 NOTE — PROGRESS NOTES
Patient has arrived on unit from PACU. 2 family members are in the room with patient. Orders have been released.

## 2022-12-10 NOTE — PROGRESS NOTES
"[facility]  Progress Note      POD #1, s/p robotic Mony-en-Y gastric bypass and hiatal hernia repair.    Subjective:     Reports feeling well, overall. Notes substernal chest pain, shoulder pain, and some pain at abdominal incisions. Otherwise, no recent history of fevers/chills, nausea/vomiting, hematemesis, dysphagia/odynophagia, CP/SOB, bloating/belching, worsening abdominal pain, dysuria/hematuria, BRBPR/melena, or constipation/diarrhea. No other symptoms or complaints at this time. Tolerating clear liquids and water, ambulating in the hallways, and voiding spontaneously.    Objective:    BP (!) 141/92   Pulse (!) 55   Temp 36.7 °C (98 °F) (Temporal)   Resp 18   Ht 1.753 m (5' 9\")   Wt (!) 156 kg (344 lb 12.8 oz)   SpO2 96%     I/O last 3 completed shifts:  In: 1680 [P.O.:180; I.V.:1500]  Out: 650 [Urine:600]    Current Facility-Administered Medications   Medication Dose    lisinopril (PRINIVIL) tablet 20 mg  20 mg    lactated ringers infusion      heparin injection 7,500 Units  7,500 Units    Pharmacy Consult Request ...Pain Management Review 1 Each  1 Each    acetaminophen (TYLENOL) tablet 1,000 mg  1,000 mg    Followed by    [START ON 12/14/2022] acetaminophen (TYLENOL) tablet 1,000 mg  1,000 mg    ketorolac (TORADOL) injection 30 mg  30 mg    oxyCODONE (ROXICODONE) oral solution 5 mg  5 mg    Or    oxyCODONE (ROXICODONE) oral solution 10 mg  10 mg    Or    HYDROmorphone (Dilaudid) injection 0.5 mg  0.5 mg    ondansetron (ZOFRAN) syringe/vial injection 4 mg  4 mg    promethazine (PHENERGAN) suppository 25 mg  25 mg    simethicone (Mylicon) chewable tablet 125 mg  125 mg    benzocaine-menthol (Cepacol) lozenge 1 Lozenge  1 Lozenge    pantoprazole (Protonix) injection 40 mg  40 mg    baclofen (LIORESAL) tablet 10 mg  10 mg    gabapentin (NEURONTIN) 250 MG/5ML 300 mg  300 mg     Physical Exam:     Gen - comfortable, NAD, A&O x 3  HEENT - anicteric, PERRLA  CV - regular rate and rhythm  Abd " - soft, min TTP @ epigastrium, no rebound/guarding, no distention, no palp masses or bulges  Inc - all lap incisions are C/D/I - no evidence of infection or bulges  Ext - no clubbing, cyanosis or edema bilaterally  Skin - no rashes/lesions, no open wounds, no jaundice    Labs:    Recent Labs     12/10/22  0909   WBC 11.3*   RBC 4.57   HEMOGLOBIN 11.5*   HEMATOCRIT 37.0   MCV 81.0*   MCH 25.2*   MCHC 31.1*   RDW 45.2   PLATELETCT 274   MPV 11.6     Recent Labs     12/10/22  0909   CO2 23   GLUCOSE 102*   BUN 8     Imaging:    No results found.    Assessment/Plan:    POD #1, s/p robotic Mony-en-Y gastric bypass and hiatal hernia repair.    Doing well, recovering as expected. Postop abdominal/substernal chest pain and shoulder pain is normal for this phase of recovery and is expected to improve with time. Tolerating clear liquids and water, ambulating in the hallways, and voiding spontaneously.    Patient is stable for discharge today, with close follow up in the Surgery clinic (in 1 week, already scheduled).    Thank you for giving us this opportunity to care for your patient.      Aime To MD MPH FACS [facility]  Bariatric and Gastroesophageal Surgery  [facility]  Clinical Assistant Professor of Surgery  [facility]

## 2023-01-01 NOTE — LACTATION NOTE
This note was copied from a baby's chart. Day 2 progress note  Am wt assessed at -6.4%  Breast fed x 2, ebm pumped x 6 ml  Supplementation with formula additionally to satiation. Reviewed initiating milk and increasing supply through Mom taught how to manually hand express her colostrum. Manual massage, compression and expression of milk instructed to lead each feeding. Benefits of increasing milk production as well as milk transfer to baby with this low tech but highly effective stimulation process reviewed. Outpatient support groups/AWP encouraged for continued learning and support. Call prn. Statement Selected

## 2023-03-03 ENCOUNTER — HOSPITAL ENCOUNTER (OUTPATIENT)
Dept: LAB | Facility: MEDICAL CENTER | Age: 28
End: 2023-03-03
Payer: MEDICAID

## 2023-03-03 LAB
ALBUMIN SERPL BCP-MCNC: 4 G/DL (ref 3.2–4.9)
ALBUMIN/GLOB SERPL: 1.5 G/DL
ALP SERPL-CCNC: 93 U/L (ref 30–99)
ALT SERPL-CCNC: 54 U/L (ref 2–50)
ANION GAP SERPL CALC-SCNC: 9 MMOL/L (ref 7–16)
AST SERPL-CCNC: 31 U/L (ref 12–45)
BASOPHILS # BLD AUTO: 0.6 % (ref 0–1.8)
BASOPHILS # BLD: 0.05 K/UL (ref 0–0.12)
BILIRUB SERPL-MCNC: 0.7 MG/DL (ref 0.1–1.5)
BUN SERPL-MCNC: 9 MG/DL (ref 8–22)
CALCIUM ALBUM COR SERPL-MCNC: 9.5 MG/DL (ref 8.5–10.5)
CALCIUM SERPL-MCNC: 9.5 MG/DL (ref 8.5–10.5)
CHLORIDE SERPL-SCNC: 107 MMOL/L (ref 96–112)
CHOLEST SERPL-MCNC: 157 MG/DL (ref 100–199)
CO2 SERPL-SCNC: 25 MMOL/L (ref 20–33)
CREAT SERPL-MCNC: 0.68 MG/DL (ref 0.5–1.4)
EOSINOPHIL # BLD AUTO: 0.11 K/UL (ref 0–0.51)
EOSINOPHIL NFR BLD: 1.4 % (ref 0–6.9)
ERYTHROCYTE [DISTWIDTH] IN BLOOD BY AUTOMATED COUNT: 43.9 FL (ref 35.9–50)
FASTING STATUS PATIENT QL REPORTED: NORMAL
GFR SERPLBLD CREATININE-BSD FMLA CKD-EPI: 122 ML/MIN/1.73 M 2
GLOBULIN SER CALC-MCNC: 2.6 G/DL (ref 1.9–3.5)
GLUCOSE SERPL-MCNC: 86 MG/DL (ref 65–99)
HCT VFR BLD AUTO: 43 % (ref 37–47)
HDLC SERPL-MCNC: 38 MG/DL
HGB BLD-MCNC: 13.5 G/DL (ref 12–16)
IMM GRANULOCYTES # BLD AUTO: 0.01 K/UL (ref 0–0.11)
IMM GRANULOCYTES NFR BLD AUTO: 0.1 % (ref 0–0.9)
IRON SERPL-MCNC: 55 UG/DL (ref 40–170)
LDLC SERPL CALC-MCNC: 98 MG/DL
LYMPHOCYTES # BLD AUTO: 2.5 K/UL (ref 1–4.8)
LYMPHOCYTES NFR BLD: 31.1 % (ref 22–41)
MCH RBC QN AUTO: 25.3 PG (ref 27–33)
MCHC RBC AUTO-ENTMCNC: 31.4 G/DL (ref 33.6–35)
MCV RBC AUTO: 80.7 FL (ref 81.4–97.8)
MONOCYTES # BLD AUTO: 0.54 K/UL (ref 0–0.85)
MONOCYTES NFR BLD AUTO: 6.7 % (ref 0–13.4)
NEUTROPHILS # BLD AUTO: 4.84 K/UL (ref 2–7.15)
NEUTROPHILS NFR BLD: 60.1 % (ref 44–72)
NRBC # BLD AUTO: 0 K/UL
NRBC BLD-RTO: 0 /100 WBC
PLATELET # BLD AUTO: 228 K/UL (ref 164–446)
PMV BLD AUTO: 12.7 FL (ref 9–12.9)
POTASSIUM SERPL-SCNC: 4.2 MMOL/L (ref 3.6–5.5)
PROT SERPL-MCNC: 6.6 G/DL (ref 6–8.2)
RBC # BLD AUTO: 5.33 M/UL (ref 4.2–5.4)
SODIUM SERPL-SCNC: 141 MMOL/L (ref 135–145)
TRANSFERRIN SERPL-MCNC: 314 MG/DL (ref 200–370)
TRIGL SERPL-MCNC: 105 MG/DL (ref 0–149)
WBC # BLD AUTO: 8.1 K/UL (ref 4.8–10.8)

## 2023-03-03 PROCEDURE — 85025 COMPLETE CBC W/AUTO DIFF WBC: CPT

## 2023-03-03 PROCEDURE — 36415 COLL VENOUS BLD VENIPUNCTURE: CPT

## 2023-03-03 PROCEDURE — 84134 ASSAY OF PREALBUMIN: CPT

## 2023-03-03 PROCEDURE — 80061 LIPID PANEL: CPT

## 2023-03-03 PROCEDURE — 84252 ASSAY OF VITAMIN B-2: CPT

## 2023-03-03 PROCEDURE — 83540 ASSAY OF IRON: CPT

## 2023-03-03 PROCEDURE — 82728 ASSAY OF FERRITIN: CPT

## 2023-03-03 PROCEDURE — 82306 VITAMIN D 25 HYDROXY: CPT

## 2023-03-03 PROCEDURE — 84466 ASSAY OF TRANSFERRIN: CPT

## 2023-03-03 PROCEDURE — 82746 ASSAY OF FOLIC ACID SERUM: CPT

## 2023-03-03 PROCEDURE — 80053 COMPREHEN METABOLIC PANEL: CPT

## 2023-03-03 PROCEDURE — 82607 VITAMIN B-12: CPT

## 2023-03-03 PROCEDURE — 84425 ASSAY OF VITAMIN B-1: CPT

## 2023-03-03 PROCEDURE — 84207 ASSAY OF VITAMIN B-6: CPT

## 2023-03-03 PROCEDURE — 84630 ASSAY OF ZINC: CPT

## 2023-03-04 LAB
25(OH)D3 SERPL-MCNC: 20 NG/ML (ref 30–100)
FERRITIN SERPL-MCNC: 15.3 NG/ML (ref 10–291)
FOLATE SERPL-MCNC: 11.3 NG/ML
PREALB SERPL-MCNC: 21.2 MG/DL (ref 18–38)
VIT B12 SERPL-MCNC: 1016 PG/ML (ref 211–911)

## 2023-03-06 ENCOUNTER — OCCUPATIONAL MEDICINE (OUTPATIENT)
Dept: URGENT CARE | Facility: PHYSICIAN GROUP | Age: 28
End: 2023-03-06
Payer: OTHER MISCELLANEOUS

## 2023-03-06 ENCOUNTER — NON-PROVIDER VISIT (OUTPATIENT)
Dept: URGENT CARE | Facility: PHYSICIAN GROUP | Age: 28
End: 2023-03-06

## 2023-03-06 VITALS
DIASTOLIC BLOOD PRESSURE: 80 MMHG | OXYGEN SATURATION: 97 % | WEIGHT: 286 LBS | BODY MASS INDEX: 42.36 KG/M2 | TEMPERATURE: 97.7 F | RESPIRATION RATE: 18 BRPM | HEIGHT: 69 IN | HEART RATE: 74 BPM | SYSTOLIC BLOOD PRESSURE: 122 MMHG

## 2023-03-06 DIAGNOSIS — Z02.1 PRE-EMPLOYMENT DRUG SCREENING: ICD-10-CM

## 2023-03-06 DIAGNOSIS — S39.012A STRAIN OF LUMBAR REGION, INITIAL ENCOUNTER: ICD-10-CM

## 2023-03-06 LAB
AMP AMPHETAMINE: NORMAL
BAR BARBITURATES: NORMAL
BREATH ALCOHOL COMMENT: 0
BZO BENZODIAZEPINES: NORMAL
COC COCAINE: NORMAL
INT CON NEG: NEGATIVE
INT CON POS: POSITIVE
MDMA ECSTASY: NORMAL
MET METHAMPHETAMINES: NORMAL
MTD METHADONE: NORMAL
OPI OPIATES: NORMAL
OXY OXYCODONE: NORMAL
PCP PHENCYCLIDINE: NORMAL
POC BREATHALIZER: 0 PERCENT (ref 0–0.01)
POC URINE DRUG SCREEN OCDRS: NORMAL
THC: NORMAL

## 2023-03-06 PROCEDURE — 82075 ASSAY OF BREATH ETHANOL: CPT | Performed by: NURSE PRACTITIONER

## 2023-03-06 PROCEDURE — 99214 OFFICE O/P EST MOD 30 MIN: CPT | Performed by: NURSE PRACTITIONER

## 2023-03-06 PROCEDURE — 80305 DRUG TEST PRSMV DIR OPT OBS: CPT | Performed by: NURSE PRACTITIONER

## 2023-03-06 RX ORDER — CYCLOBENZAPRINE HCL 5 MG
5-10 TABLET ORAL 3 TIMES DAILY PRN
Qty: 30 TABLET | Refills: 0 | Status: SHIPPED | OUTPATIENT
Start: 2023-03-06 | End: 2023-03-26

## 2023-03-06 RX ORDER — VENLAFAXINE HYDROCHLORIDE 75 MG/1
75 CAPSULE, EXTENDED RELEASE ORAL DAILY
COMMUNITY
Start: 2022-12-30

## 2023-03-06 RX ORDER — LOSARTAN POTASSIUM AND HYDROCHLOROTHIAZIDE 12.5; 5 MG/1; MG/1
1 TABLET ORAL DAILY
COMMUNITY

## 2023-03-06 ASSESSMENT — FIBROSIS 4 INDEX: FIB4 SCORE: 0.5

## 2023-03-06 NOTE — PROGRESS NOTES
"Subjective:     Sintia Jain is a 27 y.o. 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  She reports that she immediately felt pain in her lower back that was and throbbing in nature and rates it a 8/10.  Today she reports that she continues to have lower back pain but continues to be sharp and throbbing in nature and rates it at a 8.5/10.  She denies having any numbness or tingling running down her legs at the time of injury or today.  She is able to ambulate however it does cause pain.   She reports that she has done cryotherapy and taken Tylenol which did not help with her pain.  She reports that a hot bath did make the pain worse.    PMH:   No pertinent past medical history to this problem  MEDS:  Medications were reviewed in EMR  ALLERGIES:  Allergies were reviewed in EMR  SOCHX:  Works as a Caregiver at [employer]   FH:   No pertinent family history to this problem    [unfilled]       Objective:     /80   Pulse 74   Temp 36.5 °C (97.7 °F) (Temporal)   Resp 18   Ht 1.753 m (5' 9\")   Wt (!) 130 kg (286 lb)   SpO2 97%   BMI 42.23 kg/m²     [unfilled]    Back: Decreased ROM to the left with lateral extension, twisting, and flexion and extension, 4/5 LE strength, sensation intact bilaterally in LE, no TTP over spinous processes, positive left lumbar paraspinals TTP, SI joint positive on left, negative on right, straight leg raise negative.       Assessment/Plan:       1. Strain of lumbar region, initial encounter  - cyclobenzaprine (FLEXERIL) 5 mg tablet; Take 1-2 Tablets by mouth 3 times a day as " needed for Muscle Spasms or Moderate Pain for up to 20 days.  Dispense: 30 Tablet; Refill: 0    Other orders  - venlafaxine XR (EFFEXOR XR) 37.5 MG CAPSULE SR 24 HR; TAKE 1 CAPSULE EVERY DAY BY ORAL ROUTE IN THE MORNING.  - losartan-hydrochlorothiazide (HYZAAR) 50-12.5 MG per tablet; Take 1 Tablet by mouth every day.    Released to Restricted Duty FROM 3/6/2023 TO 3/27/2023  Patient has lumbar sprain to left side.  No twisting, bending, pushing, pulling, lifting, squatting, or climbing stairs.  Patient was discharged with Flexeril 5 to 10 mg 3 times daily as needed for muscle spasms and pain.  Patient may still use Tylenol 500 to 1000 mg 3 times daily.  Continue with cryotherapy 20 minutes nightly.        Differential diagnosis, natural history, supportive care, and indications for immediate follow-up discussed.    Approximately 35 minutes were spent in reviewing notes, preparing for visit, obtaining history, exam and evaluation, patient counseling/education and post visit documentation/orders.

## 2023-03-06 NOTE — LETTER
"EMPLOYEE’S CLAIM FOR COMPENSATION/ REPORT OF INITIAL TREATMENT  FORM C-4    EMPLOYEE’S CLAIM - PROVIDE ALL INFORMATION REQUESTED   First Name  April Last Name  Cristobal Birthdate                    1995                Sex  female Claim Number (Insurer’s Use Only)   Home Address  [address] Age  27 y.o. 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 (if applicable)  TRANSFERRING A RESIDENT AND POSITIONING IN CHAIR    How did this injury or occupational disease occur? (Be specific an answer in detail. Use additional sheet if necessary)  I WAS TRANSFERRING A REDIFENT TO WHEELCHAIR AFTER SITTING HER DOWN I WENT BEHIND RESIDENT TO POSITION HER CHAIR BETTER AND I FELT SOMETHING POP IN MY BACK   If you believe that you have an occupational disease, when did you first have knowledge of the disability and it relationship to your employment?  NA Witnesses to the Accident  RESIDENT      Nature of Injury or Occupational Disease  Workers' Compensation  Part(s) of Body Injured or Affected  Lower Back Area " (Lumbar Area & Lumbo-Sacral), Hip (L), Defer    I certify that the above is true and correct to the best of my knowledge and that I have provided this information in order to obtain the benefits of [place]  I hereby authorize any physician, chiropractor, surgeon, practitioner, or other person, any hospital, including [facility] or [facility], any medical service organization, any insurance company, or other institution or organization to release to each other, any medical or other information, including benefits paid or payable, pertinent to this injury or disease, except information relative to diagnosis, treatment and/or counseling for AIDS, psychological conditions, alcohol or controlled substances, for which I must give specific authorization.  A Photostat of this authorization shall be as valid as the original.     Date   [id number]   [facility] Employee’s Original or  *Electronic Signature   THIS REPORT MUST BE COMPLETED AND MAILED WITHIN 3 WORKING DAYS OF TREATMENT   Place  [facility]  Name of [facility]   Date  3/6/2023 Diagnosis and Description of Injury or Occupational Disease  (S39.012A) Strain of lumbar region, initial encounter Is there evidence the injured employee was under the influence of alcohol and/or another controlled substance at the time of accident?  ? No ? Yes (if yes, please explain)   Hour  12:46 PM   The encounter diagnosis was Strain of lumbar region, initial encounter. No   Treatment  Patient has lumbar sprain to left side.  No twisting, bending, pushing, pulling, lifting, squatting, or climbing stairs.  Patient was discharged with Flexeril 5 to 10 mg 3 times daily as needed for muscle spasms and pain.  Patient may still use Tylenol 500 to 1000 mg 3 times daily.  Continue with cryotherapy 20 minutes nightly.   Have you advised the  patient to remain off work five days or     more?    X-Ray Findings      ? Yes Indicate dates:   From   To      From information given by the employee, together with medical evidence, can        you directly connect this injury or occupational disease as job incurred?  Yes ? No If no, is the injured employee capable of:  ? full duty  No ? modified duty  Yes   Is additional medical care by a physician indicated?  Yes If Modified Duty, Specify any Limitations / Restrictions  No twisting, bending, pushing, pulling lifting, squatting, or climbing stairs.   Do you know of any previous injury or disease contributing to this condition or occupational disease?  ? Yes ? No (Explain if yes)                          No   Date  3/6/2023 Print Health Care Provider's   AMANDA Rasheed I certify the employer’s copy of  this form was mailed on:   Address  [address] Insurer’s Use Only     [facility]  [address]    Provider’s Tax ID Number  [id number] Telephone  Dept: [phone]             Health Care Provider’s Original or Electronic Signature  e-SignSJOLIE HWANG.P.RNarayanNNarayan Degree (MD,DO, DC,PA-C,APRN)  APRN      * Complete and attach Release of Information (Form C-4A) when injured employee signs C-4 Form electronically  ORIGINAL - TREATING HEALTHCARE PROVIDER PAGE 2 - INSURER/TPA PAGE 3 - EMPLOYER PAGE 4 - EMPLOYEE             Form C-4 (rev.08/21)           BRIEF DESCRIPTION OF RIGHTS AND BENEFITS  (Pursuant to [place] 616[place])    Notice of Injury or Occupational Disease (Incident Report Form C-1): If an injury or occupational disease (OD) arises out of and in the course of employment, you must provide written notice to your employer as soon as practicable, but no later than 7 days after the accident or OD. Your employer shall maintain a sufficient supply of the required forms.    Claim for Compensation (Form C-4): If medical treatment is sought, the form C-4 is available at the place of  "initial treatment. A completed \"Claim for Compensation\" (Form C-4) must be filed within 90 days after an accident or OD. The treating physician or chiropractor must, within 3 working days after treatment, complete and mail to the employer, the employer's insurer and third-party , the Claim for Compensation.    Medical Treatment: If you require medical treatment for your on-the-job injury or OD, you may be required to select a physician or chiropractor from a list provided by your workers’ compensation insurer, if it has contracted with an Organization for Managed Care (MCO) or Preferred Provider Organization (PPO) or providers of health care. If your employer has not entered into a contract with an MCO or PPO, you may select a physician or chiropractor from the Panel of Physicians and Chiropractors. Any medical costs related to your industrial injury or OD will be paid by your insurer.    Temporary Total Disability (TTD): If your doctor has certified that you are unable to work for a period of at least 5 consecutive days, or 5 cumulative days in a 20-day period, or places restrictions on you that your employer does not accommodate, you may be entitled to TTD compensation.    Temporary Partial Disability (TPD): If the wage you receive upon reemployment is less than the compensation for TTD to which you are entitled, the insurer may be required to pay you TPD compensation to make up the difference. TPD can only be paid for a maximum of 24 months.    Permanent Partial Disability (PPD): When your medical condition is stable and there is an indication of a PPD as a result of your injury or OD, within 30 days, your insurer must arrange for an evaluation by a rating physician or chiropractor to determine the degree of your PPD. The amount of your PPD award depends on the date of injury, the results of the PPD evaluation, your age and wage.    Permanent Total Disability (PTD): If you are medically certified by " a treating physician or chiropractor as permanently and totally disabled and have been granted a PTD status by your insurer, you are entitled to receive monthly benefits not to exceed 66 2/3% of your average monthly wage. The amount of your PTD payments is subject to reduction if you previously received a lump-sum PPD award.    Vocational Rehabilitation Services: You may be eligible for vocational rehabilitation services if you are unable to return to the job due to a permanent physical impairment or permanent restrictions as a result of your injury or occupational disease.    Transportation and Per Rylan Reimbursement: You may be eligible for travel expenses and per rylan associated with medical treatment.    Reopening: You may be able to reopen your claim if your condition worsens after claim closure.     Appeal Process: If you disagree with a written determination issued by the insurer or the insurer does not respond to your request, you may appeal to the Department of Administration, , by following the instructions contained in your determination letter. You must appeal the determination within 70 days from the date of the determination letter at [address], [address], [place] [address], or [address], [place] [address]. If you disagree with the  decision, you may appeal to the Department of Administration, . You must file your appeal within 30 days from the date of the  decision letter at [address], [address], [place] [address], or [address], [place] [address]. If you disagree with a decision of an , you may file a petition for judicial review with the District Court. You must do so within 30 days of the Appeal Officer’s decision. You may be represented by an  at your own expense or you may contact the [facility] for possible  representation.    [place]  for Injured Workers (NAIW): If you disagree with a  decision, you may request that NAIW represent you without charge at an  Hearing. For information regarding denial of benefits, you may contact the NAIW at: [address], [address], [place] [address], [phone], or [address], [address], [place] [address], [phone]    To File a Complaint with the Division: If you wish to file a complaint with the  of the Division of Industrial Relations (DIR),  please contact the Workers’ Compensation Section, [address], [address], [place], [place] [address], telephone [phone]) [phone], or [address], [address], [place], [place] [address], telephone [phone].    For assistance with Workers’ Compensation Issues: You may contact the [facility] Office for Consumer Health Assistance, [address], [address], [place], [place] [address], Toll Free 1-522.683.4532, Web site: [email or web address].[place].gov/Programs/JOANA E-mail: [email or web address]             42952414            __________________________________________________________________                                    _________________            Employee Name / Signature                                                                                                                            Date                                                                                                                                                                                                                              D-2 (rev. 10/20)

## 2023-03-07 LAB — ZINC SERPL-MCNC: 74.2 UG/DL (ref 60–120)

## 2023-03-08 LAB — VIT B6 SERPL-MCNC: 50.1 NMOL/L (ref 20–125)

## 2023-03-10 LAB — VIT B1 BLD-MCNC: 119 NMOL/L (ref 70–180)

## 2023-03-11 LAB — VIT B2 SERPL-SCNC: 7 NMOL/L (ref 5–50)

## 2023-03-15 ENCOUNTER — HOSPITAL ENCOUNTER (OUTPATIENT)
Dept: RADIOLOGY | Facility: MEDICAL CENTER | Age: 28
End: 2023-03-15
Attending: NURSE PRACTITIONER
Payer: MEDICAID

## 2023-03-15 DIAGNOSIS — Z98.84 BARIATRIC SURGERY STATUS: ICD-10-CM

## 2023-03-15 DIAGNOSIS — R10.9 STOMACH ACHE: ICD-10-CM

## 2023-03-15 PROCEDURE — 74177 CT ABD & PELVIS W/CONTRAST: CPT

## 2023-03-15 PROCEDURE — 700117 HCHG RX CONTRAST REV CODE 255: Performed by: NURSE PRACTITIONER

## 2023-03-15 RX ADMIN — IOHEXOL 100 ML: 350 INJECTION, SOLUTION INTRAVENOUS at 12:47

## 2023-03-15 RX ADMIN — IOHEXOL 25 ML: 240 INJECTION, SOLUTION INTRATHECAL; INTRAVASCULAR; INTRAVENOUS; ORAL at 12:47

## 2023-03-27 ENCOUNTER — OCCUPATIONAL MEDICINE (OUTPATIENT)
Dept: URGENT CARE | Facility: PHYSICIAN GROUP | Age: 28
End: 2023-03-27
Payer: OTHER MISCELLANEOUS

## 2023-03-27 VITALS
OXYGEN SATURATION: 98 % | BODY MASS INDEX: 41.92 KG/M2 | TEMPERATURE: 97.5 F | HEIGHT: 69 IN | WEIGHT: 283 LBS | DIASTOLIC BLOOD PRESSURE: 78 MMHG | HEART RATE: 82 BPM | SYSTOLIC BLOOD PRESSURE: 112 MMHG | RESPIRATION RATE: 18 BRPM

## 2023-03-27 DIAGNOSIS — M54.16 RADICULOPATHY OF LUMBAR REGION: ICD-10-CM

## 2023-03-27 DIAGNOSIS — S39.012D STRAIN OF LUMBAR REGION, SUBSEQUENT ENCOUNTER: ICD-10-CM

## 2023-03-27 PROCEDURE — 99213 OFFICE O/P EST LOW 20 MIN: CPT | Performed by: NURSE PRACTITIONER

## 2023-03-27 RX ORDER — METHYLPREDNISOLONE 4 MG/1
TABLET ORAL
Qty: 21 TABLET | Refills: 0 | Status: ON HOLD | OUTPATIENT
Start: 2023-03-27 | End: 2023-06-03

## 2023-03-27 RX ORDER — ONDANSETRON 4 MG/1
4 TABLET, ORALLY DISINTEGRATING ORAL EVERY 6 HOURS PRN
COMMUNITY

## 2023-03-27 ASSESSMENT — FIBROSIS 4 INDEX: FIB4 SCORE: 0.5

## 2023-03-27 NOTE — LETTER
[facility] [place] [address]  Phone:  [phone] - Fax:  [phone]   [facility] Progress Report and Disability Certification  Date of Service: 3/27/2023   No Show:  No  Date / Time of Next Visit: 4/7/2023   Claim Information   Patient Name: Sintia Jain  Claim Number:     Employer: [employer]  Date of Injury: 3/4/2023     Insurer / TPA: Rosario Mobley  ID / SSN:     Occupation: CAREGIVER  Diagnosis: Diagnoses of Strain of lumbar region, subsequent encounter and Radiculopathy of lumbar region were pertinent to this visit.    Medical Information   Related to Industrial Injury?   yes   Subjective Complaints:  DOI: 03/04/2023 ROCHELLE: Patient sates she states that she was transferring a resident to a wheelchair and got her placed in the wheelchair moved behind the resident and pulled her backwards underneath her arms and felt a pop in her lower back.  Today she states that her pain is worsening. Her pain level in her lower back is 9/10, constant throbbing with radiating down her left leg into the middle of her thigh.  Aggravating factors include walking, trying to sit up, leaning forward, or rolling over in her bed.   Has been taking Flexeril and Tylenol without pain relief or improvement of symptoms.  Has also been doing cryo and heat therapy without any relief.   Objective Findings: Back: Decreased ROM due to pain and tightness, 5/5 LE strength, sensation intact bilaterally in LE, no TTP over spinous processes, positive for paraspinals lumbar region bilaterally left greater than right, SI joint positive on left, negative on right, straight leg raise positive for pain on left at 30 degrees without radiculopathy.  Negative straight leg raise on right.     Pre-Existing Condition(s):     Assessment:   Condition Worsened    Status: Additional Care Required  Permanent Disability:No    Plan: MedicationPT  Comments:Flexeril, new prescription  methylprednisolone.  Referral placed to PT.    Diagnostics:      Comments:       Disability Information   Status: Released to Restricted Duty    From:  3/27/2023  Through: 4/7/2023 Restrictions are: Temporary   Physical Restrictions   Sitting:    Standing:    Stooping:    Bending:      Squatting:    Walking:    Climbing:    Pushing:      Pulling:    Other:    Reaching Above Shoulder (L):   Reaching Above Shoulder (R):       Reaching Below Shoulder (L):    Reaching Below Shoulder (R):      Not to exceed Weight Limits   Carrying(hrs):   Weight Limit(lb):   Lifting(hrs):   Weight  Limit(lb):     Comments: No twisting, bending, pushing, pulling, lifting, squatting, or climbing stairs.    Patient will with Flexeril 5 to 10 mg 3 times daily as needed for muscle spasms, continue with Tylenol 5 to 1000 mg 3 times daily.   New prescription of methylprednisolone sent to pharmacy.  Continue with cryotherapy 20 minutes nightly.     Repetitive Actions   Hands: i.e. Fine Manipulations from Grasping:     Feet: i.e. Operating Foot Controls:     Driving / Operate Machinery:     Health Care Provider’s Original or Electronic Signature  Melina Sims A.P.R.N. Health Care Provider’s Original or Electronic Signature    Mark Guy DO MPH     Clinic Name / Location: [address] [address] Clinic Phone Number: Dept: [phone]   Appointment Time: 10:00 Am Visit Start Time: 10:18 AM   Check-In Time:  9:47 Am Visit Discharge Time:  11:18 am   Original-Treating Physician or Chiropractor    Page 2-Insurer/TPA    Page 3-Employer    Page 4-Employee

## 2023-03-27 NOTE — PROGRESS NOTES
"Subjective:     Sintia Jain is a 27 y.o. female who presents for Follow-Up ( follow up for back injury, pt is not feeling better it is worse, medication not helping )      DOI: 03/04/2023 ROCHELLE: Patient sates she states that she was transferring a resident to a wheelchair and got her placed in the wheelchair moved behind the resident and pulled her backwards underneath her arms and felt a pop in her lower back.  Today she states that her pain is worsening. Her pain level in her lower back is 9/10, constant throbbing with radiating down her left leg into the middle of her thigh.  Aggravating factors include walking, trying to sit up, leaning forward, or rolling over in her bed.   Has been taking Flexeril and Tylenol without pain relief or improvement of symptoms.  Has also been doing cryo and heat therapy without any relief.    PMH:   No pertinent past medical history to this problem  MEDS:  Medications were reviewed in EMR  ALLERGIES:  Allergies were reviewed in EMR  SOCHX:  Works as a CAREGIVER at [employer]  FH:   No pertinent family history to this problem       Objective:     /78   Pulse 82   Temp 36.4 °C (97.5 °F) (Temporal)   Resp 18   Ht 1.753 m (5' 9\")   Wt (!) 128 kg (283 lb)   SpO2 98%   BMI 41.79 kg/m²     Back: Decreased ROM due to pain and tightness, 5/5 LE strength, sensation intact bilaterally in LE, no TTP over spinous processes, positive for paraspinals lumbar region bilaterally left greater than right, SI joint positive on left, negative on right, straight leg raise positive for pain on left at 30 degrees without radiculopathy.  Negative straight leg raise on right.      Assessment/Plan:       1. Strain of lumbar region, subsequent encounter  - Referral to Physical Therapy  - methylPREDNISolone (MEDROL DOSEPAK) 4 MG Tablet Therapy Pack; Follow schedule on package instructions.  Dispense: 21 Tablet; Refill: 0    2. Radiculopathy of lumbar region  - Referral to Physical " Therapy  - methylPREDNISolone (MEDROL DOSEPAK) 4 MG Tablet Therapy Pack; Follow schedule on package instructions.  Dispense: 21 Tablet; Refill: 0    Other orders  - ondansetron (ZOFRAN ODT) 4 MG TABLET DISPERSIBLE; DISSOLVE 1 TAB UNDER THE TONGUE EVERY 6 HOURS    Released to Restricted Duty FROM 3/27/2023 TO 4/7/2023  No twisting, bending, pushing, pulling, lifting, squatting, or climbing stairs.    Patient will with Flexeril 5 to 10 mg 3 times daily as needed for muscle spasms, continue with Tylenol 5 to 1000 mg 3 times daily.   New prescription of methylprednisolone sent to pharmacy.  Continue with cryotherapy 20 minutes nightly.        Differential diagnosis, natural history, supportive care, and indications for immediate follow-up discussed.

## 2023-04-07 ENCOUNTER — OCCUPATIONAL MEDICINE (OUTPATIENT)
Dept: URGENT CARE | Facility: PHYSICIAN GROUP | Age: 28
End: 2023-04-07
Payer: OTHER MISCELLANEOUS

## 2023-04-07 VITALS
WEIGHT: 175 LBS | DIASTOLIC BLOOD PRESSURE: 88 MMHG | BODY MASS INDEX: 25.92 KG/M2 | HEART RATE: 81 BPM | HEIGHT: 69 IN | OXYGEN SATURATION: 96 % | RESPIRATION RATE: 16 BRPM | TEMPERATURE: 97.6 F | SYSTOLIC BLOOD PRESSURE: 132 MMHG

## 2023-04-07 DIAGNOSIS — M46.1 SACROILIITIS (HCC): ICD-10-CM

## 2023-04-07 PROCEDURE — 99213 OFFICE O/P EST LOW 20 MIN: CPT | Performed by: FAMILY MEDICINE

## 2023-04-07 ASSESSMENT — FIBROSIS 4 INDEX: FIB4 SCORE: 0.5

## 2023-04-07 NOTE — LETTER
[facility] [place] [address]  Phone:  [phone] - Fax:  [phone]   [facility] Progress Report and Disability Certification  Date of Service: 4/7/2023   No Show:  No  Date / Time of Next Visit:     Claim Information   Patient Name: Sintia Jain  Claim Number:     Employer: [employer]  Date of Injury: 3/4/2023     Insurer / TPA: [employer] Workers [employer]  ID / SSN:     Occupation: CAREGIVER  Diagnosis: The encounter diagnosis was Sacroiliitis (HCC).    Medical Information   Related to Industrial Injury? Yes    Subjective Complaints:  Low back pain worse with walking, pain radiates down to the right hip area.   Objective Findings: Neg straight leg, no vertebral pain.  No numbness or weakness.   Pre-Existing Condition(s):     Assessment:   Condition Same    Status: Additional Care Required  Permanent Disability:No    Plan: Medication  Comments:chiropractic care for back manipulation, cont muscle relaxant, and work restriction    Diagnostics:      Comments:       Disability Information   Status: Released to Restricted Duty    From:     Through:   Restrictions are:     Physical Restrictions   Sitting:  < or = to 6 hrs/day Standing:  < or = to 1 hr/day Stooping:  < or = to 1 hr/day Bending:  < or = to 1 hr/day   Squatting:  < or = to 1 hr/day Walking:  < or = to 1 hr/day Climbing:  < or = to 1 hr/day Pushing:  < or = to 1 hr/day   Pulling:  < or = to 1 hr/day Other:  < or = to 1 hr/day Reaching Above Shoulder (L): < or = to 6 hrs/day Reaching Above Shoulder (R): < or = 6 hrs/day     Reaching Below Shoulder (L):  < or = to 6 hrs/day Reaching Below Shoulder (R):  < or = to 6 hrs/day   Not to exceed Weight Limits   Carrying(hrs):   Weight Limit(lb): < or = to 10 pounds Lifting(hrs):   Weight  Limit(lb): < or = to 10 pounds   Comments:      Repetitive Actions   Hands: i.e. Fine Manipulations from Grasping: < or = to 6 hrs/day   Feet: i.e. Operating  Foot Controls:     Driving / Operate Machinery:     Health Care Provider’s Original or Electronic Signature  Jimmie Valles M.D. Health Care Provider’s Original or Electronic Signature    Mark Guy DO MPH     Clinic Name / Location: [address] [place] [address] Clinic Phone Number: Dept: [phone]   Appointment Time: 10:00 Am Visit Start Time: 10:14 AM   Check-In Time:  9:50 Am Visit Discharge Time:  10: 41AM   Original-Treating Physician or Chiropractor    Page 2-Insurer/TPA    Page 3-Employer    Page 4-Employee

## 2023-04-07 NOTE — PROGRESS NOTES
"Subjective:     Sintia Jain is a 27 y.o. female who presents for Back Pain (Wc follow up on Lower back pain, pain is mostly now when having to bend down, pain radiates down L leg. 6/10 pain )      Low back pain worse with walking, pain radiates down to the right hip area.    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  No numbness or weakness.    Assessment/Plan:       1. Sacroiliitis (HCC)  - Referral to Occupational Therapy    Released to Restricted Duty FROM   TO            Differential diagnosis, natural history, supportive care, and indications for immediate follow-up discussed.    "

## 2023-06-03 ENCOUNTER — ANESTHESIA EVENT (OUTPATIENT)
Dept: SURGERY | Facility: MEDICAL CENTER | Age: 28
DRG: 393 | End: 2023-06-03
Payer: MEDICAID

## 2023-06-03 ENCOUNTER — HOSPITAL ENCOUNTER (INPATIENT)
Facility: MEDICAL CENTER | Age: 28
LOS: 3 days | DRG: 393 | End: 2023-06-06
Attending: STUDENT IN AN ORGANIZED HEALTH CARE EDUCATION/TRAINING PROGRAM | Admitting: HOSPITALIST
Payer: MEDICAID

## 2023-06-03 ENCOUNTER — ANESTHESIA (OUTPATIENT)
Dept: SURGERY | Facility: MEDICAL CENTER | Age: 28
DRG: 393 | End: 2023-06-03
Payer: MEDICAID

## 2023-06-03 DIAGNOSIS — O10.919 CHRONIC HYPERTENSION AFFECTING PREGNANCY: ICD-10-CM

## 2023-06-03 DIAGNOSIS — K92.0 GASTROINTESTINAL HEMORRHAGE WITH HEMATEMESIS: ICD-10-CM

## 2023-06-03 PROBLEM — D68.9 COAGULOPATHY (HCC): Status: ACTIVE | Noted: 2023-06-03

## 2023-06-03 PROBLEM — D69.6 THROMBOCYTOPENIA (HCC): Status: ACTIVE | Noted: 2023-06-03

## 2023-06-03 PROBLEM — D62 ACUTE BLOOD LOSS ANEMIA: Status: ACTIVE | Noted: 2023-06-03

## 2023-06-03 PROBLEM — K92.2 GIB (GASTROINTESTINAL BLEEDING): Status: ACTIVE | Noted: 2023-06-03

## 2023-06-03 LAB
HGB BLD-MCNC: 9.3 G/DL (ref 12–16)
HGB BLD-MCNC: 9.4 G/DL (ref 12–16)
HGB BLD-MCNC: 9.5 G/DL (ref 12–16)

## 2023-06-03 PROCEDURE — 36415 COLL VENOUS BLD VENIPUNCTURE: CPT

## 2023-06-03 PROCEDURE — 160002 HCHG RECOVERY MINUTES (STAT): Performed by: INTERNAL MEDICINE

## 2023-06-03 PROCEDURE — 700111 HCHG RX REV CODE 636 W/ 250 OVERRIDE (IP): Performed by: HOSPITALIST

## 2023-06-03 PROCEDURE — 99223 1ST HOSP IP/OBS HIGH 75: CPT | Mod: AI | Performed by: HOSPITALIST

## 2023-06-03 PROCEDURE — 160203 HCHG ENDO MINUTES - 1ST 30 MINS LEVEL 4: Performed by: INTERNAL MEDICINE

## 2023-06-03 PROCEDURE — 700102 HCHG RX REV CODE 250 W/ 637 OVERRIDE(OP): Performed by: INTERNAL MEDICINE

## 2023-06-03 PROCEDURE — 700111 HCHG RX REV CODE 636 W/ 250 OVERRIDE (IP): Performed by: ANESTHESIOLOGY

## 2023-06-03 PROCEDURE — 700105 HCHG RX REV CODE 258: Performed by: HOSPITALIST

## 2023-06-03 PROCEDURE — C9113 INJ PANTOPRAZOLE SODIUM, VIA: HCPCS | Performed by: HOSPITALIST

## 2023-06-03 PROCEDURE — 00731 ANES UPR GI NDSC PX NOS: CPT | Performed by: ANESTHESIOLOGY

## 2023-06-03 PROCEDURE — 502240 HCHG MISC OR SUPPLY RC 0272: Performed by: INTERNAL MEDICINE

## 2023-06-03 PROCEDURE — 770020 HCHG ROOM/CARE - TELE (206)

## 2023-06-03 PROCEDURE — 99254 IP/OBS CNSLTJ NEW/EST MOD 60: CPT | Performed by: INTERNAL MEDICINE

## 2023-06-03 PROCEDURE — 160009 HCHG ANES TIME/MIN: Performed by: INTERNAL MEDICINE

## 2023-06-03 PROCEDURE — 0W3P8ZZ CONTROL BLEEDING IN GASTROINTESTINAL TRACT, VIA NATURAL OR ARTIFICIAL OPENING ENDOSCOPIC: ICD-10-PCS | Performed by: INTERNAL MEDICINE

## 2023-06-03 PROCEDURE — 160035 HCHG PACU - 1ST 60 MINS PHASE I: Performed by: INTERNAL MEDICINE

## 2023-06-03 PROCEDURE — 43255 EGD CONTROL BLEEDING ANY: CPT | Performed by: INTERNAL MEDICINE

## 2023-06-03 PROCEDURE — 700105 HCHG RX REV CODE 258: Performed by: ANESTHESIOLOGY

## 2023-06-03 PROCEDURE — 160048 HCHG OR STATISTICAL LEVEL 1-5: Performed by: INTERNAL MEDICINE

## 2023-06-03 PROCEDURE — 85018 HEMOGLOBIN: CPT

## 2023-06-03 PROCEDURE — 700101 HCHG RX REV CODE 250: Performed by: ANESTHESIOLOGY

## 2023-06-03 PROCEDURE — A9270 NON-COVERED ITEM OR SERVICE: HCPCS | Performed by: INTERNAL MEDICINE

## 2023-06-03 RX ORDER — PANTOPRAZOLE SODIUM 40 MG/10ML
40 INJECTION, POWDER, LYOPHILIZED, FOR SOLUTION INTRAVENOUS 2 TIMES DAILY
Status: DISCONTINUED | OUTPATIENT
Start: 2023-06-03 | End: 2023-06-05

## 2023-06-03 RX ORDER — VITAMIN E (DL,TOCOPHERYL ACET) 180 MG
2 CAPSULE ORAL DAILY
Status: ON HOLD | COMMUNITY
End: 2023-06-06

## 2023-06-03 RX ORDER — ONDANSETRON 2 MG/ML
4 INJECTION INTRAMUSCULAR; INTRAVENOUS EVERY 4 HOURS PRN
Status: DISCONTINUED | OUTPATIENT
Start: 2023-06-03 | End: 2023-06-06 | Stop reason: HOSPADM

## 2023-06-03 RX ORDER — ONDANSETRON 2 MG/ML
4 INJECTION INTRAMUSCULAR; INTRAVENOUS
Status: DISCONTINUED | OUTPATIENT
Start: 2023-06-03 | End: 2023-06-03 | Stop reason: HOSPADM

## 2023-06-03 RX ORDER — DIPHENHYDRAMINE HYDROCHLORIDE 50 MG/ML
12.5 INJECTION INTRAMUSCULAR; INTRAVENOUS
Status: DISCONTINUED | OUTPATIENT
Start: 2023-06-03 | End: 2023-06-03 | Stop reason: HOSPADM

## 2023-06-03 RX ORDER — HYDRALAZINE HYDROCHLORIDE 20 MG/ML
5 INJECTION INTRAMUSCULAR; INTRAVENOUS
Status: DISCONTINUED | OUTPATIENT
Start: 2023-06-03 | End: 2023-06-03 | Stop reason: HOSPADM

## 2023-06-03 RX ORDER — SODIUM CHLORIDE, SODIUM LACTATE, POTASSIUM CHLORIDE, CALCIUM CHLORIDE 600; 310; 30; 20 MG/100ML; MG/100ML; MG/100ML; MG/100ML
INJECTION, SOLUTION INTRAVENOUS CONTINUOUS
Status: DISCONTINUED | OUTPATIENT
Start: 2023-06-03 | End: 2023-06-03 | Stop reason: HOSPADM

## 2023-06-03 RX ORDER — LIDOCAINE HYDROCHLORIDE 20 MG/ML
INJECTION, SOLUTION EPIDURAL; INFILTRATION; INTRACAUDAL; PERINEURAL PRN
Status: DISCONTINUED | OUTPATIENT
Start: 2023-06-03 | End: 2023-06-03 | Stop reason: SURG

## 2023-06-03 RX ORDER — SODIUM CHLORIDE, SODIUM LACTATE, POTASSIUM CHLORIDE, CALCIUM CHLORIDE 600; 310; 30; 20 MG/100ML; MG/100ML; MG/100ML; MG/100ML
INJECTION, SOLUTION INTRAVENOUS
Status: DISCONTINUED | OUTPATIENT
Start: 2023-06-03 | End: 2023-06-03 | Stop reason: SURG

## 2023-06-03 RX ORDER — MORPHINE SULFATE 4 MG/ML
1-4 INJECTION INTRAVENOUS
Status: DISCONTINUED | OUTPATIENT
Start: 2023-06-03 | End: 2023-06-04

## 2023-06-03 RX ORDER — SUCRALFATE ORAL 1 G/10ML
1 SUSPENSION ORAL EVERY 6 HOURS
Status: DISCONTINUED | OUTPATIENT
Start: 2023-06-03 | End: 2023-06-06 | Stop reason: HOSPADM

## 2023-06-03 RX ORDER — MEPERIDINE HYDROCHLORIDE 25 MG/ML
12.5 INJECTION INTRAMUSCULAR; INTRAVENOUS; SUBCUTANEOUS
Status: DISCONTINUED | OUTPATIENT
Start: 2023-06-03 | End: 2023-06-03 | Stop reason: HOSPADM

## 2023-06-03 RX ADMIN — PROPOFOL 20 MG: 10 INJECTION, EMULSION INTRAVENOUS at 11:51

## 2023-06-03 RX ADMIN — PROPOFOL 50 MG: 10 INJECTION, EMULSION INTRAVENOUS at 11:47

## 2023-06-03 RX ADMIN — LIDOCAINE HYDROCHLORIDE 60 MG: 20 INJECTION, SOLUTION EPIDURAL; INFILTRATION; INTRACAUDAL at 11:44

## 2023-06-03 RX ADMIN — PANTOPRAZOLE SODIUM 40 MG: 40 INJECTION, POWDER, FOR SOLUTION INTRAVENOUS at 22:52

## 2023-06-03 RX ADMIN — SUCRALFATE 1 G: 1 SUSPENSION ORAL at 22:52

## 2023-06-03 RX ADMIN — SODIUM CHLORIDE, POTASSIUM CHLORIDE, SODIUM LACTATE AND CALCIUM CHLORIDE: 600; 310; 30; 20 INJECTION, SOLUTION INTRAVENOUS at 11:41

## 2023-06-03 RX ADMIN — SUCRALFATE 1 G: 1 SUSPENSION ORAL at 17:53

## 2023-06-03 RX ADMIN — PANTOPRAZOLE SODIUM 40 MG: 40 INJECTION, POWDER, FOR SOLUTION INTRAVENOUS at 13:09

## 2023-06-03 RX ADMIN — PROPOFOL 150 MCG/KG/MIN: 10 INJECTION, EMULSION INTRAVENOUS at 11:45

## 2023-06-03 RX ADMIN — PHYTONADIONE 10 MG: 10 INJECTION, EMULSION INTRAMUSCULAR; INTRAVENOUS; SUBCUTANEOUS at 13:15

## 2023-06-03 RX ADMIN — PROPOFOL 100 MG: 10 INJECTION, EMULSION INTRAVENOUS at 11:44

## 2023-06-03 RX ADMIN — SUCRALFATE 1 G: 1 SUSPENSION ORAL at 13:23

## 2023-06-03 RX ADMIN — PROPOFOL 20 MG: 10 INJECTION, EMULSION INTRAVENOUS at 11:56

## 2023-06-03 RX ADMIN — MORPHINE SULFATE 2 MG: 4 INJECTION, SOLUTION INTRAMUSCULAR; INTRAVENOUS at 20:17

## 2023-06-03 RX ADMIN — MORPHINE SULFATE 1 MG: 4 INJECTION, SOLUTION INTRAMUSCULAR; INTRAVENOUS at 13:56

## 2023-06-03 ASSESSMENT — LIFESTYLE VARIABLES
TOTAL SCORE: 0
ALCOHOL_USE: NO
TOTAL SCORE: 0
DOES PATIENT WANT TO STOP DRINKING: NO
EVER HAD A DRINK FIRST THING IN THE MORNING TO STEADY YOUR NERVES TO GET RID OF A HANGOVER: NO
EVER FELT BAD OR GUILTY ABOUT YOUR DRINKING: NO
AVERAGE NUMBER OF DAYS PER WEEK YOU HAVE A DRINK CONTAINING ALCOHOL: 0
CONSUMPTION TOTAL: NEGATIVE
ON A TYPICAL DAY WHEN YOU DRINK ALCOHOL HOW MANY DRINKS DO YOU HAVE: 0
HOW MANY TIMES IN THE PAST YEAR HAVE YOU HAD 5 OR MORE DRINKS IN A DAY: 0
TOTAL SCORE: 0
HAVE YOU EVER FELT YOU SHOULD CUT DOWN ON YOUR DRINKING: NO
HAVE PEOPLE ANNOYED YOU BY CRITICIZING YOUR DRINKING: NO

## 2023-06-03 ASSESSMENT — COGNITIVE AND FUNCTIONAL STATUS - GENERAL
SUGGESTED CMS G CODE MODIFIER DAILY ACTIVITY: CH
MOBILITY SCORE: 24
DAILY ACTIVITIY SCORE: 24
SUGGESTED CMS G CODE MODIFIER MOBILITY: CH

## 2023-06-03 ASSESSMENT — ENCOUNTER SYMPTOMS
CHILLS: 0
RESPIRATORY NEGATIVE: 1
BLOOD IN STOOL: 1
NEUROLOGICAL NEGATIVE: 1
WEIGHT LOSS: 1
MUSCULOSKELETAL NEGATIVE: 1
VOMITING: 1
CARDIOVASCULAR NEGATIVE: 1
DEPRESSION: 1
ABDOMINAL PAIN: 1
NAUSEA: 1
FEVER: 0
SHORTNESS OF BREATH: 0
EYES NEGATIVE: 1

## 2023-06-03 ASSESSMENT — PAIN SCALES - GENERAL: PAIN_LEVEL: 0

## 2023-06-03 ASSESSMENT — PAIN DESCRIPTION - PAIN TYPE
TYPE: ACUTE PAIN

## 2023-06-03 ASSESSMENT — FIBROSIS 4 INDEX
FIB4 SCORE: 0.5
FIB4 SCORE: 0.5

## 2023-06-03 NOTE — ANESTHESIA POSTPROCEDURE EVALUATION
Patient: Sintia Jain    Procedure Summary     Date: 06/03/23 Room / Location: [facility] OR 05 / SURGERY [facility]    Anesthesia Start: 1141 Anesthesia Stop: 1204    Procedures:       GASTROSCOPY (Esophagus)      EGD, WITH CAUTERIZATION (Esophagus) Diagnosis: (marginal ulcer on anastmosis site ulcer )    Surgeons: You Nieto M.D. Responsible Provider: Ignacio Narayanan M.D.    Anesthesia Type: MAC ASA Status: 2          Final Anesthesia Type: MAC  Last vitals  BP   Blood Pressure: 115/67    Temp   36.5 °C (97.7 °F)    Pulse   (!) 55   Resp   18    SpO2   97 %      Anesthesia Post Evaluation    Patient location during evaluation: PACU  Patient participation: complete - patient participated  Level of consciousness: awake and alert  Pain score: 0    Airway patency: patent  Anesthetic complications: no  Cardiovascular status: hemodynamically stable  Respiratory status: acceptable  Hydration status: euvolemic    PONV: none          No notable events documented.     Nurse Pain Score: 0 (NPRS)

## 2023-06-03 NOTE — PROGRESS NOTES
4 Eyes Skin Assessment Completed by Abeba RN and ROGER Dong.    Head WDL  Ears WDL  Nose WDL  Mouth WDL  Neck WDL  Breast/Chest WDL  Shoulder Blades WDL  Spine WDL  (R) Arm/Elbow/Hand WDL  (L) Arm/Elbow/Hand WDL  Abdomen WDL  Groin WDL  Scrotum/Coccyx/Buttocks WDL  (R) Leg WDL  (L) Leg WDL  (R) Heel/Foot/Toe WDL  (L) Heel/Foot/Toe WDL          Devices In Places Tele Box and Pulse Ox      Interventions In Place Pressure Redistribution Mattress    Possible Skin Injury No    Pictures Uploaded Into Epic N/A  Wound Consult Placed N/A  RN Wound Prevention Protocol Ordered No

## 2023-06-03 NOTE — PROGRESS NOTES
"[facility] TRIAGE OFFICER DIRECT ADMISSION REPORT  Transferring facility: [place]  Transferring physician:   Chief complaint: GIB  Pertinent history & patient course: 28yo female with history of gastric bypass presented to ER with hematemesis and hematochezia.  Reported to have both hematemesis and hematochezia at home, no further hematemesis in ER but continued to have hematochezia.  She received 1 L IVF in route to ER.  Initial hemoglobin 6.1, previously 12 in January 2023.  However, repeat hemoglobin 8.8 without transfusion, additional labs being repeated.  Hemodynamically stable.  Transfer being requested as no GI service available.    Pertinent imaging & lab results: Hemoglobin 6.1 >> 8.8 repeated  Code Status:  per transferring provider, I personally verified with the transferring provider patient's code status and the transferring provider has confirmed this with the patient.  Further work up or recommendations per triage officer prior to transfer: PPI, transfuse as needed  Consultants called prior to transfer and pertinent input from consultants: NA  Patient accepted for transfer: Yes  Consultants to be called upon arrival: GI in AM  Admission status: Inpatient.   Floor requested: tele  If ICU transfer, name of intensivist case discussed with and pertinent input from critical care:     Please inform the \"Triage Coord.-RTOC\" through Vo[facility]te upon arrival of the patient to [facility] for assignment of a hospitalist to perform admission. Reach out to the assigned hospitalist (assigned by RTOC) for any questions or concerns regarding the care of this patient.           "

## 2023-06-03 NOTE — CONSULTS
Gastroenterology Initial Consult Note               Author:  Philomena Renteria M.D. Date & Time Created: 6/3/2023 9:54 AM       Patient ID:  Name:             Sintia Jain  YOB: 1995  Age:                 27 y.o.  female  MRN:               [id number]      Referring Provider:  Alfredo Howard MD        Presenting Chief Complaint:  hematemesis      History of Present Illness:    This is a very pleasant 27 y.o. female who underwent robotic Mony en Y gastric bypass with hiatal hernia repair December 2022 for BMI 50.92, HTN, DEE, PCOS and GERD with Dr. To.    She presented to [facility] this morning with hematemesis and hematochezia.  Her initial Hgb 6.1 and repeat Hgb 8.8 without transfusion. CBC and CMP just drawn.    She has had LUQ pain for 3 months along with vomiting frequently postprandially.  Last night around 10pm while at work, she vomited only once after eating and no blood.  She also had a bowel movement and no blood.  She was awakened at 0300 and had anand hematemesis and has vomited 6 times.  She also passes dark burgundy blood per rectum 4 times. She had syncopal event at home twice.  She does not take NSAIDs, no previous EGD nor history of bleeding.  She is on a SSRI.        Review of Systems:  Review of Systems   Constitutional:  Positive for weight loss. Negative for chills and fever.   HENT: Negative.     Eyes: Negative.    Respiratory: Negative.     Cardiovascular: Negative.    Gastrointestinal:  Positive for abdominal pain, blood in stool, nausea and vomiting.   Genitourinary: Negative.    Musculoskeletal: Negative.    Skin: Negative.    Neurological: Negative.    Endo/Heme/Allergies: Negative.    Psychiatric/Behavioral:  Positive for depression.              Past Medical History:  Past Medical History:   Diagnosis Date    Anesthesia     Anxiety     Arthritis     Awareness under anesthesia     During cholecystectomy    Delayed emergence from general anesthesia      Depression     GERD (gastroesophageal reflux disease)     Heart burn     Hiatus hernia syndrome     Hypertension     Indigestion      Active Hospital Problems    Diagnosis     GIB (gastrointestinal bleeding) [K92.2]          Past Surgical History:  Past Surgical History:   Procedure Laterality Date    MT LAP, RANDY RESTRICT PROC, LONGITUDINAL GAS* N/A 2022    Procedure: ROBOTIC WU EN Y GASTRIC BYPASS, HIATAL HERNIA REPAIR;  Surgeon: Aime To M.D.;  Location: SURGERY [facility];  Service: Gen Robotic    ROBOTIC ASSISTED LAP HIATAL HERNIA REP N/A 2022    Procedure: REPAIR, HERNIA, HIATAL, ROBOT-ASSISTED, LAPAROSCOPIC;  Surgeon: Aime To M.D.;  Location: SURGERY [facility];  Service: Gen Robotic    REPEAT C SECTION  2021    Procedure:  SECTION, REPEAT;  Surgeon: Ronnie Seals M.D.;  Location: SURGERY [facility] DELIVERY;  Service: Labor and Delivery    ABDOMINAL EXPLORATION      OTHER ABDOMINAL SURGERY      PRIMARY C SECTION      TONSILLECTOMY Bilateral            Hospital Medications:  No current facility-administered medications for this encounter.     Last reviewed on 6/3/2023  9:18 AM by Emi Perry R.N.       Current Outpatient Medications:  Medications Prior to Admission   Medication Sig Dispense Refill Last Dose    ondansetron (ZOFRAN ODT) 4 MG TABLET DISPERSIBLE DISSOLVE 1 TAB UNDER THE TONGUE EVERY 6 HOURS   2023    venlafaxine XR (EFFEXOR XR) 37.5 MG CAPSULE SR 24 HR TAKE 1 CAPSULE EVERY DAY BY ORAL ROUTE IN THE MORNING.   2023    losartan-hydrochlorothiazide (HYZAAR) 50-12.5 MG per tablet Take 1 Tablet by mouth every day.   2023    pantoprazole (PROTONIX) 20 MG tablet Take 20 mg by mouth every day.   2023    Multiple Vitamin (MULTIVITAMIN ADULT PO) Take 1 Tablet by mouth every day.            Medication Allergies:  Allergies   Allergen Reactions    Celexa Anxiety    Compazine Vomiting    Latex Hives    Prozac [Fluoxetine] Hives     Wellbutrin [Bupropion] Anxiety    Tramadol          Family Medical History:  Family History   Problem Relation Age of Onset    Hypertension Mother     Cancer Mother         Uterine    Diabetes Maternal Grandmother          Social History:  Social History     Socioeconomic History    Marital status: Single     Spouse name: Not on file    Number of children: Not on file    Years of education: Not on file    Highest education level: GED or equivalent   Occupational History    Not on file   Tobacco Use    Smoking status: Every Day     Packs/day: 0.50     Years: 9.00     Pack years: 4.50     Types: Cigarettes     Last attempt to quit: 10/5/2022     Years since quittin.6    Smokeless tobacco: Never    Tobacco comments:     cutting back to quit   Vaping Use    Vaping Use: Never used   Substance and Sexual Activity    Alcohol use: Not Currently    Drug use: Not Currently     Types: Marijuana    Sexual activity: Yes     Partners: Male     Comment: None    Other Topics Concern    Not on file   Social History Narrative    Not on file     Social Determinants of Health     Financial Resource Strain: Medium Risk (12/10/2022)    Overall Financial Resource Strain (CARDIA)     Difficulty of Paying Living Expenses: Somewhat hard   Food Insecurity: No Food Insecurity (12/10/2022)    Hunger Vital Sign     Worried About Running Out of Food in the Last Year: Never true     Ran Out of Food in the Last Year: Never true   Transportation Needs: No Transportation Needs (12/10/2022)    PRAPARE - Transportation     Lack of Transportation (Medical): No     Lack of Transportation (Non-Medical): No   Physical Activity: Sufficiently Active (12/10/2022)    Exercise Vital Sign     Days of Exercise per Week: 5 days     Minutes of Exercise per Session: 60 min   Stress: No Stress Concern Present (12/10/2022)    [employer] [place] - Occupational Stress Questionnaire     Feeling of Stress : Only a little   Social Connections:  "Moderately Isolated (12/10/2022)    Social Connection and Isolation Panel [NHANES]     Frequency of Communication with Friends and Family: More than three times a week     Frequency of Social Gatherings with Friends and Family: Once a week     Attends Islam Services: Never     Active Member of Clubs or Organizations: No     Attends Club or Organization Meetings: Never     Marital Status: Living with partner   Intimate Partner Violence: Not on file   Housing Stability: Low Risk  (12/10/2022)    Housing Stability Vital Sign     Unable to Pay for Housing in the Last Year: No     Number of Places Lived in the Last Year: 1     Unstable Housing in the Last Year: No         Vital signs:  Weight/BMI: Body mass index is 37.44 kg/m².  /80   Pulse 62   Temp 36.3 °C (97.3 °F) (Temporal)   Resp 18   Ht 1.753 m (5' 9\")   Wt 115 kg (253 lb 8.5 oz)   SpO2 98%   Vitals:    06/03/23 0904   BP: 117/80   Pulse: 62   Resp: 18   Temp: 36.3 °C (97.3 °F)   TempSrc: Temporal   SpO2: 98%   Weight: 115 kg (253 lb 8.5 oz)   Height: 1.753 m (5' 9\")     Oxygen Therapy:  Pulse Oximetry: 98 %, O2 (LPM): 0, O2 Delivery Device: None - Room Air  No intake or output data in the 24 hours ending 06/03/23 0954      Physical Exam:  Physical Exam  Constitutional:       Appearance: Normal appearance. She is obese.   HENT:      Head: Normocephalic and atraumatic.      Nose: Nose normal.      Mouth/Throat:      Mouth: Mucous membranes are moist.   Eyes:      General: No scleral icterus.     Pupils: Pupils are equal, round, and reactive to light.   Cardiovascular:      Rate and Rhythm: Normal rate and regular rhythm.   Pulmonary:      Effort: Pulmonary effort is normal.      Breath sounds: Normal breath sounds.   Abdominal:      General: Bowel sounds are normal. There is no distension.      Palpations: Abdomen is soft.      Tenderness: There is no abdominal tenderness.   Musculoskeletal:         General: Normal range of motion.      Cervical " back: Normal range of motion and neck supple.   Skin:     General: Skin is warm and dry.   Neurological:      Mental Status: She is alert and oriented to person, place, and time.                 Labs:  No results for input(s): SODIUM, POTASSIUM, CHLORIDE, CO2, BUN, CREATININE, MAGNESIUM, PHOSPHORUS, CALCIUM in the last 72 hours.  No results for input(s): ALTSGPT, ASTSGOT, ALKPHOSPHAT, TBILIRUBIN, DBILIRUBIN, GAMMAGT, AMYLASE, LIPASE, ALB, PREALBUMIN, GLUCOSE in the last 72 hours.      No results for input(s): RBC, HEMOGLOBIN, HEMATOCRIT, PLATELETCT, PROTHROMBTM, APTT, INR, IRON, FERRITIN, TOTIRONBC in the last 72 hours.  No results found for this or any previous visit (from the past 24 hour(s)).      Radiology Review:  No orders to display         MDM (Data Review):   -Records reviewed and summarized in current documentation  -I personally reviewed and interpreted the laboratory results  -I personally reviewed the radiology images    Assessment/Recommendations:    Impression:   Hematemesis   Melena   Acute blood loss anemia   S/p Mony en Y gastric bypass with hiatal hernia repair   Obesity    Recs:  Follow up labs and transfuse if Hgb <7  NPO  EGD today with Dr. Gerson Renteria M.D.          Core Quality Measures   Reviewed items:  Labs, Medications and Radiology reports reviewed

## 2023-06-03 NOTE — OP REPORT
OPERATIVE REPORT    PATIENT:   Sintia Jain   1995       PREOPERATIVE DIAGNOSES/INDICATIONS: Hematemesis    PROCEDURE: EGD with control of bleeding    PHYSICIAN:  You Nieto MD     ANESTHESIA:  Per anesthesiologist.  CELESTINE    LOCATION: [facility]    CONSENT: The risks, benefits and alternatives of the procedure were discussed in detail. The risks include and are not limited to bleeding, infection, perforation, missed lesions, and sedations risks (cardiopulmonary compromise and allergic reaction to medications).    DESCRIPTION:   The patient presented to the operating room.  A time out was performed prior to beginning the procedure.   The patient was placed in the left lateral recumbent position. Patient was sedated by anesthesia: Propofol.    OPERATIVE FINDINGS:    Esophagus: Normal.  Stomach: Gastric bypass anatomy with ulcer along the anastomosis.  Stigmata of recent bleeding with high risk stigmata for rebleeding.  The ulcer bed was denuded with the scope using irrigation and suction.  Mild mucosal hemorrhage ensued.  Treated with bipolar cautery.  Hemospray applied.  No bleeding at conclusion.  Blood loss less than 20 cc.  Jejunum: Normal to 20 cm.      Blood loss: Less than 20 cc    The patient tolerated the procedure well.      There were no immediate complications.    Pathology samples obtained: None    IMPRESSION:  Marginal ulcer at the gastrojejunal anastomosis.  Treated with bipolar cautery and Hemospray with good effect.      RECOMMENDATIONS:  Sips and chips this afternoon.  May advance diet tomorrow pending clinical course.  Carafate 1 g oral suspension every 6 hours.  Continue PPI therapy.  Relook EGD in 6 to 8 weeks either with bariatric surgeon or outpatient GI.  Avoid NSAIDs.

## 2023-06-03 NOTE — H&P
"Hospital Medicine History & Physical Note    Date of Service  6/3/2023    Primary Care Physician  Lavelle Sexton D.O.    Consultants  GI    Specialist Names: [place]    Code Status  Full Code    Chief Complaint  vomiting blood.    History of Presenting Illness  April Anamaria Jain is a 27 y.o. female who presented 6/3/2023 with vomiting blood.  Ms. Jain has a past medical history of morbid obesity for which she underwent Mony-en-Y gastric bypass with hiatal hernia repair on 12/9/2022 by Dr. To.  She states she has had chronic epigastric pain since.  Yesterday she vomited though no blood in it this morning she woke up about 3 in the morning and vomited \"straight blood with clots\" and then had a bowel movement with \"dark red\" blood.  She showed me a picture of the toilet which is indeed significant blood in it.  She started sweating and called 911.  She had subsequently about 5 episodes of vomiting multiple episodes of blood per rectum.  She was brought to the emergency room in [facility] where CT of the abdomen pelvis with contrast was negative.  She was found to have a hemoglobin of 6.1 and was transfused a unit of packed red blood cells.  Her platelets were low at 148 with an INR of 1.6.  Her beta hCG was negative.  She was transferred here for GI consultation.  Upon questioning this is not happened before.  She has not been taking any NSAIDs nor blood thinners.    I discussed the plan of care with Dr. Renteria in person.    Review of Systems  Review of Systems   Constitutional:  Negative for chills and fever.   Respiratory:  Negative for shortness of breath.    Cardiovascular:  Negative for chest pain.   Gastrointestinal:  Positive for blood in stool, nausea and vomiting.   All other systems reviewed and are negative.      Past Medical History   has a past medical history of Anesthesia, Anxiety, Arthritis, Awareness under anesthesia, Coagulopathy (HCC) (6/3/2023), Delayed emergence from general anesthesia, " Depression, GERD (gastroesophageal reflux disease), Heart burn, Hiatus hernia syndrome, Hypertension, Indigestion, and Thrombocytopenia (HCC) (6/3/2023).    Surgical History   has a past surgical history that includes primary c section; abdominal exploration; tonsillectomy (Bilateral); repeat c section (06/25/2021); other abdominal surgery; pr lap, navid restrict proc, longitudinal gas* (N/A, 12/9/2022); and robotic assisted lap hiatal hernia rep (N/A, 12/9/2022).     Family History  family history includes Cancer in her mother; Diabetes in her maternal grandmother; Hypertension in her mother.   Family history reviewed with patient. There is no family history that is pertinent to the chief complaint.     Social History   reports that she has been smoking cigarettes. She has a 4.50 pack-year smoking history. She has never used smokeless tobacco. She reports that she does not currently use alcohol. She reports that she does not currently use drugs after having used the following drugs: Marijuana.    Allergies  Allergies   Allergen Reactions    Celexa Anxiety    Compazine Vomiting    Latex Hives    Prozac [Fluoxetine] Hives    Wellbutrin [Bupropion] Anxiety    Tramadol        Medications  Prior to Admission Medications   Prescriptions Last Dose Informant Patient Reported? Taking?   Multiple Vitamin (MULTIVITAMIN ADULT PO)  Patient Yes No   Sig: Take 1 Tablet by mouth every day.   losartan-hydrochlorothiazide (HYZAAR) 50-12.5 MG per tablet 6/2/2023  Yes No   Sig: Take 1 Tablet by mouth every day.   ondansetron (ZOFRAN ODT) 4 MG TABLET DISPERSIBLE 6/2/2023  Yes No   Sig: DISSOLVE 1 TAB UNDER THE TONGUE EVERY 6 HOURS   pantoprazole (PROTONIX) 20 MG tablet 6/2/2023 Patient Yes No   Sig: Take 20 mg by mouth every day.   venlafaxine XR (EFFEXOR XR) 37.5 MG CAPSULE SR 24 HR 6/2/2023  Yes No   Sig: TAKE 1 CAPSULE EVERY DAY BY ORAL ROUTE IN THE MORNING.      Facility-Administered Medications: None       Physical Exam  Temp:   [36.3 °C (97.3 °F)] 36.3 °C (97.3 °F)  Pulse:  [62] 62  Resp:  [18] 18  BP: (117)/(80) 117/80  SpO2:  [98 %] 98 %  Blood Pressure: 117/80   Temperature: 36.3 °C (97.3 °F)   Pulse: 62   Respiration: 18   Pulse Oximetry: 98 %       Physical Exam  Vitals and nursing note reviewed.   HENT:      Head: Normocephalic.      Mouth/Throat:      Mouth: Mucous membranes are dry.   Cardiovascular:      Rate and Rhythm: Normal rate and regular rhythm.      Heart sounds: No murmur heard.  Pulmonary:      Effort: Pulmonary effort is normal.      Breath sounds: Normal breath sounds.   Abdominal:      General: There is no distension.      Palpations: Abdomen is soft.      Tenderness: There is no abdominal tenderness.   Musculoskeletal:      Right lower leg: No edema.      Left lower leg: No edema.   Neurological:      General: No focal deficit present.      Mental Status: She is oriented to person, place, and time.   Psychiatric:         Mood and Affect: Mood normal.         Behavior: Behavior normal.         Laboratory:          No results for input(s): ALTSGPT, ASTSGOT, ALKPHOSPHAT, TBILIRUBIN, DBILIRUBIN, GAMMAGT, AMYLASE, LIPASE, ALB, PREALBUMIN, GLUCOSE in the last 72 hours.      No results for input(s): NTPROBNP in the last 72 hours.      No results for input(s): TROPONINT in the last 72 hours.    Imaging:  No orders to display           Assessment/Plan:  Justification for Admission Status  I anticipate this patient will require at least two midnights for appropriate medical management, necessitating inpatient admission because work up of severe anemia from an upper GI bleed requiring transfusions and EGD    Patient will need a Telemetry bed on MEDICAL service .  The need is secondary to as above.    * GIB (gastrointestinal bleeding)- (present on admission)  Assessment & Plan  Upper GI bleed with Hb 6  CT abd/pelvis with contrast prior to transfer was negative  IV protonix  GI Dr. Renteria consulted for urgent EGD which may be  limited by Mony-en-Y surgery    Acute blood loss anemia- (present on admission)  Assessment & Plan  Due to upper GI bleed  Hb 6 prior to transfer and she was given one unit of RBCs in [place]  q6 hour Hb with standing orders to transfuse a unit of RBCs q6 hours for Hb less than 7.    Coagulopathy (HCC)- (present on admission)  Assessment & Plan  INR was 1.6 prior to transfer.  10 mg IV vitamin K will be ordered and this will be repeated    Thrombocytopenia (HCC)- (present on admission)  Assessment & Plan  Platelet count was 148,000 prior to transfer and will be repeated    Morbid obesity (HCC)- (present on admission)  Assessment & Plan  Status post gastric bypass Mony-en-Y  BMI 37    Tobacco use- (present on admission)  Assessment & Plan  Cessation encouraged  She defers a nicotine patch        VTE prophylaxis: SCDs/TEDs

## 2023-06-03 NOTE — PROGRESS NOTES
Med rec completed per patient at bedside.  Allergies reviewed with patient.  No outpatient antibiotics within the last 30 days.  Patient's preferred pharmacy: [facility] in [place].    Patient unsure of the strengths of her vitamins/supplements.

## 2023-06-03 NOTE — CARE PLAN
Problem: Communication  Goal: The ability to communicate needs accurately and effectively will improve  Outcome: Progressing  Flowsheets (Taken 6/3/2023 1212)  Communication: Assessed patient's ability to understand and communicate     Problem: Hemodynamics  Goal: Patient's hemodynamics, fluid balance and neurologic status will be stable or improve  6/3/2023 1212 by Emi Perry, R.N.  Outcome: Progressing  Note: Pt monitored, hemodynamics cori    6/3/2023 1212 by Emi Perry, R.N.  Note: Pt monitored, hemodynamics cori     The patient is Watcher - Medium risk of patient condition declining or worsening         Progress made toward(s) clinical / shift goals:  hemodynamic stability, pt in endoscopy at this time.     Patient is not progressing towards the following goals: Monitor for active GIB

## 2023-06-03 NOTE — ASSESSMENT & PLAN NOTE
Upper GI bleed     Hb 8.5 currently    CT abd/pelvis with contrast prior to transfer was negative  IV protonix to po  S/p egd with cautery for gastric ulcer    Started on clear liquid diet on 6/4. Advance diet as tolerated--currently gi soft

## 2023-06-03 NOTE — OR NURSING
Mother updated w/ pt permission. Procedure completed. Pt doing well. Will return to room in about an hour.

## 2023-06-03 NOTE — ANESTHESIA TIME REPORT
Anesthesia Start and Stop Event Times     Date Time Event    6/3/2023 1110 Ready for Procedure     1141 Anesthesia Start     1204 Anesthesia Stop        Responsible Staff  06/03/23    Name Role Begin End    Ignacio Narayanan M.D. Anesth 1141 1204        Overtime Reason:  no overtime (within assigned shift)    Comments:

## 2023-06-03 NOTE — ANESTHESIA PREPROCEDURE EVALUATION
Case: [id number] Date/Time: 06/03/23 1505    Procedure: GASTROSCOPY    Location: [facility] OR 05 / SURGERY [facility]    Surgeons: You Nieto M.D.        Morbid obesity with hx of amrik-en-y    Relevant Problems   CARDIAC   (positive) Chronic hypertension affecting pregnancy         (positive) Fatty liver      OB   (positive) Chronic hypertension affecting pregnancy       Physical Exam    Airway   Mallampati: II  TM distance: >3 FB  Neck ROM: full       Cardiovascular - normal exam  Rhythm: regular  Rate: normal  (-) murmur     Dental - normal exam           Pulmonary - normal exam  Breath sounds clear to auscultation     Abdominal    Neurological - normal exam                 Anesthesia Plan    ASA 2       Plan - MAC               Induction: intravenous          Informed Consent:    Anesthetic plan and risks discussed with patient.    Use of blood products discussed with: patient whom consented to blood products.

## 2023-06-04 LAB
ALBUMIN SERPL BCP-MCNC: 3.2 G/DL (ref 3.2–4.9)
ALBUMIN/GLOB SERPL: 1.4 G/DL
ALP SERPL-CCNC: 73 U/L (ref 30–99)
ALT SERPL-CCNC: 26 U/L (ref 2–50)
ANION GAP SERPL CALC-SCNC: 11 MMOL/L (ref 7–16)
AST SERPL-CCNC: 40 U/L (ref 12–45)
BASOPHILS # BLD AUTO: 0.7 % (ref 0–1.8)
BASOPHILS # BLD: 0.05 K/UL (ref 0–0.12)
BILIRUB SERPL-MCNC: 1.1 MG/DL (ref 0.1–1.5)
BUN SERPL-MCNC: 9 MG/DL (ref 8–22)
CALCIUM ALBUM COR SERPL-MCNC: 9.2 MG/DL (ref 8.5–10.5)
CALCIUM SERPL-MCNC: 8.6 MG/DL (ref 8.5–10.5)
CHLORIDE SERPL-SCNC: 108 MMOL/L (ref 96–112)
CO2 SERPL-SCNC: 22 MMOL/L (ref 20–33)
CREAT SERPL-MCNC: 0.75 MG/DL (ref 0.5–1.4)
EOSINOPHIL # BLD AUTO: 0.13 K/UL (ref 0–0.51)
EOSINOPHIL NFR BLD: 1.8 % (ref 0–6.9)
ERYTHROCYTE [DISTWIDTH] IN BLOOD BY AUTOMATED COUNT: 41.5 FL (ref 35.9–50)
GFR SERPLBLD CREATININE-BSD FMLA CKD-EPI: 111 ML/MIN/1.73 M 2
GLOBULIN SER CALC-MCNC: 2.3 G/DL (ref 1.9–3.5)
GLUCOSE SERPL-MCNC: 76 MG/DL (ref 65–99)
HCT VFR BLD AUTO: 30.2 % (ref 37–47)
HGB BLD-MCNC: 9.3 G/DL (ref 12–16)
IMM GRANULOCYTES # BLD AUTO: 0.03 K/UL (ref 0–0.11)
IMM GRANULOCYTES NFR BLD AUTO: 0.4 % (ref 0–0.9)
INR PPP: 1.1 (ref 0.87–1.13)
LYMPHOCYTES # BLD AUTO: 2.32 K/UL (ref 1–4.8)
LYMPHOCYTES NFR BLD: 32.9 % (ref 22–41)
MCH RBC QN AUTO: 25.4 PG (ref 27–33)
MCHC RBC AUTO-ENTMCNC: 30.8 G/DL (ref 32.2–35.5)
MCV RBC AUTO: 82.5 FL (ref 81.4–97.8)
MONOCYTES # BLD AUTO: 0.5 K/UL (ref 0–0.85)
MONOCYTES NFR BLD AUTO: 7.1 % (ref 0–13.4)
NEUTROPHILS # BLD AUTO: 4.03 K/UL (ref 1.82–7.42)
NEUTROPHILS NFR BLD: 57.1 % (ref 44–72)
NRBC # BLD AUTO: 0 K/UL
NRBC BLD-RTO: 0 /100 WBC (ref 0–0.2)
PLATELET # BLD AUTO: 189 K/UL (ref 164–446)
PMV BLD AUTO: 12.2 FL (ref 9–12.9)
POTASSIUM SERPL-SCNC: 3.8 MMOL/L (ref 3.6–5.5)
PROT SERPL-MCNC: 5.5 G/DL (ref 6–8.2)
PROTHROMBIN TIME: 14.1 SEC (ref 12–14.6)
RBC # BLD AUTO: 3.66 M/UL (ref 4.2–5.4)
SODIUM SERPL-SCNC: 141 MMOL/L (ref 135–145)
WBC # BLD AUTO: 7.1 K/UL (ref 4.8–10.8)

## 2023-06-04 PROCEDURE — 99232 SBSQ HOSP IP/OBS MODERATE 35: CPT | Performed by: INTERNAL MEDICINE

## 2023-06-04 PROCEDURE — 85025 COMPLETE CBC W/AUTO DIFF WBC: CPT

## 2023-06-04 PROCEDURE — 99232 SBSQ HOSP IP/OBS MODERATE 35: CPT | Performed by: HOSPITALIST

## 2023-06-04 PROCEDURE — 770020 HCHG ROOM/CARE - TELE (206)

## 2023-06-04 PROCEDURE — 36415 COLL VENOUS BLD VENIPUNCTURE: CPT

## 2023-06-04 PROCEDURE — 700102 HCHG RX REV CODE 250 W/ 637 OVERRIDE(OP): Performed by: INTERNAL MEDICINE

## 2023-06-04 PROCEDURE — 80053 COMPREHEN METABOLIC PANEL: CPT

## 2023-06-04 PROCEDURE — 700111 HCHG RX REV CODE 636 W/ 250 OVERRIDE (IP): Performed by: HOSPITALIST

## 2023-06-04 PROCEDURE — C9113 INJ PANTOPRAZOLE SODIUM, VIA: HCPCS | Performed by: HOSPITALIST

## 2023-06-04 PROCEDURE — 700102 HCHG RX REV CODE 250 W/ 637 OVERRIDE(OP): Performed by: HOSPITALIST

## 2023-06-04 PROCEDURE — A9270 NON-COVERED ITEM OR SERVICE: HCPCS | Performed by: INTERNAL MEDICINE

## 2023-06-04 PROCEDURE — 85610 PROTHROMBIN TIME: CPT

## 2023-06-04 PROCEDURE — A9270 NON-COVERED ITEM OR SERVICE: HCPCS | Performed by: HOSPITALIST

## 2023-06-04 RX ORDER — MORPHINE SULFATE 4 MG/ML
2 INJECTION INTRAVENOUS EVERY 4 HOURS PRN
Status: DISCONTINUED | OUTPATIENT
Start: 2023-06-04 | End: 2023-06-05

## 2023-06-04 RX ORDER — VENLAFAXINE HYDROCHLORIDE 37.5 MG/1
75 CAPSULE, EXTENDED RELEASE ORAL DAILY
Status: DISCONTINUED | OUTPATIENT
Start: 2023-06-04 | End: 2023-06-06 | Stop reason: HOSPADM

## 2023-06-04 RX ORDER — OXYCODONE HYDROCHLORIDE 5 MG/1
5 TABLET ORAL EVERY 4 HOURS PRN
Status: DISCONTINUED | OUTPATIENT
Start: 2023-06-04 | End: 2023-06-06

## 2023-06-04 RX ADMIN — ONDANSETRON 4 MG: 2 INJECTION INTRAMUSCULAR; INTRAVENOUS at 04:04

## 2023-06-04 RX ADMIN — SUCRALFATE 1 G: 1 SUSPENSION ORAL at 23:04

## 2023-06-04 RX ADMIN — MORPHINE SULFATE 2 MG: 4 INJECTION, SOLUTION INTRAMUSCULAR; INTRAVENOUS at 04:04

## 2023-06-04 RX ADMIN — VENLAFAXINE HYDROCHLORIDE 75 MG: 37.5 CAPSULE, EXTENDED RELEASE ORAL at 13:24

## 2023-06-04 RX ADMIN — SUCRALFATE 1 G: 1 SUSPENSION ORAL at 17:16

## 2023-06-04 RX ADMIN — PANTOPRAZOLE SODIUM 40 MG: 40 INJECTION, POWDER, FOR SOLUTION INTRAVENOUS at 23:04

## 2023-06-04 RX ADMIN — OXYCODONE HYDROCHLORIDE 5 MG: 5 TABLET ORAL at 19:06

## 2023-06-04 RX ADMIN — PANTOPRAZOLE SODIUM 40 MG: 40 INJECTION, POWDER, FOR SOLUTION INTRAVENOUS at 11:44

## 2023-06-04 RX ADMIN — SUCRALFATE 1 G: 1 SUSPENSION ORAL at 11:44

## 2023-06-04 RX ADMIN — ONDANSETRON 4 MG: 2 INJECTION INTRAMUSCULAR; INTRAVENOUS at 23:10

## 2023-06-04 RX ADMIN — SUCRALFATE 1 G: 1 SUSPENSION ORAL at 04:04

## 2023-06-04 ASSESSMENT — PAIN DESCRIPTION - PAIN TYPE: TYPE: ACUTE PAIN

## 2023-06-04 ASSESSMENT — ENCOUNTER SYMPTOMS
ABDOMINAL PAIN: 1
NEUROLOGICAL NEGATIVE: 1
RESPIRATORY NEGATIVE: 1
CARDIOVASCULAR NEGATIVE: 1
EYES NEGATIVE: 1
MUSCULOSKELETAL NEGATIVE: 1
PSYCHIATRIC NEGATIVE: 1

## 2023-06-04 ASSESSMENT — FIBROSIS 4 INDEX: FIB4 SCORE: 1.12

## 2023-06-04 NOTE — PROGRESS NOTES
"Hospital Medicine Daily Progress Note    Date of Service  6/4/2023    Chief Complaint  Sintia Jain is a 27 y.o. female admitted 6/3/2023 with gib    Hospital Course  Sintia Jain is a 27 y.o. female who presented 6/3/2023 with vomiting blood.  Ms. Jain has a past medical history of morbid obesity for which she underwent Mony-en-Y gastric bypass with hiatal hernia repair on 12/9/2022 by Dr. To.  She states she has had chronic epigastric pain since.  Yesterday she vomited though no blood in it this morning she woke up about 3 in the morning and vomited \"straight blood with clots\" and then had a bowel movement with \"dark red\" blood.  She showed me a picture of the toilet which is indeed significant blood in it.  She started sweating and called 911.  She had subsequently about 5 episodes of vomiting multiple episodes of blood per rectum.  She was brought to the emergency room in [facility] where CT of the abdomen pelvis with contrast was negative.  She was found to have a hemoglobin of 6.1 and was transfused a unit of packed red blood cells.  Her platelets were low at 148 with an INR of 1.6.  Her beta hCG was negative.  She was transferred here for GI consultation.  Upon questioning this is not happened before.  She has not been taking any NSAIDs nor blood thinners.        Interval Problem Update    s/p egd--> Marginal ulcer at the gastrojejunal anastomosis.  Treated with bipolar cautery and Hemospray with good effect.    The patient planing some mild epigastric discomfort    No new bleeding noted    Hemoglobin currently at 9.3    Feels good enough to try clear liquids a.m.    I have discussed this patient's plan of care and discharge plan at IDT rounds today with Case Management, Nursing, Nursing leadership, and other members of the IDT team.    Consultants/Specialty  GI    Code Status  Full Code    Disposition  The patient is not medically cleared for discharge to home or a post-acute " facility.  Anticipate discharge to: home with close outpatient follow-up    I have placed the appropriate orders for post-discharge needs.    Review of Systems  Review of Systems   Constitutional:  Positive for malaise/fatigue.   HENT: Negative.     Eyes: Negative.    Respiratory: Negative.     Cardiovascular: Negative.    Gastrointestinal:  Positive for abdominal pain.   Genitourinary: Negative.    Musculoskeletal: Negative.    Neurological: Negative.    Psychiatric/Behavioral: Negative.     All other systems reviewed and are negative.       Physical Exam  Temp:  [36.3 °C (97.3 °F)-36.7 °C (98.1 °F)] 36.7 °C (98.1 °F)  Pulse:  [48-86] 63  Resp:  [14-20] 16  BP: ()/(49-82) 108/68  SpO2:  [90 %-100 %] 96 %    Physical Exam  Constitutional:       General: She is not in acute distress.     Appearance: She is obese. She is diaphoretic. She is not toxic-appearing.   HENT:      Head: Normocephalic.      Mouth/Throat:      Mouth: Mucous membranes are moist.   Eyes:      General: No scleral icterus.        Left eye: No discharge.      Extraocular Movements: Extraocular movements intact.      Pupils: Pupils are equal, round, and reactive to light.   Cardiovascular:      Rate and Rhythm: Normal rate and regular rhythm.      Heart sounds: No murmur heard.     No gallop.   Pulmonary:      Effort: Pulmonary effort is normal. No respiratory distress.      Breath sounds: No stridor. No wheezing or rales.   Chest:      Chest wall: No tenderness.   Abdominal:      Tenderness: There is abdominal tenderness (epigastric).   Musculoskeletal:         General: No swelling. Normal range of motion.      Cervical back: Normal range of motion and neck supple. No rigidity.   Lymphadenopathy:      Cervical: No cervical adenopathy.   Skin:     General: Skin is warm.      Capillary Refill: Capillary refill takes 2 to 3 seconds.      Coloration: Skin is not jaundiced or pale.      Findings: No bruising or lesion.   Neurological:       General: No focal deficit present.      Mental Status: She is oriented to person, place, and time.      Cranial Nerves: No cranial nerve deficit.      Motor: No weakness.   Psychiatric:         Mood and Affect: Mood normal.         Fluids    Intake/Output Summary (Last 24 hours) at 6/4/2023 0940  Last data filed at 6/3/2023 1149  Gross per 24 hour   Intake 500 ml   Output --   Net 500 ml       Laboratory  Recent Labs     06/03/23  1614 06/03/23  2210 06/04/23  0420   WBC  --   --  7.1   RBC  --   --  3.66*   HEMOGLOBIN 9.3* 9.5* 9.3*   HEMATOCRIT  --   --  30.2*   MCV  --   --  82.5   MCH  --   --  25.4*   MCHC  --   --  30.8*   RDW  --   --  41.5   PLATELETCT  --   --  189   MPV  --   --  12.2     Recent Labs     06/04/23  0420   SODIUM 141   POTASSIUM 3.8   CHLORIDE 108   CO2 22   GLUCOSE 76   BUN 9   CREATININE 0.75   CALCIUM 8.6     Recent Labs     06/04/23  0420   INR 1.10               Imaging  No orders to display        Assessment/Plan  * GIB (gastrointestinal bleeding)- (present on admission)  Assessment & Plan  Upper GI bleed with Hb 6m on transfer--> probably lab error    Hb 9.3 currently    CT abd/pelvis with contrast prior to transfer was negative  IV protonix  S/p egd with cautery for gastric ulcer    Started on clear liquid diet on 6/4    Coagulopathy (HCC)- (present on admission)  Assessment & Plan  INR was 1.6 prior to transfer--> probably lab error    inr 1.1 currently        Thrombocytopenia (HCC)- (present on admission)  Assessment & Plan  Platelet count was 148,000 prior to transfer --> probably lab error    Current 189    Acute blood loss anemia- (present on admission)  Assessment & Plan  Due to upper GI bleed    Transfuse prbcs   for Hb less than 7.    Morbid obesity (HCC)- (present on admission)  Assessment & Plan  Status post gastric bypass Mony-en-Y  BMI 37    Tobacco use- (present on admission)  Assessment & Plan  Cessation encouraged  She defers a nicotine patch         VTE prophylaxis:  SCDs/TEDs    I have performed a physical exam and reviewed and updated ROS and Plan today (6/4/2023). In review of yesterday's note (6/3/2023), there are no changes except as documented above.

## 2023-06-04 NOTE — HOSPITAL COURSE
"Sintia Jain is a 27 y.o. female who presented 6/3/2023 with vomiting blood.  Ms. Jain has a past medical history of morbid obesity for which she underwent Mony-en-Y gastric bypass with hiatal hernia repair on 12/9/2022 by Dr. To.  She states she has had chronic epigastric pain since.  Yesterday she vomited though no blood in it this morning she woke up about 3 in the morning and vomited \"straight blood with clots\" and then had a bowel movement with \"dark red\" blood.  She showed me a picture of the toilet which is indeed significant blood in it.  She started sweating and called 911.  She had subsequently about 5 episodes of vomiting multiple episodes of blood per rectum.  She was brought to the emergency room in [facility] where CT of the abdomen pelvis with contrast was negative.  She was found to have a hemoglobin of 6.1 and was transfused a unit of packed red blood cells.  Her platelets were low at 148 with an INR of 1.6.  Her beta hCG was negative.  She was transferred here for GI consultation.  Upon questioning this is not happened before.  She has not been taking any NSAIDs nor blood thinners.      "

## 2023-06-04 NOTE — CARE PLAN
The patient is Stable - Low risk of patient condition declining or worsening    Shift Goals  Clinical Goals: hemodynamic stability, pain management  Patient Goals: pain management    Progress made toward(s) clinical / shift goals:  Progressing      Problem: Knowledge Deficit - Standard  Goal: Patient and family/care givers will demonstrate understanding of plan of care, disease process/condition, diagnostic tests and medications  Outcome: Progressing  Note: Pt verbalizes understanding of plan of care at this time     Problem: Communication  Goal: The ability to communicate needs accurately and effectively will improve  Outcome: Progressing  Note: Pt communicates needs effectively and calls for assistance appropriately.     Problem: Respiratory  Goal: Patient will achieve/maintain optimum respiratory ventilation and gas exchange  Outcome: Progressing  Note: Pt maintaining O2 saturation on room air     Problem: Self Care  Goal: Patient will have the ability to perform ADLs independently or with assistance (bathe, groom, dress, toilet and feed)  Outcome: Progressing  Note: Pt demonstrates the ability to perform ADLs independently.

## 2023-06-04 NOTE — PROGRESS NOTES
Date:  6/4/2023    Patient: : Sintia Jain  MRN: [id number]      Interval History:  No acute events overnight.  Hemoglobin stable.  EGD yesterday demonstrated marginal ulcer at the gastrojejunal anastomosis.  Patient endorses continued abdominal pain.      Objective:  Vitals:    06/03/23 2256 06/04/23 0351 06/04/23 0753 06/04/23 1131   BP: 124/68 110/71 108/68 128/80   Pulse:  (!) 50 63 (!) 55   Resp:  16 16 18   Temp:  36.7 °C (98.1 °F) 36.7 °C (98.1 °F) 36.9 °C (98.4 °F)   TempSrc:  Temporal Temporal Temporal   SpO2: 93% 95% 96% 94%   Weight:       Height:           Physical Exam    Nontoxic no acute distress sitting up.  Lungs clear  Heart S1-S2  Extremities no clubbing cyanosis or edema  Neurologically intact    Labs:    Recent Labs     06/03/23  1614 06/03/23  2210 06/04/23  0420   WBC  --   --  7.1   RBC  --   --  3.66*   HEMOGLOBIN 9.3* 9.5* 9.3*   HEMATOCRIT  --   --  30.2*   MCV  --   --  82.5   MCH  --   --  25.4*   MCHC  --   --  30.8*   RDW  --   --  41.5   PLATELETCT  --   --  189   MPV  --   --  12.2       Recent Labs     06/04/23  0420   SODIUM 141   POTASSIUM 3.8   CHLORIDE 108   CO2 22   GLUCOSE 76   BUN 9       Recent Labs     06/04/23  0420   INR 1.10       Recent Labs     06/04/23  0420   ASTSGOT 40   ALTSGPT 26   TBILIRUBIN 1.1   ALKPHOSPHAT 73   GLOBULIN 2.3   INR 1.10         Imaging:    CT-ABDOMEN-PELVIS WITH  Narrative: 3/15/2023 12:46 PM    HISTORY/REASON FOR EXAM:  Abdominal pain, vomiting, recent gastric bypass.    TECHNIQUE/EXAM DESCRIPTION:   CT scan of the abdomen and pelvis with contrast.    Contrast-enhanced helical scanning was obtained from the diaphragmatic domes through the pubic symphysis following the bolus administration of nonionic contrast without complication.    100 mL of Omnipaque 350 nonionic contrast was administered without complication.    Low dose optimization technique was utilized for this CT exam including automated exposure control and adjustment of the mA  and/or kV according to patient size.    COMPARISON: No prior studies available.    FINDINGS:  Lower Chest: Visualized lung bases are clear.    Liver: Normal.    Spleen: Unremarkable.    Pancreas: Unremarkable.    Gallbladder: Absent.    Biliary: Nondilated.    Adrenal glands: Normal.    Kidneys: Unremarkable without hydronephrosis.    Bowel: No obstruction or acute inflammation.  Normal appendix.  Postoperative change from gastric bypass.    Lymph nodes: No adenopathy.    Vasculature: Unremarkable.    Peritoneum: Unremarkable without ascites.    Musculoskeletal: No acute or destructive process.    Pelvis: No adenopathy or free fluid.  Tampon present within the vagina.  Impression: 1.  Prior gastric bypass and cholecystectomy.  2.  No bowel obstruction or perforation.  3.  Normal appendix.      Meds:     Current Facility-Administered Medications:     morphine injection    oxyCODONE immediate-release    venlafaxine XR    pantoprazole    ondansetron    sucralfate          Assessment:  27-year-old female with gastric bypass with a marginal ulcer at the gastrojejunal anastomosis.      Recommendations/Plan:  Aggressive PPI therapy.  Avoidance of toxic habits such as nicotine.  Follow-up with bariatric surgery as an outpatient regarding relook EGD in 4 to 8 weeks.  GI will sign off.  Reconsult as needed.

## 2023-06-05 LAB
ANION GAP SERPL CALC-SCNC: 9 MMOL/L (ref 7–16)
BUN SERPL-MCNC: 6 MG/DL (ref 8–22)
CALCIUM SERPL-MCNC: 8.4 MG/DL (ref 8.5–10.5)
CHLORIDE SERPL-SCNC: 109 MMOL/L (ref 96–112)
CO2 SERPL-SCNC: 25 MMOL/L (ref 20–33)
CREAT SERPL-MCNC: 0.79 MG/DL (ref 0.5–1.4)
ERYTHROCYTE [DISTWIDTH] IN BLOOD BY AUTOMATED COUNT: 41.1 FL (ref 35.9–50)
GFR SERPLBLD CREATININE-BSD FMLA CKD-EPI: 104 ML/MIN/1.73 M 2
GLUCOSE SERPL-MCNC: 84 MG/DL (ref 65–99)
HCT VFR BLD AUTO: 27.3 % (ref 37–47)
HGB BLD-MCNC: 8.5 G/DL (ref 12–16)
MCH RBC QN AUTO: 25.5 PG (ref 27–33)
MCHC RBC AUTO-ENTMCNC: 31.1 G/DL (ref 32.2–35.5)
MCV RBC AUTO: 82 FL (ref 81.4–97.8)
PLATELET # BLD AUTO: 168 K/UL (ref 164–446)
PMV BLD AUTO: 12.2 FL (ref 9–12.9)
POTASSIUM SERPL-SCNC: 4.1 MMOL/L (ref 3.6–5.5)
RBC # BLD AUTO: 3.33 M/UL (ref 4.2–5.4)
SODIUM SERPL-SCNC: 143 MMOL/L (ref 135–145)
WBC # BLD AUTO: 6.1 K/UL (ref 4.8–10.8)

## 2023-06-05 PROCEDURE — A9270 NON-COVERED ITEM OR SERVICE: HCPCS | Performed by: INTERNAL MEDICINE

## 2023-06-05 PROCEDURE — 99232 SBSQ HOSP IP/OBS MODERATE 35: CPT | Performed by: HOSPITALIST

## 2023-06-05 PROCEDURE — A9270 NON-COVERED ITEM OR SERVICE: HCPCS | Performed by: HOSPITALIST

## 2023-06-05 PROCEDURE — 700102 HCHG RX REV CODE 250 W/ 637 OVERRIDE(OP): Performed by: HOSPITALIST

## 2023-06-05 PROCEDURE — 700111 HCHG RX REV CODE 636 W/ 250 OVERRIDE (IP): Performed by: HOSPITALIST

## 2023-06-05 PROCEDURE — 85027 COMPLETE CBC AUTOMATED: CPT

## 2023-06-05 PROCEDURE — 36415 COLL VENOUS BLD VENIPUNCTURE: CPT

## 2023-06-05 PROCEDURE — 80048 BASIC METABOLIC PNL TOTAL CA: CPT

## 2023-06-05 PROCEDURE — 700102 HCHG RX REV CODE 250 W/ 637 OVERRIDE(OP): Performed by: INTERNAL MEDICINE

## 2023-06-05 PROCEDURE — C9113 INJ PANTOPRAZOLE SODIUM, VIA: HCPCS | Performed by: HOSPITALIST

## 2023-06-05 PROCEDURE — 770020 HCHG ROOM/CARE - TELE (206)

## 2023-06-05 RX ORDER — OMEPRAZOLE 20 MG/1
20 CAPSULE, DELAYED RELEASE ORAL EVERY 12 HOURS
Status: DISCONTINUED | OUTPATIENT
Start: 2023-06-05 | End: 2023-06-06

## 2023-06-05 RX ADMIN — SUCRALFATE 1 G: 1 SUSPENSION ORAL at 13:07

## 2023-06-05 RX ADMIN — OXYCODONE HYDROCHLORIDE 5 MG: 5 TABLET ORAL at 06:20

## 2023-06-05 RX ADMIN — SUCRALFATE 1 G: 1 SUSPENSION ORAL at 18:06

## 2023-06-05 RX ADMIN — PANTOPRAZOLE SODIUM 40 MG: 40 INJECTION, POWDER, FOR SOLUTION INTRAVENOUS at 13:07

## 2023-06-05 RX ADMIN — SUCRALFATE 1 G: 1 SUSPENSION ORAL at 06:15

## 2023-06-05 RX ADMIN — ONDANSETRON 4 MG: 2 INJECTION INTRAMUSCULAR; INTRAVENOUS at 06:18

## 2023-06-05 RX ADMIN — VENLAFAXINE HYDROCHLORIDE 75 MG: 37.5 CAPSULE, EXTENDED RELEASE ORAL at 06:15

## 2023-06-05 RX ADMIN — OMEPRAZOLE 20 MG: 20 CAPSULE, DELAYED RELEASE ORAL at 20:06

## 2023-06-05 RX ADMIN — MORPHINE SULFATE 2 MG: 4 INJECTION, SOLUTION INTRAMUSCULAR; INTRAVENOUS at 00:47

## 2023-06-05 ASSESSMENT — ENCOUNTER SYMPTOMS
ABDOMINAL PAIN: 1
NAUSEA: 1
NEUROLOGICAL NEGATIVE: 1
MUSCULOSKELETAL NEGATIVE: 1
CARDIOVASCULAR NEGATIVE: 1
PSYCHIATRIC NEGATIVE: 1
EYES NEGATIVE: 1
RESPIRATORY NEGATIVE: 1

## 2023-06-05 ASSESSMENT — FIBROSIS 4 INDEX: FIB4 SCORE: 1.26

## 2023-06-05 ASSESSMENT — PAIN DESCRIPTION - PAIN TYPE: TYPE: ACUTE PAIN

## 2023-06-05 NOTE — PROGRESS NOTES
Bedside report received, assumed pt care@1487. Pt A&Ox4. Pt denies any pain at this time. POC discussed, pt verbalized understanding. Call light within reach.

## 2023-06-05 NOTE — CARE PLAN
The patient is Stable - Low risk of patient condition declining or worsening    Shift Goals  Clinical Goals: hemodynamic stability, pain management  Patient Goals: pain management    Progress made toward(s) clinical / shift goals:  Progressing      Problem: Knowledge Deficit - Standard  Goal: Patient and family/care givers will demonstrate understanding of plan of care, disease process/condition, diagnostic tests and medications  Outcome: Progressing  Note: Pt verbalizes understanding of plan of care     Problem: Hemodynamics  Goal: Patient's hemodynamics, fluid balance and neurologic status will be stable or improve  Outcome: Progressing  Note: Pt hemoglobin remains stable at this time.

## 2023-06-06 ENCOUNTER — HOSPITAL ENCOUNTER (OUTPATIENT)
Dept: LAB | Facility: MEDICAL CENTER | Age: 28
DRG: 393 | End: 2023-06-06
Attending: NURSE PRACTITIONER
Payer: MEDICAID

## 2023-06-06 VITALS
TEMPERATURE: 98.1 F | HEART RATE: 55 BPM | RESPIRATION RATE: 16 BRPM | DIASTOLIC BLOOD PRESSURE: 91 MMHG | HEIGHT: 69 IN | WEIGHT: 253.97 LBS | OXYGEN SATURATION: 98 % | BODY MASS INDEX: 37.62 KG/M2 | SYSTOLIC BLOOD PRESSURE: 134 MMHG

## 2023-06-06 LAB
ALBUMIN SERPL BCP-MCNC: 3.8 G/DL (ref 3.2–4.9)
ALBUMIN/GLOB SERPL: 1.5 G/DL
ALP SERPL-CCNC: 149 U/L (ref 30–99)
ALT SERPL-CCNC: 136 U/L (ref 2–50)
ANION GAP SERPL CALC-SCNC: 11 MMOL/L (ref 7–16)
AST SERPL-CCNC: 93 U/L (ref 12–45)
BASOPHILS # BLD AUTO: 0.4 % (ref 0–1.8)
BASOPHILS # BLD: 0.03 K/UL (ref 0–0.12)
BILIRUB SERPL-MCNC: 0.8 MG/DL (ref 0.1–1.5)
BUN SERPL-MCNC: 6 MG/DL (ref 8–22)
CALCIUM ALBUM COR SERPL-MCNC: 9.3 MG/DL (ref 8.5–10.5)
CALCIUM SERPL-MCNC: 9.1 MG/DL (ref 8.5–10.5)
CHLORIDE SERPL-SCNC: 104 MMOL/L (ref 96–112)
CHOLEST SERPL-MCNC: 153 MG/DL (ref 100–199)
CO2 SERPL-SCNC: 25 MMOL/L (ref 20–33)
CREAT SERPL-MCNC: 0.71 MG/DL (ref 0.5–1.4)
EOSINOPHIL # BLD AUTO: 0.11 K/UL (ref 0–0.51)
EOSINOPHIL NFR BLD: 1.5 % (ref 0–6.9)
ERYTHROCYTE [DISTWIDTH] IN BLOOD BY AUTOMATED COUNT: 41.9 FL (ref 35.9–50)
ERYTHROCYTE [DISTWIDTH] IN BLOOD BY AUTOMATED COUNT: 41.9 FL (ref 35.9–50)
FASTING STATUS PATIENT QL REPORTED: NORMAL
GFR SERPLBLD CREATININE-BSD FMLA CKD-EPI: 119 ML/MIN/1.73 M 2
GLOBULIN SER CALC-MCNC: 2.5 G/DL (ref 1.9–3.5)
GLUCOSE SERPL-MCNC: 84 MG/DL (ref 65–99)
HCT VFR BLD AUTO: 27.9 % (ref 37–47)
HCT VFR BLD AUTO: 31.6 % (ref 37–47)
HDLC SERPL-MCNC: 37 MG/DL
HGB BLD-MCNC: 8.6 G/DL (ref 12–16)
HGB BLD-MCNC: 9.8 G/DL (ref 12–16)
IMM GRANULOCYTES # BLD AUTO: 0.01 K/UL (ref 0–0.11)
IMM GRANULOCYTES NFR BLD AUTO: 0.1 % (ref 0–0.9)
IRON SATN MFR SERPL: 2 % (ref 15–55)
IRON SERPL-MCNC: 9 UG/DL (ref 40–170)
LDLC SERPL CALC-MCNC: 98 MG/DL
LYMPHOCYTES # BLD AUTO: 1.9 K/UL (ref 1–4.8)
LYMPHOCYTES NFR BLD: 25.6 % (ref 22–41)
MCH RBC QN AUTO: 25.1 PG (ref 27–33)
MCH RBC QN AUTO: 25.3 PG (ref 27–33)
MCHC RBC AUTO-ENTMCNC: 30.8 G/DL (ref 32.2–35.5)
MCHC RBC AUTO-ENTMCNC: 31 G/DL (ref 32.2–35.5)
MCV RBC AUTO: 81 FL (ref 81.4–97.8)
MCV RBC AUTO: 82.1 FL (ref 81.4–97.8)
MONOCYTES # BLD AUTO: 0.49 K/UL (ref 0–0.85)
MONOCYTES NFR BLD AUTO: 6.6 % (ref 0–13.4)
NEUTROPHILS # BLD AUTO: 4.89 K/UL (ref 1.82–7.42)
NEUTROPHILS NFR BLD: 65.8 % (ref 44–72)
NRBC # BLD AUTO: 0 K/UL
NRBC BLD-RTO: 0 /100 WBC (ref 0–0.2)
PLATELET # BLD AUTO: 173 K/UL (ref 164–446)
PLATELET # BLD AUTO: 224 K/UL (ref 164–446)
PMV BLD AUTO: 12.4 FL (ref 9–12.9)
PMV BLD AUTO: 13.6 FL (ref 9–12.9)
POTASSIUM SERPL-SCNC: 4.1 MMOL/L (ref 3.6–5.5)
PROT SERPL-MCNC: 6.3 G/DL (ref 6–8.2)
RBC # BLD AUTO: 3.4 M/UL (ref 4.2–5.4)
RBC # BLD AUTO: 3.9 M/UL (ref 4.2–5.4)
SODIUM SERPL-SCNC: 140 MMOL/L (ref 135–145)
TIBC SERPL-MCNC: 394 UG/DL (ref 250–450)
TRANSFERRIN SERPL-MCNC: 337 MG/DL (ref 200–370)
TRIGL SERPL-MCNC: 89 MG/DL (ref 0–149)
UIBC SERPL-MCNC: 385 UG/DL (ref 110–370)
WBC # BLD AUTO: 6.8 K/UL (ref 4.8–10.8)
WBC # BLD AUTO: 7.4 K/UL (ref 4.8–10.8)

## 2023-06-06 PROCEDURE — 82306 VITAMIN D 25 HYDROXY: CPT

## 2023-06-06 PROCEDURE — 700102 HCHG RX REV CODE 250 W/ 637 OVERRIDE(OP): Performed by: INTERNAL MEDICINE

## 2023-06-06 PROCEDURE — 85027 COMPLETE CBC AUTOMATED: CPT

## 2023-06-06 PROCEDURE — 84425 ASSAY OF VITAMIN B-1: CPT

## 2023-06-06 PROCEDURE — 84630 ASSAY OF ZINC: CPT

## 2023-06-06 PROCEDURE — 82607 VITAMIN B-12: CPT

## 2023-06-06 PROCEDURE — 84207 ASSAY OF VITAMIN B-6: CPT

## 2023-06-06 PROCEDURE — 84466 ASSAY OF TRANSFERRIN: CPT

## 2023-06-06 PROCEDURE — 84134 ASSAY OF PREALBUMIN: CPT

## 2023-06-06 PROCEDURE — 83550 IRON BINDING TEST: CPT

## 2023-06-06 PROCEDURE — 84252 ASSAY OF VITAMIN B-2: CPT

## 2023-06-06 PROCEDURE — 82746 ASSAY OF FOLIC ACID SERUM: CPT

## 2023-06-06 PROCEDURE — 80061 LIPID PANEL: CPT

## 2023-06-06 PROCEDURE — 85025 COMPLETE CBC W/AUTO DIFF WBC: CPT

## 2023-06-06 PROCEDURE — 36415 COLL VENOUS BLD VENIPUNCTURE: CPT

## 2023-06-06 PROCEDURE — 700102 HCHG RX REV CODE 250 W/ 637 OVERRIDE(OP): Performed by: STUDENT IN AN ORGANIZED HEALTH CARE EDUCATION/TRAINING PROGRAM

## 2023-06-06 PROCEDURE — 700102 HCHG RX REV CODE 250 W/ 637 OVERRIDE(OP): Performed by: HOSPITALIST

## 2023-06-06 PROCEDURE — 82728 ASSAY OF FERRITIN: CPT

## 2023-06-06 PROCEDURE — A9270 NON-COVERED ITEM OR SERVICE: HCPCS | Performed by: INTERNAL MEDICINE

## 2023-06-06 PROCEDURE — A9270 NON-COVERED ITEM OR SERVICE: HCPCS | Performed by: HOSPITALIST

## 2023-06-06 PROCEDURE — 99239 HOSP IP/OBS DSCHRG MGMT >30: CPT | Performed by: STUDENT IN AN ORGANIZED HEALTH CARE EDUCATION/TRAINING PROGRAM

## 2023-06-06 PROCEDURE — A9270 NON-COVERED ITEM OR SERVICE: HCPCS | Performed by: STUDENT IN AN ORGANIZED HEALTH CARE EDUCATION/TRAINING PROGRAM

## 2023-06-06 PROCEDURE — 80053 COMPREHEN METABOLIC PANEL: CPT

## 2023-06-06 PROCEDURE — 83540 ASSAY OF IRON: CPT

## 2023-06-06 RX ORDER — POLYETHYLENE GLYCOL 3350 17 G/17G
1 POWDER, FOR SOLUTION ORAL DAILY
Status: COMPLETED | OUTPATIENT
Start: 2023-06-06 | End: 2023-06-06

## 2023-06-06 RX ORDER — OMEPRAZOLE 40 MG/1
40 CAPSULE, DELAYED RELEASE ORAL EVERY 12 HOURS
Qty: 30 CAPSULE | Refills: 0 | Status: SHIPPED | OUTPATIENT
Start: 2023-06-06

## 2023-06-06 RX ORDER — OMEPRAZOLE 20 MG/1
40 CAPSULE, DELAYED RELEASE ORAL EVERY 12 HOURS
Status: DISCONTINUED | OUTPATIENT
Start: 2023-06-06 | End: 2023-06-06 | Stop reason: HOSPADM

## 2023-06-06 RX ORDER — SUCRALFATE ORAL 1 G/10ML
1 SUSPENSION ORAL EVERY 6 HOURS
Qty: 414 ML | Refills: 0 | Status: SHIPPED | OUTPATIENT
Start: 2023-06-06 | End: 2023-09-08

## 2023-06-06 RX ADMIN — POLYETHYLENE GLYCOL 3350 1 PACKET: 17 POWDER, FOR SOLUTION ORAL at 10:54

## 2023-06-06 RX ADMIN — SUCRALFATE 1 G: 1 SUSPENSION ORAL at 10:54

## 2023-06-06 RX ADMIN — OMEPRAZOLE 20 MG: 20 CAPSULE, DELAYED RELEASE ORAL at 05:45

## 2023-06-06 RX ADMIN — SUCRALFATE 1 G: 1 SUSPENSION ORAL at 05:45

## 2023-06-06 RX ADMIN — VENLAFAXINE HYDROCHLORIDE 75 MG: 37.5 CAPSULE, EXTENDED RELEASE ORAL at 05:45

## 2023-06-06 NOTE — DISCHARGE PLANNING
Case Management Discharge Planning    Admission Date: 6/3/2023  GMLOS: 4.3  ALOS: 2    6-Clicks ADL Score: 24  6-Clicks Mobility Score: 24      Anticipated Discharge Dispo: Discharge Disposition: Discharged to home/self care (01)  HOME    DME Needed: None    Action(s) Taken: dc tomorrow when pt tolerates diet.     Escalations Completed: no    Medically Clear: no-pt needs to tolerate diet    Next Steps: follow up with MD tomorrow    Barriers to Discharge: medical clearance    Is the patient up for discharge tomorrow: yes

## 2023-06-06 NOTE — DISCHARGE PLANNING
Met with pt who is independent with ADLs and IADLs. She has no dc concerns.     PCP is Dr. Lavelle Sexton.       Care Transition Team Assessment         Readmission Evaluation  Is this a readmission?: No    Elopement Risk  Legal Hold: No  Ambulatory or Self Mobile in Wheelchair: Yes  Disoriented: No  Psychiatric Symptoms: None  History of Wandering: No  Elopement this Admit: No  Vocalizing Wanting to Leave: No  Displays Behaviors, Body Language Wanting to Leave: No-Not at Risk for Elopement  Elopement Risk: Not at Risk for Elopement    Interdisciplinary Discharge Planning  Lives with - Patient's Self Care Capacity: (P) Significant Other  Patient or legal guardian wants to designate a caregiver: No  Support Systems: (P) Children, Spouse / Significant Other  Housing / Facility: (P) 1 Story Apartment / Condo  Do You Take your Prescribed Medications Regularly: Yes  Able to Return to Previous ADL's: Yes  Mobility Issues: No  Prior Services: None, Home-Independent  Patient Prefers to be Discharged to:: Home  Assistance Needed: No  Durable Medical Equipment: Not Applicable    Discharge Preparedness  What is your plan after discharge?: Home with help  Prior Functional Level: Ambulatory, Drives Self, Independent with Activities of Daily Living, Independent with Medication Management  Difficulity with ADLs: None  Difficulity with IADLs: None    Functional Assesment  Prior Functional Level: Ambulatory, Drives Self, Independent with Activities of Daily Living, Independent with Medication Management    Finances  Financial Barriers to Discharge: No  Prescription Coverage: Yes    Vision / Hearing Impairment  Vision Impairment : No  Hearing Impairment : No    Values / Beliefs / Concerns  Values / Beliefs Concerns : No    Advance Directive  Advance Directive?: None    Domestic Abuse  Have you ever been the victim of abuse or violence?: No  Physical Abuse or Sexual Abuse: No  Verbal Abuse or Emotional Abuse: No  Possible Abuse/Neglect  Reported to:: Not Applicable         Discharge Risks or Barriers  Discharge risks or barriers?: No  Patient risk factors: Other (comment)    Anticipated Discharge Information  Discharge Disposition: Discharged to home/self care (01)

## 2023-06-06 NOTE — DISCHARGE SUMMARY
Discharge Summary    CHIEF COMPLAINT ON ADMISSION  No chief complaint on file.      Reason for Admission  GI bleed     Admission Date  6/3/2023    CODE STATUS  Full Code    HPI & HOSPITAL COURSE  27 y.o. female with a past medical history of morbid obesity for which she underwent Mony-en-Y gastric bypass with hiatal hernia repair on 12/9/2022 by Dr. To who was admitted on 6/3/2023 for hematemesis.  She was kept n.p.o. and required PRBC transfusion which she received a Miranda prior to admission.  Gastroenterology following for which patient underwent EGD on 6/3/2023 when she was noted for marginal ulcer at the gastrojejunal anastomosis that is post bipolar cautery and Hemospray. Her hemoglobin remained stable and patient able to tolerate meals.  She transition to oral omeprazole which she is to continue the outpatient setting.  Stable patient with chronic condition was discharged home and instructed to follow-up with her primary care provider and gastroenterology in the outpatient setting.  All results of antibiotics and were discussed with the patient for she was understanding and agreement with the primary care team.  Patient was instructed to return to emergency department symptoms were to worsen.    Therefore, she is discharged in good and stable condition to home with close outpatient follow-up.    The patient met 2-midnight criteria for an inpatient stay at the time of discharge.    Discharge Date  6/6/2023    FOLLOW UP ITEMS POST DISCHARGE  Primary care provider follow posthospital discharge care    DISCHARGE DIAGNOSES  Principal Problem:    GIB (gastrointestinal bleeding) (POA: Yes)  Active Problems:    Tobacco use (POA: Yes)      Overview: Max 10 a day    Morbid obesity (HCC) (POA: Yes)    Acute blood loss anemia (POA: Yes)    Thrombocytopenia (HCC) (POA: Yes)    Coagulopathy (HCC) (POA: Yes)  Resolved Problems:    * No resolved hospital problems. *      FOLLOW UP  No future appointments.  No  "follow-up provider specified.    MEDICATIONS ON DISCHARGE     Medication List        ASK your doctor about these medications        Instructions   CALCIUM PO   Take 1 Tablet by mouth every evening.  Dose: 1 Tablet     Hair Skin Nails Caps   Take 2 Capsules by mouth every day.  Dose: 2 Capsule     IRON PO   Take 2 Tablets by mouth every day.  Dose: 2 Tablet     losartan-hydrochlorothiazide 50-12.5 MG per tablet  Commonly known as: HYZAAR   Take 1 Tablet by mouth every day.  Dose: 1 Tablet     MULTIVITAMIN ADULT PO   Take 1 Tablet by mouth every day.  Dose: 1 Tablet     ondansetron 4 MG Tbdp  Commonly known as: ZOFRAN ODT   Take 4 mg by mouth every 6 hours as needed for Nausea/Vomiting.  Dose: 4 mg     pantoprazole 20 MG tablet  Commonly known as: PROTONIX   Take 20 mg by mouth every day.  Dose: 20 mg     Tylenol Dissolve Packs 500 MG Pack  Generic drug: Acetaminophen   Take 500 mg by mouth every 6 hours as needed (Mild Pain).  Dose: 500 mg     venlafaxine XR 75 MG Cp24  Commonly known as: EFFEXOR XR   Take 75 mg by mouth every day.  Dose: 75 mg     VITAMIN D3 PO   Take 1 Capsule by mouth every day.  Dose: 1 Capsule              Allergies  Allergies   Allergen Reactions    Latex Hives    Prozac [Fluoxetine] Hives    Tramadol Hives    Celexa [Citalopram] Anxiety    Compazine [Prochlorperazine] Itching     Patient states she felt like she has \"bugs in her skin\"    Wellbutrin [Bupropion] Anxiety       DIET  Orders Placed This Encounter   Procedures    Diet Order Diet: Low Fiber(GI Soft)     Standing Status:   Standing     Number of Occurrences:   1     Order Specific Question:   Diet:     Answer:   Low Fiber(GI Soft) [2]       ACTIVITY  As tolerated.  Weight bearing as tolerated    CONSULTATIONS  Gastroenterology    PROCEDURES  EGD    LABORATORY  Lab Results   Component Value Date    SODIUM 143 06/05/2023    POTASSIUM 4.1 06/05/2023    CHLORIDE 109 06/05/2023    CO2 25 06/05/2023    GLUCOSE 84 06/05/2023    BUN 6 (L) " 06/05/2023    CREATININE 0.79 06/05/2023        Lab Results   Component Value Date    WBC 6.8 06/06/2023    HEMOGLOBIN 8.6 (L) 06/06/2023    HEMATOCRIT 27.9 (L) 06/06/2023    PLATELETCT 173 06/06/2023        Total time of the discharge process exceeds 39 minutes.

## 2023-06-06 NOTE — CARE PLAN
The patient is Stable - Low risk of patient condition declining or worsening    Shift Goals  Clinical Goals: upgrade diet  Patient Goals: shower    Progress made toward(s) clinical / shift goals:  Progressing      Problem: Knowledge Deficit - Standard  Goal: Patient and family/care givers will demonstrate understanding of plan of care, disease process/condition, diagnostic tests and medications  Outcome: Progressing  Note: Pt verbalizes understanding of plan of care     Problem: Nutrition  Goal: Patient's nutritional and fluid intake will be adequate or improve  Outcome: Progressing  Note: Pt currently on GI Soft diet, tolerating well     Problem: Mobility  Goal: Patient's capacity to carry out activities will improve  Outcome: Progressing  Note: Pt able to mobilize safely and independently     Problem: Pain - Standard  Goal: Alleviation of pain or a reduction in pain to the patient’s comfort goal  Outcome: Progressing  Note: Pt reports only mild nausea, no abdominal pain at this time

## 2023-06-06 NOTE — PROGRESS NOTES
"Hospital Medicine Daily Progress Note    Date of Service  6/5/2023    Chief Complaint  Sintia Jain is a 27 y.o. female admitted 6/3/2023 with gib    Hospital Course  Sintia Jain is a 27 y.o. female who presented 6/3/2023 with vomiting blood.  Ms. Jain has a past medical history of morbid obesity for which she underwent Mony-en-Y gastric bypass with hiatal hernia repair on 12/9/2022 by Dr. To.  She states she has had chronic epigastric pain since.  Yesterday she vomited though no blood in it this morning she woke up about 3 in the morning and vomited \"straight blood with clots\" and then had a bowel movement with \"dark red\" blood.  She showed me a picture of the toilet which is indeed significant blood in it.  She started sweating and called 911.  She had subsequently about 5 episodes of vomiting multiple episodes of blood per rectum.  She was brought to the emergency [facility] where CT of the abdomen pelvis with contrast was negative.  She was found to have a hemoglobin of 6.1 and was transfused a unit of packed red blood cells.  Her platelets were low at 148 with an INR of 1.6.  Her beta hCG was negative.  She was transferred here for GI consultation.  Upon questioning this is not happened before.  She has not been taking any NSAIDs nor blood thinners.        Interval Problem Update    s/p egd--> Marginal ulcer at the gastrojejunal anastomosis.  Treated with bipolar cautery and Hemospray with good effect.    The patient planing some mild epigastric discomfort    Patient c/o nausea    No new bleeding noted    Hemoglobin currently at 8.5      Advancing diet as tolerated      I have discussed this patient's plan of care and discharge plan at IDT rounds today with Case Management, Nursing, Nursing leadership, and other members of the IDT team.    Consultants/Specialty  GI    Code Status  Full Code    Disposition  The patient is not medically cleared for discharge to home or a post-acute " facility.  Anticipate discharge to: home with close outpatient follow-up    I have placed the appropriate orders for post-discharge needs.    Review of Systems  Review of Systems   Constitutional:  Positive for malaise/fatigue.   HENT: Negative.     Eyes: Negative.    Respiratory: Negative.     Cardiovascular: Negative.    Gastrointestinal:  Positive for abdominal pain and nausea.   Genitourinary: Negative.    Musculoskeletal: Negative.    Neurological: Negative.    Psychiatric/Behavioral: Negative.     All other systems reviewed and are negative.       Physical Exam  Temp:  [36.6 °C (97.9 °F)-37 °C (98.6 °F)] 36.7 °C (98.1 °F)  Pulse:  [52-66] 66  Resp:  [16-18] 16  BP: (117-149)/(69-98) 149/98  SpO2:  [94 %-98 %] 97 %    Physical Exam  Constitutional:       General: She is not in acute distress.     Appearance: She is obese. She is not toxic-appearing or diaphoretic.   HENT:      Head: Normocephalic.      Mouth/Throat:      Mouth: Mucous membranes are moist.   Eyes:      General: No scleral icterus.        Left eye: No discharge.      Extraocular Movements: Extraocular movements intact.      Pupils: Pupils are equal, round, and reactive to light.   Cardiovascular:      Rate and Rhythm: Normal rate and regular rhythm.      Heart sounds: No murmur heard.     No gallop.   Pulmonary:      Effort: Pulmonary effort is normal. No respiratory distress.      Breath sounds: No stridor. No wheezing or rales.   Chest:      Chest wall: No tenderness.   Abdominal:      Tenderness: There is abdominal tenderness (epigastric).   Musculoskeletal:         General: No swelling. Normal range of motion.      Cervical back: Normal range of motion and neck supple. No rigidity.   Lymphadenopathy:      Cervical: No cervical adenopathy.   Skin:     General: Skin is warm.      Capillary Refill: Capillary refill takes 2 to 3 seconds.      Coloration: Skin is not jaundiced or pale.      Findings: No bruising or lesion.   Neurological:       General: No focal deficit present.      Mental Status: She is oriented to person, place, and time.      Cranial Nerves: No cranial nerve deficit.      Motor: No weakness.   Psychiatric:         Mood and Affect: Mood normal.         Fluids    Intake/Output Summary (Last 24 hours) at 6/5/2023 2048  Last data filed at 6/5/2023 0303  Gross per 24 hour   Intake 240 ml   Output --   Net 240 ml         Laboratory  Recent Labs     06/03/23  2210 06/04/23  0420 06/05/23  0047   WBC  --  7.1 6.1   RBC  --  3.66* 3.33*   HEMOGLOBIN 9.5* 9.3* 8.5*   HEMATOCRIT  --  30.2* 27.3*   MCV  --  82.5 82.0   MCH  --  25.4* 25.5*   MCHC  --  30.8* 31.1*   RDW  --  41.5 41.1   PLATELETCT  --  189 168   MPV  --  12.2 12.2       Recent Labs     06/04/23  0420 06/05/23  0047   SODIUM 141 143   POTASSIUM 3.8 4.1   CHLORIDE 108 109   CO2 22 25   GLUCOSE 76 84   BUN 9 6*   CREATININE 0.75 0.79   CALCIUM 8.6 8.4*       Recent Labs     06/04/23  0420   INR 1.10                 Imaging  No orders to display          Assessment/Plan  * GIB (gastrointestinal bleeding)- (present on admission)  Assessment & Plan  Upper GI bleed     Hb 8.5 currently    CT abd/pelvis with contrast prior to transfer was negative  IV protonix to po  S/p egd with cautery for gastric ulcer    Started on clear liquid diet on 6/4. Advance diet as tolerated--currently gi soft    Coagulopathy (HCC)- (present on admission)  Assessment & Plan  INR was 1.6 prior to transfer--> probably lab error    inr 1.1 currently        Thrombocytopenia (HCC)- (present on admission)  Assessment & Plan  plt 168    Monitor cbc    Acute blood loss anemia- (present on admission)  Assessment & Plan  Due to upper GI bleed    Transfuse prbcs   for Hb less than 7.    Morbid obesity (HCC)- (present on admission)  Assessment & Plan  Status post gastric bypass Mony-en-Y  BMI 37    Tobacco use- (present on admission)  Assessment & Plan  Cessation encouraged  She defers a nicotine patch         VTE  prophylaxis: SCDs/TEDs    I have performed a physical exam and reviewed and updated ROS and Plan today (6/5/2023). In review of yesterday's note (6/4/2023), there are no changes except as documented above.

## 2023-06-06 NOTE — PROGRESS NOTES
Patient being discharged. Patient educated on discharge instructions and new prescriptions. Patient verbalized understanding. Follow up appt made with PCP Dr. De León.  PIV removed, telemetry monitor checked in. Patient going home with family. Will monitor until patient is off unit.

## 2023-06-07 LAB
25(OH)D3 SERPL-MCNC: 26 NG/ML (ref 30–100)
FERRITIN SERPL-MCNC: 8.1 NG/ML (ref 10–291)
FOLATE SERPL-MCNC: 11.9 NG/ML
PREALB SERPL-MCNC: 16.4 MG/DL (ref 18–38)
VIT B12 SERPL-MCNC: 816 PG/ML (ref 211–911)

## 2023-06-08 LAB — ZINC SERPL-MCNC: 79.9 UG/DL (ref 60–120)

## 2023-06-10 LAB — VIT B6 SERPL-MCNC: 21.8 NMOL/L (ref 20–125)

## 2023-06-11 LAB
VIT B1 BLD-MCNC: 70 NMOL/L (ref 70–180)
VIT B2 SERPL-SCNC: 5 NMOL/L (ref 5–50)

## 2023-08-07 ENCOUNTER — HOSPITAL ENCOUNTER (OUTPATIENT)
Dept: LAB | Facility: MEDICAL CENTER | Age: 28
End: 2023-08-07
Attending: COLON & RECTAL SURGERY
Payer: MEDICAID

## 2023-08-07 LAB
25(OH)D3 SERPL-MCNC: 27 NG/ML (ref 30–100)
ALBUMIN SERPL BCP-MCNC: 3.9 G/DL (ref 3.2–4.9)
ALBUMIN/GLOB SERPL: 1.6 G/DL
ALP SERPL-CCNC: 73 U/L (ref 30–99)
ALT SERPL-CCNC: 11 U/L (ref 2–50)
ANION GAP SERPL CALC-SCNC: 9 MMOL/L (ref 7–16)
ANISOCYTOSIS BLD QL SMEAR: ABNORMAL
AST SERPL-CCNC: 15 U/L (ref 12–45)
BASOPHILS # BLD AUTO: 0.7 % (ref 0–1.8)
BASOPHILS # BLD: 0.05 K/UL (ref 0–0.12)
BILIRUB SERPL-MCNC: 0.8 MG/DL (ref 0.1–1.5)
BUN SERPL-MCNC: 9 MG/DL (ref 8–22)
CALCIUM ALBUM COR SERPL-MCNC: 9.4 MG/DL (ref 8.5–10.5)
CALCIUM SERPL-MCNC: 9.3 MG/DL (ref 8.5–10.5)
CHLORIDE SERPL-SCNC: 105 MMOL/L (ref 96–112)
CHOLEST SERPL-MCNC: 140 MG/DL (ref 100–199)
CO2 SERPL-SCNC: 24 MMOL/L (ref 20–33)
COMMENT 1642: NORMAL
CREAT SERPL-MCNC: 0.68 MG/DL (ref 0.5–1.4)
EOSINOPHIL # BLD AUTO: 0.15 K/UL (ref 0–0.51)
EOSINOPHIL NFR BLD: 2.1 % (ref 0–6.9)
ERYTHROCYTE [DISTWIDTH] IN BLOOD BY AUTOMATED COUNT: 43.2 FL (ref 35.9–50)
FASTING STATUS PATIENT QL REPORTED: NORMAL
FERRITIN SERPL-MCNC: 4.3 NG/ML (ref 10–291)
FOLATE SERPL-MCNC: 22.3 NG/ML
GFR SERPLBLD CREATININE-BSD FMLA CKD-EPI: 121 ML/MIN/1.73 M 2
GLOBULIN SER CALC-MCNC: 2.4 G/DL (ref 1.9–3.5)
GLUCOSE SERPL-MCNC: 81 MG/DL (ref 65–99)
HCT VFR BLD AUTO: 29.9 % (ref 37–47)
HDLC SERPL-MCNC: 39 MG/DL
HGB BLD-MCNC: 8.5 G/DL (ref 12–16)
HYPOCHROMIA BLD QL SMEAR: ABNORMAL
IMM GRANULOCYTES # BLD AUTO: 0.01 K/UL (ref 0–0.11)
IMM GRANULOCYTES NFR BLD AUTO: 0.1 % (ref 0–0.9)
IRON SERPL-MCNC: 17 UG/DL (ref 40–170)
LDLC SERPL CALC-MCNC: 88 MG/DL
LYMPHOCYTES # BLD AUTO: 2.01 K/UL (ref 1–4.8)
LYMPHOCYTES NFR BLD: 28.5 % (ref 22–41)
MCH RBC QN AUTO: 20.6 PG (ref 27–33)
MCHC RBC AUTO-ENTMCNC: 28.4 G/DL (ref 32.2–35.5)
MCV RBC AUTO: 72.4 FL (ref 81.4–97.8)
MICROCYTES BLD QL SMEAR: ABNORMAL
MONOCYTES # BLD AUTO: 0.51 K/UL (ref 0–0.85)
MONOCYTES NFR BLD AUTO: 7.2 % (ref 0–13.4)
MORPHOLOGY BLD-IMP: NORMAL
NEUTROPHILS # BLD AUTO: 4.32 K/UL (ref 1.82–7.42)
NEUTROPHILS NFR BLD: 61.4 % (ref 44–72)
NRBC # BLD AUTO: 0 K/UL
NRBC BLD-RTO: 0 /100 WBC (ref 0–0.2)
PLATELET # BLD AUTO: 301 K/UL (ref 164–446)
PLATELET BLD QL SMEAR: NORMAL
PMV BLD AUTO: 12.3 FL (ref 9–12.9)
POTASSIUM SERPL-SCNC: 4.2 MMOL/L (ref 3.6–5.5)
PREALB SERPL-MCNC: 18.3 MG/DL (ref 18–38)
PROT SERPL-MCNC: 6.3 G/DL (ref 6–8.2)
RBC # BLD AUTO: 4.13 M/UL (ref 4.2–5.4)
RBC BLD AUTO: PRESENT
SODIUM SERPL-SCNC: 138 MMOL/L (ref 135–145)
TRANSFERRIN SERPL-MCNC: 344 MG/DL (ref 200–370)
TRIGL SERPL-MCNC: 65 MG/DL (ref 0–149)
VIT B12 SERPL-MCNC: 356 PG/ML (ref 211–911)
WBC # BLD AUTO: 7.1 K/UL (ref 4.8–10.8)

## 2023-08-07 PROCEDURE — 85025 COMPLETE CBC W/AUTO DIFF WBC: CPT

## 2023-08-07 PROCEDURE — 80061 LIPID PANEL: CPT

## 2023-08-07 PROCEDURE — 84630 ASSAY OF ZINC: CPT

## 2023-08-07 PROCEDURE — 84134 ASSAY OF PREALBUMIN: CPT

## 2023-08-07 PROCEDURE — 84466 ASSAY OF TRANSFERRIN: CPT

## 2023-08-07 PROCEDURE — 82607 VITAMIN B-12: CPT

## 2023-08-07 PROCEDURE — 83540 ASSAY OF IRON: CPT

## 2023-08-07 PROCEDURE — 84252 ASSAY OF VITAMIN B-2: CPT

## 2023-08-07 PROCEDURE — 82746 ASSAY OF FOLIC ACID SERUM: CPT

## 2023-08-07 PROCEDURE — 82306 VITAMIN D 25 HYDROXY: CPT

## 2023-08-07 PROCEDURE — 84425 ASSAY OF VITAMIN B-1: CPT

## 2023-08-07 PROCEDURE — 80053 COMPREHEN METABOLIC PANEL: CPT

## 2023-08-07 PROCEDURE — 82728 ASSAY OF FERRITIN: CPT

## 2023-08-07 PROCEDURE — 84207 ASSAY OF VITAMIN B-6: CPT

## 2023-08-07 PROCEDURE — 36415 COLL VENOUS BLD VENIPUNCTURE: CPT

## 2023-08-10 LAB
VIT B1 BLD-MCNC: 85 NMOL/L (ref 70–180)
VIT B6 SERPL-MCNC: 69.6 NMOL/L (ref 20–125)
ZINC SERPL-MCNC: 67 UG/DL (ref 60–120)

## 2023-08-11 LAB — VIT B2 SERPL-SCNC: 5 NMOL/L (ref 5–50)

## 2023-09-08 ENCOUNTER — OFFICE VISIT (OUTPATIENT)
Dept: URGENT CARE | Facility: CLINIC | Age: 28
End: 2023-09-08

## 2023-09-08 ENCOUNTER — HOSPITAL ENCOUNTER (OUTPATIENT)
Facility: MEDICAL CENTER | Age: 28
End: 2023-09-08
Attending: NURSE PRACTITIONER
Payer: COMMERCIAL

## 2023-09-08 VITALS
DIASTOLIC BLOOD PRESSURE: 72 MMHG | HEIGHT: 69 IN | TEMPERATURE: 98.5 F | SYSTOLIC BLOOD PRESSURE: 118 MMHG | BODY MASS INDEX: 34.8 KG/M2 | OXYGEN SATURATION: 95 % | HEART RATE: 80 BPM | RESPIRATION RATE: 14 BRPM | WEIGHT: 235 LBS

## 2023-09-08 DIAGNOSIS — Z02.1 PRE-EMPLOYMENT DRUG SCREENING: ICD-10-CM

## 2023-09-08 DIAGNOSIS — Z02.1 PHYSICAL EXAM, PRE-EMPLOYMENT: ICD-10-CM

## 2023-09-08 LAB
AMP AMPHETAMINE: NORMAL
COC COCAINE: NORMAL
INT CON NEG: NORMAL
INT CON POS: NORMAL
MET METHAMPHETAMINES: NORMAL
OPI OPIATES: NORMAL
PCP PHENCYCLIDINE: NORMAL
POC DRUG COMMENT 753798-OCCUPATIONAL HEALTH: NORMAL
THC: NORMAL

## 2023-09-08 PROCEDURE — 86480 TB TEST CELL IMMUN MEASURE: CPT | Performed by: NURSE PRACTITIONER

## 2023-09-08 PROCEDURE — 3078F DIAST BP <80 MM HG: CPT | Performed by: NURSE PRACTITIONER

## 2023-09-08 PROCEDURE — 8915 PR COMPREHENSIVE PHYSICAL: Performed by: NURSE PRACTITIONER

## 2023-09-08 PROCEDURE — 80305 DRUG TEST PRSMV DIR OPT OBS: CPT | Performed by: NURSE PRACTITIONER

## 2023-09-08 PROCEDURE — 3074F SYST BP LT 130 MM HG: CPT | Performed by: NURSE PRACTITIONER

## 2023-09-08 RX ORDER — SUCRALFATE 1 G/1
1 TABLET ORAL
COMMUNITY

## 2023-09-08 ASSESSMENT — FIBROSIS 4 INDEX: FIB4 SCORE: 0.42

## 2023-09-08 NOTE — PROGRESS NOTES
"Subjective:     Sintia Jain is a 28 y.o. female who presents for Employment Physical (Px, TBQ, UDS)       Patient presents for a pre-employment physical exam. Refer to paper documentation scanned into electronic health record under \"Media.\"    During this visit, appropriate PPE was worn and hand hygiene was performed.    PMH:  has a past medical history of Anesthesia, Anxiety, Arthritis, Awareness under anesthesia, Coagulopathy (HCC) (6/3/2023), Delayed emergence from general anesthesia, Depression, GERD (gastroesophageal reflux disease), Heart burn, Hiatus hernia syndrome, Hypertension, Indigestion, and Thrombocytopenia (HCC) (6/3/2023).    She has no past medical history of Acute nasopharyngitis, Addisons disease (HCC), Adrenal disorder (HCC), Allergy, Anginal syndrome (HCC), Arrhythmia, Asthma, Blood clotting disorder (HCC), Blood transfusion without reported diagnosis, Bowel habit changes, Breath shortness, Bronchitis, Cancer (HCC), Carcinoma in situ of respiratory system, Cataract, CHF (congestive heart failure) (HCC), Congestive heart failure (HCC), Continuous ambulatory peritoneal dialysis status (HCC), COPD (chronic obstructive pulmonary disease) (HCC), Coughing blood, Cushings syndrome (HCC), Dental disorder, Diabetes (HCC), Diabetic neuropathy (HCC), Dialysis patient (HCC), Disorder of thyroid, Emphysema of lung (HCC), Glaucoma, Goiter, Gynecological disorder, Head ache, Heart attack (HCC), Heart murmur, Heart valve disease, Hepatitis A, Hepatitis B, Hepatitis C, High cholesterol, HIV (human immunodeficiency virus infection) (HCC), Hyperlipidemia, IBD (inflammatory bowel disease), Infectious disease, Jaundice, Kidney disease, Meningitis, Migraine, Muscle disorder, Myocardial infarct (HCC), Osteoporosis, Pacemaker, Pain, Parathyroid disorder (HCC), Pituitary disease (HCC), Pneumonia, Pregnant, Pulmonary emphysema (HCC), Renal disorder, Rheumatic fever, Seizure (HCC), Sickle cell disease (HCC), Sleep " "apnea, Snoring, Stroke (HCC), Substance abuse (HCC), Thyroid disease, Tuberculosis, Urinary bladder disorder, Urinary incontinence, or Urinary tract infection.    MEDS:   Current Outpatient Medications:     sucralfate (CARAFATE) 1 GM Tab, Take 1 g by mouth 4 Times a Day,Before Meals and at Bedtime., Disp: , Rfl:     omeprazole (PRILOSEC) 40 MG delayed-release capsule, Take 1 Capsule by mouth every 12 hours., Disp: 30 Capsule, Rfl: 0    Cholecalciferol (VITAMIN D3 PO), Take 1 Capsule by mouth every day., Disp: , Rfl:     CALCIUM PO, Take 1 Tablet by mouth every evening., Disp: , Rfl:     Acetaminophen (TYLENOL DISSOLVE PACKS) 500 MG Pack, Take 500 mg by mouth every 6 hours as needed (Mild Pain)., Disp: , Rfl:     ondansetron (ZOFRAN ODT) 4 MG TABLET DISPERSIBLE, Take 4 mg by mouth every 6 hours as needed for Nausea/Vomiting., Disp: , Rfl:     venlafaxine XR (EFFEXOR XR) 75 MG CAPSULE SR 24 HR, Take 75 mg by mouth every day., Disp: , Rfl:     losartan-hydrochlorothiazide (HYZAAR) 50-12.5 MG per tablet, Take 1 Tablet by mouth every day., Disp: , Rfl:     Multiple Vitamin (MULTIVITAMIN ADULT PO), Take 1 Tablet by mouth every day., Disp: , Rfl:     ALLERGIES:   Allergies   Allergen Reactions    Latex Hives    Prozac [Fluoxetine] Hives    Tramadol Hives    Celexa [Citalopram] Anxiety    Compazine [Prochlorperazine] Itching     Patient states she felt like she has \"bugs in her skin\"    Wellbutrin [Bupropion] Anxiety     SURGHX:   Past Surgical History:   Procedure Laterality Date    MD UPPER GI ENDOSCOPY,DIAGNOSIS N/A 6/3/2023    Procedure: GASTROSCOPY;  Surgeon: You Nieto M.D.;  Location: SURGERY [facility];  Service: Gastroenterology    GASTROSCOPY WITH ENDOSTAT N/A 6/3/2023    Procedure: EGD, WITH CAUTERIZATION;  Surgeon: You Nieto M.D.;  Location: SURGERY [facility];  Service: Gastroenterology    MD LAP, RANDY RESTRICT PROC, LONGITUDINAL GAS* N/A 12/9/2022    Procedure: ROBOTIC WU EN Y GASTRIC BYPASS, " "HIATAL HERNIA REPAIR;  Surgeon: Aime To M.D.;  Location: SURGERY [facility];  Service: Gen Robotic    ROBOTIC ASSISTED LAP HIATAL HERNIA REP N/A 2022    Procedure: REPAIR, HERNIA, HIATAL, ROBOT-ASSISTED, LAPAROSCOPIC;  Surgeon: Aime To M.D.;  Location: SURGERY [facility];  Service: Gen Robotic    REPEAT C SECTION  2021    Procedure:  SECTION, REPEAT;  Surgeon: Ronnie Seals M.D.;  Location: [facility];  Service: Labor and Delivery    ABDOMINAL EXPLORATION      OTHER ABDOMINAL SURGERY      PRIMARY C SECTION      TONSILLECTOMY Bilateral      SOCHX:  reports that she has been smoking cigarettes. She started smoking about 9 years ago. She has a 4.5 pack-year smoking history. She has never used smokeless tobacco. She reports that she does not currently use alcohol. She reports that she does not currently use drugs after having used the following drugs: Marijuana.     FH: Reviewed with patient, not pertinent to this visit.    ROS reviewed. Refer to paper documentation scanned into electronic health record under \"Media.\"      Objective:     /72   Pulse 80   Temp 36.9 °C (98.5 °F) (Temporal)   Resp 14   Ht 1.753 m (5' 9\")   Wt 107 kg (235 lb)   SpO2 95%   BMI 34.70 kg/m²     Physical exam WNL.    Refer to paper documentation scanned into electronic health record under \"Media.\"    Quantiferon pending.      Assessment/Plan:     1. Physical exam, pre-employment  - Quantiferon Gold Plus TB (4 tube); Future    2. Pre-employment drug screening  - POCT 6 Panel Urine Drug Screen    Other orders  - sucralfate (CARAFATE) 1 GM Tab; Take 1 g by mouth 4 Times a Day,Before Meals and at Bedtime.    Cleared for duty without restriction or accomodation. Refer to paper documentation scanned into electronic health record under \"Media.\"    "

## 2023-09-11 LAB
GAMMA INTERFERON BACKGROUND BLD IA-ACNC: 0.03 IU/ML
M TB IFN-G BLD-IMP: NEGATIVE
M TB IFN-G CD4+ BCKGRND COR BLD-ACNC: 0.01 IU/ML
MITOGEN IGNF BCKGRD COR BLD-ACNC: >10 IU/ML
QFT TB2 - NIL TBQ2: 0 IU/ML

## 2023-11-06 ENCOUNTER — HOSPITAL ENCOUNTER (OUTPATIENT)
Dept: LAB | Facility: MEDICAL CENTER | Age: 28
End: 2023-11-06
Attending: NURSE PRACTITIONER
Payer: MEDICAID

## 2023-11-06 LAB
25(OH)D3 SERPL-MCNC: 19 NG/ML (ref 30–100)
ALBUMIN SERPL BCP-MCNC: 4.2 G/DL (ref 3.2–4.9)
ALBUMIN/GLOB SERPL: 1.7 G/DL
ALP SERPL-CCNC: 101 U/L (ref 30–99)
ALT SERPL-CCNC: 43 U/L (ref 2–50)
ANION GAP SERPL CALC-SCNC: 7 MMOL/L (ref 7–16)
ANISOCYTOSIS BLD QL SMEAR: ABNORMAL
AST SERPL-CCNC: 32 U/L (ref 12–45)
BASOPHILS # BLD AUTO: 0.7 % (ref 0–1.8)
BASOPHILS # BLD: 0.05 K/UL (ref 0–0.12)
BILIRUB SERPL-MCNC: 0.7 MG/DL (ref 0.1–1.5)
BUN SERPL-MCNC: 8 MG/DL (ref 8–22)
CALCIUM ALBUM COR SERPL-MCNC: 8.8 MG/DL (ref 8.5–10.5)
CALCIUM SERPL-MCNC: 9 MG/DL (ref 8.5–10.5)
CHLORIDE SERPL-SCNC: 105 MMOL/L (ref 96–112)
CHOLEST SERPL-MCNC: 150 MG/DL (ref 100–199)
CO2 SERPL-SCNC: 27 MMOL/L (ref 20–33)
COMMENT 1642: NORMAL
CREAT SERPL-MCNC: 0.7 MG/DL (ref 0.5–1.4)
EOSINOPHIL # BLD AUTO: 0.16 K/UL (ref 0–0.51)
EOSINOPHIL NFR BLD: 2.1 % (ref 0–6.9)
ERYTHROCYTE [DISTWIDTH] IN BLOOD BY AUTOMATED COUNT: 42.7 FL (ref 35.9–50)
FASTING STATUS PATIENT QL REPORTED: NORMAL
FERRITIN SERPL-MCNC: 4.2 NG/ML (ref 10–291)
FOLATE SERPL-MCNC: 8.7 NG/ML
GFR SERPLBLD CREATININE-BSD FMLA CKD-EPI: 120 ML/MIN/1.73 M 2
GLOBULIN SER CALC-MCNC: 2.5 G/DL (ref 1.9–3.5)
GLUCOSE SERPL-MCNC: 82 MG/DL (ref 65–99)
HCT VFR BLD AUTO: 29.8 % (ref 37–47)
HDLC SERPL-MCNC: 46 MG/DL
HGB BLD-MCNC: 8.1 G/DL (ref 12–16)
HYPOCHROMIA BLD QL SMEAR: ABNORMAL
IMM GRANULOCYTES # BLD AUTO: 0.02 K/UL (ref 0–0.11)
IMM GRANULOCYTES NFR BLD AUTO: 0.3 % (ref 0–0.9)
IRON SATN MFR SERPL: 2 % (ref 15–55)
IRON SERPL-MCNC: 9 UG/DL (ref 40–170)
LDLC SERPL CALC-MCNC: 91 MG/DL
LYMPHOCYTES # BLD AUTO: 2.33 K/UL (ref 1–4.8)
LYMPHOCYTES NFR BLD: 30.9 % (ref 22–41)
MCH RBC QN AUTO: 18.1 PG (ref 27–33)
MCHC RBC AUTO-ENTMCNC: 27.2 G/DL (ref 32.2–35.5)
MCV RBC AUTO: 66.7 FL (ref 81.4–97.8)
MICROCYTES BLD QL SMEAR: ABNORMAL
MONOCYTES # BLD AUTO: 0.55 K/UL (ref 0–0.85)
MONOCYTES NFR BLD AUTO: 7.3 % (ref 0–13.4)
MORPHOLOGY BLD-IMP: NORMAL
NEUTROPHILS # BLD AUTO: 4.42 K/UL (ref 1.82–7.42)
NEUTROPHILS NFR BLD: 58.7 % (ref 44–72)
NRBC # BLD AUTO: 0 K/UL
NRBC BLD-RTO: 0 /100 WBC (ref 0–0.2)
PLATELET # BLD AUTO: 259 K/UL (ref 164–446)
PLATELET BLD QL SMEAR: NORMAL
POTASSIUM SERPL-SCNC: 4 MMOL/L (ref 3.6–5.5)
PREALB SERPL-MCNC: 18.6 MG/DL (ref 18–38)
PROT SERPL-MCNC: 6.7 G/DL (ref 6–8.2)
RBC # BLD AUTO: 4.47 M/UL (ref 4.2–5.4)
RBC BLD AUTO: PRESENT
SODIUM SERPL-SCNC: 139 MMOL/L (ref 135–145)
TIBC SERPL-MCNC: 452 UG/DL (ref 250–450)
TRANSFERRIN SERPL-MCNC: 372 MG/DL (ref 200–370)
TRIGL SERPL-MCNC: 64 MG/DL (ref 0–149)
UIBC SERPL-MCNC: 443 UG/DL (ref 110–370)
VIT B12 SERPL-MCNC: 316 PG/ML (ref 211–911)
WBC # BLD AUTO: 7.5 K/UL (ref 4.8–10.8)

## 2023-11-06 PROCEDURE — 80053 COMPREHEN METABOLIC PANEL: CPT

## 2023-11-06 PROCEDURE — 82746 ASSAY OF FOLIC ACID SERUM: CPT

## 2023-11-06 PROCEDURE — 83550 IRON BINDING TEST: CPT

## 2023-11-06 PROCEDURE — 84466 ASSAY OF TRANSFERRIN: CPT

## 2023-11-06 PROCEDURE — 82306 VITAMIN D 25 HYDROXY: CPT

## 2023-11-06 PROCEDURE — 84425 ASSAY OF VITAMIN B-1: CPT

## 2023-11-06 PROCEDURE — 80061 LIPID PANEL: CPT

## 2023-11-06 PROCEDURE — 85025 COMPLETE CBC W/AUTO DIFF WBC: CPT

## 2023-11-06 PROCEDURE — 84252 ASSAY OF VITAMIN B-2: CPT

## 2023-11-06 PROCEDURE — 84630 ASSAY OF ZINC: CPT

## 2023-11-06 PROCEDURE — 84207 ASSAY OF VITAMIN B-6: CPT

## 2023-11-06 PROCEDURE — 84134 ASSAY OF PREALBUMIN: CPT

## 2023-11-06 PROCEDURE — 82607 VITAMIN B-12: CPT

## 2023-11-06 PROCEDURE — 36415 COLL VENOUS BLD VENIPUNCTURE: CPT

## 2023-11-06 PROCEDURE — 83540 ASSAY OF IRON: CPT

## 2023-11-06 PROCEDURE — 82728 ASSAY OF FERRITIN: CPT

## 2023-11-08 LAB — ZINC SERPL-MCNC: 63.6 UG/DL (ref 60–120)

## 2023-11-09 LAB
VIT B2 SERPL-SCNC: 6 NMOL/L (ref 5–50)
VIT B6 SERPL-MCNC: 17 NMOL/L (ref 20–125)

## 2023-11-10 LAB — VIT B1 BLD-MCNC: 92 NMOL/L (ref 70–180)

## 2023-11-12 DIAGNOSIS — D50.8 IRON DEFICIENCY ANEMIA SECONDARY TO INADEQUATE DIETARY IRON INTAKE: ICD-10-CM

## 2023-11-12 PROBLEM — D50.9 IRON DEFICIENCY ANEMIA, UNSPECIFIED: Status: ACTIVE | Noted: 2023-11-12

## 2023-11-12 RX ORDER — METHYLPREDNISOLONE SODIUM SUCCINATE 125 MG/2ML
125 INJECTION, POWDER, LYOPHILIZED, FOR SOLUTION INTRAMUSCULAR; INTRAVENOUS PRN
Status: CANCELLED | OUTPATIENT
Start: 2023-11-13

## 2023-11-12 RX ORDER — SODIUM CHLORIDE 9 MG/ML
INJECTION, SOLUTION INTRAVENOUS CONTINUOUS
Status: CANCELLED | OUTPATIENT
Start: 2023-11-13

## 2023-11-12 RX ORDER — EPINEPHRINE 1 MG/ML(1)
0.5 AMPUL (ML) INJECTION PRN
Status: CANCELLED | OUTPATIENT
Start: 2023-11-13

## 2023-11-12 RX ORDER — DIPHENHYDRAMINE HYDROCHLORIDE 50 MG/ML
50 INJECTION INTRAMUSCULAR; INTRAVENOUS PRN
Status: CANCELLED | OUTPATIENT
Start: 2023-11-13

## 2023-11-29 ENCOUNTER — OUTPATIENT INFUSION SERVICES (OUTPATIENT)
Dept: ONCOLOGY | Facility: MEDICAL CENTER | Age: 28
End: 2023-11-29
Attending: NURSE PRACTITIONER
Payer: MEDICAID

## 2023-11-29 VITALS
SYSTOLIC BLOOD PRESSURE: 138 MMHG | RESPIRATION RATE: 16 BRPM | HEIGHT: 68 IN | OXYGEN SATURATION: 100 % | WEIGHT: 237.22 LBS | BODY MASS INDEX: 35.95 KG/M2 | DIASTOLIC BLOOD PRESSURE: 91 MMHG | HEART RATE: 98 BPM | TEMPERATURE: 97.7 F

## 2023-11-29 DIAGNOSIS — D50.8 IRON DEFICIENCY ANEMIA SECONDARY TO INADEQUATE DIETARY IRON INTAKE: ICD-10-CM

## 2023-11-29 PROCEDURE — 96365 THER/PROPH/DIAG IV INF INIT: CPT

## 2023-11-29 PROCEDURE — 700105 HCHG RX REV CODE 258: Mod: UD | Performed by: NURSE PRACTITIONER

## 2023-11-29 PROCEDURE — 700111 HCHG RX REV CODE 636 W/ 250 OVERRIDE (IP): Mod: JZ,UD | Performed by: NURSE PRACTITIONER

## 2023-11-29 PROCEDURE — 96375 TX/PRO/DX INJ NEW DRUG ADDON: CPT

## 2023-11-29 RX ORDER — DIPHENHYDRAMINE HYDROCHLORIDE 50 MG/ML
50 INJECTION INTRAMUSCULAR; INTRAVENOUS PRN
OUTPATIENT
Start: 2023-11-29

## 2023-11-29 RX ORDER — METHYLPREDNISOLONE SODIUM SUCCINATE 125 MG/2ML
125 INJECTION, POWDER, LYOPHILIZED, FOR SOLUTION INTRAMUSCULAR; INTRAVENOUS PRN
OUTPATIENT
Start: 2023-11-29

## 2023-11-29 RX ORDER — SODIUM CHLORIDE 9 MG/ML
INJECTION, SOLUTION INTRAVENOUS CONTINUOUS
OUTPATIENT
Start: 2023-11-29

## 2023-11-29 RX ORDER — EPINEPHRINE 1 MG/ML(1)
0.5 AMPUL (ML) INJECTION PRN
OUTPATIENT
Start: 2023-11-29

## 2023-11-29 RX ORDER — METHYLPREDNISOLONE SODIUM SUCCINATE 125 MG/2ML
125 INJECTION, POWDER, LYOPHILIZED, FOR SOLUTION INTRAMUSCULAR; INTRAVENOUS PRN
Status: DISCONTINUED | OUTPATIENT
Start: 2023-11-29 | End: 2023-11-29 | Stop reason: HOSPADM

## 2023-11-29 RX ORDER — METHYLPREDNISOLONE SODIUM SUCCINATE 125 MG/2ML
125 INJECTION, POWDER, LYOPHILIZED, FOR SOLUTION INTRAMUSCULAR; INTRAVENOUS PRN
Status: CANCELLED | OUTPATIENT
Start: 2023-11-29

## 2023-11-29 RX ORDER — HYDROCODONE BITARTRATE AND ACETAMINOPHEN 5; 325 MG/1; MG/1
TABLET ORAL
COMMUNITY
Start: 2023-11-06

## 2023-11-29 RX ADMIN — METHYLPREDNISOLONE SODIUM SUCCINATE 125 MG: 125 INJECTION, POWDER, FOR SOLUTION INTRAMUSCULAR; INTRAVENOUS at 08:39

## 2023-11-29 RX ADMIN — SODIUM CHLORIDE 1000 MG: 9 INJECTION, SOLUTION INTRAVENOUS at 08:56

## 2023-11-29 ASSESSMENT — FIBROSIS 4 INDEX: FIB4 SCORE: 0.53

## 2023-11-29 NOTE — PROGRESS NOTES
Pt presented to infusion center for iron dextran. POC discussed, including time of treatment, pt agreeable. PIV started, brisk blood return observed. Iron labs collected 11/06/2023 reviewed. Pre-med given as ordered. Iron Dextran started at 75 ml/hr per orders for 5 mins, paused for 10 mins with no s/s of adverse reaction. Remainder of infusion run over remainder of approx 1 hour with no s/s of adverse reaction. PIV flushed and removed, gauze dressing placed. No further appts needed at this time. Left on foot to self care.

## 2024-05-11 ENCOUNTER — APPOINTMENT (OUTPATIENT)
Dept: RADIOLOGY | Facility: MEDICAL CENTER | Age: 29
End: 2024-05-11
Attending: EMERGENCY MEDICINE
Payer: MEDICAID

## 2024-05-11 ENCOUNTER — HOSPITAL ENCOUNTER (EMERGENCY)
Facility: MEDICAL CENTER | Age: 29
End: 2024-05-11
Attending: EMERGENCY MEDICINE
Payer: MEDICAID

## 2024-05-11 VITALS
DIASTOLIC BLOOD PRESSURE: 77 MMHG | SYSTOLIC BLOOD PRESSURE: 129 MMHG | RESPIRATION RATE: 16 BRPM | TEMPERATURE: 98.1 F | HEIGHT: 69 IN | HEART RATE: 82 BPM | BODY MASS INDEX: 35.53 KG/M2 | WEIGHT: 239.86 LBS | OXYGEN SATURATION: 95 %

## 2024-05-11 DIAGNOSIS — K52.9 ENTERITIS: ICD-10-CM

## 2024-05-11 LAB
ALBUMIN SERPL BCP-MCNC: 3.9 G/DL (ref 3.2–4.9)
ALBUMIN/GLOB SERPL: 1.3 G/DL
ALP SERPL-CCNC: 82 U/L (ref 30–99)
ALT SERPL-CCNC: 15 U/L (ref 2–50)
ANION GAP SERPL CALC-SCNC: 12 MMOL/L (ref 7–16)
APPEARANCE UR: CLEAR
AST SERPL-CCNC: 18 U/L (ref 12–45)
BACTERIA #/AREA URNS HPF: ABNORMAL /HPF
BASOPHILS # BLD AUTO: 0.4 % (ref 0–1.8)
BASOPHILS # BLD: 0.03 K/UL (ref 0–0.12)
BILIRUB SERPL-MCNC: 0.9 MG/DL (ref 0.1–1.5)
BILIRUB UR QL STRIP.AUTO: NEGATIVE
BUN SERPL-MCNC: 8 MG/DL (ref 8–22)
CALCIUM ALBUM COR SERPL-MCNC: 8.8 MG/DL (ref 8.5–10.5)
CALCIUM SERPL-MCNC: 8.7 MG/DL (ref 8.5–10.5)
CHLORIDE SERPL-SCNC: 101 MMOL/L (ref 96–112)
CO2 SERPL-SCNC: 22 MMOL/L (ref 20–33)
COLOR UR: YELLOW
CREAT SERPL-MCNC: 0.71 MG/DL (ref 0.5–1.4)
EOSINOPHIL # BLD AUTO: 0.04 K/UL (ref 0–0.51)
EOSINOPHIL NFR BLD: 0.6 % (ref 0–6.9)
EPI CELLS #/AREA URNS HPF: ABNORMAL /HPF
ERYTHROCYTE [DISTWIDTH] IN BLOOD BY AUTOMATED COUNT: 41.1 FL (ref 35.9–50)
GFR SERPLBLD CREATININE-BSD FMLA CKD-EPI: 118 ML/MIN/1.73 M 2
GLOBULIN SER CALC-MCNC: 2.9 G/DL (ref 1.9–3.5)
GLUCOSE SERPL-MCNC: 102 MG/DL (ref 65–99)
GLUCOSE UR STRIP.AUTO-MCNC: NEGATIVE MG/DL
HCG SERPL QL: NEGATIVE
HCT VFR BLD AUTO: 37.7 % (ref 37–47)
HGB BLD-MCNC: 11.5 G/DL (ref 12–16)
HYALINE CASTS #/AREA URNS LPF: ABNORMAL /LPF
IMM GRANULOCYTES # BLD AUTO: 0.02 K/UL (ref 0–0.11)
IMM GRANULOCYTES NFR BLD AUTO: 0.3 % (ref 0–0.9)
KETONES UR STRIP.AUTO-MCNC: NEGATIVE MG/DL
LACTATE SERPL-SCNC: 0.9 MMOL/L (ref 0.5–2)
LEUKOCYTE ESTERASE UR QL STRIP.AUTO: ABNORMAL
LIPASE SERPL-CCNC: 14 U/L (ref 11–82)
LYMPHOCYTES # BLD AUTO: 1.14 K/UL (ref 1–4.8)
LYMPHOCYTES NFR BLD: 16.2 % (ref 22–41)
MCH RBC QN AUTO: 23.1 PG (ref 27–33)
MCHC RBC AUTO-ENTMCNC: 30.5 G/DL (ref 32.2–35.5)
MCV RBC AUTO: 75.7 FL (ref 81.4–97.8)
MICRO URNS: ABNORMAL
MONOCYTES # BLD AUTO: 0.71 K/UL (ref 0–0.85)
MONOCYTES NFR BLD AUTO: 10.1 % (ref 0–13.4)
NEUTROPHILS # BLD AUTO: 5.08 K/UL (ref 1.82–7.42)
NEUTROPHILS NFR BLD: 72.4 % (ref 44–72)
NITRITE UR QL STRIP.AUTO: NEGATIVE
NRBC # BLD AUTO: 0 K/UL
NRBC BLD-RTO: 0 /100 WBC (ref 0–0.2)
PH UR STRIP.AUTO: 6 [PH] (ref 5–8)
PLATELET # BLD AUTO: 240 K/UL (ref 164–446)
PMV BLD AUTO: 10.3 FL (ref 9–12.9)
POTASSIUM SERPL-SCNC: 3.9 MMOL/L (ref 3.6–5.5)
PROT SERPL-MCNC: 6.8 G/DL (ref 6–8.2)
PROT UR QL STRIP: NEGATIVE MG/DL
RBC # BLD AUTO: 4.98 M/UL (ref 4.2–5.4)
RBC # URNS HPF: ABNORMAL /HPF
RBC UR QL AUTO: NEGATIVE
SODIUM SERPL-SCNC: 135 MMOL/L (ref 135–145)
SP GR UR STRIP.AUTO: 1.01
UROBILINOGEN UR STRIP.AUTO-MCNC: 0.2 MG/DL
WBC # BLD AUTO: 7 K/UL (ref 4.8–10.8)
WBC #/AREA URNS HPF: ABNORMAL /HPF

## 2024-05-11 RX ORDER — ONDANSETRON 2 MG/ML
4 INJECTION INTRAMUSCULAR; INTRAVENOUS ONCE
Status: COMPLETED | OUTPATIENT
Start: 2024-05-11 | End: 2024-05-11

## 2024-05-11 RX ORDER — ONDANSETRON 4 MG/1
4 TABLET, ORALLY DISINTEGRATING ORAL EVERY 8 HOURS PRN
Qty: 10 TABLET | Refills: 0 | Status: SHIPPED | OUTPATIENT
Start: 2024-05-11

## 2024-05-11 RX ORDER — SODIUM CHLORIDE 9 MG/ML
1000 INJECTION, SOLUTION INTRAVENOUS ONCE
Status: COMPLETED | OUTPATIENT
Start: 2024-05-11 | End: 2024-05-11

## 2024-05-11 RX ORDER — MORPHINE SULFATE 4 MG/ML
4 INJECTION INTRAVENOUS
Status: DISCONTINUED | OUTPATIENT
Start: 2024-05-11 | End: 2024-05-11 | Stop reason: HOSPADM

## 2024-05-11 RX ORDER — HYDROCODONE BITARTRATE AND ACETAMINOPHEN 5; 325 MG/1; MG/1
1 TABLET ORAL EVERY 4 HOURS PRN
Qty: 20 TABLET | Refills: 0 | Status: SHIPPED | OUTPATIENT
Start: 2024-05-11 | End: 2024-05-16

## 2024-05-11 RX ADMIN — IOHEXOL 100 ML: 350 INJECTION, SOLUTION INTRAVENOUS at 12:15

## 2024-05-11 RX ADMIN — MORPHINE SULFATE 4 MG: 4 INJECTION INTRAVENOUS at 10:13

## 2024-05-11 RX ADMIN — ONDANSETRON 4 MG: 2 INJECTION INTRAMUSCULAR; INTRAVENOUS at 10:12

## 2024-05-11 RX ADMIN — SODIUM CHLORIDE 1000 ML: 9 INJECTION, SOLUTION INTRAVENOUS at 10:12

## 2024-05-11 RX ADMIN — MORPHINE SULFATE 4 MG: 4 INJECTION INTRAVENOUS at 12:17

## 2024-05-11 ASSESSMENT — PAIN DESCRIPTION - PAIN TYPE
TYPE: ACUTE PAIN
TYPE: ACUTE PAIN

## 2024-05-11 ASSESSMENT — FIBROSIS 4 INDEX: FIB4 SCORE: 0.53

## 2024-05-11 NOTE — Clinical Note
Sintia Jain was seen and treated in our emergency department on 5/11/2024.  She may return to work on 05/18/2024.       If you have any questions or concerns, please don't hesitate to call.      Benny Herndon M.D.

## 2024-05-11 NOTE — ED PROVIDER NOTES
ED Provider Note    CHIEF COMPLAINT  Chief Complaint   Patient presents with    Abdominal Pain     X3 days in mid epigastric area down to lower abdomen. Reports nausea but no vomiting. Hx of cholecystectomy, Gastric bypass, bleeding ulcer. Eating and drinking make it worse       HPI  Sintia Jain is a 28 y.o. female who presents for evaluation of mid abdominal pain for approximately 3 days which has been constant and severe.  Patient notes eating makes the pain worse but denies any vomiting because she has had gastric bypass.  She notes no diarrhea or blood in the stool.  She notes no burning with urination or blood in the urine.  She notes the pain is not radiating through to her back.  She has had no fevers or chills and no sick contacts.  She notes she is at a history of a cholecystectomy.  EXTERNAL RECORDS REVIEWED  Reviewed robotic Mony-en-Y performed in 2022 as well as a gastroscopy in 2023 for GI bleed.  ROS  Constitutional: No fevers or chills  Skin: No rashes  HEENT: No sore throat, or runny nose  Neck: No neck pain  Chest: No pain or rashes  Pulm: No shortness of breath, cough, wheezing, stridor, or pain with inspiration/expiration  Gastrointestinal: Abdominal pain and nausea.  No vomiting, diarrhea, melena, or hematochezia.  Genitourinary: No dysuria or hematuria  Musculoskeletal: No pain, swelling, or focal weakness  Neurologic: No sensory or focal motor changes to extremities. No confusion or disorientation.  Heme: No bleeding or bruising problems.   Immuno: No hx of recurrent infections        LIMITATION TO HISTORY   None   OUTSIDE HISTORIAN(S):  None        PAST FAM HISTORY  Family History   Problem Relation Age of Onset    Hypertension Mother     Cancer Mother         Uterine    Diabetes Maternal Grandmother        PAST MEDICAL HISTORY   has a past medical history of Anesthesia, Anxiety, Arthritis, Awareness under anesthesia, Coagulopathy (HCC) (6/3/2023), Delayed emergence from general  anesthesia, Depression, GERD (gastroesophageal reflux disease), Heart burn, Hiatus hernia syndrome, Hypertension, Indigestion, and Thrombocytopenia (HCC) (6/3/2023).    SOCIAL HISTORY  Social History     Tobacco Use    Smoking status: Every Day     Current packs/day: 0.00     Average packs/day: 0.5 packs/day for 9.0 years (4.5 ttl pk-yrs)     Types: Cigarettes     Start date: 10/5/2013     Last attempt to quit: 10/5/2022     Years since quittin.6    Smokeless tobacco: Never    Tobacco comments:     cutting back to quit   Vaping Use    Vaping Use: Never used   Substance and Sexual Activity    Alcohol use: Not Currently    Drug use: Not Currently     Types: Marijuana    Sexual activity: Yes     Partners: Male     Comment: None        SURGICAL HISTORY   has a past surgical history that includes primary c section; abdominal exploration; tonsillectomy (Bilateral); repeat c section (2021); other abdominal surgery; lap, navid restrict proc, longitudinal gas* (N/A, 2022); robotic assisted lap hiatal hernia rep (N/A, 2022); upper gi endoscopy,diagnosis (N/A, 6/3/2023); and gastroscopy with endostat (N/A, 6/3/2023).    CURRENT MEDICATIONS  Home Medications       Reviewed by Karolyn Vale R.N. (Registered Nurse) on 24 at 0919  Med List Status: Partial     Medication Last Dose Status   Acetaminophen (TYLENOL DISSOLVE PACKS) 500 MG Pack  Active   CALCIUM PO  Active   Cholecalciferol (VITAMIN D3 PO)  Active   HYDROcodone-acetaminophen (NORCO) 5-325 MG Tab per tablet  Active   losartan-hydrochlorothiazide (HYZAAR) 50-12.5 MG per tablet  Active   Multiple Vitamin (MULTI VITAMIN DAILY PO)  Active   Multiple Vitamin (MULTIVITAMIN ADULT PO)  Active   omeprazole (PRILOSEC) 40 MG delayed-release capsule  Active   ondansetron (ZOFRAN ODT) 4 MG TABLET DISPERSIBLE  Active   sucralfate (CARAFATE) 1 GM Tab  Active   venlafaxine XR (EFFEXOR XR) 75 MG CAPSULE SR 24 HR  Active                     ALLERGIES  Allergies  "  Allergen Reactions    Latex Hives    Prozac [Fluoxetine] Hives    Tramadol Hives    Celexa [Citalopram] Anxiety    Compazine [Prochlorperazine] Itching     Patient states she felt like she has \"bugs in her skin\"    Wellbutrin [Bupropion] Anxiety       PHYSICAL EXAM  VITAL SIGNS: /77   Pulse 82   Temp 36.7 °C (98.1 °F) (Temporal)   Resp 16   Ht 1.753 m (5' 9\")   Wt 109 kg (239 lb 13.8 oz)   SpO2 95%   BMI 35.42 kg/m²    Gen: Appears tired, appears mildly to moderately distressed with occasional grimacing.  Does not make eye contact, keeps eyes closed when talking.  HEENT: No signs of trauma, Bilateral external ears normal, Nose normal. Conjunctiva normal, Non-icteric.   Cardiovascular: Regular rate and rhythm, no murmurs.  Capillary refill less than 3 seconds to all extremities, 2+ distal pulses.  Thorax & Lungs: Normal breath sounds, No respiratory distress, No wheezing bilateral chest rise  Abdomen: Bowel sounds normal, Soft, diffuse tenderness with no guarding.  Skin: Warm, Dry, No erythema, No rash noted to exposed areas.   Extremities: Intact distal pulses, No edema  Neurologic: Alert , no facial droop, grossly normal coordination and strength    INITIAL IMPRESSION  Patient arrives for evaluation of abdominal pain for 3 days which is concerning for small bowel obstruction or possibly ileus.  Patient has had a Mony-en-Y gastric bypass and has had an upper GI bleed last summer.  She does not appear septic or toxic and does not appear hemodynamically unstable.  She is mildly tachycardic however.  She has no guarding or rebound and I do not feel immediate surgical consultation is necessary but we will need to CT her abdomen and pelvis to evaluate for surgical pathology such as perforation or small bowel obstruction and contact the surgeon as appropriate.  Alternatively, pancreatitis or atypical appendicitis are also possible.  Patient states understanding we will keep her n.p.o. while we empirically " hydrate her, treat nausea, and pain.    ED observation? No    LABS  Results for orders placed or performed during the hospital encounter of 05/11/24   CBC WITH DIFFERENTIAL   Result Value Ref Range    WBC 7.0 4.8 - 10.8 K/uL    RBC 4.98 4.20 - 5.40 M/uL    Hemoglobin 11.5 (L) 12.0 - 16.0 g/dL    Hematocrit 37.7 37.0 - 47.0 %    MCV 75.7 (L) 81.4 - 97.8 fL    MCH 23.1 (L) 27.0 - 33.0 pg    MCHC 30.5 (L) 32.2 - 35.5 g/dL    RDW 41.1 35.9 - 50.0 fL    Platelet Count 240 164 - 446 K/uL    MPV 10.3 9.0 - 12.9 fL    Neutrophils-Polys 72.40 (H) 44.00 - 72.00 %    Lymphocytes 16.20 (L) 22.00 - 41.00 %    Monocytes 10.10 0.00 - 13.40 %    Eosinophils 0.60 0.00 - 6.90 %    Basophils 0.40 0.00 - 1.80 %    Immature Granulocytes 0.30 0.00 - 0.90 %    Nucleated RBC 0.00 0.00 - 0.20 /100 WBC    Neutrophils (Absolute) 5.08 1.82 - 7.42 K/uL    Lymphs (Absolute) 1.14 1.00 - 4.80 K/uL    Monos (Absolute) 0.71 0.00 - 0.85 K/uL    Eos (Absolute) 0.04 0.00 - 0.51 K/uL    Baso (Absolute) 0.03 0.00 - 0.12 K/uL    Immature Granulocytes (abs) 0.02 0.00 - 0.11 K/uL    NRBC (Absolute) 0.00 K/uL   COMP METABOLIC PANEL   Result Value Ref Range    Sodium 135 135 - 145 mmol/L    Potassium 3.9 3.6 - 5.5 mmol/L    Chloride 101 96 - 112 mmol/L    Co2 22 20 - 33 mmol/L    Anion Gap 12.0 7.0 - 16.0    Glucose 102 (H) 65 - 99 mg/dL    Bun 8 8 - 22 mg/dL    Creatinine 0.71 0.50 - 1.40 mg/dL    Calcium 8.7 8.5 - 10.5 mg/dL    Correct Calcium 8.8 8.5 - 10.5 mg/dL    AST(SGOT) 18 12 - 45 U/L    ALT(SGPT) 15 2 - 50 U/L    Alkaline Phosphatase 82 30 - 99 U/L    Total Bilirubin 0.9 0.1 - 1.5 mg/dL    Albumin 3.9 3.2 - 4.9 g/dL    Total Protein 6.8 6.0 - 8.2 g/dL    Globulin 2.9 1.9 - 3.5 g/dL    A-G Ratio 1.3 g/dL   LIPASE   Result Value Ref Range    Lipase 14 11 - 82 U/L   HCG QUAL SERUM   Result Value Ref Range    Beta-Hcg Qualitative Serum Negative Negative   URINALYSIS,CULTURE IF INDICATED    Specimen: Urine, Clean Catch   Result Value Ref Range    Color  Yellow     Character Clear     Specific Gravity 1.015 <1.035    Ph 6.0 5.0 - 8.0    Glucose Negative Negative mg/dL    Ketones Negative Negative mg/dL    Protein Negative Negative mg/dL    Bilirubin Negative Negative    Urobilinogen, Urine 0.2 Negative    Nitrite Negative Negative    Leukocyte Esterase Small (A) Negative    Occult Blood Negative Negative    Micro Urine Req Microscopic    LACTIC ACID   Result Value Ref Range    Lactic Acid 0.9 0.5 - 2.0 mmol/L   URINE MICROSCOPIC (W/UA)   Result Value Ref Range    WBC 2-5 /hpf    RBC 0-2 /hpf    Bacteria Moderate (A) None /hpf    Epithelial Cells Moderate (A) /hpf    Hyaline Cast 0-2 /lpf   ESTIMATED GFR   Result Value Ref Range    GFR (CKD-EPI) 118 >60 mL/min/1.73 m 2       RADIOLOGY  CT-ABDOMEN-PELVIS WITH   Final Result      1.  There is some wall thickening within a segment of the small bowel to the left of midline as described above. Differential diagnosis includes enteritis or inflammatory bowel disease. There is currently no evidence of obstruction.      2.  Post gastric bypass and cholecystectomy.      3.  No other acute abnormalities are identified.            HYDRATION: Based on the patient's presentation of Inability to take oral fluids the patient was given IV fluids. IV Hydration was used because oral hydration was not adequate alone. Upon recheck following hydration, the patient was improving.        COURSE & MEDICAL DECISION MAKING  Pertinent Labs & Imaging studies reviewed. (See chart for details)  12:15 PM  Discussed the findings with patient.  She stated understanding her labs are very reassuring and do not indicate any significant organ dysfunction or leukocytosis.  CT was reassuring and that there was no small bowel obstruction, perforation, or focal phlegmon.  There is diffuse findings to suggest enteritis.  She states clear understanding that the etiology of the enteritis is unclear but that symptomatic treatment is very reasonable at this  point.  I do not feel antibiotics will be beneficial the patient based on the findings today, and I suspect this is likely viral.  Autoimmune is also possible, but difficult to diagnose in the emergency department and, at this stage, treated the same way.    Although the patient was still in pain upon reevaluation, she did not appear particularly distressed and I feel this can be safely treated as an outpatient. Patient will be given a short course of narcotic pain control.  She states clear understanding of the danger of these medications, and will use them carefully.  She will be given a prescription for Zofran as well.  She will be instructed to return if symptoms worsen or change in any way and otherwise follow-up with her primary care physician.  I have discussed management of the patient with the following physicians and POOJA's: None    Escalation of care considered, and ultimately not performed:acute inpatient care management, however at this time, the patient is most appropriate for outpatient management    Barriers to care at this time, including but not limited to: None.     Decision tools and Rx drugs considered including, but not limited to : Norco, Zofran.  PDMP reviewed.I reviewed prescription monitoring program for patient's narcotic use before prescribing a scheduled drug.The patient will not drink alcohol nor drive with prescribed medications. The patient will return for new or worsening symptoms and is stable at the time of discharge.     The patient is referred to a primary physician for blood pressure management, diabetic screening, and for all other preventative health concerns.     In prescribing controlled substances to this patient, I certify that I have obtained and reviewed the medical history of the patient. I have also made a good yang effort to obtain applicable records from other providers who have treated the patient and records did not demonstrate any increased risk of substance abuse  that would prevent me from prescribing controlled substances.      I have conducted a physical exam and documented it. I have reviewed the patient's prescription history as maintained by the [place] Prescription Monitoring Program.      I have assessed the patient’s risk for abuse, dependency, and addiction using the validated Opioid Risk Tool available at https://www.mdcalc.com/aibbvu-zoaj-uzaj-ort-narcotic-abuse.      Given the above, I believe the benefits of controlled substance therapy outweigh the risks. The reasons for prescribing controlled substances include in my professional opinion, controlled substances are the only reasonable choice for this patient because over-the-counter medications are unlikely to control the pain adequately. Accordingly, I have discussed the risk and benefits, treatment plan, and alternative therapies with the patient.      Discussion of management with other QHP or appropriate source(s): None    The patient will not drink alcohol nor drive with prescribed medications. The patient will return for worsening symptoms and is stable at the time of discharge. The patient verbalizes understanding and will comply.    FINAL IMPRESSION  1. Enteritis        Electronically signed by: Benny Herndon M.D., 5/11/2024 9:47 AM

## 2024-05-11 NOTE — ED TRIAGE NOTES
"Chief Complaint   Patient presents with    Abdominal Pain     X3 days in mid epigastric area down to lower abdomen. Reports nausea but no vomiting. Hx of cholecystectomy, Gastric bypass, bleeding ulcer. Eating and drinking make it worse       Patient to triage ambulatory with a steady gait, AAOx4, Appropriate precautions in place.     Explained wait time and triage process. Placed back in lobby. Told to notify ED tech or RN of any changes, verbalized understanding.    BP (!) 142/102   Pulse (!) 105   Temp 36.7 °C (98.1 °F) (Temporal)   Resp 18   Ht 1.753 m (5' 9\")   Wt 109 kg (239 lb 13.8 oz)   SpO2 98%   BMI 35.42 kg/m²     "

## 2024-05-11 NOTE — ED NOTES
Discharge instructions given to pt including follow up with pcp or returning if no improvement of symptoms or to return if worse. Prescription x 2 provided to pt. Instructed no driving no drinking alcohol while on prescribed pain medication. Questions answered by RN. Denies any new complaints. Discharged w/stable vitals and wheeled to the lobby by significant other. Paperwork signed and place in a bin. Work note provided to pt by erp.

## 2024-05-11 NOTE — DISCHARGE INSTRUCTIONS
You have evidence for inflamed small bowel but the source is unclear.  Generally these issues resolve with time and conservative treatment at home such as pain medication, nausea medication, and clear liquid diet for a few days.  Advance her diet as tolerated.  Return if her symptoms worsen or change and otherwise follow-up with your primary care physician if they persist.

## 2024-07-09 NOTE — LETTER
[facility] [address]  Phone:  [phone] - Fax:  [phone]   [facility] Progress Report and Disability Certification  Date of Service: 3/6/2023   No Show:  No  Date / Time of Next Visit: 3/27/2023   Claim Information   Patient Name: Sintia Jain  Claim Number:     Employer: [employer]  Date of Injury: 3/4/2023     Insurer / TPA: Rosario Mobley  ID / SSN:     Occupation: CAREGIVER  Diagnosis: The encounter diagnosis was Strain of lumbar region, initial encounter.    Medical Information   Related to Industrial Injury? Yes    Subjective Complaints:  DOI: 03/04/2023 ROCHELLE: Patient sates she states that she was transferring a resident to a wheelchair and got her placed in the wheelchair moved behind the resident and pulled her backwards underneath her arms and felt a pop in her lower back.  She reports that she immediately felt pain in her lower back that was and throbbing in nature and rates it a 8/10.  Today she reports that she continues to have lower back pain but continues to be sharp and throbbing in nature and rates it at a 8.5/10.  She denies having any numbness or tingling running down her legs at the time of injury or today.  She is able to ambulate however it does cause pain.   She reports that she has done cryotherapy and taken Tylenol which did not help with her pain.  She reports that a hot bath did make the pain worse.   Objective Findings: Back: Decreased ROM to the left with lateral extension, twisting, and flexion and extension, 4/5 LE strength, sensation intact bilaterally in LE, no TTP over spinous processes, positive left lumbar paraspinals TTP, SI joint positive on left, negative on right, straight leg raise negative.      Pre-Existing Condition(s):     Assessment:   Initial Visit    Status: Additional Care Required  Permanent Disability:No    Plan: Medication    Diagnostics:      Comments:       Disability  Pharmacy requesting medication refill. Please approve or deny this request.    Rx requested:  Requested Prescriptions     Pending Prescriptions Disp Refills    furosemide (LASIX) 20 MG tablet 60 tablet 0     Sig: Take 1 tablet by mouth daily         Last Office Visit:   5/2/2024      Next Visit Date:  Future Appointments   Date Time Provider Department Center   7/29/2024 11:00 AM [facility] MAMMOGRAPHY ROOM 1 [facility]   8/2/2024 11:00 AM Yamini Buchanan, APRN - CNP [facility]  [facility]   10/22/2024  2:00 PM SCHEDULE, [facility] NURSE [facility]   11/18/2024 12:45 PM Eugenie Vásquez MD [facility]  [facility]      Information   Status: Released to Restricted Duty    From:  3/6/2023  Through: 3/27/2023 Restrictions are: Temporary   Physical Restrictions   Sitting:    Standing:    Stooping:    Bending:      Squatting:    Walking:    Climbing:    Pushing:      Pulling:    Other:    Reaching Above Shoulder (L):   Reaching Above Shoulder (R):       Reaching Below Shoulder (L):    Reaching Below Shoulder (R):      Not to exceed Weight Limits   Carrying(hrs):   Weight Limit(lb):   Lifting(hrs):   Weight  Limit(lb):     Comments: Patient has lumbar sprain to left side.  No twisting, bending, pushing, pulling, lifting, squatting, or climbing stairs.  Patient was discharged with Flexeril 5 to 10 mg 3 times daily as needed for muscle spasms and pain.  Patient may still use Tylenol 500 to 1000 mg 3 times daily.  Continue with cryotherapy 20 minutes nightly.     Repetitive Actions   Hands: i.e. Fine Manipulations from Grasping:     Feet: i.e. Operating Foot Controls:     Driving / Operate Machinery:     Health Care Provider’s Original or Electronic Signature  YVON Rasheed. Health Care Provider’s Original or Electronic Signature    Mark Guy DO MPH     Clinic Name / Location: [address] [address] Clinic Phone Number: Dept: [phone]   Appointment Time: 12:00 Pm Visit Start Time: 12:46 PM   Check-In Time:  10:11 Am Visit Discharge Time:  2:04 pm    Original-Treating Physician or Chiropractor    Page 2-Insurer/TPA    Page 3-Employer    Page 4-Employee

## 2024-07-13 ENCOUNTER — OFFICE VISIT (OUTPATIENT)
Dept: URGENT CARE | Facility: PHYSICIAN GROUP | Age: 29
End: 2024-07-13
Payer: MEDICAID

## 2024-07-13 VITALS
WEIGHT: 233 LBS | TEMPERATURE: 97.1 F | BODY MASS INDEX: 34.51 KG/M2 | HEIGHT: 69 IN | HEART RATE: 84 BPM | DIASTOLIC BLOOD PRESSURE: 88 MMHG | SYSTOLIC BLOOD PRESSURE: 132 MMHG | RESPIRATION RATE: 16 BRPM | OXYGEN SATURATION: 99 %

## 2024-07-13 DIAGNOSIS — J01.90 ACUTE BACTERIAL SINUSITIS: ICD-10-CM

## 2024-07-13 DIAGNOSIS — J22 LOWER RESPIRATORY INFECTION (E.G., BRONCHITIS, PNEUMONIA, PNEUMONITIS, PULMONITIS): ICD-10-CM

## 2024-07-13 DIAGNOSIS — B96.89 ACUTE BACTERIAL SINUSITIS: ICD-10-CM

## 2024-07-13 PROCEDURE — 99213 OFFICE O/P EST LOW 20 MIN: CPT | Performed by: NURSE PRACTITIONER

## 2024-07-13 PROCEDURE — 3075F SYST BP GE 130 - 139MM HG: CPT | Performed by: NURSE PRACTITIONER

## 2024-07-13 PROCEDURE — 3079F DIAST BP 80-89 MM HG: CPT | Performed by: NURSE PRACTITIONER

## 2024-07-13 RX ORDER — AMOXICILLIN AND CLAVULANATE POTASSIUM 875; 125 MG/1; MG/1
1 TABLET, FILM COATED ORAL 2 TIMES DAILY
Qty: 14 TABLET | Refills: 0 | Status: SHIPPED | OUTPATIENT
Start: 2024-07-13 | End: 2024-07-20

## 2024-07-13 RX ORDER — PREDNISONE 10 MG/1
20 TABLET ORAL DAILY
Qty: 10 TABLET | Refills: 0 | Status: SHIPPED | OUTPATIENT
Start: 2024-07-13 | End: 2024-07-18

## 2024-07-13 ASSESSMENT — FIBROSIS 4 INDEX: FIB4 SCORE: 0.56

## 2024-10-22 ENCOUNTER — OFFICE VISIT (OUTPATIENT)
Dept: URGENT CARE | Facility: PHYSICIAN GROUP | Age: 29
End: 2024-10-22
Payer: MEDICAID

## 2024-10-22 VITALS
OXYGEN SATURATION: 96 % | SYSTOLIC BLOOD PRESSURE: 108 MMHG | TEMPERATURE: 97.8 F | DIASTOLIC BLOOD PRESSURE: 72 MMHG | RESPIRATION RATE: 16 BRPM | HEART RATE: 96 BPM

## 2024-10-22 DIAGNOSIS — K52.9 AGE (ACUTE GASTROENTERITIS): ICD-10-CM

## 2024-10-22 DIAGNOSIS — R10.84 GENERALIZED ABDOMINAL PAIN: ICD-10-CM

## 2024-10-22 PROCEDURE — 99214 OFFICE O/P EST MOD 30 MIN: CPT | Performed by: NURSE PRACTITIONER

## 2024-10-22 PROCEDURE — 3074F SYST BP LT 130 MM HG: CPT | Performed by: NURSE PRACTITIONER

## 2024-10-22 PROCEDURE — 3078F DIAST BP <80 MM HG: CPT | Performed by: NURSE PRACTITIONER

## 2025-01-09 ENCOUNTER — HOSPITAL ENCOUNTER (OUTPATIENT)
Dept: LAB | Facility: MEDICAL CENTER | Age: 30
End: 2025-01-09
Attending: COLON & RECTAL SURGERY
Payer: MEDICAID

## 2025-01-09 LAB
ANISOCYTOSIS BLD QL SMEAR: ABNORMAL
BASOPHILS # BLD AUTO: 1.2 % (ref 0–1.8)
BASOPHILS # BLD: 0.08 K/UL (ref 0–0.12)
COMMENT 1642: NORMAL
EOSINOPHIL # BLD AUTO: 0.09 K/UL (ref 0–0.51)
EOSINOPHIL NFR BLD: 1.4 % (ref 0–6.9)
ERYTHROCYTE [DISTWIDTH] IN BLOOD BY AUTOMATED COUNT: 42.9 FL (ref 35.9–50)
HCT VFR BLD AUTO: 32.1 % (ref 37–47)
HGB BLD-MCNC: 9.4 G/DL (ref 12–16)
HYPOCHROMIA BLD QL SMEAR: ABNORMAL
IMM GRANULOCYTES # BLD AUTO: 0.02 K/UL (ref 0–0.11)
IMM GRANULOCYTES NFR BLD AUTO: 0.3 % (ref 0–0.9)
LYMPHOCYTES # BLD AUTO: 1.95 K/UL (ref 1–4.8)
LYMPHOCYTES NFR BLD: 29.3 % (ref 22–41)
MCH RBC QN AUTO: 20.1 PG (ref 27–33)
MCHC RBC AUTO-ENTMCNC: 29.3 G/DL (ref 32.2–35.5)
MCV RBC AUTO: 68.6 FL (ref 81.4–97.8)
MICROCYTES BLD QL SMEAR: ABNORMAL
MONOCYTES # BLD AUTO: 0.48 K/UL (ref 0–0.85)
MONOCYTES NFR BLD AUTO: 7.2 % (ref 0–13.4)
MORPHOLOGY BLD-IMP: NORMAL
NEUTROPHILS # BLD AUTO: 4.04 K/UL (ref 1.82–7.42)
NEUTROPHILS NFR BLD: 60.6 % (ref 44–72)
NRBC # BLD AUTO: 0 K/UL
NRBC BLD-RTO: 0 /100 WBC (ref 0–0.2)
OVALOCYTES BLD QL SMEAR: NORMAL
PLATELET # BLD AUTO: 307 K/UL (ref 164–446)
PLATELET BLD QL SMEAR: NORMAL
PMV BLD AUTO: 11.6 FL (ref 9–12.9)
POIKILOCYTOSIS BLD QL SMEAR: NORMAL
RBC # BLD AUTO: 4.68 M/UL (ref 4.2–5.4)
RBC BLD AUTO: PRESENT
WBC # BLD AUTO: 6.7 K/UL (ref 4.8–10.8)

## 2025-01-09 PROCEDURE — 84466 ASSAY OF TRANSFERRIN: CPT

## 2025-01-09 PROCEDURE — 80053 COMPREHEN METABOLIC PANEL: CPT

## 2025-01-09 PROCEDURE — 84252 ASSAY OF VITAMIN B-2: CPT

## 2025-01-09 PROCEDURE — 82306 VITAMIN D 25 HYDROXY: CPT

## 2025-01-09 PROCEDURE — 85025 COMPLETE CBC W/AUTO DIFF WBC: CPT

## 2025-01-09 PROCEDURE — 82746 ASSAY OF FOLIC ACID SERUM: CPT

## 2025-01-09 PROCEDURE — 83540 ASSAY OF IRON: CPT

## 2025-01-09 PROCEDURE — 80061 LIPID PANEL: CPT

## 2025-01-09 PROCEDURE — 84134 ASSAY OF PREALBUMIN: CPT

## 2025-01-09 PROCEDURE — 82728 ASSAY OF FERRITIN: CPT

## 2025-01-09 PROCEDURE — 36415 COLL VENOUS BLD VENIPUNCTURE: CPT

## 2025-01-09 PROCEDURE — 84630 ASSAY OF ZINC: CPT

## 2025-01-09 PROCEDURE — 84207 ASSAY OF VITAMIN B-6: CPT

## 2025-01-09 PROCEDURE — 84425 ASSAY OF VITAMIN B-1: CPT

## 2025-01-09 PROCEDURE — 82607 VITAMIN B-12: CPT

## 2025-01-10 LAB
25(OH)D3 SERPL-MCNC: 18 NG/ML (ref 30–100)
ALBUMIN SERPL BCP-MCNC: 4.2 G/DL (ref 3.2–4.9)
ALBUMIN/GLOB SERPL: 1.6 G/DL
ALP SERPL-CCNC: 96 U/L (ref 30–99)
ALT SERPL-CCNC: 26 U/L (ref 2–50)
ANION GAP SERPL CALC-SCNC: 10 MMOL/L (ref 7–16)
AST SERPL-CCNC: 23 U/L (ref 12–45)
BILIRUB SERPL-MCNC: 0.9 MG/DL (ref 0.1–1.5)
BUN SERPL-MCNC: 15 MG/DL (ref 8–22)
CALCIUM ALBUM COR SERPL-MCNC: 8.7 MG/DL (ref 8.5–10.5)
CALCIUM SERPL-MCNC: 8.9 MG/DL (ref 8.5–10.5)
CHLORIDE SERPL-SCNC: 103 MMOL/L (ref 96–112)
CHOLEST SERPL-MCNC: 152 MG/DL (ref 100–199)
CO2 SERPL-SCNC: 25 MMOL/L (ref 20–33)
CREAT SERPL-MCNC: 0.7 MG/DL (ref 0.5–1.4)
FASTING STATUS PATIENT QL REPORTED: NORMAL
FERRITIN SERPL-MCNC: 6.7 NG/ML (ref 10–291)
FOLATE SERPL-MCNC: 8.4 NG/ML
GFR SERPLBLD CREATININE-BSD FMLA CKD-EPI: 120 ML/MIN/1.73 M 2
GLOBULIN SER CALC-MCNC: 2.7 G/DL (ref 1.9–3.5)
GLUCOSE SERPL-MCNC: 81 MG/DL (ref 65–99)
HDLC SERPL-MCNC: 51 MG/DL
IRON SERPL-MCNC: 20 UG/DL (ref 40–170)
LDLC SERPL CALC-MCNC: 91 MG/DL
POTASSIUM SERPL-SCNC: 4.3 MMOL/L (ref 3.6–5.5)
PREALB SERPL-MCNC: 18.3 MG/DL (ref 18–38)
PROT SERPL-MCNC: 6.9 G/DL (ref 6–8.2)
SODIUM SERPL-SCNC: 138 MMOL/L (ref 135–145)
TRANSFERRIN SERPL-MCNC: 371 MG/DL (ref 200–370)
TRIGL SERPL-MCNC: 48 MG/DL (ref 0–149)
VIT B12 SERPL-MCNC: 429 PG/ML (ref 211–911)

## 2025-01-12 LAB — ZINC SERPL-MCNC: 61.8 UG/DL (ref 60–120)

## 2025-01-13 LAB
VIT B1 BLD-MCNC: 118 NMOL/L (ref 70–180)
VIT B6 SERPL-MCNC: 50.7 NMOL/L (ref 20–125)

## 2025-01-15 LAB — VIT B2 SERPL-SCNC: 8 NMOL/L (ref 5–50)

## 2025-02-13 ENCOUNTER — OFFICE VISIT (OUTPATIENT)
Dept: URGENT CARE | Facility: PHYSICIAN GROUP | Age: 30
End: 2025-02-13
Payer: MEDICAID

## 2025-02-13 VITALS
SYSTOLIC BLOOD PRESSURE: 118 MMHG | WEIGHT: 237.6 LBS | BODY MASS INDEX: 35.09 KG/M2 | OXYGEN SATURATION: 98 % | TEMPERATURE: 98.6 F | HEART RATE: 93 BPM | DIASTOLIC BLOOD PRESSURE: 80 MMHG

## 2025-02-13 DIAGNOSIS — J06.9 VIRAL URI WITH COUGH: ICD-10-CM

## 2025-02-13 DIAGNOSIS — J02.9 PHARYNGITIS, UNSPECIFIED ETIOLOGY: ICD-10-CM

## 2025-02-13 LAB
FLUAV RNA SPEC QL NAA+PROBE: NEGATIVE
FLUBV RNA SPEC QL NAA+PROBE: NEGATIVE
RSV RNA SPEC QL NAA+PROBE: NEGATIVE
S PYO DNA SPEC NAA+PROBE: NOT DETECTED
SARS-COV-2 RNA RESP QL NAA+PROBE: NEGATIVE

## 2025-02-13 PROCEDURE — 3074F SYST BP LT 130 MM HG: CPT

## 2025-02-13 PROCEDURE — 87637 SARSCOV2&INF A&B&RSV AMP PRB: CPT | Mod: QW

## 2025-02-13 PROCEDURE — 87651 STREP A DNA AMP PROBE: CPT

## 2025-02-13 PROCEDURE — 99213 OFFICE O/P EST LOW 20 MIN: CPT

## 2025-02-13 PROCEDURE — 3079F DIAST BP 80-89 MM HG: CPT

## 2025-02-13 RX ORDER — LIDOCAINE HYDROCHLORIDE 20 MG/ML
15 SOLUTION OROPHARYNGEAL PRN
Qty: 100 ML | Refills: 0 | Status: SHIPPED | OUTPATIENT
Start: 2025-02-13 | End: 2025-02-20

## 2025-02-13 RX ORDER — DEXTROMETHORPHAN HYDROBROMIDE AND PROMETHAZINE HYDROCHLORIDE 15; 6.25 MG/5ML; MG/5ML
5 SYRUP ORAL EVERY 6 HOURS PRN
Qty: 100 ML | Refills: 0 | Status: SHIPPED | OUTPATIENT
Start: 2025-02-13

## 2025-02-13 RX ORDER — ALBUTEROL SULFATE 90 UG/1
2 INHALANT RESPIRATORY (INHALATION) EVERY 6 HOURS PRN
Qty: 8.5 G | Refills: 0 | Status: SHIPPED | OUTPATIENT
Start: 2025-02-13

## 2025-02-13 RX ORDER — FLUTICASONE PROPIONATE 50 MCG
1 SPRAY, SUSPENSION (ML) NASAL DAILY
Qty: 16 G | Refills: 0 | Status: SHIPPED | OUTPATIENT
Start: 2025-02-13

## 2025-02-13 ASSESSMENT — ENCOUNTER SYMPTOMS
VOMITING: 0
HEARTBURN: 0
HEADACHES: 1
WHEEZING: 0
COUGH: 1
SHORTNESS OF BREATH: 0
DIARRHEA: 0
CHILLS: 1
EYE REDNESS: 0
SORE THROAT: 1
SINUS PAIN: 0
FEVER: 1
WEAKNESS: 1
NAUSEA: 0

## 2025-02-13 ASSESSMENT — FIBROSIS 4 INDEX: FIB4 SCORE: 0.43

## 2025-02-13 ASSESSMENT — COPD QUESTIONNAIRES: COPD: 0

## 2025-02-13 NOTE — LETTER
[facility] [address] [address]     February 13, 2025    Patient: Sintia Jain   YOB: 1995   Date of Visit: 2/13/2025       To Whom It May Concern:    Sintia Jain was seen and treated in our department on 2/13/2025. Please excuse absence from work due to illness starting 2/13/25 through 2/15/25.    Sincerely,     YVON Cabral.

## 2025-02-13 NOTE — PROGRESS NOTES
Subjective     April Anamaria Jain is a 29 y.o. female who presents with Fever (Fever, cough, moving into chest. 3 days. Headache. Worse yesterday. Sore throat. )            Cough  This is a new problem. The current episode started in the past 7 days. The problem has been gradually worsening. The cough is Non-productive. Associated symptoms include chills, a fever, headaches, nasal congestion and a sore throat. Pertinent negatives include no chest pain, ear congestion, ear pain, eye redness, heartburn, rash, shortness of breath or wheezing. Associated symptoms comments: Tmax 102 last night. Treatments tried: Mucinex, Tylenol, OTC cold/flu. The treatment provided no relief. There is no history of asthma, bronchitis, COPD, emphysema or pneumonia.       Review of Systems   Constitutional:  Positive for chills and fever.   HENT:  Positive for congestion and sore throat. Negative for ear discharge, ear pain and sinus pain.    Eyes:  Negative for redness.   Respiratory:  Positive for cough. Negative for shortness of breath and wheezing.    Cardiovascular:  Negative for chest pain.   Gastrointestinal:  Negative for diarrhea, heartburn, nausea and vomiting.   Skin:  Negative for rash.   Neurological:  Positive for weakness and headaches.              Objective     /80   Pulse 93   Temp 37 °C (98.6 °F) (Temporal)   Wt 108 kg (237 lb 9.6 oz)   SpO2 98%   BMI 35.09 kg/m²      Physical Exam  Constitutional:       Appearance: Normal appearance. She is normal weight.   HENT:      Head: Normocephalic and atraumatic.      Right Ear: Tympanic membrane normal. Tympanic membrane is not erythematous.      Left Ear: Tympanic membrane normal. Tympanic membrane is not erythematous.      Nose: Congestion and rhinorrhea present.   Eyes:      Extraocular Movements: Extraocular movements intact.      Conjunctiva/sclera: Conjunctivae normal.      Pupils: Pupils are equal, round, and reactive to light.   Cardiovascular:      Rate and  Rhythm: Normal rate and regular rhythm.   Pulmonary:      Effort: Pulmonary effort is normal. No respiratory distress.      Breath sounds: Normal breath sounds. No stridor. No wheezing, rhonchi or rales.   Abdominal:      General: Abdomen is flat.      Palpations: Abdomen is soft.   Musculoskeletal:         General: Normal range of motion.      Cervical back: Normal range of motion and neck supple.   Lymphadenopathy:      Cervical: No cervical adenopathy.   Skin:     General: Skin is warm and dry.   Neurological:      Mental Status: She is alert.              Assessment & Plan  Viral URI with cough    Orders:    POCT CEPHEID COV-2, FLU A/B, RSV - PCR    promethazine-dextromethorphan (PROMETHAZINE-DM) 6.25-15 MG/5ML syrup; Take 5 mL by mouth every 6 hours as needed for Cough for up to 20 doses.    albuterol 108 (90 Base) MCG/ACT Aero Soln inhalation aerosol; Inhale 2 Puffs every 6 hours as needed for Shortness of Breath.    fluticasone (FLONASE) 50 MCG/ACT nasal spray; Administer 1 Spray into affected nostril(S) every day.    Results for orders placed or performed in visit on 02/13/25   POCT CEPHEID COV-2, FLU A/B, RSV - PCR    Collection Time: 02/13/25  1:21 PM   Result Value Ref Range    SARS-CoV-2 by PCR Negative Negative, Invalid    Influenza virus A RNA Negative Negative, Invalid    Influenza virus B, PCR Negative Negative, Invalid    RSV, PCR Negative Negative, Invalid   POCT CEPHEID GROUP A STREP - PCR    Collection Time: 02/13/25  1:36 PM   Result Value Ref Range    POC Group A Strep, PCR Not Detected Not Detected, Invalid      Pharyngitis, unspecified etiology    Orders:    lidocaine (XYLOCAINE) 2 % Solution; Take 15 mL by mouth as needed for Throat/Mouth Pain for up to 7 days.    POCT CEPHEID GROUP A STREP - PCR     This is an acute condition.  Previous visits, pmhx, medication, and VS reviewed.  Patient not acutely ill appearing or in acute distress.  VSS.  Strong productive cough throughout visit.  Lung  sounds clear, no wheezing or rhonchi.  No signs c/f acute bacterial ENT infection at this time. Symptoms likely viral in etiology.  It was explained today that due to the viral nature of the patient's illness, we will treat symptomatically today. Discussed that symptoms do not warrant antibiotics.    Offered Albuterol inhaler prn for intermittent cough causing shortness of breath.  Promethazine DM HS prn.  Flonase daily for nasal congestion causing post nasal drip irritating her HS.  Viscous lidocaine per patient request for viral pharyngitis.  Encouraged to continue OTC supportive meds including Mucinex for decongestant BID, and Tylenol/Ibuprofen PRN. Humidification, increased fluids intake, adding electrolytes to diet, gargling salt water, drinking warm liquids with honey, OTC throat spray, and rest also discussed.   Discussed side effects and interactions of OTC meds and any prescribed.  Patient instructed to return to clinic if symptoms do not improve or worsen within 5-7 days.  Also informed that if chest pain or increased shortness of breath develop to immediately follow up for evaluation in the ED. Advised of risks of not doing so.    The patient demonstrated a good understanding and agreed with the treatment plan. Denies further questions.

## 2025-05-15 ENCOUNTER — OFFICE VISIT (OUTPATIENT)
Dept: URGENT CARE | Facility: PHYSICIAN GROUP | Age: 30
End: 2025-05-15
Payer: MEDICAID

## 2025-05-15 ENCOUNTER — HOSPITAL ENCOUNTER (OUTPATIENT)
Dept: LAB | Facility: MEDICAL CENTER | Age: 30
End: 2025-05-15
Attending: NURSE PRACTITIONER
Payer: MEDICAID

## 2025-05-15 VITALS
OXYGEN SATURATION: 98 % | BODY MASS INDEX: 37.27 KG/M2 | WEIGHT: 252.4 LBS | HEART RATE: 61 BPM | TEMPERATURE: 98.1 F | DIASTOLIC BLOOD PRESSURE: 80 MMHG | SYSTOLIC BLOOD PRESSURE: 130 MMHG | RESPIRATION RATE: 16 BRPM

## 2025-05-15 DIAGNOSIS — R10.12 LEFT UPPER QUADRANT ABDOMINAL PAIN: ICD-10-CM

## 2025-05-15 DIAGNOSIS — R19.7 NAUSEA VOMITING AND DIARRHEA: ICD-10-CM

## 2025-05-15 DIAGNOSIS — R10.12 LEFT UPPER QUADRANT ABDOMINAL PAIN: Primary | ICD-10-CM

## 2025-05-15 DIAGNOSIS — R11.2 NAUSEA VOMITING AND DIARRHEA: ICD-10-CM

## 2025-05-15 LAB
ALBUMIN SERPL BCP-MCNC: 3.9 G/DL (ref 3.2–4.9)
ALBUMIN/GLOB SERPL: 1.4 G/DL
ALP SERPL-CCNC: 76 U/L (ref 30–99)
ALT SERPL-CCNC: 13 U/L (ref 2–50)
ANION GAP SERPL CALC-SCNC: 9 MMOL/L (ref 7–16)
ANISOCYTOSIS BLD QL SMEAR: ABNORMAL
AST SERPL-CCNC: 15 U/L (ref 12–45)
BASOPHILS # BLD AUTO: 0.6 % (ref 0–1.8)
BASOPHILS # BLD: 0.05 K/UL (ref 0–0.12)
BILIRUB SERPL-MCNC: 0.4 MG/DL (ref 0.1–1.5)
BUN SERPL-MCNC: 11 MG/DL (ref 8–22)
CALCIUM ALBUM COR SERPL-MCNC: 8.9 MG/DL (ref 8.5–10.5)
CALCIUM SERPL-MCNC: 8.8 MG/DL (ref 8.5–10.5)
CHLORIDE SERPL-SCNC: 105 MMOL/L (ref 96–112)
CO2 SERPL-SCNC: 24 MMOL/L (ref 20–33)
COMMENT 1642: NORMAL
CREAT SERPL-MCNC: 0.77 MG/DL (ref 0.5–1.4)
EOSINOPHIL # BLD AUTO: 0.11 K/UL (ref 0–0.51)
EOSINOPHIL NFR BLD: 1.4 % (ref 0–6.9)
ERYTHROCYTE [DISTWIDTH] IN BLOOD BY AUTOMATED COUNT: 44 FL (ref 35.9–50)
GFR SERPLBLD CREATININE-BSD FMLA CKD-EPI: 106 ML/MIN/1.73 M 2
GLOBULIN SER CALC-MCNC: 2.8 G/DL (ref 1.9–3.5)
GLUCOSE SERPL-MCNC: 79 MG/DL (ref 65–99)
HCT VFR BLD AUTO: 33.4 % (ref 37–47)
HGB BLD-MCNC: 9.3 G/DL (ref 12–16)
HYPOCHROMIA BLD QL SMEAR: ABNORMAL
IMM GRANULOCYTES # BLD AUTO: 0.02 K/UL (ref 0–0.11)
IMM GRANULOCYTES NFR BLD AUTO: 0.2 % (ref 0–0.9)
LIPASE SERPL-CCNC: 18 U/L (ref 11–82)
LYMPHOCYTES # BLD AUTO: 2.34 K/UL (ref 1–4.8)
LYMPHOCYTES NFR BLD: 29.1 % (ref 22–41)
MCH RBC QN AUTO: 20 PG (ref 27–33)
MCHC RBC AUTO-ENTMCNC: 27.8 G/DL (ref 32.2–35.5)
MCV RBC AUTO: 71.7 FL (ref 81.4–97.8)
MICROCYTES BLD QL SMEAR: ABNORMAL
MONOCYTES # BLD AUTO: 0.74 K/UL (ref 0–0.85)
MONOCYTES NFR BLD AUTO: 9.2 % (ref 0–13.4)
MORPHOLOGY BLD-IMP: NORMAL
NEUTROPHILS # BLD AUTO: 4.78 K/UL (ref 1.82–7.42)
NEUTROPHILS NFR BLD: 59.5 % (ref 44–72)
NRBC # BLD AUTO: 0 K/UL
NRBC BLD-RTO: 0 /100 WBC (ref 0–0.2)
OVALOCYTES BLD QL SMEAR: NORMAL
PLATELET # BLD AUTO: 281 K/UL (ref 164–446)
PLATELET BLD QL SMEAR: NORMAL
PMV BLD AUTO: 11.8 FL (ref 9–12.9)
POIKILOCYTOSIS BLD QL SMEAR: NORMAL
POTASSIUM SERPL-SCNC: 4 MMOL/L (ref 3.6–5.5)
PROT SERPL-MCNC: 6.7 G/DL (ref 6–8.2)
RBC # BLD AUTO: 4.66 M/UL (ref 4.2–5.4)
RBC BLD AUTO: PRESENT
SODIUM SERPL-SCNC: 138 MMOL/L (ref 135–145)
WBC # BLD AUTO: 8 K/UL (ref 4.8–10.8)

## 2025-05-15 PROCEDURE — 3079F DIAST BP 80-89 MM HG: CPT | Performed by: NURSE PRACTITIONER

## 2025-05-15 PROCEDURE — 85025 COMPLETE CBC W/AUTO DIFF WBC: CPT

## 2025-05-15 PROCEDURE — 99214 OFFICE O/P EST MOD 30 MIN: CPT | Performed by: NURSE PRACTITIONER

## 2025-05-15 PROCEDURE — 3075F SYST BP GE 130 - 139MM HG: CPT | Performed by: NURSE PRACTITIONER

## 2025-05-15 PROCEDURE — 36415 COLL VENOUS BLD VENIPUNCTURE: CPT

## 2025-05-15 PROCEDURE — 83690 ASSAY OF LIPASE: CPT

## 2025-05-15 PROCEDURE — 80053 COMPREHEN METABOLIC PANEL: CPT

## 2025-05-15 RX ORDER — PNV NO.95/FERROUS FUM/FOLIC AC 28MG-0.8MG
TABLET ORAL
COMMUNITY
Start: 2025-01-27

## 2025-05-15 RX ORDER — ONDANSETRON 4 MG/1
4 TABLET, ORALLY DISINTEGRATING ORAL EVERY 6 HOURS PRN
Qty: 10 TABLET | Refills: 0 | Status: SHIPPED | OUTPATIENT
Start: 2025-05-15 | End: 2025-05-20

## 2025-05-15 RX ORDER — FERROUS SULFATE 300 MG/5ML
300 LIQUID (ML) ORAL
COMMUNITY
Start: 2025-04-21 | End: 2025-08-19

## 2025-05-15 ASSESSMENT — ENCOUNTER SYMPTOMS
BLOATING: 1
CHILLS: 1
ABDOMINAL PAIN: 1
VOMITING: 1
DIARRHEA: 1
FEVER: 0
FLATUS: 1

## 2025-05-15 ASSESSMENT — FIBROSIS 4 INDEX: FIB4 SCORE: 0.43

## 2025-05-15 NOTE — LETTER
May 15, 2025    To Whom It May Concern:         This is confirmation that Sintia Jain attended her scheduled appointment with AMANDA Cowan on 5/15/25. Please excuse her absence starting 5/14/2025. She may return on 5/20/2025 or sooner if better.          If you have any questions please do not hesitate to call me at the phone number listed below.    Sincerely,          YVON Cowan.  [phone]

## 2025-05-15 NOTE — PROGRESS NOTES
Subjective:     April Anamaria Jain is a 29 y.o. female who presents for Diarrhea (Diarrhea, throwing up, stomach pain X 2 days )      Diarrhea   This is a new problem. The current episode started yesterday (April is a 29 year old female who presents to  today with complaints of N/V/D abdominal pain that started yesterday. She notes there has had ill co-workers with similar symptoms). The problem has been unchanged. Associated symptoms include abdominal pain, bloating, chills, increased flatus and vomiting. Pertinent negatives include no fever. Risk factors include ill contacts. She has tried nothing for the symptoms. There is no history of bowel resection, inflammatory bowel disease, irritable bowel syndrome, malabsorption, a recent abdominal surgery or short gut syndrome. Gastric bypass, lap santiago, bleeding ulcer         Review of Systems   Constitutional:  Positive for chills. Negative for fever.   Gastrointestinal:  Positive for abdominal pain, bloating, diarrhea, flatus and vomiting.       PMH: Past Medical History[1]  ALLERGIES: Allergies[2]  SURGHX: Past Surgical History[3]  SOCHX: Social History[4]  FH:   Family History   Problem Relation Age of Onset    Hypertension Mother     Cancer Mother         Uterine    Diabetes Maternal Grandmother          Objective:   /80   Pulse 61   Temp 36.7 °C (98.1 °F) (Temporal)   Resp 16   Wt 114 kg (252 lb 6.4 oz)   SpO2 98%   BMI 37.27 kg/m²     Physical Exam  Vitals and nursing note reviewed.   Constitutional:       General: She is not in acute distress.     Appearance: Normal appearance. She is not ill-appearing.   HENT:      Head: Normocephalic and atraumatic.      Right Ear: External ear normal.      Left Ear: External ear normal.      Nose: No congestion or rhinorrhea.      Mouth/Throat:      Mouth: Mucous membranes are moist.   Eyes:      Extraocular Movements: Extraocular movements intact.      Pupils: Pupils are equal, round, and reactive to light.    Cardiovascular:      Rate and Rhythm: Normal rate and regular rhythm.      Pulses: Normal pulses.      Heart sounds: Normal heart sounds.   Pulmonary:      Effort: Pulmonary effort is normal.      Breath sounds: Normal breath sounds.   Abdominal:      General: Abdomen is flat. Bowel sounds are normal.      Palpations: Abdomen is soft.      Tenderness: There is abdominal tenderness in the left upper quadrant. There is no right CVA tenderness or left CVA tenderness.   Musculoskeletal:         General: Normal range of motion.      Cervical back: Normal range of motion and neck supple.   Skin:     General: Skin is warm and dry.      Capillary Refill: Capillary refill takes less than 2 seconds.   Neurological:      General: No focal deficit present.      Mental Status: She is alert and oriented to person, place, and time. Mental status is at baseline.   Psychiatric:         Mood and Affect: Mood normal.         Behavior: Behavior normal.         Thought Content: Thought content normal.         Judgment: Judgment normal.         Assessment/Plan:   Assessment      1. Left upper quadrant abdominal pain  US-ABDOMEN COMPLETE SURVEY    CBC WITH DIFFERENTIAL    Comp Metabolic Panel    LIPASE      2. Nausea vomiting and diarrhea  ondansetron (ZOFRAN ODT) 4 MG TABLET DISPERSIBLE        Differential diagnoses discussed with pt. Symptoms are likely related to gastroenteritis as this is circulating throughout her workplace. Due to focal tenderness of LUQ, US was ordered Stat in addition to CBC and CMP. I will notify her of results. Importance of replacing electrolytes discussed and zofran was ordered for relief of nausea. Pt to seek care in ER if symptoms worsen.        [1]   Past Medical History:  Diagnosis Date    Anesthesia     Anxiety     Arthritis     Awareness under anesthesia     During cholecystectomy    Coagulopathy (HCC) 6/3/2023    Delayed emergence from general anesthesia     Depression     GERD (gastroesophageal  "reflux disease)     Heart burn     Hiatus hernia syndrome     Hypertension     Indigestion     Thrombocytopenia (HCC) 6/3/2023   [2]   Allergies  Allergen Reactions    Latex Hives    Prozac [Fluoxetine] Hives    Tramadol Hives    Celexa [Citalopram] Anxiety    Compazine [Prochlorperazine] Itching     Patient states she felt like she has \"bugs in her skin\"    Wellbutrin [Bupropion] Anxiety   [3]   Past Surgical History:  Procedure Laterality Date    DC UPPER GI ENDOSCOPY,DIAGNOSIS N/A 6/3/2023    Procedure: GASTROSCOPY;  Surgeon: You Nieto M.D.;  Location: [facility];  Service: Gastroenterology    GASTROSCOPY WITH ENDOSTAT N/A 6/3/2023    Procedure: EGD, WITH CAUTERIZATION;  Surgeon: You Nieto M.D.;  Location: SURGERY [facility];  Service: Gastroenterology    DC LAP RANDY RESTRICT PROC LONGITUDINAL GAS* N/A 2022    Procedure: ROBOTIC WU EN Y GASTRIC BYPASS, HIATAL HERNIA REPAIR;  Surgeon: Aime To M.D.;  Location: [facility];  Service: Gen Robotic    ROBOTIC ASSISTED LAP HIATAL HERNIA REP N/A 2022    Procedure: REPAIR, HERNIA, HIATAL, ROBOT-ASSISTED, LAPAROSCOPIC;  Surgeon: Aime To M.D.;  Location: [facility];  Service: Gen Robotic    REPEAT C SECTION  2021    Procedure:  SECTION, REPEAT;  Surgeon: Ronnie Seals M.D.;  Location: SURGERY LABOR AND DELIVERY;  Service: Labor and Delivery    ABDOMINAL EXPLORATION      OTHER ABDOMINAL SURGERY      PRIMARY C SECTION      TONSILLECTOMY Bilateral    [4]   Social History  Socioeconomic History    Marital status: Single    Highest education level: GED or equivalent   Tobacco Use    Smoking status: Former     Current packs/day: 0.00     Average packs/day: 0.5 packs/day for 9.0 years (4.5 ttl pk-yrs)     Types: Cigarettes     Start date: 10/5/2013     Quit date: 10/5/2022     Years since quittin.6    Smokeless tobacco: Never    Tobacco comments:     cutting back to quit   Vaping Use "    Vaping status: Never Used   Substance and Sexual Activity    Alcohol use: Not Currently    Drug use: Not Currently     Types: Marijuana    Sexual activity: Yes     Partners: Male     Comment: None      Social Drivers of Health     Financial Resource Strain: Medium Risk (6/4/2023)    Overall Financial Resource Strain (CARDIA)     Difficulty of Paying Living Expenses: Somewhat hard   Food Insecurity: Food Insecurity Present (6/4/2023)    Hunger Vital Sign     Worried About Running Out of Food in the Last Year: Sometimes true     Ran Out of Food in the Last Year: Sometimes true   Transportation Needs: No Transportation Needs (6/4/2023)    PRAPARE - Transportation     Lack of Transportation (Medical): No     Lack of Transportation (Non-Medical): No   Physical Activity: Sufficiently Active (6/4/2023)    Exercise Vital Sign     Days of Exercise per Week: 5 days     Minutes of Exercise per Session: 60 min   Stress: No Stress Concern Present (6/4/2023)    [employer] - Occupational Stress Questionnaire     Feeling of Stress : Only a little   Social Connections: Moderately Isolated (6/4/2023)    Social Connection and Isolation Panel [NHANES]     Frequency of Communication with Friends and Family: More than three times a week     Frequency of Social Gatherings with Friends and Family: Once a week     Attends Restoration Services: Never     Active Member of Clubs or Organizations: No     Attends Club or Organization Meetings: Never     Marital Status: Living with partner   Housing Stability: Low Risk  (6/4/2023)    Housing Stability Vital Sign     Unable to Pay for Housing in the Last Year: No     Number of Places Lived in the Last Year: 1     Unstable Housing in the Last Year: No

## 2025-06-03 ENCOUNTER — OFFICE VISIT (OUTPATIENT)
Dept: URGENT CARE | Facility: PHYSICIAN GROUP | Age: 30
End: 2025-06-03
Payer: MEDICAID

## 2025-06-03 VITALS
RESPIRATION RATE: 16 BRPM | BODY MASS INDEX: 37.51 KG/M2 | OXYGEN SATURATION: 99 % | TEMPERATURE: 98.4 F | WEIGHT: 254 LBS | DIASTOLIC BLOOD PRESSURE: 76 MMHG | HEART RATE: 64 BPM | SYSTOLIC BLOOD PRESSURE: 120 MMHG

## 2025-06-03 DIAGNOSIS — K52.9 GASTROENTERITIS: Primary | ICD-10-CM

## 2025-06-03 PROCEDURE — 3074F SYST BP LT 130 MM HG: CPT | Performed by: NURSE PRACTITIONER

## 2025-06-03 PROCEDURE — 99213 OFFICE O/P EST LOW 20 MIN: CPT | Performed by: NURSE PRACTITIONER

## 2025-06-03 PROCEDURE — 3078F DIAST BP <80 MM HG: CPT | Performed by: NURSE PRACTITIONER

## 2025-06-03 RX ORDER — DICYCLOMINE HCL 20 MG
TABLET ORAL
Qty: 20 TABLET | Refills: 0 | Status: SHIPPED | OUTPATIENT
Start: 2025-06-03

## 2025-06-03 ASSESSMENT — FIBROSIS 4 INDEX: FIB4 SCORE: 0.43

## 2025-06-03 NOTE — PROGRESS NOTES
Subjective:   Sintia Jain is a 29 y.o. female who presents for Vomiting (Vomiting after eating x 3 days. Note for work. Been using zofran but not helping. Stomach pain. )    Patient is a 29-year-old female presenting today reporting 2 to 3-day history of abdominal cramping, diarrhea, nausea, and vomiting.  Patient is been taking Zofran which has helped some with symptoms.  She is tolerating fluids well and has not had any vomiting today.  She is drinking a Pepsi in clinic.  Patient denies any fevers, chills, hematemesis, or blood or mucus in her stool.      Medications, Allergies, and current problem list reviewed today in Epic.     Objective:     /76   Pulse 64   Temp 36.9 °C (98.4 °F) (Temporal)   Resp 16   Wt 115 kg (254 lb)   SpO2 99%     Physical Exam  Vitals reviewed.   Constitutional:       General: She is not in acute distress.     Appearance: Normal appearance. She is not ill-appearing or toxic-appearing.   HENT:      Head: Normocephalic.      Nose: Nose normal.      Mouth/Throat:      Mouth: Mucous membranes are moist.   Eyes:      Extraocular Movements: Extraocular movements intact.      Conjunctiva/sclera: Conjunctivae normal.      Pupils: Pupils are equal, round, and reactive to light.   Cardiovascular:      Rate and Rhythm: Normal rate.   Pulmonary:      Effort: Pulmonary effort is normal.   Abdominal:      General: Abdomen is flat. Bowel sounds are increased.      Palpations: Abdomen is soft.      Tenderness: There is generalized abdominal tenderness. There is no guarding or rebound. Negative signs include McBurney's sign.   Musculoskeletal:         General: Normal range of motion.      Cervical back: Normal range of motion and neck supple.   Skin:     General: Skin is warm and dry.   Neurological:      Mental Status: She is alert and oriented to person, place, and time.   Psychiatric:         Mood and Affect: Mood normal.         Behavior: Behavior normal.         Thought Content:  Thought content normal.         Judgment: Judgment normal.         Assessment/Plan:     Diagnosis and associated orders:     1. Gastroenteritis  dicyclomine (BENTYL) 20 MG Tab         Comments/MDM:     This patient presents with  nausea, vomiting & diarrhea. Differential diagnoses includes possible acute gastroenteritis. Abdominal exam without peritoneal signs. Currently  without evidence of dehydration. No evidence of surgical abdomen or other acute medical emergency including bowel obstruction, appendicitis, or acute cholecystitis at this time. Presentation not consistent with other acute, emergent causes of vomiting / diarrhea at this time. No indication for abdominal imaging.  Vital signs all within normal range    Plan:   Discussed adding a daily probiotic and Bentyl for diarrhea. Zofran  as needed for nausea/vomiting as prescribed above  May use over-the-counter Imodium or Pepto-Bismol to help with diarrhea.    Encourage fluids (avoid sugary drinks) and small meals as tolerated (avoid fatty foods and sugary foods).  Follow up if symptoms persist/worsen, new symptoms develop or any other concerns arise.  ER precautions discussed.  Patient was involved with shared decision-making throughout the exam today and verbalizes understanding regards to plan of care, discharge instructions, and follow-up         Differential diagnosis, natural history, supportive care, and indications for immediate follow-up discussed.    Advised the patient to follow-up with the primary care physician for recheck, reevaluation, and consideration of further management.    I personally reviewed prior external notes and test results pertinent to today's visit as well as additional imaging and testing completed in clinic today.     Please note that this dictation was created using voice recognition software. I have made a reasonable attempt to correct obvious errors, but I expect that there are errors of grammar and possibly content that I  did not discover before finalizing the note.

## 2025-06-03 NOTE — LETTER
[facility] URGENT CARE [address] [address]     Chantelle 3, 2025    Patient: Sintia Jain   YOB: 1995   Date of Visit: 6/3/2025       To Whom It May Concern:    Sintia Jain was seen and treated in our department on 6/3/2025.  Will need to be excused from work due to illness and may return on 6/5/2025 as long as symptoms are improving.    Sincerely,     YVON Colin.

## 2025-06-10 ENCOUNTER — OFFICE VISIT (OUTPATIENT)
Dept: URGENT CARE | Facility: PHYSICIAN GROUP | Age: 30
End: 2025-06-10
Payer: MEDICAID

## 2025-06-10 VITALS
WEIGHT: 250.5 LBS | HEART RATE: 66 BPM | BODY MASS INDEX: 39.31 KG/M2 | DIASTOLIC BLOOD PRESSURE: 64 MMHG | HEIGHT: 67 IN | SYSTOLIC BLOOD PRESSURE: 120 MMHG | TEMPERATURE: 97.8 F | RESPIRATION RATE: 18 BRPM | OXYGEN SATURATION: 99 %

## 2025-06-10 DIAGNOSIS — R50.9 FEVER, UNSPECIFIED FEVER CAUSE: Primary | ICD-10-CM

## 2025-06-10 DIAGNOSIS — R11.0 NAUSEA: ICD-10-CM

## 2025-06-10 DIAGNOSIS — R19.7 DIARRHEA, UNSPECIFIED TYPE: ICD-10-CM

## 2025-06-10 PROCEDURE — 3078F DIAST BP <80 MM HG: CPT | Performed by: NURSE PRACTITIONER

## 2025-06-10 PROCEDURE — 3074F SYST BP LT 130 MM HG: CPT | Performed by: NURSE PRACTITIONER

## 2025-06-10 PROCEDURE — 99213 OFFICE O/P EST LOW 20 MIN: CPT | Performed by: NURSE PRACTITIONER

## 2025-06-10 ASSESSMENT — FIBROSIS 4 INDEX: FIB4 SCORE: 0.43

## 2025-06-10 NOTE — LETTER
[facility] URGENT CARE [address] [address]     Chantelle 10, 2025    Patient: Sintia Jain   YOB: 1995   Date of Visit: 6/10/2025       To Whom It May Concern:    Sintia Jain was seen and treated in our department on 6/10/2025.  Will need to be excused from work and may return on 6/11/2025.    Sincerely,     YVON Colin.

## 2025-06-10 NOTE — PROGRESS NOTES
"Subjective:   Sintia Jain is a 29 y.o. female who presents for GI Problem (Pt presents for possible side effects after an iron infusion. C/o nausea, diarrhea, fevers, extreme fatigue.)    Patient is a 29-year-old female presenting clinic today reporting yesterday after receiving her first iron infusion she got home and had nausea, fever, fatigue, and has had several episodes of diarrhea.  Patient states that symptoms are improving, she has not had any returning fevers.  She is tolerating fluids well.  She denies any vomiting, blood or mucus in her stool, rashes, or facial oral swelling.  Patient did take some Tylenol.    Medications, Allergies, and current problem list reviewed today in Epic.     Objective:     /64 (BP Location: Left arm, Patient Position: Sitting, BP Cuff Size: Large adult)   Pulse 66   Temp 36.6 °C (97.8 °F) (Temporal)   Resp 18   Ht 1.702 m (5' 7\")   Wt 114 kg (250 lb 8 oz)   SpO2 99%     Physical Exam  Vitals reviewed.   Constitutional:       General: She is not in acute distress.     Appearance: Normal appearance. She is not ill-appearing or toxic-appearing.   HENT:      Head: Normocephalic.      Nose: Nose normal.      Mouth/Throat:      Mouth: Mucous membranes are moist.   Eyes:      Extraocular Movements: Extraocular movements intact.      Conjunctiva/sclera: Conjunctivae normal.      Pupils: Pupils are equal, round, and reactive to light.   Cardiovascular:      Rate and Rhythm: Normal rate and regular rhythm.   Pulmonary:      Effort: Pulmonary effort is normal.   Abdominal:      General: Abdomen is flat. Bowel sounds are normal. There is no distension.      Palpations: Abdomen is soft.      Tenderness: There is no abdominal tenderness. There is no guarding or rebound.   Musculoskeletal:         General: Normal range of motion.      Cervical back: Normal range of motion and neck supple.   Skin:     General: Skin is warm and dry.   Neurological:      Mental Status: She is " alert and oriented to person, place, and time.   Psychiatric:         Mood and Affect: Mood normal.         Behavior: Behavior normal.         Thought Content: Thought content normal.         Judgment: Judgment normal.         Assessment/Plan:     Diagnosis and associated orders:     1. Fever, unspecified fever cause        2. Diarrhea, unspecified type        3. Nausea           Comments/MDM:     This is an acute condition.  Patient is nontoxic-appearing in no acute distress.  Patient is tolerating fluids throughout the exam.  Patient does not appear dehydrated.  She is speaking full sentences.  Lung sounds are clear on physical exam.  Heart rate and rhythm is regular.  Abdomen is soft and nontender.  Bowel sounds present in all 4 quadrants.  Vital signs all within normal range.  Discussed with differentials, possible mild intolerance to recent iron infusion or viral illness.  At this time recommended pretreating with Zyrtec or Claritin starting today prior to future iron infusions.  Recommended bland diet and increase as tolerated.  May take probiotic or Pepto to help with diarrhea.  Recommended patient follow-up with her primary care provider will order infusion.  Did discuss ER precautions and recommended that patient discuss symptoms with infusion clinic prior to next infusion tomorrow.  Work note was provided.  Patient was involved with shared decision-making throughout the exam today and verbalizes understanding regards to plan of care, discharge instructions, and follow-up         Differential diagnosis, natural history, supportive care, and indications for immediate follow-up discussed.    Advised the patient to follow-up with the primary care physician for recheck, reevaluation, and consideration of further management.    I personally reviewed prior external notes and test results pertinent to today's visit as well as additional imaging and testing completed in clinic today.     Please note that this  dictation was created using voice recognition software. I have made a reasonable attempt to correct obvious errors, but I expect that there are errors of grammar and possibly content that I did not discover before finalizing the note.

## 2025-07-10 ENCOUNTER — OFFICE VISIT (OUTPATIENT)
Dept: URGENT CARE | Facility: PHYSICIAN GROUP | Age: 30
End: 2025-07-10
Payer: MEDICAID

## 2025-07-10 VITALS
WEIGHT: 249.1 LBS | HEART RATE: 79 BPM | OXYGEN SATURATION: 99 % | TEMPERATURE: 98 F | BODY MASS INDEX: 39.01 KG/M2 | SYSTOLIC BLOOD PRESSURE: 140 MMHG | RESPIRATION RATE: 16 BRPM | DIASTOLIC BLOOD PRESSURE: 80 MMHG

## 2025-07-10 DIAGNOSIS — G43.909 MIGRAINE WITHOUT STATUS MIGRAINOSUS, NOT INTRACTABLE, UNSPECIFIED MIGRAINE TYPE: Primary | ICD-10-CM

## 2025-07-10 PROBLEM — Z87.19 HISTORY OF CHOLESTASIS DURING PREGNANCY: Status: RESOLVED | Noted: 2021-01-05 | Resolved: 2025-07-10

## 2025-07-10 PROBLEM — Z87.59 HISTORY OF CHOLESTASIS DURING PREGNANCY: Status: RESOLVED | Noted: 2021-01-05 | Resolved: 2025-07-10

## 2025-07-10 PROCEDURE — 3079F DIAST BP 80-89 MM HG: CPT | Performed by: FAMILY MEDICINE

## 2025-07-10 PROCEDURE — 3077F SYST BP >= 140 MM HG: CPT | Performed by: FAMILY MEDICINE

## 2025-07-10 PROCEDURE — 99214 OFFICE O/P EST MOD 30 MIN: CPT | Performed by: FAMILY MEDICINE

## 2025-07-10 RX ORDER — KETOROLAC TROMETHAMINE 15 MG/ML
15 INJECTION, SOLUTION INTRAMUSCULAR; INTRAVENOUS ONCE
Status: COMPLETED | OUTPATIENT
Start: 2025-07-10 | End: 2025-07-10

## 2025-07-10 RX ORDER — SUMATRIPTAN 6 MG/.5ML
6 INJECTION, SOLUTION SUBCUTANEOUS ONCE
Status: COMPLETED | OUTPATIENT
Start: 2025-07-10 | End: 2025-07-10

## 2025-07-10 RX ORDER — ONDANSETRON 4 MG/1
4 TABLET, ORALLY DISINTEGRATING ORAL ONCE
Status: COMPLETED | OUTPATIENT
Start: 2025-07-10 | End: 2025-07-10

## 2025-07-10 RX ADMIN — SUMATRIPTAN 6 MG: 6 INJECTION, SOLUTION SUBCUTANEOUS at 12:44

## 2025-07-10 RX ADMIN — ONDANSETRON 4 MG: 4 TABLET, ORALLY DISINTEGRATING ORAL at 12:31

## 2025-07-10 RX ADMIN — KETOROLAC TROMETHAMINE 15 MG: 15 INJECTION, SOLUTION INTRAMUSCULAR; INTRAVENOUS at 12:30

## 2025-07-10 ASSESSMENT — FIBROSIS 4 INDEX: FIB4 SCORE: 0.44

## 2025-07-10 NOTE — PROGRESS NOTES
Chief Complaint   Patient presents with    Migraine     Migraine, pressure in eyes, throwing up, X 3 days              Migraine   This is a new problem. The current episode started in the past 3 days. The problem occurs intermittently. The problem has been unchanged. The pain is located in the bilat temporal region. The pain does not radiate. The pain quality is similar to prior headaches. The quality of the pain is described as sharp. Associated symptoms include  nausea, phonophobia and photophobia. Pertinent negatives include no abdominal pain or fever. The symptoms are aggravated by activity and bright light. Patient has tried acetaminophen for the symptoms. The treatment provided no relief.       Never has been formally diagnosed with migraines       Social History[1]        Past Medical History[2]        Review of Systems   Constitutional: Negative for fever and chills.   Eyes: Positive for photophobia.   Respiratory: Negative for shortness of breath.    Cardiovascular: Negative for chest pain and palpitations.   Gastrointestinal: Positive for nausea. Negative for abdominal pain.   Skin: Negative for rash.   Neurological: Positive for dizziness and headaches.   Psychiatric/Behavioral: The patient is not nervous/anxious.    All other systems reviewed and are negative.         Objective:     BP (!) 140/80   Pulse 79   Temp 36.7 °C (98 °F) (Temporal)   Resp 16   Wt 113 kg (249 lb 1.6 oz)   SpO2 99%     Physical Exam   Constitutional: pt is oriented to person, place, and time.  Pt appears well-developed and well-nourished. No distress.   HENT:   Head: Normocephalic and atraumatic.   Mouth/Throat: Oropharynx is clear and moist. No oropharyngeal exudate.   Eyes: Conjunctivae and EOM are normal. Pupils are equal, round, and reactive to light. Right eye exhibits no discharge. Left eye exhibits no discharge. No scleral icterus.   Neck: Neck supple.   Cardiovascular: Normal rate and regular rhythm.     Pulmonary/Chest: Effort normal.   Lymphadenopathy:     Pt has no cervical adenopathy.   Neurologic: Alert and oriented. Cranial nerves II-XII intact, EOMs intact, no tongue deviation, PERRL, no facial asymmetry to motor or sensation, symmetric palate, normal finger-to-nose test, no pronator drift. No focal motor deficits. Symmetric reflexes. Normal station and gait, normal tandem walk. Coordination normal.   Skin: Skin is warm. Pt is not diaphoretic. No erythema. No pallor.   Psychiatric:  behavior is normal.   Nursing note and vitals reviewed.              Assessment/Plan:         1. Migraine without status migrainosus, not intractable, unspecified migraine type (Primary)   Pain unchanged after toradol, zofran, imitrex    Will tx to [facility] ED       Report called to ERP at [place]      Pt voiced understanding POC    - ondansetron (Zofran ODT) dispertab 4 mg  - ketorolac (Toradol) 15 MG/ML injection 15 mg  - SUMAtriptan Succinate (Imitrex) injection 6 mg             [1]   Social History  Tobacco Use    Smoking status: Every Day     Current packs/day: 0.00     Average packs/day: 0.5 packs/day for 9.0 years (4.5 ttl pk-yrs)     Types: Cigarettes     Start date: 10/5/2013     Last attempt to quit: 10/5/2022     Years since quittin.7    Smokeless tobacco: Never    Tobacco comments:     cutting back to quit   Vaping Use    Vaping status: Never Used   Substance Use Topics    Alcohol use: Not Currently    Drug use: Not Currently     Types: Marijuana   [2]   Past Medical History:  Diagnosis Date    Anesthesia     Anxiety     Arthritis     Awareness under anesthesia     During cholecystectomy    Coagulopathy (HCC) 6/3/2023    Delayed emergence from general anesthesia     Depression     GERD (gastroesophageal reflux disease)     Heart burn     Hiatus hernia syndrome     Hypertension     Indigestion     Thrombocytopenia (HCC) 6/3/2023

## (undated) DEVICE — SOLIDIFIER MEDC 1200ML -- CONVERT TO 356117

## (undated) DEVICE — LACTATED RINGERS INJ 1000 ML - (14EA/CA 60CA/PF)

## (undated) DEVICE — SUTURE 1 CHROMIC CTX ETHICON - (36PK/BX)

## (undated) DEVICE — TROCAR 5X100 NON BLADED Z-TH - READ KII (6/BX)

## (undated) DEVICE — PAD GROUNDING PRE-JELLED - (50EA/PK)

## (undated) DEVICE — SEALER VESSEL EXTEND FROM DAVINCI ENERGY (6EA/BX)

## (undated) DEVICE — STAPLER SUREFORM 60 12 MM (6EA/BX)

## (undated) DEVICE — PAD OR TABLE DA VINCI 2IN X 20IN X 72IN - (12EA/CA)

## (undated) DEVICE — ARMREST CRADLE FOAM - (2PR/PK 12PR/CA)

## (undated) DEVICE — SUTURE 0 VICRYL PLUS UR-6 - 27 INCH (36/BX)

## (undated) DEVICE — KIT  I.V. START (100EA/CA)

## (undated) DEVICE — GLOVE BIOGEL PI INDICATOR SZ 8.0 SURGICAL PF LF -(50/BX 4BX/CA)

## (undated) DEVICE — RETRACTOR O C SECTION LRY - (5/BX)

## (undated) DEVICE — SUTURE 0 VICRYL PLUS CT-1 - 36 INCH (36/BX)

## (undated) DEVICE — STAPLER SKIN DISP - (6/BX 10BX/CA) VISISTAT

## (undated) DEVICE — SCISSORS HANDLE HARMONIC ACE - 45CM CURVED (6/BX)

## (undated) DEVICE — SUTURE 2-0 CHROMIC GUT CT-1 27 (36PK/BX)"

## (undated) DEVICE — TOWEL STOP TIMEOUT SAFETY FLAG (40EA/CA)

## (undated) DEVICE — SYSTEM CLEARIFY VISUALIZATION (10EA/PK)

## (undated) DEVICE — CHLORAPREP 26 ML APPLICATOR - ORANGE TINT(25/CA)

## (undated) DEVICE — SUTURE VCRL SZ 0 L36IN ABSRB VLT L40MM CT 1/2 CIR J358H

## (undated) DEVICE — TIP CLEANER: Brand: VALLEYLAB

## (undated) DEVICE — 3000CC GUARDIAN II: Brand: GUARDIAN

## (undated) DEVICE — IRRIGATOR SUCTION ENDOWRIST DISPOSABLE OD8 MM (6EA/BX)

## (undated) DEVICE — SUTURE 2-0 SILK SH (36PK/BX)

## (undated) DEVICE — GOWN SURGEONS X-LARGE - DISP. (30/CA)

## (undated) DEVICE — (D)PREP SKN CHLRAPRP APPL 26ML -- CONVERT TO ITEM 371833

## (undated) DEVICE — SUTURE2-0 20CM STRATAFIX SPIRAL PDS CT-1 (12EA/BX)

## (undated) DEVICE — CANISTER SUCTION 3000ML MECHANICAL FILTER AUTO SHUTOFF MEDI-VAC NONSTERILE LF DISP  (40EA/CA)

## (undated) DEVICE — TOWELS CLOTH SURGICAL - (4/PK 20PK/CA)

## (undated) DEVICE — GLOVE BIOGEL SZ 8 SURGICAL PF LTX - (50PR/BX 4BX/CA)

## (undated) DEVICE — GOLD PROBE 7FR (5/BX)

## (undated) DEVICE — SUTURE2-0 27IN VCRL ANTI VIOL (36PK/BX)

## (undated) DEVICE — SET SUCTION/IRRIGATION WITH DISPOSABLE TIP (6/CA )PART #0250-070-520 IS A SUB

## (undated) DEVICE — PACK C-SECTION (2EA/CA)

## (undated) DEVICE — DRAIN PENROSE 3/8 IN X 12 IN - STERILE (25EA/BX)

## (undated) DEVICE — TUBE CONNECTING SUCTION - CLEAR PLASTIC STERILE 72 IN (50EA/CA)

## (undated) DEVICE — SET EXTENSION WITH 2 PORTS (48EA/CA) ***PART #2C8610 IS A SUBSTITUTE*****

## (undated) DEVICE — SUTURE 3-0 VICRYL PLUS CT-1 - 36 INCH (36/BX)

## (undated) DEVICE — SET LEADWIRE 5 LEAD BEDSIDE DISPOSABLE ECG (1SET OF 5/EA)

## (undated) DEVICE — RELOAD STAPLER SUREFORM 60 WHITE (12EA/BX)

## (undated) DEVICE — SENSOR OXIMETER ADULT SPO2 RD SET (20EA/BX)

## (undated) DEVICE — SUTURE 2-0 ETHIBOND CT-2 (12PK/BX)

## (undated) DEVICE — WATER IRRIGATION STERILE 1000ML (12EA/CA)

## (undated) DEVICE — ROBOTIC SURGERY SERVICES

## (undated) DEVICE — HEMOSTAT ENDOSCOPIC HEMOSPRAY 7FR

## (undated) DEVICE — PENCIL ES L3M BTTN SWCH S STL HEX LOK BLDE ELECTRD HOLSTER

## (undated) DEVICE — ELECTRODE DUAL RETURN W/ CORD - (50/PK)

## (undated) DEVICE — BIPOLAR FORCE DA VINCI 12X'S REISABLE

## (undated) DEVICE — CANNULA O2 COMFORT SOFT EAR ADULT 7 FT TUBING (50/CA)

## (undated) DEVICE — SUTURE 3-0 STRATAFIX SPIRAL PDS PLUS SH 15CM (12EA/BX)

## (undated) DEVICE — CANISTER SUCTION RIGID RED 1500CC (40EA/CA)

## (undated) DEVICE — GOWN WARMING STANDARD FLEX - (30/CA)

## (undated) DEVICE — HEAD HOLDER JUNIOR/ADULT

## (undated) DEVICE — COVER TIP ENDOWRIST HOT SHEAR - (10EA/BX) DA VINCI

## (undated) DEVICE — BITE BLOCK, DISP.

## (undated) DEVICE — REDUCER XI STAPLER 12MM TO 8MM (6EA/BX)

## (undated) DEVICE — Device: Brand: PORTEX

## (undated) DEVICE — PACK PROCEDURE SURG C SECT KT SMH

## (undated) DEVICE — SOLUTION IV 1000ML 0.9% SOD CHL

## (undated) DEVICE — Device

## (undated) DEVICE — RELOAD STAPLER SUREFORM 60 BLUE (12EA/BX)

## (undated) DEVICE — SOLUTION PREP PVP IODINE 3/4 OZ POUCH PACKET CONTAINER STERILE LATEX FREE

## (undated) DEVICE — DEVON™ KNEE AND BODY STRAP 60" X 3" (1.5 M X 7.6 CM): Brand: DEVON

## (undated) DEVICE — ABDOMINAL PAD: Brand: DERMACEA

## (undated) DEVICE — TUBING CLEARLINK DUO-VENT - C-FLO (48EA/CA)

## (undated) DEVICE — SUTURE 4-0 MONOCRYL PLUS PS-2 - 27 INCH (36/BX)

## (undated) DEVICE — SUTURE 2-0 30CM STRATAFIX SPIRAL PDO ***WAS PART #SXPD1B401 *****

## (undated) DEVICE — SUTURE 3-0 VICRYL PLUS REEL 54 (12PK/BX)"

## (undated) DEVICE — NEPTUNE 4 PORT MANIFOLD - (20/PK)

## (undated) DEVICE — NEEDLE INSFL 120MM 14GA VRRS - (20/BX)

## (undated) DEVICE — MEDI-VAC NON-CONDUCTIVE SUCTION TUBING: Brand: CARDINAL HEALTH

## (undated) DEVICE — LARGE, DISPOSABLE ALEXIS O C-SECTION PROTECTOR - RETRACTOR: Brand: ALEXIS ® O C-SECTION PROTECTOR - RETRACTOR

## (undated) DEVICE — REM POLYHESIVE ADULT PATIENT RETURN ELECTRODE: Brand: VALLEYLAB

## (undated) DEVICE — SLEEVE VASO CALF MED - (10PR/CA)

## (undated) DEVICE — KIT CUSTOM PROCEDURE SINGLE FOR ENDO  (15/CA)

## (undated) DEVICE — SUTURE 3.5-0 ETHIBOND GREEN CT-2 TAPER (36PK/BX)

## (undated) DEVICE — SEALANT TISSUE CLOSURE KIT FIBIRIN VISTASEAL 10ML (1EA/BX)

## (undated) DEVICE — SUTURE 2-0 20CM STRATAFIX SPIRAL SH NEEDLE (12/BX)

## (undated) DEVICE — DRAPE COLUMN  BOX OF 20

## (undated) DEVICE — GLOVE BIOGEL INDICATOR SZ 8 SURGICAL PF LTX - (50/BX 4BX/CA)

## (undated) DEVICE — SUCTION INSTRUMENT YANKAUER BULBOUS TIP W/O VENT (50EA/CA)

## (undated) DEVICE — SHEARS MONOPOLAR CURVED  DA VINCI 10X'S REUSABLE

## (undated) DEVICE — PENCIL ELECTSURG 10FT HLSTR - WITH BLADE (50EA/CA)

## (undated) DEVICE — SUTURE 4-0 VICRYL PLUS FS-2 - 27 INCH (36/BX)

## (undated) DEVICE — SUTURE 4-0 MONOCRYL PLUS PS-1 - 27 INCH (36/BX)

## (undated) DEVICE — BLADE SURGICAL #15 - (50/BX 3BX/CA)

## (undated) DEVICE — SEAL 5MM-8MM UNIVERSAL  BOX OF 10

## (undated) DEVICE — SUTURE VCRL SZ 2-0 L36IN ABSRB VLT L36MM CT-1 1/2 CIR J345H

## (undated) DEVICE — SLEEVE, SEQUENTIAL CALF REG

## (undated) DEVICE — SUTURE 3-0 VICRYL PLUS SH - 27 INCH (36/BX)

## (undated) DEVICE — OBTURATOR BLADELESS STANDARD 8MM (6EA/BX)

## (undated) DEVICE — SUTURE 2-0 ETHIBOND GREEN CT-2 TAPER (36PK/BX)

## (undated) DEVICE — MASK PANORAMIC OXYGEN PRO2 (30EA/CA)

## (undated) DEVICE — DERMABOND ADVANCED - (12EA/BX)

## (undated) DEVICE — SUTURE 0 VICRYL PLUS CT 36 (36PK/BX)"

## (undated) DEVICE — SEAL CANNULA STAPLER 12 MM (10EA/BX)

## (undated) DEVICE — CATHETER IV NON-SAFETY 18 GA X 1 1/4 (50/BX 4BX/CA)

## (undated) DEVICE — TAPE CLOTH MEDIPORE 6 INCH - (12RL/CA)

## (undated) DEVICE — SUTURE 2-0 VICRYL PLUS SH - 27 INCH (36/BX)

## (undated) DEVICE — SODIUM CHL IRRIGATION 0.9% 1000ML (12EA/CA)

## (undated) DEVICE — ELECTRODE RADIOLUCENT LF 3PK - RED DOT (3/PK 200PK/CA)

## (undated) DEVICE — SET TUBING PNEUMOCLEAR HIGH FLOW SMOKE EVACUATION (10EA/BX)

## (undated) DEVICE — TRAY SPINAL ANESTHESIA NON-SAFETY (10/CA)

## (undated) DEVICE — TELFA 8 X 10 BIOSEAL - (50EA/CA)

## (undated) DEVICE — BAG RETRIEVAL 10ML (10EA/BX)

## (undated) DEVICE — FILM CASSETTE ENDO

## (undated) DEVICE — DRESSING INTERCEED ABSORBABLE ADHESION BARRIER TC7 (10EA/CA)

## (undated) DEVICE — COVER LIGHT HANDLE ALC PLUS DISP (18EA/BX)

## (undated) DEVICE — DRAPE ARM  BOX OF 20

## (undated) DEVICE — DRAIN PENROSE STERILE 1/4 X - 18 IN  (25EA/BX)

## (undated) DEVICE — POOLE SUCTION INSTRUMENT WITH REMOVABLE SHEATH: Brand: POOLE

## (undated) DEVICE — SPONGE COUNTING BAG: Brand: DEVON

## (undated) DEVICE — STERILE LATEX POWDER-FREE SURGICAL GLOVESWITH NITRILE COATING: Brand: PROTEXIS

## (undated) DEVICE — SYRINGE 30 ML LS (56/BX)

## (undated) DEVICE — GLOVE SZ 7.5 BIOGEL PI MICRO - PF LF (50PR/BX)

## (undated) DEVICE — SUTURE GENERAL

## (undated) DEVICE — SPONGE GAUZESTER 4 X 4 4PLY - (128PK/CA)

## (undated) DEVICE — APPLICATOR 35CM RIGID VISTASEAL LAPAROSCOPIC (3EA/BX)

## (undated) DEVICE — NEEDLE DRIVER MEGA SUTURECUT DA VINCI 15X'S REUSABLE

## (undated) DEVICE — BLANKET UNDERBODY FULL ACCES - (5/CA)

## (undated) DEVICE — PACK ROOM TURNOVER L&D (12/CA)

## (undated) DEVICE — TUBE NG SALEM SUMP 16FR (50EA/CA)